# Patient Record
Sex: FEMALE | Race: WHITE | NOT HISPANIC OR LATINO | Employment: UNEMPLOYED | URBAN - NONMETROPOLITAN AREA
[De-identification: names, ages, dates, MRNs, and addresses within clinical notes are randomized per-mention and may not be internally consistent; named-entity substitution may affect disease eponyms.]

---

## 2017-01-01 ENCOUNTER — APPOINTMENT (OUTPATIENT)
Dept: CT IMAGING | Facility: HOSPITAL | Age: 57
End: 2017-01-01

## 2017-01-01 ENCOUNTER — APPOINTMENT (OUTPATIENT)
Dept: GENERAL RADIOLOGY | Facility: HOSPITAL | Age: 57
End: 2017-01-01

## 2017-01-01 ENCOUNTER — OFFICE VISIT (OUTPATIENT)
Dept: FAMILY MEDICINE CLINIC | Facility: CLINIC | Age: 57
End: 2017-01-01

## 2017-01-01 ENCOUNTER — HOSPITAL ENCOUNTER (OUTPATIENT)
Facility: HOSPITAL | Age: 57
Discharge: HOME | End: 2017-05-05
Attending: INTERNAL MEDICINE | Admitting: INTERNAL MEDICINE

## 2017-01-01 ENCOUNTER — APPOINTMENT (OUTPATIENT)
Dept: INTERVENTIONAL RADIOLOGY/VASCULAR | Facility: HOSPITAL | Age: 57
End: 2017-01-01

## 2017-01-01 ENCOUNTER — APPOINTMENT (OUTPATIENT)
Dept: LAB | Facility: HOSPITAL | Age: 57
End: 2017-01-01

## 2017-01-01 ENCOUNTER — APPOINTMENT (OUTPATIENT)
Dept: ULTRASOUND IMAGING | Facility: HOSPITAL | Age: 57
End: 2017-01-01

## 2017-01-01 ENCOUNTER — HOSPITAL ENCOUNTER (OUTPATIENT)
Facility: HOSPITAL | Age: 57
Discharge: HOME | End: 2017-03-13
Attending: FAMILY MEDICINE | Admitting: FAMILY MEDICINE

## 2017-01-01 ENCOUNTER — APPOINTMENT (OUTPATIENT)
Dept: INTERVENTIONAL RADIOLOGY/VASCULAR | Facility: HOSPITAL | Age: 57
End: 2017-01-01
Attending: FAMILY MEDICINE

## 2017-01-01 ENCOUNTER — APPOINTMENT (OUTPATIENT)
Dept: CARDIOLOGY | Facility: HOSPITAL | Age: 57
End: 2017-01-01
Attending: FAMILY MEDICINE

## 2017-01-01 ENCOUNTER — ANESTHESIA (OUTPATIENT)
Dept: EMERGENCY DEPT | Facility: HOSPITAL | Age: 57
End: 2017-01-01

## 2017-01-01 ENCOUNTER — HOSPITAL ENCOUNTER (INPATIENT)
Facility: HOSPITAL | Age: 57
LOS: 11 days | Discharge: HOME HEALTH | End: 2017-02-17
Attending: EMERGENCY MEDICINE | Admitting: INTERNAL MEDICINE

## 2017-01-01 ENCOUNTER — OFFICE VISIT (OUTPATIENT)
Dept: SURGERY | Facility: CLINIC | Age: 57
End: 2017-01-01

## 2017-01-01 ENCOUNTER — ANESTHESIA EVENT (OUTPATIENT)
Dept: EMERGENCY DEPT | Facility: HOSPITAL | Age: 57
End: 2017-01-01

## 2017-01-01 ENCOUNTER — ANESTHESIA (OUTPATIENT)
Dept: PERIOP | Facility: HOSPITAL | Age: 57
End: 2017-01-01

## 2017-01-01 ENCOUNTER — ANESTHESIA (OUTPATIENT)
Dept: GASTROENTEROLOGY | Facility: HOSPITAL | Age: 57
End: 2017-01-01

## 2017-01-01 ENCOUNTER — HOSPITAL ENCOUNTER (INPATIENT)
Facility: HOSPITAL | Age: 57
LOS: 5 days | Discharge: HOME | End: 2017-03-09
Attending: EMERGENCY MEDICINE | Admitting: FAMILY MEDICINE

## 2017-01-01 ENCOUNTER — ANESTHESIA EVENT (OUTPATIENT)
Dept: GASTROENTEROLOGY | Facility: HOSPITAL | Age: 57
End: 2017-01-01

## 2017-01-01 ENCOUNTER — HOSPITAL ENCOUNTER (EMERGENCY)
Facility: HOSPITAL | Age: 57
Discharge: HOME | End: 2017-02-19
Attending: EMERGENCY MEDICINE | Admitting: EMERGENCY MEDICINE

## 2017-01-01 ENCOUNTER — PREP FOR SURGERY (OUTPATIENT)
Dept: OTHER | Facility: HOSPITAL | Age: 57
End: 2017-01-01

## 2017-01-01 ENCOUNTER — HOSPITAL ENCOUNTER (INPATIENT)
Facility: HOSPITAL | Age: 57
LOS: 9 days | End: 2017-03-23
Attending: EMERGENCY MEDICINE | Admitting: FAMILY MEDICINE

## 2017-01-01 ENCOUNTER — ANESTHESIA (OUTPATIENT)
Dept: ICU | Facility: HOSPITAL | Age: 57
End: 2017-01-01

## 2017-01-01 ENCOUNTER — ANESTHESIA EVENT (OUTPATIENT)
Dept: PERIOP | Facility: HOSPITAL | Age: 57
End: 2017-01-01

## 2017-01-01 ENCOUNTER — ANESTHESIA EVENT (OUTPATIENT)
Dept: ICU | Facility: HOSPITAL | Age: 57
End: 2017-01-01

## 2017-01-01 ENCOUNTER — HOSPITAL ENCOUNTER (INPATIENT)
Facility: HOSPITAL | Age: 57
LOS: 28 days | End: 2017-06-03
Attending: FAMILY MEDICINE | Admitting: FAMILY MEDICINE

## 2017-01-01 ENCOUNTER — APPOINTMENT (OUTPATIENT)
Dept: NUCLEAR MEDICINE | Facility: HOSPITAL | Age: 57
End: 2017-01-01

## 2017-01-01 VITALS
HEIGHT: 65 IN | HEART RATE: 96 BPM | SYSTOLIC BLOOD PRESSURE: 160 MMHG | BODY MASS INDEX: 39.14 KG/M2 | OXYGEN SATURATION: 95 % | DIASTOLIC BLOOD PRESSURE: 90 MMHG | WEIGHT: 234.9 LBS

## 2017-01-01 VITALS
HEART RATE: 100 BPM | OXYGEN SATURATION: 94 % | SYSTOLIC BLOOD PRESSURE: 160 MMHG | DIASTOLIC BLOOD PRESSURE: 90 MMHG | BODY MASS INDEX: 39.49 KG/M2 | HEIGHT: 65 IN | WEIGHT: 237 LBS

## 2017-01-01 VITALS
OXYGEN SATURATION: 97 % | HEIGHT: 65 IN | SYSTOLIC BLOOD PRESSURE: 111 MMHG | TEMPERATURE: 98.5 F | HEART RATE: 84 BPM | DIASTOLIC BLOOD PRESSURE: 62 MMHG | RESPIRATION RATE: 16 BRPM | WEIGHT: 248.2 LBS | BODY MASS INDEX: 41.35 KG/M2

## 2017-01-01 VITALS
BODY MASS INDEX: 39.25 KG/M2 | OXYGEN SATURATION: 97 % | HEIGHT: 65 IN | DIASTOLIC BLOOD PRESSURE: 110 MMHG | TEMPERATURE: 99.4 F | HEART RATE: 101 BPM | SYSTOLIC BLOOD PRESSURE: 160 MMHG | WEIGHT: 235.6 LBS

## 2017-01-01 VITALS
RESPIRATION RATE: 17 BRPM | TEMPERATURE: 98.8 F | HEART RATE: 70 BPM | OXYGEN SATURATION: 97 % | BODY MASS INDEX: 37.06 KG/M2 | SYSTOLIC BLOOD PRESSURE: 162 MMHG | DIASTOLIC BLOOD PRESSURE: 90 MMHG | HEIGHT: 65 IN | WEIGHT: 222.44 LBS

## 2017-01-01 VITALS
OXYGEN SATURATION: 95 % | RESPIRATION RATE: 19 BRPM | HEART RATE: 67 BPM | WEIGHT: 220 LBS | DIASTOLIC BLOOD PRESSURE: 78 MMHG | TEMPERATURE: 98.9 F | BODY MASS INDEX: 36.65 KG/M2 | HEIGHT: 65 IN | SYSTOLIC BLOOD PRESSURE: 188 MMHG

## 2017-01-01 VITALS
TEMPERATURE: 97.7 F | HEIGHT: 65 IN | HEART RATE: 75 BPM | OXYGEN SATURATION: 95 % | BODY MASS INDEX: 41.59 KG/M2 | DIASTOLIC BLOOD PRESSURE: 84 MMHG | RESPIRATION RATE: 18 BRPM | SYSTOLIC BLOOD PRESSURE: 136 MMHG | WEIGHT: 249.6 LBS

## 2017-01-01 VITALS
HEIGHT: 65 IN | OXYGEN SATURATION: 98 % | WEIGHT: 244.31 LBS | SYSTOLIC BLOOD PRESSURE: 111 MMHG | RESPIRATION RATE: 22 BRPM | TEMPERATURE: 98.5 F | BODY MASS INDEX: 40.71 KG/M2 | HEART RATE: 80 BPM | DIASTOLIC BLOOD PRESSURE: 55 MMHG

## 2017-01-01 VITALS
BODY MASS INDEX: 39.53 KG/M2 | RESPIRATION RATE: 22 BRPM | HEIGHT: 65 IN | OXYGEN SATURATION: 98 % | TEMPERATURE: 98.6 F | DIASTOLIC BLOOD PRESSURE: 85 MMHG | HEART RATE: 104 BPM | WEIGHT: 237.25 LBS | SYSTOLIC BLOOD PRESSURE: 138 MMHG

## 2017-01-01 VITALS — HEART RATE: 76 BPM

## 2017-01-01 VITALS
BODY MASS INDEX: 39.15 KG/M2 | SYSTOLIC BLOOD PRESSURE: 144 MMHG | WEIGHT: 235 LBS | HEIGHT: 65 IN | DIASTOLIC BLOOD PRESSURE: 98 MMHG

## 2017-01-01 DIAGNOSIS — K92.2 GASTROINTESTINAL HEMORRHAGE, UNSPECIFIED GASTROINTESTINAL HEMORRHAGE TYPE: ICD-10-CM

## 2017-01-01 DIAGNOSIS — Z78.9 IMPAIRED MOBILITY AND ADLS: ICD-10-CM

## 2017-01-01 DIAGNOSIS — R26.89 IMPAIRED GAIT AND MOBILITY: ICD-10-CM

## 2017-01-01 DIAGNOSIS — L03.116 CELLULITIS OF BOTH LOWER EXTREMITIES: Primary | ICD-10-CM

## 2017-01-01 DIAGNOSIS — I10 ESSENTIAL HYPERTENSION: ICD-10-CM

## 2017-01-01 DIAGNOSIS — Z74.09 IMPAIRED FUNCTIONAL MOBILITY, BALANCE, GAIT, AND ENDURANCE: ICD-10-CM

## 2017-01-01 DIAGNOSIS — I50.21 ACUTE SYSTOLIC CONGESTIVE HEART FAILURE (HCC): ICD-10-CM

## 2017-01-01 DIAGNOSIS — Z74.09 IMPAIRED MOBILITY AND ADLS: ICD-10-CM

## 2017-01-01 DIAGNOSIS — K92.2 ACUTE GI HEMORRHAGE: ICD-10-CM

## 2017-01-01 DIAGNOSIS — K92.2 ACUTE GI HEMORRHAGE: Primary | ICD-10-CM

## 2017-01-01 DIAGNOSIS — M48.56XA COMPRESSION FRACTURE OF LUMBAR SPINE, NON-TRAUMATIC, INITIAL ENCOUNTER (HCC): ICD-10-CM

## 2017-01-01 DIAGNOSIS — R06.03 ACUTE RESPIRATORY DISTRESS: Primary | ICD-10-CM

## 2017-01-01 DIAGNOSIS — I16.1 HYPERTENSIVE EMERGENCY: ICD-10-CM

## 2017-01-01 DIAGNOSIS — Z93.3 COLOSTOMY PRESENT (HCC): ICD-10-CM

## 2017-01-01 DIAGNOSIS — R07.9 CHEST PAIN, UNSPECIFIED TYPE: Primary | ICD-10-CM

## 2017-01-01 DIAGNOSIS — D64.9 ANEMIA, UNSPECIFIED TYPE: ICD-10-CM

## 2017-01-01 DIAGNOSIS — I16.0 HYPERTENSIVE URGENCY: ICD-10-CM

## 2017-01-01 DIAGNOSIS — J81.0 ACUTE PULMONARY EDEMA (HCC): ICD-10-CM

## 2017-01-01 DIAGNOSIS — J40 BRONCHITIS: ICD-10-CM

## 2017-01-01 DIAGNOSIS — E86.0 DEHYDRATION: ICD-10-CM

## 2017-01-01 DIAGNOSIS — L24.89 IRRITANT CONTACT DERMATITIS DUE TO OTHER AGENTS: ICD-10-CM

## 2017-01-01 DIAGNOSIS — L03.115 CELLULITIS OF BOTH LOWER EXTREMITIES: Primary | ICD-10-CM

## 2017-01-01 DIAGNOSIS — Z97.8 ENDOTRACHEALLY INTUBATED: ICD-10-CM

## 2017-01-01 DIAGNOSIS — L03.119 CELLULITIS OF LOWER EXTREMITY, UNSPECIFIED LATERALITY: ICD-10-CM

## 2017-01-01 DIAGNOSIS — R10.84 GENERALIZED ABDOMINAL PAIN: Primary | ICD-10-CM

## 2017-01-01 DIAGNOSIS — Z93.3 COLOSTOMY PRESENT (HCC): Primary | ICD-10-CM

## 2017-01-01 DIAGNOSIS — L03.311 ABDOMINAL WALL CELLULITIS: ICD-10-CM

## 2017-01-01 DIAGNOSIS — L03.119 CELLULITIS OF LOWER EXTREMITY, UNSPECIFIED LATERALITY: Primary | ICD-10-CM

## 2017-01-01 DIAGNOSIS — Z74.09 IMPAIRED PHYSICAL MOBILITY: ICD-10-CM

## 2017-01-01 DIAGNOSIS — Z45.2 PICC (PERIPHERALLY INSERTED CENTRAL CATHETER) IN PLACE: ICD-10-CM

## 2017-01-01 DIAGNOSIS — R26.9 ABNORMALITY OF GAIT AND MOBILITY: ICD-10-CM

## 2017-01-01 DIAGNOSIS — Z51.81 ENCOUNTER FOR MEDICATION MONITORING: ICD-10-CM

## 2017-01-01 DIAGNOSIS — L73.9 FOLLICULITIS: Primary | ICD-10-CM

## 2017-01-01 DIAGNOSIS — D50.0 IRON DEFICIENCY ANEMIA DUE TO CHRONIC BLOOD LOSS: ICD-10-CM

## 2017-01-01 DIAGNOSIS — K92.2 GASTROINTESTINAL HEMORRHAGE, UNSPECIFIED GASTROINTESTINAL HEMORRHAGE TYPE: Primary | ICD-10-CM

## 2017-01-01 DIAGNOSIS — R09.02 HYPOXIA: ICD-10-CM

## 2017-01-01 LAB
A-A DO2: 115.4 MMHG
A-A DO2: 27.4 MMHG
ABO + RH BLD: NORMAL
ABO GROUP BLD: NORMAL
ALBUMIN SERPL-MCNC: 2.1 G/DL (ref 3.4–4.8)
ALBUMIN SERPL-MCNC: 2.1 G/DL (ref 3.4–4.8)
ALBUMIN SERPL-MCNC: 2.2 G/DL (ref 3.4–4.8)
ALBUMIN SERPL-MCNC: 2.3 G/DL (ref 3.4–4.8)
ALBUMIN SERPL-MCNC: 2.4 G/DL (ref 3.4–4.8)
ALBUMIN SERPL-MCNC: 2.5 G/DL (ref 3.4–4.8)
ALBUMIN SERPL-MCNC: 2.6 G/DL (ref 3.4–4.8)
ALBUMIN SERPL-MCNC: 2.7 G/DL (ref 3.4–4.8)
ALBUMIN SERPL-MCNC: 2.8 G/DL (ref 3.4–4.8)
ALBUMIN SERPL-MCNC: 3 G/DL (ref 3.4–4.8)
ALBUMIN SERPL-MCNC: 3.1 G/DL (ref 3.4–4.8)
ALBUMIN SERPL-MCNC: 3.1 G/DL (ref 3.4–4.8)
ALBUMIN SERPL-MCNC: 3.2 G/DL (ref 3.4–4.8)
ALBUMIN SERPL-MCNC: 3.3 G/DL (ref 3.4–4.8)
ALBUMIN SERPL-MCNC: 3.5 G/DL (ref 3.4–4.8)
ALBUMIN SERPL-MCNC: 3.6 G/DL (ref 3.4–4.8)
ALBUMIN SERPL-MCNC: 3.6 G/DL (ref 3.4–4.8)
ALBUMIN SERPL-MCNC: 3.7 G/DL (ref 3.4–4.8)
ALBUMIN SERPL-MCNC: 3.8 G/DL (ref 3.4–4.8)
ALBUMIN SERPL-MCNC: 3.8 G/DL (ref 3.4–4.8)
ALBUMIN SERPL-MCNC: 3.9 G/DL (ref 3.4–4.8)
ALBUMIN SERPL-MCNC: 4 G/DL (ref 3.4–4.8)
ALBUMIN SERPL-MCNC: 4 G/DL (ref 3.4–4.8)
ALBUMIN SERPL-MCNC: 4.1 G/DL (ref 3.4–4.8)
ALBUMIN SERPL-MCNC: 4.1 G/DL (ref 3.4–4.8)
ALBUMIN SERPL-MCNC: 4.2 G/DL (ref 3.4–4.8)
ALBUMIN SERPL-MCNC: 4.5 G/DL (ref 3.4–4.8)
ALBUMIN/GLOB SERPL: 0.8 G/DL (ref 1.1–1.8)
ALBUMIN/GLOB SERPL: 0.9 G/DL (ref 1.1–1.8)
ALBUMIN/GLOB SERPL: 1 G/DL (ref 1.1–1.8)
ALBUMIN/GLOB SERPL: 1.1 G/DL (ref 1.1–1.8)
ALBUMIN/GLOB SERPL: 1.2 G/DL (ref 1.1–1.8)
ALBUMIN/GLOB SERPL: 1.3 G/DL (ref 1.1–1.8)
ALBUMIN/GLOB SERPL: 1.4 G/DL (ref 1.1–1.8)
ALBUMIN/GLOB SERPL: 1.5 G/DL (ref 1.1–1.8)
ALBUMIN/GLOB SERPL: 1.6 G/DL (ref 1.1–1.8)
ALBUMIN/GLOB SERPL: 1.7 G/DL (ref 1.1–1.8)
ALBUMIN/GLOB SERPL: 1.7 G/DL (ref 1.1–1.8)
ALP SERPL-CCNC: 100 U/L (ref 38–126)
ALP SERPL-CCNC: 103 U/L (ref 38–126)
ALP SERPL-CCNC: 103 U/L (ref 38–126)
ALP SERPL-CCNC: 104 U/L (ref 38–126)
ALP SERPL-CCNC: 105 U/L (ref 38–126)
ALP SERPL-CCNC: 106 U/L (ref 38–126)
ALP SERPL-CCNC: 109 U/L (ref 38–126)
ALP SERPL-CCNC: 112 U/L (ref 38–126)
ALP SERPL-CCNC: 113 U/L (ref 38–126)
ALP SERPL-CCNC: 116 U/L (ref 38–126)
ALP SERPL-CCNC: 116 U/L (ref 38–126)
ALP SERPL-CCNC: 117 U/L (ref 38–126)
ALP SERPL-CCNC: 117 U/L (ref 38–126)
ALP SERPL-CCNC: 118 U/L (ref 38–126)
ALP SERPL-CCNC: 123 U/L (ref 38–126)
ALP SERPL-CCNC: 123 U/L (ref 38–126)
ALP SERPL-CCNC: 124 U/L (ref 38–126)
ALP SERPL-CCNC: 132 U/L (ref 38–126)
ALP SERPL-CCNC: 136 U/L (ref 38–126)
ALP SERPL-CCNC: 140 U/L (ref 38–126)
ALP SERPL-CCNC: 47 U/L (ref 38–126)
ALP SERPL-CCNC: 48 U/L (ref 38–126)
ALP SERPL-CCNC: 50 U/L (ref 38–126)
ALP SERPL-CCNC: 50 U/L (ref 38–126)
ALP SERPL-CCNC: 52 U/L (ref 38–126)
ALP SERPL-CCNC: 52 U/L (ref 38–126)
ALP SERPL-CCNC: 58 U/L (ref 38–126)
ALP SERPL-CCNC: 59 U/L (ref 38–126)
ALP SERPL-CCNC: 59 U/L (ref 38–126)
ALP SERPL-CCNC: 61 U/L (ref 38–126)
ALP SERPL-CCNC: 62 U/L (ref 38–126)
ALP SERPL-CCNC: 63 U/L (ref 38–126)
ALP SERPL-CCNC: 65 U/L (ref 38–126)
ALP SERPL-CCNC: 66 U/L (ref 38–126)
ALP SERPL-CCNC: 67 U/L (ref 38–126)
ALP SERPL-CCNC: 68 U/L (ref 38–126)
ALP SERPL-CCNC: 68 U/L (ref 38–126)
ALP SERPL-CCNC: 69 U/L (ref 38–126)
ALP SERPL-CCNC: 70 U/L (ref 38–126)
ALP SERPL-CCNC: 71 U/L (ref 38–126)
ALP SERPL-CCNC: 72 U/L (ref 38–126)
ALP SERPL-CCNC: 73 U/L (ref 38–126)
ALP SERPL-CCNC: 74 U/L (ref 38–126)
ALP SERPL-CCNC: 75 U/L (ref 38–126)
ALP SERPL-CCNC: 76 U/L (ref 38–126)
ALP SERPL-CCNC: 77 U/L (ref 38–126)
ALP SERPL-CCNC: 77 U/L (ref 38–126)
ALP SERPL-CCNC: 78 U/L (ref 38–126)
ALP SERPL-CCNC: 80 U/L (ref 38–126)
ALP SERPL-CCNC: 81 U/L (ref 38–126)
ALP SERPL-CCNC: 82 U/L (ref 38–126)
ALP SERPL-CCNC: 83 U/L (ref 38–126)
ALP SERPL-CCNC: 84 U/L (ref 38–126)
ALP SERPL-CCNC: 85 U/L (ref 38–126)
ALP SERPL-CCNC: 86 U/L (ref 38–126)
ALP SERPL-CCNC: 87 U/L (ref 38–126)
ALP SERPL-CCNC: 87 U/L (ref 38–126)
ALP SERPL-CCNC: 88 U/L (ref 38–126)
ALP SERPL-CCNC: 89 U/L (ref 38–126)
ALP SERPL-CCNC: 90 U/L (ref 38–126)
ALP SERPL-CCNC: 90 U/L (ref 38–126)
ALP SERPL-CCNC: 91 U/L (ref 38–126)
ALP SERPL-CCNC: 91 U/L (ref 38–126)
ALP SERPL-CCNC: 92 U/L (ref 38–126)
ALP SERPL-CCNC: 92 U/L (ref 38–126)
ALP SERPL-CCNC: 93 U/L (ref 38–126)
ALP SERPL-CCNC: 94 U/L (ref 38–126)
ALP SERPL-CCNC: 96 U/L (ref 38–126)
ALP SERPL-CCNC: 97 U/L (ref 38–126)
ALP SERPL-CCNC: 97 U/L (ref 38–126)
ALP SERPL-CCNC: 98 U/L (ref 38–126)
ALP SERPL-CCNC: 98 U/L (ref 38–126)
ALT SERPL W P-5'-P-CCNC: 102 U/L (ref 9–52)
ALT SERPL W P-5'-P-CCNC: 106 U/L (ref 9–52)
ALT SERPL W P-5'-P-CCNC: 107 U/L (ref 9–52)
ALT SERPL W P-5'-P-CCNC: 108 U/L (ref 9–52)
ALT SERPL W P-5'-P-CCNC: 111 U/L (ref 9–52)
ALT SERPL W P-5'-P-CCNC: 113 U/L (ref 9–52)
ALT SERPL W P-5'-P-CCNC: 120 U/L (ref 9–52)
ALT SERPL W P-5'-P-CCNC: 124 U/L (ref 9–52)
ALT SERPL W P-5'-P-CCNC: 132 U/L (ref 9–52)
ALT SERPL W P-5'-P-CCNC: 137 U/L (ref 9–52)
ALT SERPL W P-5'-P-CCNC: 139 U/L (ref 9–52)
ALT SERPL W P-5'-P-CCNC: 141 U/L (ref 9–52)
ALT SERPL W P-5'-P-CCNC: 165 U/L (ref 9–52)
ALT SERPL W P-5'-P-CCNC: 166 U/L (ref 9–52)
ALT SERPL W P-5'-P-CCNC: 172 U/L (ref 9–52)
ALT SERPL W P-5'-P-CCNC: 174 U/L (ref 9–52)
ALT SERPL W P-5'-P-CCNC: 176 U/L (ref 9–52)
ALT SERPL W P-5'-P-CCNC: 176 U/L (ref 9–52)
ALT SERPL W P-5'-P-CCNC: 188 U/L (ref 9–52)
ALT SERPL W P-5'-P-CCNC: 23 U/L (ref 9–52)
ALT SERPL W P-5'-P-CCNC: 24 U/L (ref 9–52)
ALT SERPL W P-5'-P-CCNC: 25 U/L (ref 9–52)
ALT SERPL W P-5'-P-CCNC: 26 U/L (ref 9–52)
ALT SERPL W P-5'-P-CCNC: 31 U/L (ref 9–52)
ALT SERPL W P-5'-P-CCNC: 32 U/L (ref 9–52)
ALT SERPL W P-5'-P-CCNC: 35 U/L (ref 9–52)
ALT SERPL W P-5'-P-CCNC: 38 U/L (ref 9–52)
ALT SERPL W P-5'-P-CCNC: 40 U/L (ref 9–52)
ALT SERPL W P-5'-P-CCNC: 42 U/L (ref 9–52)
ALT SERPL W P-5'-P-CCNC: 42 U/L (ref 9–52)
ALT SERPL W P-5'-P-CCNC: 43 U/L (ref 9–52)
ALT SERPL W P-5'-P-CCNC: 43 U/L (ref 9–52)
ALT SERPL W P-5'-P-CCNC: 44 U/L (ref 9–52)
ALT SERPL W P-5'-P-CCNC: 44 U/L (ref 9–52)
ALT SERPL W P-5'-P-CCNC: 45 U/L (ref 9–52)
ALT SERPL W P-5'-P-CCNC: 46 U/L (ref 9–52)
ALT SERPL W P-5'-P-CCNC: 46 U/L (ref 9–52)
ALT SERPL W P-5'-P-CCNC: 47 U/L (ref 9–52)
ALT SERPL W P-5'-P-CCNC: 48 U/L (ref 9–52)
ALT SERPL W P-5'-P-CCNC: 48 U/L (ref 9–52)
ALT SERPL W P-5'-P-CCNC: 49 U/L (ref 9–52)
ALT SERPL W P-5'-P-CCNC: 50 U/L (ref 9–52)
ALT SERPL W P-5'-P-CCNC: 51 U/L (ref 9–52)
ALT SERPL W P-5'-P-CCNC: 52 U/L (ref 9–52)
ALT SERPL W P-5'-P-CCNC: 53 U/L (ref 9–52)
ALT SERPL W P-5'-P-CCNC: 54 U/L (ref 9–52)
ALT SERPL W P-5'-P-CCNC: 56 U/L (ref 9–52)
ALT SERPL W P-5'-P-CCNC: 56 U/L (ref 9–52)
ALT SERPL W P-5'-P-CCNC: 57 U/L (ref 9–52)
ALT SERPL W P-5'-P-CCNC: 57 U/L (ref 9–52)
ALT SERPL W P-5'-P-CCNC: 58 U/L (ref 9–52)
ALT SERPL W P-5'-P-CCNC: 59 U/L (ref 9–52)
ALT SERPL W P-5'-P-CCNC: 62 U/L (ref 9–52)
ALT SERPL W P-5'-P-CCNC: 63 U/L (ref 9–52)
ALT SERPL W P-5'-P-CCNC: 64 U/L (ref 9–52)
ALT SERPL W P-5'-P-CCNC: 66 U/L (ref 9–52)
ALT SERPL W P-5'-P-CCNC: 67 U/L (ref 9–52)
ALT SERPL W P-5'-P-CCNC: 69 U/L (ref 9–52)
ALT SERPL W P-5'-P-CCNC: 70 U/L (ref 9–52)
ALT SERPL W P-5'-P-CCNC: 70 U/L (ref 9–52)
ALT SERPL W P-5'-P-CCNC: 71 U/L (ref 9–52)
ALT SERPL W P-5'-P-CCNC: 71 U/L (ref 9–52)
ALT SERPL W P-5'-P-CCNC: 74 U/L (ref 9–52)
ALT SERPL W P-5'-P-CCNC: 75 U/L (ref 9–52)
ALT SERPL W P-5'-P-CCNC: 76 U/L (ref 9–52)
ALT SERPL W P-5'-P-CCNC: 76 U/L (ref 9–52)
ALT SERPL W P-5'-P-CCNC: 77 U/L (ref 9–52)
ALT SERPL W P-5'-P-CCNC: 83 U/L (ref 9–52)
AMPHET+METHAMPHET UR QL: NEGATIVE
AMPHET+METHAMPHET UR QL: NEGATIVE
AMYLASE SERPL-CCNC: 106 U/L (ref 50–130)
ANION GAP SERPL CALCULATED.3IONS-SCNC: 0 MMOL/L (ref 5–15)
ANION GAP SERPL CALCULATED.3IONS-SCNC: 10 MMOL/L (ref 5–15)
ANION GAP SERPL CALCULATED.3IONS-SCNC: 11 MMOL/L (ref 5–15)
ANION GAP SERPL CALCULATED.3IONS-SCNC: 12 MMOL/L (ref 5–15)
ANION GAP SERPL CALCULATED.3IONS-SCNC: 13 MMOL/L (ref 5–15)
ANION GAP SERPL CALCULATED.3IONS-SCNC: 13 MMOL/L (ref 5–15)
ANION GAP SERPL CALCULATED.3IONS-SCNC: 14 MMOL/L (ref 5–15)
ANION GAP SERPL CALCULATED.3IONS-SCNC: 15 MMOL/L (ref 5–15)
ANION GAP SERPL CALCULATED.3IONS-SCNC: 3 MMOL/L (ref 5–15)
ANION GAP SERPL CALCULATED.3IONS-SCNC: 4 MMOL/L (ref 5–15)
ANION GAP SERPL CALCULATED.3IONS-SCNC: 5 MMOL/L (ref 5–15)
ANION GAP SERPL CALCULATED.3IONS-SCNC: 6 MMOL/L (ref 5–15)
ANION GAP SERPL CALCULATED.3IONS-SCNC: 7 MMOL/L (ref 5–15)
ANION GAP SERPL CALCULATED.3IONS-SCNC: 8 MMOL/L (ref 5–15)
ANION GAP SERPL CALCULATED.3IONS-SCNC: 9 MMOL/L (ref 5–15)
ANISOCYTOSIS BLD QL: ABNORMAL
ANISOCYTOSIS BLD QL: NORMAL
APTT PPP: 20.5 SECONDS (ref 20–40.3)
APTT PPP: 20.6 SECONDS (ref 20–40.3)
APTT PPP: 21.2 SECONDS (ref 20–40.3)
APTT PPP: 23 SECONDS (ref 20–40.3)
APTT PPP: 23.1 SECONDS (ref 20–40.3)
APTT PPP: 24.4 SECONDS (ref 20–40.3)
APTT PPP: 24.9 SECONDS (ref 20–40.3)
APTT PPP: 25.6 SECONDS (ref 20–40.3)
APTT PPP: 25.6 SECONDS (ref 20–40.3)
APTT PPP: 25.9 SECONDS (ref 20–40.3)
APTT PPP: 26.2 SECONDS (ref 20–40.3)
APTT PPP: 26.4 SECONDS (ref 20–40.3)
APTT PPP: 26.9 SECONDS (ref 20–40.3)
APTT PPP: 27.1 SECONDS (ref 20–40.3)
APTT PPP: 27.5 SECONDS (ref 20–40.3)
APTT PPP: 28 SECONDS (ref 20–40.3)
APTT PPP: 28.1 SECONDS (ref 20–40.3)
APTT PPP: 28.1 SECONDS (ref 20–40.3)
APTT PPP: 28.8 SECONDS (ref 20–40.3)
APTT PPP: 29.7 SECONDS (ref 20–40.3)
APTT PPP: 30.4 SECONDS (ref 20–40.3)
APTT PPP: 30.5 SECONDS (ref 20–40.3)
APTT PPP: 36.4 SECONDS (ref 20–40.3)
ARTERIAL PATENCY WRIST A: ABNORMAL
ARTICHOKE IGE QN: 50 MG/DL (ref 1–129)
AST SERPL-CCNC: 106 U/L (ref 14–36)
AST SERPL-CCNC: 112 U/L (ref 14–36)
AST SERPL-CCNC: 113 U/L (ref 14–36)
AST SERPL-CCNC: 114 U/L (ref 14–36)
AST SERPL-CCNC: 119 U/L (ref 14–36)
AST SERPL-CCNC: 123 U/L (ref 14–36)
AST SERPL-CCNC: 126 U/L (ref 14–36)
AST SERPL-CCNC: 132 U/L (ref 14–36)
AST SERPL-CCNC: 14 U/L (ref 14–36)
AST SERPL-CCNC: 142 U/L (ref 14–36)
AST SERPL-CCNC: 146 U/L (ref 14–36)
AST SERPL-CCNC: 148 U/L (ref 14–36)
AST SERPL-CCNC: 151 U/L (ref 14–36)
AST SERPL-CCNC: 16 U/L (ref 14–36)
AST SERPL-CCNC: 160 U/L (ref 14–36)
AST SERPL-CCNC: 165 U/L (ref 14–36)
AST SERPL-CCNC: 170 U/L (ref 14–36)
AST SERPL-CCNC: 171 U/L (ref 14–36)
AST SERPL-CCNC: 172 U/L (ref 14–36)
AST SERPL-CCNC: 21 U/L (ref 14–36)
AST SERPL-CCNC: 26 U/L (ref 14–36)
AST SERPL-CCNC: 27 U/L (ref 14–36)
AST SERPL-CCNC: 30 U/L (ref 14–36)
AST SERPL-CCNC: 31 U/L (ref 14–36)
AST SERPL-CCNC: 33 U/L (ref 14–36)
AST SERPL-CCNC: 35 U/L (ref 14–36)
AST SERPL-CCNC: 35 U/L (ref 14–36)
AST SERPL-CCNC: 38 U/L (ref 14–36)
AST SERPL-CCNC: 38 U/L (ref 14–36)
AST SERPL-CCNC: 39 U/L (ref 14–36)
AST SERPL-CCNC: 40 U/L (ref 14–36)
AST SERPL-CCNC: 41 U/L (ref 14–36)
AST SERPL-CCNC: 44 U/L (ref 14–36)
AST SERPL-CCNC: 44 U/L (ref 14–36)
AST SERPL-CCNC: 45 U/L (ref 14–36)
AST SERPL-CCNC: 47 U/L (ref 14–36)
AST SERPL-CCNC: 47 U/L (ref 14–36)
AST SERPL-CCNC: 48 U/L (ref 14–36)
AST SERPL-CCNC: 48 U/L (ref 14–36)
AST SERPL-CCNC: 49 U/L (ref 14–36)
AST SERPL-CCNC: 51 U/L (ref 14–36)
AST SERPL-CCNC: 52 U/L (ref 14–36)
AST SERPL-CCNC: 53 U/L (ref 14–36)
AST SERPL-CCNC: 54 U/L (ref 14–36)
AST SERPL-CCNC: 55 U/L (ref 14–36)
AST SERPL-CCNC: 56 U/L (ref 14–36)
AST SERPL-CCNC: 57 U/L (ref 14–36)
AST SERPL-CCNC: 58 U/L (ref 14–36)
AST SERPL-CCNC: 58 U/L (ref 14–36)
AST SERPL-CCNC: 59 U/L (ref 14–36)
AST SERPL-CCNC: 60 U/L (ref 14–36)
AST SERPL-CCNC: 61 U/L (ref 14–36)
AST SERPL-CCNC: 61 U/L (ref 14–36)
AST SERPL-CCNC: 62 U/L (ref 14–36)
AST SERPL-CCNC: 62 U/L (ref 14–36)
AST SERPL-CCNC: 63 U/L (ref 14–36)
AST SERPL-CCNC: 65 U/L (ref 14–36)
AST SERPL-CCNC: 66 U/L (ref 14–36)
AST SERPL-CCNC: 66 U/L (ref 14–36)
AST SERPL-CCNC: 67 U/L (ref 14–36)
AST SERPL-CCNC: 73 U/L (ref 14–36)
AST SERPL-CCNC: 73 U/L (ref 14–36)
AST SERPL-CCNC: 74 U/L (ref 14–36)
AST SERPL-CCNC: 75 U/L (ref 14–36)
AST SERPL-CCNC: 75 U/L (ref 14–36)
AST SERPL-CCNC: 76 U/L (ref 14–36)
AST SERPL-CCNC: 76 U/L (ref 14–36)
AST SERPL-CCNC: 77 U/L (ref 14–36)
AST SERPL-CCNC: 78 U/L (ref 14–36)
AST SERPL-CCNC: 80 U/L (ref 14–36)
AST SERPL-CCNC: 81 U/L (ref 14–36)
AST SERPL-CCNC: 84 U/L (ref 14–36)
AST SERPL-CCNC: 86 U/L (ref 14–36)
AST SERPL-CCNC: 86 U/L (ref 14–36)
AST SERPL-CCNC: 87 U/L (ref 14–36)
AST SERPL-CCNC: 88 U/L (ref 14–36)
AST SERPL-CCNC: 91 U/L (ref 14–36)
AST SERPL-CCNC: 92 U/L (ref 14–36)
AST SERPL-CCNC: 93 U/L (ref 14–36)
AST SERPL-CCNC: 97 U/L (ref 14–36)
AST SERPL-CCNC: 99 U/L (ref 14–36)
AT III AG PPP IA-ACNC: 79 % (ref 72–124)
AT III PPP CHRO-ACNC: 98 % (ref 75–135)
ATMOSPHERIC PRESS: ABNORMAL MMHG
BACTERIA SPEC AEROBE CULT: ABNORMAL
BACTERIA SPEC AEROBE CULT: NORMAL
BACTERIA SPEC RESP CULT: ABNORMAL
BACTERIA UR QL AUTO: ABNORMAL /HPF
BARBITURATES UR QL SCN: NEGATIVE
BARBITURATES UR QL SCN: NEGATIVE
BASE EXCESS BLDA CALC-SCNC: -0.2 MMOL/L (ref -2.4–2.4)
BASE EXCESS BLDA CALC-SCNC: -1.5 MMOL/L (ref -2.4–2.4)
BASE EXCESS BLDA CALC-SCNC: -1.7 MMOL/L (ref -2.4–2.4)
BASE EXCESS BLDA CALC-SCNC: -10.1 MMOL/L (ref -2.4–2.4)
BASE EXCESS BLDA CALC-SCNC: -11 MMOL/L (ref -2.4–2.4)
BASE EXCESS BLDA CALC-SCNC: -11 MMOL/L (ref -2.4–2.4)
BASE EXCESS BLDA CALC-SCNC: -11.8 MMOL/L (ref -2.4–2.4)
BASE EXCESS BLDA CALC-SCNC: -2.5 MMOL/L (ref -2.4–2.4)
BASE EXCESS BLDA CALC-SCNC: -3.1 MMOL/L (ref -2.4–2.4)
BASE EXCESS BLDA CALC-SCNC: -3.7 MMOL/L (ref -2.4–2.4)
BASE EXCESS BLDA CALC-SCNC: -4.4 MMOL/L (ref -2.4–2.4)
BASE EXCESS BLDA CALC-SCNC: -4.5 MMOL/L (ref -2.4–2.4)
BASE EXCESS BLDA CALC-SCNC: -5.2 MMOL/L (ref -2.4–2.4)
BASE EXCESS BLDA CALC-SCNC: -5.3 MMOL/L (ref -2.4–2.4)
BASE EXCESS BLDA CALC-SCNC: -5.7 MMOL/L (ref -2.4–2.4)
BASE EXCESS BLDA CALC-SCNC: -5.7 MMOL/L (ref -2.4–2.4)
BASE EXCESS BLDA CALC-SCNC: -5.8 MMOL/L (ref -2.4–2.4)
BASE EXCESS BLDA CALC-SCNC: -6.3 MMOL/L (ref -2.4–2.4)
BASE EXCESS BLDA CALC-SCNC: -7.9 MMOL/L (ref -2.4–2.4)
BASE EXCESS BLDA CALC-SCNC: -8.2 MMOL/L (ref -2.4–2.4)
BASE EXCESS BLDA CALC-SCNC: -9 MMOL/L (ref -2.4–2.4)
BASE EXCESS BLDA CALC-SCNC: -9.7 MMOL/L (ref -2.4–2.4)
BASE EXCESS BLDA CALC-SCNC: -9.8 MMOL/L (ref -2.4–2.4)
BASE EXCESS BLDA CALC-SCNC: -9.9 MMOL/L (ref -2.4–2.4)
BASE EXCESS BLDA CALC-SCNC: 1.2 MMOL/L (ref -2.4–2.4)
BASE EXCESS BLDA CALC-SCNC: 1.5 MMOL/L (ref -2.4–2.4)
BASOPHILS # BLD AUTO: 0 10*3/MM3 (ref 0–0.2)
BASOPHILS # BLD AUTO: 0.01 10*3/MM3 (ref 0–0.2)
BASOPHILS # BLD AUTO: 0.02 10*3/MM3 (ref 0–0.2)
BASOPHILS # BLD MANUAL: 0.07 10*3/MM3 (ref 0–0.2)
BASOPHILS NFR BLD AUTO: 0 % (ref 0–2)
BASOPHILS NFR BLD AUTO: 0.1 % (ref 0–2)
BASOPHILS NFR BLD AUTO: 0.2 % (ref 0–2)
BASOPHILS NFR BLD AUTO: 0.3 % (ref 0–2)
BASOPHILS NFR BLD AUTO: 0.3 % (ref 0–2)
BASOPHILS NFR BLD AUTO: 1 % (ref 0–2)
BDY SITE: ABNORMAL
BENZODIAZ UR QL SCN: NEGATIVE
BENZODIAZ UR QL SCN: POSITIVE
BETA LACTAMASE: ABNORMAL
BETA LACTAMASE: ABNORMAL
BH BB BLOOD EXPIRATION DATE: NORMAL
BH BB BLOOD TYPE BARCODE: 5100
BH BB BLOOD TYPE BARCODE: 600
BH BB BLOOD TYPE BARCODE: 6200
BH BB BLOOD TYPE BARCODE: 8400
BH BB BLOOD TYPE BARCODE: 8400
BH BB BLOOD TYPE BARCODE: 9500
BH BB BLOOD TYPE BARCODE: NORMAL
BH BB DISPENSE STATUS: NORMAL
BH BB PRODUCT CODE: NORMAL
BH BB UNIT NUMBER: NORMAL
BH CV ECHO MEAS - ACS: 1.9 CM
BH CV ECHO MEAS - AO ISTHMUS: 2 CM
BH CV ECHO MEAS - AO MAX PG (FULL): 17.5 MMHG
BH CV ECHO MEAS - AO MAX PG: 23.6 MMHG
BH CV ECHO MEAS - AO MEAN PG (FULL): 6.4 MMHG
BH CV ECHO MEAS - AO MEAN PG: 9.6 MMHG
BH CV ECHO MEAS - AO ROOT AREA (BSA CORRECTED): 1.4
BH CV ECHO MEAS - AO ROOT AREA: 7 CM^2
BH CV ECHO MEAS - AO ROOT DIAM: 3 CM
BH CV ECHO MEAS - AO V2 MAX: 242.8 CM/SEC
BH CV ECHO MEAS - AO V2 MEAN: 144.5 CM/SEC
BH CV ECHO MEAS - AO V2 VTI: 43.5 CM
BH CV ECHO MEAS - ASC AORTA: 2.8 CM
BH CV ECHO MEAS - AVA(I,A): 1.9 CM^2
BH CV ECHO MEAS - AVA(I,D): 1.9 CM^2
BH CV ECHO MEAS - AVA(V,A): 1.6 CM^2
BH CV ECHO MEAS - AVA(V,D): 1.6 CM^2
BH CV ECHO MEAS - BSA(HAYCOCK): 2.2 M^2
BH CV ECHO MEAS - BSA: 2.1 M^2
BH CV ECHO MEAS - BZI_BMI: 36.6 KILOGRAMS/M^2
BH CV ECHO MEAS - BZI_METRIC_HEIGHT: 165.1 CM
BH CV ECHO MEAS - BZI_METRIC_WEIGHT: 99.8 KG
BH CV ECHO MEAS - EDV(CUBED): 139.5 ML
BH CV ECHO MEAS - EDV(TEICH): 128.7 ML
BH CV ECHO MEAS - EF(CUBED): 79.5 %
BH CV ECHO MEAS - EF(TEICH): 71.6 %
BH CV ECHO MEAS - ESV(CUBED): 28.5 ML
BH CV ECHO MEAS - ESV(TEICH): 36.6 ML
BH CV ECHO MEAS - FS: 41.1 %
BH CV ECHO MEAS - IVS/LVPW: 1.1
BH CV ECHO MEAS - IVSD: 1 CM
BH CV ECHO MEAS - LA DIMENSION: 3.7 CM
BH CV ECHO MEAS - LA/AO: 1.2
BH CV ECHO MEAS - LV MASS(C)D: 188.9 GRAMS
BH CV ECHO MEAS - LV MASS(C)DI: 91.7 GRAMS/M^2
BH CV ECHO MEAS - LV MAX PG: 6.1 MMHG
BH CV ECHO MEAS - LV MEAN PG: 3.2 MMHG
BH CV ECHO MEAS - LV V1 MAX: 123.6 CM/SEC
BH CV ECHO MEAS - LV V1 MEAN: 85 CM/SEC
BH CV ECHO MEAS - LV V1 VTI: 27.2 CM
BH CV ECHO MEAS - LVIDD: 5.2 CM
BH CV ECHO MEAS - LVIDS: 3.1 CM
BH CV ECHO MEAS - LVOT AREA (M): 3.1 CM^2
BH CV ECHO MEAS - LVOT AREA: 3.1 CM^2
BH CV ECHO MEAS - LVOT DIAM: 2 CM
BH CV ECHO MEAS - LVPWD: 0.93 CM
BH CV ECHO MEAS - MR MAX PG: 85 MMHG
BH CV ECHO MEAS - MR MAX VEL: 461 CM/SEC
BH CV ECHO MEAS - MV A MAX VEL: 89.2 CM/SEC
BH CV ECHO MEAS - MV DEC SLOPE: 729.8 CM/SEC^2
BH CV ECHO MEAS - MV E MAX VEL: 113.8 CM/SEC
BH CV ECHO MEAS - MV E/A: 1.3
BH CV ECHO MEAS - MV MAX PG: 9.6 MMHG
BH CV ECHO MEAS - MV MEAN PG: 3.9 MMHG
BH CV ECHO MEAS - MV P1/2T MAX VEL: 156.1 CM/SEC
BH CV ECHO MEAS - MV P1/2T: 62.7 MSEC
BH CV ECHO MEAS - MV V2 MAX: 155.1 CM/SEC
BH CV ECHO MEAS - MV V2 MEAN: 90.7 CM/SEC
BH CV ECHO MEAS - MV V2 VTI: 46.2 CM
BH CV ECHO MEAS - MVA P1/2T LCG: 1.4 CM^2
BH CV ECHO MEAS - MVA(P1/2T): 3.5 CM^2
BH CV ECHO MEAS - MVA(VTI): 1.8 CM^2
BH CV ECHO MEAS - PA MAX PG: 4.6 MMHG
BH CV ECHO MEAS - PA V2 MAX: 107.7 CM/SEC
BH CV ECHO MEAS - RAP SYSTOLE: 10 MMHG
BH CV ECHO MEAS - RAP SYSTOLE: 10 MMHG
BH CV ECHO MEAS - RAP SYSTOLE: 5 MMHG
BH CV ECHO MEAS - RVDD: 3.3 CM
BH CV ECHO MEAS - RVSP: 32.1 MMHG
BH CV ECHO MEAS - RVSP: 49.4 MMHG
BH CV ECHO MEAS - RVSP: 52.2 MMHG
BH CV ECHO MEAS - SI(AO): 147.6 ML/M^2
BH CV ECHO MEAS - SI(CUBED): 53.9 ML/M^2
BH CV ECHO MEAS - SI(LVOT): 40.6 ML/M^2
BH CV ECHO MEAS - SI(TEICH): 44.7 ML/M^2
BH CV ECHO MEAS - SV(AO): 304 ML
BH CV ECHO MEAS - SV(CUBED): 111 ML
BH CV ECHO MEAS - SV(LVOT): 83.7 ML
BH CV ECHO MEAS - SV(TEICH): 92.1 ML
BH CV ECHO MEAS - TR MAX VEL: 260.1 CM/SEC
BH CV ECHO MEAS - TR MAX VEL: 314 CM/SEC
BH CV ECHO MEAS - TR MAX VEL: 324.6 CM/SEC
BILIRUB SERPL-MCNC: 0.2 MG/DL (ref 0.2–1.3)
BILIRUB SERPL-MCNC: 0.3 MG/DL (ref 0.2–1.3)
BILIRUB SERPL-MCNC: 0.4 MG/DL (ref 0.2–1.3)
BILIRUB SERPL-MCNC: 0.5 MG/DL (ref 0.2–1.3)
BILIRUB SERPL-MCNC: 0.6 MG/DL (ref 0.2–1.3)
BILIRUB SERPL-MCNC: 0.7 MG/DL (ref 0.2–1.3)
BILIRUB SERPL-MCNC: 0.8 MG/DL (ref 0.2–1.3)
BILIRUB SERPL-MCNC: 1 MG/DL (ref 0.2–1.3)
BILIRUB SERPL-MCNC: 1.1 MG/DL (ref 0.2–1.3)
BILIRUB SERPL-MCNC: 1.1 MG/DL (ref 0.2–1.3)
BILIRUB SERPL-MCNC: 1.3 MG/DL (ref 0.2–1.3)
BILIRUB UR QL STRIP: ABNORMAL
BILIRUB UR QL STRIP: NEGATIVE
BLD GP AB SCN SERPL QL: NEGATIVE
BODY TEMPERATURE: 98.6 C
BODY TEMPERATURE: 98.6 C
BUN BLD-MCNC: 10 MG/DL (ref 7–21)
BUN BLD-MCNC: 102 MG/DL (ref 7–21)
BUN BLD-MCNC: 11 MG/DL (ref 7–21)
BUN BLD-MCNC: 111 MG/DL (ref 7–21)
BUN BLD-MCNC: 119 MG/DL (ref 7–21)
BUN BLD-MCNC: 120 MG/DL (ref 7–21)
BUN BLD-MCNC: 13 MG/DL (ref 7–21)
BUN BLD-MCNC: 17 MG/DL (ref 7–21)
BUN BLD-MCNC: 19 MG/DL (ref 7–21)
BUN BLD-MCNC: 19 MG/DL (ref 7–21)
BUN BLD-MCNC: 20 MG/DL (ref 7–21)
BUN BLD-MCNC: 20 MG/DL (ref 7–21)
BUN BLD-MCNC: 21 MG/DL (ref 7–21)
BUN BLD-MCNC: 23 MG/DL (ref 7–21)
BUN BLD-MCNC: 24 MG/DL (ref 7–21)
BUN BLD-MCNC: 25 MG/DL (ref 7–21)
BUN BLD-MCNC: 26 MG/DL (ref 7–21)
BUN BLD-MCNC: 27 MG/DL (ref 7–21)
BUN BLD-MCNC: 28 MG/DL (ref 7–21)
BUN BLD-MCNC: 29 MG/DL (ref 7–21)
BUN BLD-MCNC: 29 MG/DL (ref 7–21)
BUN BLD-MCNC: 30 MG/DL (ref 7–21)
BUN BLD-MCNC: 30 MG/DL (ref 7–21)
BUN BLD-MCNC: 36 MG/DL (ref 7–21)
BUN BLD-MCNC: 36 MG/DL (ref 7–21)
BUN BLD-MCNC: 37 MG/DL (ref 7–21)
BUN BLD-MCNC: 37 MG/DL (ref 7–21)
BUN BLD-MCNC: 38 MG/DL (ref 7–21)
BUN BLD-MCNC: 39 MG/DL (ref 7–21)
BUN BLD-MCNC: 40 MG/DL (ref 7–21)
BUN BLD-MCNC: 42 MG/DL (ref 7–21)
BUN BLD-MCNC: 43 MG/DL (ref 7–21)
BUN BLD-MCNC: 43 MG/DL (ref 7–21)
BUN BLD-MCNC: 45 MG/DL (ref 7–21)
BUN BLD-MCNC: 46 MG/DL (ref 7–21)
BUN BLD-MCNC: 46 MG/DL (ref 7–21)
BUN BLD-MCNC: 47 MG/DL (ref 7–21)
BUN BLD-MCNC: 48 MG/DL (ref 7–21)
BUN BLD-MCNC: 49 MG/DL (ref 7–21)
BUN BLD-MCNC: 50 MG/DL (ref 7–21)
BUN BLD-MCNC: 50 MG/DL (ref 7–21)
BUN BLD-MCNC: 52 MG/DL (ref 7–21)
BUN BLD-MCNC: 52 MG/DL (ref 7–21)
BUN BLD-MCNC: 53 MG/DL (ref 7–21)
BUN BLD-MCNC: 55 MG/DL (ref 7–21)
BUN BLD-MCNC: 56 MG/DL (ref 7–21)
BUN BLD-MCNC: 57 MG/DL (ref 7–21)
BUN BLD-MCNC: 58 MG/DL (ref 7–21)
BUN BLD-MCNC: 59 MG/DL (ref 7–21)
BUN BLD-MCNC: 59 MG/DL (ref 7–21)
BUN BLD-MCNC: 60 MG/DL (ref 7–21)
BUN BLD-MCNC: 60 MG/DL (ref 7–21)
BUN BLD-MCNC: 64 MG/DL (ref 7–21)
BUN BLD-MCNC: 65 MG/DL (ref 7–21)
BUN BLD-MCNC: 70 MG/DL (ref 7–21)
BUN BLD-MCNC: 71 MG/DL (ref 7–21)
BUN BLD-MCNC: 71 MG/DL (ref 7–21)
BUN BLD-MCNC: 72 MG/DL (ref 7–21)
BUN BLD-MCNC: 72 MG/DL (ref 7–21)
BUN BLD-MCNC: 73 MG/DL (ref 7–21)
BUN BLD-MCNC: 73 MG/DL (ref 7–21)
BUN BLD-MCNC: 74 MG/DL (ref 7–21)
BUN BLD-MCNC: 76 MG/DL (ref 7–21)
BUN BLD-MCNC: 76 MG/DL (ref 7–21)
BUN BLD-MCNC: 77 MG/DL (ref 7–21)
BUN BLD-MCNC: 77 MG/DL (ref 7–21)
BUN BLD-MCNC: 78 MG/DL (ref 7–21)
BUN BLD-MCNC: 78 MG/DL (ref 7–21)
BUN BLD-MCNC: 79 MG/DL (ref 7–21)
BUN BLD-MCNC: 80 MG/DL (ref 7–21)
BUN BLD-MCNC: 81 MG/DL (ref 7–21)
BUN BLD-MCNC: 81 MG/DL (ref 7–21)
BUN BLD-MCNC: 83 MG/DL (ref 7–21)
BUN BLD-MCNC: 84 MG/DL (ref 7–21)
BUN BLD-MCNC: 85 MG/DL (ref 7–21)
BUN BLD-MCNC: 85 MG/DL (ref 7–21)
BUN BLD-MCNC: 87 MG/DL (ref 7–21)
BUN BLD-MCNC: 88 MG/DL (ref 7–21)
BUN BLD-MCNC: 9 MG/DL (ref 7–21)
BUN BLD-MCNC: 90 MG/DL (ref 7–21)
BUN BLD-MCNC: 90 MG/DL (ref 7–21)
BUN BLD-MCNC: 94 MG/DL (ref 7–21)
BUN/CREAT SERPL: 100 (ref 7–25)
BUN/CREAT SERPL: 104.7 (ref 7–25)
BUN/CREAT SERPL: 11.1 (ref 7–25)
BUN/CREAT SERPL: 11.7 (ref 7–25)
BUN/CREAT SERPL: 12 (ref 7–25)
BUN/CREAT SERPL: 12.9 (ref 7–25)
BUN/CREAT SERPL: 13.3 (ref 7–25)
BUN/CREAT SERPL: 13.5 (ref 7–25)
BUN/CREAT SERPL: 13.5 (ref 7–25)
BUN/CREAT SERPL: 13.6 (ref 7–25)
BUN/CREAT SERPL: 13.9 (ref 7–25)
BUN/CREAT SERPL: 14.7 (ref 7–25)
BUN/CREAT SERPL: 14.9 (ref 7–25)
BUN/CREAT SERPL: 15.2 (ref 7–25)
BUN/CREAT SERPL: 17 (ref 7–25)
BUN/CREAT SERPL: 18.1 (ref 7–25)
BUN/CREAT SERPL: 18.2 (ref 7–25)
BUN/CREAT SERPL: 18.3 (ref 7–25)
BUN/CREAT SERPL: 19.1 (ref 7–25)
BUN/CREAT SERPL: 20.2 (ref 7–25)
BUN/CREAT SERPL: 22.6 (ref 7–25)
BUN/CREAT SERPL: 22.6 (ref 7–25)
BUN/CREAT SERPL: 22.8 (ref 7–25)
BUN/CREAT SERPL: 22.9 (ref 7–25)
BUN/CREAT SERPL: 22.9 (ref 7–25)
BUN/CREAT SERPL: 23.8 (ref 7–25)
BUN/CREAT SERPL: 24.1 (ref 7–25)
BUN/CREAT SERPL: 24.4 (ref 7–25)
BUN/CREAT SERPL: 25.4 (ref 7–25)
BUN/CREAT SERPL: 26.1 (ref 7–25)
BUN/CREAT SERPL: 26.4 (ref 7–25)
BUN/CREAT SERPL: 26.8 (ref 7–25)
BUN/CREAT SERPL: 26.9 (ref 7–25)
BUN/CREAT SERPL: 27.4 (ref 7–25)
BUN/CREAT SERPL: 27.5 (ref 7–25)
BUN/CREAT SERPL: 27.5 (ref 7–25)
BUN/CREAT SERPL: 27.9 (ref 7–25)
BUN/CREAT SERPL: 28.1 (ref 7–25)
BUN/CREAT SERPL: 28.1 (ref 7–25)
BUN/CREAT SERPL: 29.3 (ref 7–25)
BUN/CREAT SERPL: 29.7 (ref 7–25)
BUN/CREAT SERPL: 30.6 (ref 7–25)
BUN/CREAT SERPL: 30.9 (ref 7–25)
BUN/CREAT SERPL: 31 (ref 7–25)
BUN/CREAT SERPL: 32.2 (ref 7–25)
BUN/CREAT SERPL: 32.4 (ref 7–25)
BUN/CREAT SERPL: 32.9 (ref 7–25)
BUN/CREAT SERPL: 33 (ref 7–25)
BUN/CREAT SERPL: 33.1 (ref 7–25)
BUN/CREAT SERPL: 33.1 (ref 7–25)
BUN/CREAT SERPL: 34.8 (ref 7–25)
BUN/CREAT SERPL: 36.4 (ref 7–25)
BUN/CREAT SERPL: 37.3 (ref 7–25)
BUN/CREAT SERPL: 37.4 (ref 7–25)
BUN/CREAT SERPL: 37.6 (ref 7–25)
BUN/CREAT SERPL: 37.7 (ref 7–25)
BUN/CREAT SERPL: 38.8 (ref 7–25)
BUN/CREAT SERPL: 39.2 (ref 7–25)
BUN/CREAT SERPL: 39.2 (ref 7–25)
BUN/CREAT SERPL: 39.6 (ref 7–25)
BUN/CREAT SERPL: 39.8 (ref 7–25)
BUN/CREAT SERPL: 41 (ref 7–25)
BUN/CREAT SERPL: 41.3 (ref 7–25)
BUN/CREAT SERPL: 41.6 (ref 7–25)
BUN/CREAT SERPL: 41.7 (ref 7–25)
BUN/CREAT SERPL: 42.3 (ref 7–25)
BUN/CREAT SERPL: 42.8 (ref 7–25)
BUN/CREAT SERPL: 43.5 (ref 7–25)
BUN/CREAT SERPL: 43.8 (ref 7–25)
BUN/CREAT SERPL: 43.9 (ref 7–25)
BUN/CREAT SERPL: 45 (ref 7–25)
BUN/CREAT SERPL: 45 (ref 7–25)
BUN/CREAT SERPL: 46 (ref 7–25)
BUN/CREAT SERPL: 47.2 (ref 7–25)
BUN/CREAT SERPL: 48.4 (ref 7–25)
BUN/CREAT SERPL: 48.7 (ref 7–25)
BUN/CREAT SERPL: 48.8 (ref 7–25)
BUN/CREAT SERPL: 49.6 (ref 7–25)
BUN/CREAT SERPL: 52.7 (ref 7–25)
BUN/CREAT SERPL: 52.9 (ref 7–25)
BUN/CREAT SERPL: 53.4 (ref 7–25)
BUN/CREAT SERPL: 55.5 (ref 7–25)
BUN/CREAT SERPL: 55.5 (ref 7–25)
BUN/CREAT SERPL: 60.5 (ref 7–25)
BUN/CREAT SERPL: 62.4 (ref 7–25)
BUN/CREAT SERPL: 63.9 (ref 7–25)
BUN/CREAT SERPL: 66.1 (ref 7–25)
BUN/CREAT SERPL: 67.5 (ref 7–25)
BUN/CREAT SERPL: 71.5 (ref 7–25)
BUN/CREAT SERPL: 72.2 (ref 7–25)
BUN/CREAT SERPL: 73.7 (ref 7–25)
BUN/CREAT SERPL: 77 (ref 7–25)
BUN/CREAT SERPL: 77.6 (ref 7–25)
BUN/CREAT SERPL: 79 (ref 7–25)
BUN/CREAT SERPL: 80 (ref 7–25)
BUN/CREAT SERPL: 80 (ref 7–25)
BUN/CREAT SERPL: 80.2 (ref 7–25)
BUN/CREAT SERPL: 87.2 (ref 7–25)
BUN/CREAT SERPL: 88.8 (ref 7–25)
BUN/CREAT SERPL: 94.9 (ref 7–25)
CA TITR SERPL AGGL: NORMAL {TITER}
CA-I BLD-MCNC: 4.2 MG/DL (ref 4.5–4.9)
CA-I BLD-MCNC: 4.4 MG/DL (ref 4.5–4.9)
CA-I BLD-MCNC: 4.5 MG/DL (ref 4.5–4.9)
CA-I BLD-MCNC: 4.5 MG/DL (ref 4.5–4.9)
CA-I BLD-MCNC: 4.6 MG/DL (ref 4.5–4.9)
CA-I BLD-MCNC: 4.7 MG/DL (ref 4.5–4.9)
CA-I BLD-MCNC: 4.8 MG/DL (ref 4.5–4.9)
CA-I BLD-MCNC: 4.9 MG/DL (ref 4.5–4.9)
CA-I BLD-MCNC: 4.9 MG/DL (ref 4.5–4.9)
CA-I BLD-MCNC: 5 MG/DL (ref 4.5–4.9)
CA-I BLD-MCNC: 5 MG/DL (ref 4.5–4.9)
CALCIUM SPEC-SCNC: 7.1 MG/DL (ref 8.4–10.2)
CALCIUM SPEC-SCNC: 7.3 MG/DL (ref 8.4–10.2)
CALCIUM SPEC-SCNC: 7.4 MG/DL (ref 8.4–10.2)
CALCIUM SPEC-SCNC: 7.5 MG/DL (ref 8.4–10.2)
CALCIUM SPEC-SCNC: 7.6 MG/DL (ref 8.4–10.2)
CALCIUM SPEC-SCNC: 7.6 MG/DL (ref 8.4–10.2)
CALCIUM SPEC-SCNC: 7.7 MG/DL (ref 8.4–10.2)
CALCIUM SPEC-SCNC: 7.8 MG/DL (ref 8.4–10.2)
CALCIUM SPEC-SCNC: 7.9 MG/DL (ref 8.4–10.2)
CALCIUM SPEC-SCNC: 8 MG/DL (ref 8.4–10.2)
CALCIUM SPEC-SCNC: 8.1 MG/DL (ref 8.4–10.2)
CALCIUM SPEC-SCNC: 8.2 MG/DL (ref 8.4–10.2)
CALCIUM SPEC-SCNC: 8.3 MG/DL (ref 8.4–10.2)
CALCIUM SPEC-SCNC: 8.4 MG/DL (ref 8.4–10.2)
CALCIUM SPEC-SCNC: 8.4 MG/DL (ref 8.4–10.2)
CALCIUM SPEC-SCNC: 8.6 MG/DL (ref 8.4–10.2)
CALCIUM SPEC-SCNC: 8.7 MG/DL (ref 8.4–10.2)
CALCIUM SPEC-SCNC: 8.8 MG/DL (ref 8.4–10.2)
CALCIUM SPEC-SCNC: 8.8 MG/DL (ref 8.4–10.2)
CALCIUM SPEC-SCNC: 8.9 MG/DL (ref 8.4–10.2)
CALCIUM SPEC-SCNC: 9 MG/DL (ref 8.4–10.2)
CALCIUM SPEC-SCNC: 9.1 MG/DL (ref 8.4–10.2)
CALCIUM SPEC-SCNC: 9.2 MG/DL (ref 8.4–10.2)
CALCIUM SPEC-SCNC: 9.4 MG/DL (ref 8.4–10.2)
CALCIUM SPEC-SCNC: 9.4 MG/DL (ref 8.4–10.2)
CALCIUM SPEC-SCNC: 9.5 MG/DL (ref 8.4–10.2)
CALCIUM SPEC-SCNC: 9.5 MG/DL (ref 8.4–10.2)
CANNABINOIDS SERPL QL: NEGATIVE
CANNABINOIDS SERPL QL: NEGATIVE
CHLORIDE SERPL-SCNC: 100 MMOL/L (ref 95–110)
CHLORIDE SERPL-SCNC: 102 MMOL/L (ref 95–110)
CHLORIDE SERPL-SCNC: 103 MMOL/L (ref 95–110)
CHLORIDE SERPL-SCNC: 104 MMOL/L (ref 95–110)
CHLORIDE SERPL-SCNC: 105 MMOL/L (ref 95–110)
CHLORIDE SERPL-SCNC: 106 MMOL/L (ref 95–110)
CHLORIDE SERPL-SCNC: 107 MMOL/L (ref 95–110)
CHLORIDE SERPL-SCNC: 108 MMOL/L (ref 95–110)
CHLORIDE SERPL-SCNC: 109 MMOL/L (ref 95–110)
CHLORIDE SERPL-SCNC: 110 MMOL/L (ref 95–110)
CHLORIDE SERPL-SCNC: 111 MMOL/L (ref 95–110)
CHLORIDE SERPL-SCNC: 112 MMOL/L (ref 95–110)
CHLORIDE SERPL-SCNC: 113 MMOL/L (ref 95–110)
CHLORIDE SERPL-SCNC: 114 MMOL/L (ref 95–110)
CHLORIDE SERPL-SCNC: 115 MMOL/L (ref 95–110)
CHLORIDE SERPL-SCNC: 116 MMOL/L (ref 95–110)
CHLORIDE SERPL-SCNC: 94 MMOL/L (ref 95–110)
CHLORIDE SERPL-SCNC: 95 MMOL/L (ref 95–110)
CHLORIDE SERPL-SCNC: 96 MMOL/L (ref 95–110)
CHLORIDE SERPL-SCNC: 97 MMOL/L (ref 95–110)
CHLORIDE SERPL-SCNC: 97 MMOL/L (ref 95–110)
CHLORIDE SERPL-SCNC: 98 MMOL/L (ref 95–110)
CHOLEST SERPL-MCNC: 92 MG/DL (ref 0–199)
CK MB SERPL-CCNC: 0.49 NG/ML (ref 0–5)
CK MB SERPL-CCNC: 0.53 NG/ML (ref 0–5)
CK MB SERPL-CCNC: 0.76 NG/ML (ref 0–5)
CK MB SERPL-CCNC: 0.79 NG/ML (ref 0–5)
CK MB SERPL-CCNC: 0.99 NG/ML (ref 0–5)
CK MB SERPL-CCNC: 1.22 NG/ML (ref 0–5)
CK MB SERPL-CCNC: 2.81 NG/ML (ref 0–5)
CK SERPL-CCNC: 28 U/L (ref 30–135)
CK SERPL-CCNC: 30 U/L (ref 30–135)
CK SERPL-CCNC: 35 U/L (ref 30–135)
CK SERPL-CCNC: 36 U/L (ref 30–135)
CK SERPL-CCNC: 52 U/L (ref 30–135)
CK SERPL-CCNC: 52 U/L (ref 30–135)
CK SERPL-CCNC: 62 U/L (ref 30–135)
CK SERPL-CCNC: 97 U/L (ref 30–135)
CLARITY UR: ABNORMAL
CLARITY UR: CLEAR
CO2 BLDA-SCNC: 14.9 MMOL/L (ref 23–27)
CO2 BLDA-SCNC: 15.9 MMOL/L (ref 23–27)
CO2 BLDA-SCNC: 16.3 MMOL/L (ref 23–27)
CO2 BLDA-SCNC: 16.4 MMOL/L (ref 23–27)
CO2 BLDA-SCNC: 16.4 MMOL/L (ref 23–27)
CO2 BLDA-SCNC: 17 MMOL/L (ref 23–27)
CO2 BLDA-SCNC: 17.3 MMOL/L (ref 23–27)
CO2 BLDA-SCNC: 17.6 MMOL/L (ref 23–27)
CO2 BLDA-SCNC: 18.1 MMOL/L (ref 23–27)
CO2 BLDA-SCNC: 18.4 MMOL/L (ref 23–27)
CO2 BLDA-SCNC: 18.4 MMOL/L (ref 23–27)
CO2 BLDA-SCNC: 18.5 MMOL/L (ref 23–27)
CO2 BLDA-SCNC: 18.5 MMOL/L (ref 23–27)
CO2 BLDA-SCNC: 19.1 MMOL/L (ref 23–27)
CO2 BLDA-SCNC: 19.1 MMOL/L (ref 23–27)
CO2 BLDA-SCNC: 19.4 MMOL/L (ref 23–27)
CO2 BLDA-SCNC: 20.1 MMOL/L (ref 23–27)
CO2 BLDA-SCNC: 20.3 MMOL/L (ref 23–27)
CO2 BLDA-SCNC: 22.2 MMOL/L (ref 23–27)
CO2 BLDA-SCNC: 22.2 MMOL/L (ref 23–27)
CO2 BLDA-SCNC: 22.5 MMOL/L (ref 23–27)
CO2 BLDA-SCNC: 23.7 MMOL/L (ref 23–27)
CO2 BLDA-SCNC: 24.3 MMOL/L (ref 23–27)
CO2 BLDA-SCNC: 25.2 MMOL/L (ref 23–27)
CO2 BLDA-SCNC: 25.9 MMOL/L (ref 23–27)
CO2 BLDA-SCNC: 25.9 MMOL/L (ref 23–27)
CO2 BLDA-SCNC: 26.8 MMOL/L (ref 23–27)
CO2 BLDA-SCNC: 27.7 MMOL/L (ref 23–27)
CO2 SERPL-SCNC: 14 MMOL/L (ref 22–31)
CO2 SERPL-SCNC: 15 MMOL/L (ref 22–31)
CO2 SERPL-SCNC: 15 MMOL/L (ref 22–31)
CO2 SERPL-SCNC: 16 MMOL/L (ref 22–31)
CO2 SERPL-SCNC: 16 MMOL/L (ref 22–31)
CO2 SERPL-SCNC: 17 MMOL/L (ref 22–31)
CO2 SERPL-SCNC: 18 MMOL/L (ref 22–31)
CO2 SERPL-SCNC: 19 MMOL/L (ref 22–31)
CO2 SERPL-SCNC: 20 MMOL/L (ref 22–31)
CO2 SERPL-SCNC: 21 MMOL/L (ref 22–31)
CO2 SERPL-SCNC: 22 MMOL/L (ref 22–31)
CO2 SERPL-SCNC: 23 MMOL/L (ref 22–31)
CO2 SERPL-SCNC: 24 MMOL/L (ref 22–31)
CO2 SERPL-SCNC: 25 MMOL/L (ref 22–31)
CO2 SERPL-SCNC: 26 MMOL/L (ref 22–31)
CO2 SERPL-SCNC: 27 MMOL/L (ref 22–31)
CO2 SERPL-SCNC: 29 MMOL/L (ref 22–31)
CO2 SERPL-SCNC: 30 MMOL/L (ref 22–31)
CO2 SERPL-SCNC: 30 MMOL/L (ref 22–31)
CO2 SERPL-SCNC: 31 MMOL/L (ref 22–31)
CO2 SERPL-SCNC: 32 MMOL/L (ref 22–31)
CO2 SERPL-SCNC: 32 MMOL/L (ref 22–31)
CO2 SERPL-SCNC: 33 MMOL/L (ref 22–31)
CO2 SERPL-SCNC: 36 MMOL/L (ref 22–31)
COCAINE UR QL: NEGATIVE
COCAINE UR QL: NEGATIVE
COLOR UR: ABNORMAL
COLOR UR: YELLOW
COLOR UR: YELLOW
CRE SCREEN PCR: NOT DETECTED
CRE SCREEN PCR: NOT DETECTED
CREAT BLD-MCNC: 0.71 MG/DL (ref 0.5–1)
CREAT BLD-MCNC: 0.74 MG/DL (ref 0.5–1)
CREAT BLD-MCNC: 0.75 MG/DL (ref 0.5–1)
CREAT BLD-MCNC: 0.77 MG/DL (ref 0.5–1)
CREAT BLD-MCNC: 0.81 MG/DL (ref 0.5–1)
CREAT BLD-MCNC: 0.84 MG/DL (ref 0.5–1)
CREAT BLD-MCNC: 0.84 MG/DL (ref 0.5–1)
CREAT BLD-MCNC: 0.85 MG/DL (ref 0.5–1)
CREAT BLD-MCNC: 0.86 MG/DL (ref 0.5–1)
CREAT BLD-MCNC: 0.9 MG/DL (ref 0.5–1)
CREAT BLD-MCNC: 0.91 MG/DL (ref 0.5–1)
CREAT BLD-MCNC: 0.92 MG/DL (ref 0.5–1)
CREAT BLD-MCNC: 0.96 MG/DL (ref 0.5–1)
CREAT BLD-MCNC: 0.97 MG/DL (ref 0.5–1)
CREAT BLD-MCNC: 1 MG/DL (ref 0.5–1)
CREAT BLD-MCNC: 1 MG/DL (ref 0.5–1)
CREAT BLD-MCNC: 1.01 MG/DL (ref 0.5–1)
CREAT BLD-MCNC: 1.05 MG/DL (ref 0.5–1)
CREAT BLD-MCNC: 1.05 MG/DL (ref 0.5–1)
CREAT BLD-MCNC: 1.06 MG/DL (ref 0.5–1)
CREAT BLD-MCNC: 1.06 MG/DL (ref 0.5–1)
CREAT BLD-MCNC: 1.08 MG/DL (ref 0.5–1)
CREAT BLD-MCNC: 1.09 MG/DL (ref 0.5–1)
CREAT BLD-MCNC: 1.1 MG/DL (ref 0.5–1)
CREAT BLD-MCNC: 1.12 MG/DL (ref 0.5–1)
CREAT BLD-MCNC: 1.13 MG/DL (ref 0.5–1)
CREAT BLD-MCNC: 1.14 MG/DL (ref 0.5–1)
CREAT BLD-MCNC: 1.15 MG/DL (ref 0.5–1)
CREAT BLD-MCNC: 1.15 MG/DL (ref 0.5–1)
CREAT BLD-MCNC: 1.16 MG/DL (ref 0.5–1)
CREAT BLD-MCNC: 1.16 MG/DL (ref 0.5–1)
CREAT BLD-MCNC: 1.17 MG/DL (ref 0.5–1)
CREAT BLD-MCNC: 1.18 MG/DL (ref 0.5–1)
CREAT BLD-MCNC: 1.19 MG/DL (ref 0.5–1)
CREAT BLD-MCNC: 1.19 MG/DL (ref 0.5–1)
CREAT BLD-MCNC: 1.2 MG/DL (ref 0.5–1)
CREAT BLD-MCNC: 1.2 MG/DL (ref 0.5–1)
CREAT BLD-MCNC: 1.21 MG/DL (ref 0.5–1)
CREAT BLD-MCNC: 1.22 MG/DL (ref 0.5–1)
CREAT BLD-MCNC: 1.23 MG/DL (ref 0.5–1)
CREAT BLD-MCNC: 1.24 MG/DL (ref 0.5–1)
CREAT BLD-MCNC: 1.26 MG/DL (ref 0.5–1)
CREAT BLD-MCNC: 1.27 MG/DL (ref 0.5–1)
CREAT BLD-MCNC: 1.29 MG/DL (ref 0.5–1)
CREAT BLD-MCNC: 1.3 MG/DL (ref 0.5–1)
CREAT BLD-MCNC: 1.3 MG/DL (ref 0.5–1)
CREAT BLD-MCNC: 1.31 MG/DL (ref 0.5–1)
CREAT BLD-MCNC: 1.33 MG/DL (ref 0.5–1)
CREAT BLD-MCNC: 1.34 MG/DL (ref 0.5–1)
CREAT BLD-MCNC: 1.35 MG/DL (ref 0.5–1)
CREAT BLD-MCNC: 1.35 MG/DL (ref 0.5–1)
CREAT BLD-MCNC: 1.36 MG/DL (ref 0.5–1)
CREAT BLD-MCNC: 1.36 MG/DL (ref 0.5–1)
CREAT BLD-MCNC: 1.37 MG/DL (ref 0.5–1)
CREAT BLD-MCNC: 1.38 MG/DL (ref 0.5–1)
CREAT BLD-MCNC: 1.39 MG/DL (ref 0.5–1)
CREAT BLD-MCNC: 1.39 MG/DL (ref 0.5–1)
CREAT BLD-MCNC: 1.43 MG/DL (ref 0.5–1)
CREAT BLD-MCNC: 1.44 MG/DL (ref 0.5–1)
CREAT BLD-MCNC: 1.46 MG/DL (ref 0.5–1)
CREAT BLD-MCNC: 1.46 MG/DL (ref 0.5–1)
CREAT BLD-MCNC: 1.47 MG/DL (ref 0.5–1)
CREAT BLD-MCNC: 1.48 MG/DL (ref 0.5–1)
CREAT BLD-MCNC: 1.5 MG/DL (ref 0.5–1)
CREAT BLD-MCNC: 1.51 MG/DL (ref 0.5–1)
CREAT BLD-MCNC: 1.52 MG/DL (ref 0.5–1)
CREAT BLD-MCNC: 1.52 MG/DL (ref 0.5–1)
CREAT BLD-MCNC: 1.53 MG/DL (ref 0.5–1)
CREAT BLD-MCNC: 1.57 MG/DL (ref 0.5–1)
CREAT BLD-MCNC: 1.59 MG/DL (ref 0.5–1)
CREAT BLD-MCNC: 1.6 MG/DL (ref 0.5–1)
CREAT BLD-MCNC: 1.66 MG/DL (ref 0.5–1)
CREAT BLD-MCNC: 1.71 MG/DL (ref 0.5–1)
CREAT BLD-MCNC: 1.82 MG/DL (ref 0.5–1)
CREAT BLD-MCNC: 1.91 MG/DL (ref 0.5–1)
CREAT BLD-MCNC: 2.01 MG/DL (ref 0.5–1)
CREAT BLD-MCNC: 2.03 MG/DL (ref 0.5–1)
CREAT BLD-MCNC: 2.04 MG/DL (ref 0.5–1)
CREAT BLD-MCNC: 2.15 MG/DL (ref 0.5–1)
CREAT BLD-MCNC: 2.24 MG/DL (ref 0.5–1)
CREAT BLD-MCNC: 2.28 MG/DL (ref 0.5–1)
CREAT BLD-MCNC: 2.31 MG/DL (ref 0.5–1)
CREAT BLD-MCNC: 2.34 MG/DL (ref 0.5–1)
CREAT BLD-MCNC: 2.5 MG/DL (ref 0.5–1)
CREAT BLD-MCNC: 2.85 MG/DL (ref 0.5–1)
CREAT BLD-MCNC: 2.9 MG/DL (ref 0.5–1)
CREAT BLD-MCNC: 2.97 MG/DL (ref 0.5–1)
CREAT BLD-MCNC: 3.06 MG/DL (ref 0.5–1)
CREAT UR-MCNC: 170.5 MG/DL
CRP SERPL-MCNC: 0.5 MG/DL (ref 0–1)
CRP SERPL-MCNC: 0.7 MG/DL (ref 0–1)
CRP SERPL-MCNC: 0.8 MG/DL (ref 0–1)
CRP SERPL-MCNC: 0.9 MG/DL (ref 0–1)
CRP SERPL-MCNC: 0.9 MG/DL (ref 0–1)
CRP SERPL-MCNC: 1.1 MG/DL (ref 0–1)
CRP SERPL-MCNC: 2.1 MG/DL (ref 0–1)
CRP SERPL-MCNC: 3.2 MG/DL (ref 0–1)
CRP SERPL-MCNC: 3.7 MG/DL (ref 0–1)
CRP SERPL-MCNC: 4.3 MG/DL (ref 0–1)
CRP SERPL-MCNC: 4.3 MG/DL (ref 0–1)
CRP SERPL-MCNC: 4.7 MG/DL (ref 0–1)
CRP SERPL-MCNC: 5.5 MG/DL (ref 0–1)
CRP SERPL-MCNC: 7.6 MG/DL (ref 0–1)
CRP SERPL-MCNC: 7.8 MG/DL (ref 0–1)
CRP SERPL-MCNC: <0.5 MG/DL (ref 0–1)
CRP SERPL-MCNC: <0.5 MG/DL (ref 0–1)
CRYOCRIT SER SPUN WESTERGREN: 6 %
CRYOGLOB SER QL 1D COLD INC: ABNORMAL
CRYOGLOB TYP SER IFE: ABNORMAL
D-DIMER, QUANTITATIVE (MAD,POW, STR): 3896 NG/ML (FEU) (ref 0–470)
D-LACTATE SERPL-SCNC: 0.5 MMOL/L (ref 0.5–2)
D-LACTATE SERPL-SCNC: 0.6 MMOL/L (ref 0.5–2)
D-LACTATE SERPL-SCNC: 0.6 MMOL/L (ref 0.5–2)
D-LACTATE SERPL-SCNC: 0.7 MMOL/L (ref 0.5–2)
D-LACTATE SERPL-SCNC: 0.9 MMOL/L (ref 0.5–2)
D-LACTATE SERPL-SCNC: 0.9 MMOL/L (ref 0.5–2)
D-LACTATE SERPL-SCNC: <0.5 MMOL/L (ref 0.5–2)
DEPRECATED RDW RBC AUTO: 40.1 FL (ref 36.4–46.3)
DEPRECATED RDW RBC AUTO: 41 FL (ref 36.4–46.3)
DEPRECATED RDW RBC AUTO: 41.5 FL (ref 36.4–46.3)
DEPRECATED RDW RBC AUTO: 41.7 FL (ref 36.4–46.3)
DEPRECATED RDW RBC AUTO: 42.1 FL (ref 36.4–46.3)
DEPRECATED RDW RBC AUTO: 42.3 FL (ref 36.4–46.3)
DEPRECATED RDW RBC AUTO: 43 FL (ref 36.4–46.3)
DEPRECATED RDW RBC AUTO: 43.6 FL (ref 36.4–46.3)
DEPRECATED RDW RBC AUTO: 43.8 FL (ref 36.4–46.3)
DEPRECATED RDW RBC AUTO: 44.3 FL (ref 36.4–46.3)
DEPRECATED RDW RBC AUTO: 44.4 FL (ref 36.4–46.3)
DEPRECATED RDW RBC AUTO: 44.5 FL (ref 36.4–46.3)
DEPRECATED RDW RBC AUTO: 44.6 FL (ref 36.4–46.3)
DEPRECATED RDW RBC AUTO: 44.7 FL (ref 36.4–46.3)
DEPRECATED RDW RBC AUTO: 44.9 FL (ref 36.4–46.3)
DEPRECATED RDW RBC AUTO: 45.4 FL (ref 36.4–46.3)
DEPRECATED RDW RBC AUTO: 45.5 FL (ref 36.4–46.3)
DEPRECATED RDW RBC AUTO: 45.7 FL (ref 36.4–46.3)
DEPRECATED RDW RBC AUTO: 45.8 FL (ref 36.4–46.3)
DEPRECATED RDW RBC AUTO: 46.2 FL (ref 36.4–46.3)
DEPRECATED RDW RBC AUTO: 46.2 FL (ref 36.4–46.3)
DEPRECATED RDW RBC AUTO: 46.4 FL (ref 36.4–46.3)
DEPRECATED RDW RBC AUTO: 46.4 FL (ref 36.4–46.3)
DEPRECATED RDW RBC AUTO: 46.6 FL (ref 36.4–46.3)
DEPRECATED RDW RBC AUTO: 46.6 FL (ref 36.4–46.3)
DEPRECATED RDW RBC AUTO: 46.7 FL (ref 36.4–46.3)
DEPRECATED RDW RBC AUTO: 46.9 FL (ref 36.4–46.3)
DEPRECATED RDW RBC AUTO: 47 FL (ref 36.4–46.3)
DEPRECATED RDW RBC AUTO: 47.1 FL (ref 36.4–46.3)
DEPRECATED RDW RBC AUTO: 47.1 FL (ref 36.4–46.3)
DEPRECATED RDW RBC AUTO: 47.6 FL (ref 36.4–46.3)
DEPRECATED RDW RBC AUTO: 47.7 FL (ref 36.4–46.3)
DEPRECATED RDW RBC AUTO: 48 FL (ref 36.4–46.3)
DEPRECATED RDW RBC AUTO: 48.1 FL (ref 36.4–46.3)
DEPRECATED RDW RBC AUTO: 48.2 FL (ref 36.4–46.3)
DEPRECATED RDW RBC AUTO: 48.3 FL (ref 36.4–46.3)
DEPRECATED RDW RBC AUTO: 48.4 FL (ref 36.4–46.3)
DEPRECATED RDW RBC AUTO: 48.4 FL (ref 36.4–46.3)
DEPRECATED RDW RBC AUTO: 48.5 FL (ref 36.4–46.3)
DEPRECATED RDW RBC AUTO: 48.5 FL (ref 36.4–46.3)
DEPRECATED RDW RBC AUTO: 48.7 FL (ref 36.4–46.3)
DEPRECATED RDW RBC AUTO: 48.8 FL (ref 36.4–46.3)
DEPRECATED RDW RBC AUTO: 48.8 FL (ref 36.4–46.3)
DEPRECATED RDW RBC AUTO: 48.9 FL (ref 36.4–46.3)
DEPRECATED RDW RBC AUTO: 48.9 FL (ref 36.4–46.3)
DEPRECATED RDW RBC AUTO: 49 FL (ref 36.4–46.3)
DEPRECATED RDW RBC AUTO: 49.1 FL (ref 36.4–46.3)
DEPRECATED RDW RBC AUTO: 49.2 FL (ref 36.4–46.3)
DEPRECATED RDW RBC AUTO: 49.3 FL (ref 36.4–46.3)
DEPRECATED RDW RBC AUTO: 49.4 FL (ref 36.4–46.3)
DEPRECATED RDW RBC AUTO: 49.5 FL (ref 36.4–46.3)
DEPRECATED RDW RBC AUTO: 49.6 FL (ref 36.4–46.3)
DEPRECATED RDW RBC AUTO: 49.7 FL (ref 36.4–46.3)
DEPRECATED RDW RBC AUTO: 49.8 FL (ref 36.4–46.3)
DEPRECATED RDW RBC AUTO: 49.9 FL (ref 36.4–46.3)
DEPRECATED RDW RBC AUTO: 50 FL (ref 36.4–46.3)
DEPRECATED RDW RBC AUTO: 50.2 FL (ref 36.4–46.3)
DEPRECATED RDW RBC AUTO: 50.2 FL (ref 36.4–46.3)
DEPRECATED RDW RBC AUTO: 50.3 FL (ref 36.4–46.3)
DEPRECATED RDW RBC AUTO: 50.4 FL (ref 36.4–46.3)
DEPRECATED RDW RBC AUTO: 50.5 FL (ref 36.4–46.3)
DEPRECATED RDW RBC AUTO: 50.7 FL (ref 36.4–46.3)
DEPRECATED RDW RBC AUTO: 51 FL (ref 36.4–46.3)
DEPRECATED RDW RBC AUTO: 51 FL (ref 36.4–46.3)
DEPRECATED RDW RBC AUTO: 51.3 FL (ref 36.4–46.3)
DEPRECATED RDW RBC AUTO: 51.5 FL (ref 36.4–46.3)
DEPRECATED RDW RBC AUTO: 51.5 FL (ref 36.4–46.3)
DEPRECATED RDW RBC AUTO: 51.7 FL (ref 36.4–46.3)
DEPRECATED RDW RBC AUTO: 51.8 FL (ref 36.4–46.3)
DEPRECATED RDW RBC AUTO: 51.9 FL (ref 36.4–46.3)
DEPRECATED RDW RBC AUTO: 51.9 FL (ref 36.4–46.3)
DEPRECATED RDW RBC AUTO: 52 FL (ref 36.4–46.3)
DEPRECATED RDW RBC AUTO: 52 FL (ref 36.4–46.3)
DEPRECATED RDW RBC AUTO: 52.1 FL (ref 36.4–46.3)
DEPRECATED RDW RBC AUTO: 52.1 FL (ref 36.4–46.3)
DEPRECATED RDW RBC AUTO: 52.2 FL (ref 36.4–46.3)
DEPRECATED RDW RBC AUTO: 52.3 FL (ref 36.4–46.3)
DEPRECATED RDW RBC AUTO: 52.3 FL (ref 36.4–46.3)
DEPRECATED RDW RBC AUTO: 52.4 FL (ref 36.4–46.3)
DEPRECATED RDW RBC AUTO: 52.4 FL (ref 36.4–46.3)
DEPRECATED RDW RBC AUTO: 52.6 FL (ref 36.4–46.3)
DEPRECATED RDW RBC AUTO: 52.9 FL (ref 36.4–46.3)
DEPRECATED RDW RBC AUTO: 53.1 FL (ref 36.4–46.3)
DEPRECATED RDW RBC AUTO: 53.2 FL (ref 36.4–46.3)
DEPRECATED RDW RBC AUTO: 53.4 FL (ref 36.4–46.3)
DEPRECATED RDW RBC AUTO: 53.5 FL (ref 36.4–46.3)
DEPRECATED RDW RBC AUTO: 53.6 FL (ref 36.4–46.3)
DEPRECATED RDW RBC AUTO: 53.6 FL (ref 36.4–46.3)
DEPRECATED RDW RBC AUTO: 53.8 FL (ref 36.4–46.3)
DEPRECATED RDW RBC AUTO: 54.2 FL (ref 36.4–46.3)
DEPRECATED RDW RBC AUTO: 54.3 FL (ref 36.4–46.3)
E CHAFFEENSIS DNA BLD QL NAA+PROBE: NEGATIVE
EOSINOPHIL # BLD AUTO: 0 10*3/MM3 (ref 0–0.7)
EOSINOPHIL # BLD AUTO: 0.01 10*3/MM3 (ref 0–0.7)
EOSINOPHIL # BLD AUTO: 0.02 10*3/MM3 (ref 0–0.7)
EOSINOPHIL # BLD AUTO: 0.03 10*3/MM3 (ref 0–0.7)
EOSINOPHIL # BLD AUTO: 0.04 10*3/MM3 (ref 0–0.7)
EOSINOPHIL # BLD AUTO: 0.04 10*3/MM3 (ref 0–0.7)
EOSINOPHIL # BLD AUTO: 0.05 10*3/MM3 (ref 0–0.7)
EOSINOPHIL # BLD AUTO: 0.05 10*3/MM3 (ref 0–0.7)
EOSINOPHIL # BLD AUTO: 0.07 10*3/MM3 (ref 0–0.7)
EOSINOPHIL # BLD AUTO: 0.09 10*3/MM3 (ref 0–0.7)
EOSINOPHIL # BLD AUTO: 0.11 10*3/MM3 (ref 0–0.7)
EOSINOPHIL # BLD AUTO: 0.12 10*3/MM3 (ref 0–0.7)
EOSINOPHIL # BLD AUTO: 0.13 10*3/MM3 (ref 0–0.7)
EOSINOPHIL # BLD AUTO: 0.14 10*3/MM3 (ref 0–0.7)
EOSINOPHIL # BLD AUTO: 0.14 10*3/MM3 (ref 0–0.7)
EOSINOPHIL # BLD AUTO: 0.15 10*3/MM3 (ref 0–0.7)
EOSINOPHIL # BLD AUTO: 0.18 10*3/MM3 (ref 0–0.7)
EOSINOPHIL # BLD AUTO: 0.21 10*3/MM3 (ref 0–0.7)
EOSINOPHIL # BLD AUTO: 0.22 10*3/MM3 (ref 0–0.7)
EOSINOPHIL # BLD MANUAL: 0.07 10*3/MM3 (ref 0–0.7)
EOSINOPHIL # BLD MANUAL: 0.15 10*3/MM3 (ref 0–0.7)
EOSINOPHIL NFR BLD AUTO: 0 % (ref 0–7)
EOSINOPHIL NFR BLD AUTO: 0.1 % (ref 0–7)
EOSINOPHIL NFR BLD AUTO: 0.2 % (ref 0–7)
EOSINOPHIL NFR BLD AUTO: 0.3 % (ref 0–7)
EOSINOPHIL NFR BLD AUTO: 0.3 % (ref 0–7)
EOSINOPHIL NFR BLD AUTO: 0.4 % (ref 0–7)
EOSINOPHIL NFR BLD AUTO: 0.6 % (ref 0–7)
EOSINOPHIL NFR BLD AUTO: 0.6 % (ref 0–7)
EOSINOPHIL NFR BLD AUTO: 0.7 % (ref 0–7)
EOSINOPHIL NFR BLD AUTO: 0.9 % (ref 0–7)
EOSINOPHIL NFR BLD AUTO: 1.1 % (ref 0–7)
EOSINOPHIL NFR BLD AUTO: 1.4 % (ref 0–7)
EOSINOPHIL NFR BLD AUTO: 1.5 % (ref 0–7)
EOSINOPHIL NFR BLD AUTO: 1.5 % (ref 0–7)
EOSINOPHIL NFR BLD AUTO: 1.6 % (ref 0–7)
EOSINOPHIL NFR BLD AUTO: 1.8 % (ref 0–7)
EOSINOPHIL NFR BLD AUTO: 1.8 % (ref 0–7)
EOSINOPHIL NFR BLD AUTO: 2.3 % (ref 0–7)
EOSINOPHIL NFR BLD AUTO: 2.4 % (ref 0–7)
EOSINOPHIL NFR BLD AUTO: 2.4 % (ref 0–7)
EOSINOPHIL NFR BLD AUTO: 3.2 % (ref 0–7)
EOSINOPHIL NFR BLD AUTO: 3.3 % (ref 0–7)
EOSINOPHIL NFR BLD AUTO: 5 % (ref 0–7)
EOSINOPHIL NFR BLD MANUAL: 1 % (ref 0–7)
EOSINOPHIL NFR BLD MANUAL: 4 % (ref 0–7)
ERYTHROCYTE [DISTWIDTH] IN BLOOD BY AUTOMATED COUNT: 12.3 % (ref 11.5–14.5)
ERYTHROCYTE [DISTWIDTH] IN BLOOD BY AUTOMATED COUNT: 12.6 % (ref 11.5–14.5)
ERYTHROCYTE [DISTWIDTH] IN BLOOD BY AUTOMATED COUNT: 12.6 % (ref 11.5–14.5)
ERYTHROCYTE [DISTWIDTH] IN BLOOD BY AUTOMATED COUNT: 12.8 % (ref 11.5–14.5)
ERYTHROCYTE [DISTWIDTH] IN BLOOD BY AUTOMATED COUNT: 12.9 % (ref 11.5–14.5)
ERYTHROCYTE [DISTWIDTH] IN BLOOD BY AUTOMATED COUNT: 13.2 % (ref 11.5–14.5)
ERYTHROCYTE [DISTWIDTH] IN BLOOD BY AUTOMATED COUNT: 13.3 % (ref 11.5–14.5)
ERYTHROCYTE [DISTWIDTH] IN BLOOD BY AUTOMATED COUNT: 13.4 % (ref 11.5–14.5)
ERYTHROCYTE [DISTWIDTH] IN BLOOD BY AUTOMATED COUNT: 13.4 % (ref 11.5–14.5)
ERYTHROCYTE [DISTWIDTH] IN BLOOD BY AUTOMATED COUNT: 13.5 % (ref 11.5–14.5)
ERYTHROCYTE [DISTWIDTH] IN BLOOD BY AUTOMATED COUNT: 13.5 % (ref 11.5–14.5)
ERYTHROCYTE [DISTWIDTH] IN BLOOD BY AUTOMATED COUNT: 13.7 % (ref 11.5–14.5)
ERYTHROCYTE [DISTWIDTH] IN BLOOD BY AUTOMATED COUNT: 13.8 % (ref 11.5–14.5)
ERYTHROCYTE [DISTWIDTH] IN BLOOD BY AUTOMATED COUNT: 13.9 % (ref 11.5–14.5)
ERYTHROCYTE [DISTWIDTH] IN BLOOD BY AUTOMATED COUNT: 14.2 % (ref 11.5–14.5)
ERYTHROCYTE [DISTWIDTH] IN BLOOD BY AUTOMATED COUNT: 14.3 % (ref 11.5–14.5)
ERYTHROCYTE [DISTWIDTH] IN BLOOD BY AUTOMATED COUNT: 14.4 % (ref 11.5–14.5)
ERYTHROCYTE [DISTWIDTH] IN BLOOD BY AUTOMATED COUNT: 14.5 % (ref 11.5–14.5)
ERYTHROCYTE [DISTWIDTH] IN BLOOD BY AUTOMATED COUNT: 14.6 % (ref 11.5–14.5)
ERYTHROCYTE [DISTWIDTH] IN BLOOD BY AUTOMATED COUNT: 14.6 % (ref 11.5–14.5)
ERYTHROCYTE [DISTWIDTH] IN BLOOD BY AUTOMATED COUNT: 14.7 % (ref 11.5–14.5)
ERYTHROCYTE [DISTWIDTH] IN BLOOD BY AUTOMATED COUNT: 14.8 % (ref 11.5–14.5)
ERYTHROCYTE [DISTWIDTH] IN BLOOD BY AUTOMATED COUNT: 14.9 % (ref 11.5–14.5)
ERYTHROCYTE [DISTWIDTH] IN BLOOD BY AUTOMATED COUNT: 15 % (ref 11.5–14.5)
ERYTHROCYTE [DISTWIDTH] IN BLOOD BY AUTOMATED COUNT: 15.1 % (ref 11.5–14.5)
ERYTHROCYTE [DISTWIDTH] IN BLOOD BY AUTOMATED COUNT: 15.2 % (ref 11.5–14.5)
ERYTHROCYTE [DISTWIDTH] IN BLOOD BY AUTOMATED COUNT: 15.3 % (ref 11.5–14.5)
ERYTHROCYTE [DISTWIDTH] IN BLOOD BY AUTOMATED COUNT: 15.4 % (ref 11.5–14.5)
ERYTHROCYTE [DISTWIDTH] IN BLOOD BY AUTOMATED COUNT: 15.5 % (ref 11.5–14.5)
ERYTHROCYTE [DISTWIDTH] IN BLOOD BY AUTOMATED COUNT: 15.6 % (ref 11.5–14.5)
ERYTHROCYTE [DISTWIDTH] IN BLOOD BY AUTOMATED COUNT: 15.6 % (ref 11.5–14.5)
ERYTHROCYTE [DISTWIDTH] IN BLOOD BY AUTOMATED COUNT: 15.7 % (ref 11.5–14.5)
ERYTHROCYTE [DISTWIDTH] IN BLOOD BY AUTOMATED COUNT: 15.9 % (ref 11.5–14.5)
ERYTHROCYTE [DISTWIDTH] IN BLOOD BY AUTOMATED COUNT: 15.9 % (ref 11.5–14.5)
ERYTHROCYTE [DISTWIDTH] IN BLOOD BY AUTOMATED COUNT: 16 % (ref 11.5–14.5)
ERYTHROCYTE [DISTWIDTH] IN BLOOD BY AUTOMATED COUNT: 16.1 % (ref 11.5–14.5)
ERYTHROCYTE [DISTWIDTH] IN BLOOD BY AUTOMATED COUNT: 16.3 % (ref 11.5–14.5)
ERYTHROCYTE [DISTWIDTH] IN BLOOD BY AUTOMATED COUNT: 16.4 % (ref 11.5–14.5)
ERYTHROCYTE [DISTWIDTH] IN BLOOD BY AUTOMATED COUNT: 16.4 % (ref 11.5–14.5)
ERYTHROCYTE [DISTWIDTH] IN BLOOD BY AUTOMATED COUNT: 16.5 % (ref 11.5–14.5)
ERYTHROCYTE [DISTWIDTH] IN BLOOD BY AUTOMATED COUNT: 16.6 % (ref 11.5–14.5)
ERYTHROCYTE [DISTWIDTH] IN BLOOD BY AUTOMATED COUNT: 16.7 % (ref 11.5–14.5)
ERYTHROCYTE [DISTWIDTH] IN BLOOD BY AUTOMATED COUNT: 16.8 % (ref 11.5–14.5)
ERYTHROCYTE [DISTWIDTH] IN BLOOD BY AUTOMATED COUNT: 17 % (ref 11.5–14.5)
ERYTHROCYTE [DISTWIDTH] IN BLOOD BY AUTOMATED COUNT: 17 % (ref 11.5–14.5)
ERYTHROCYTE [DISTWIDTH] IN BLOOD BY AUTOMATED COUNT: 17.1 % (ref 11.5–14.5)
ERYTHROCYTE [DISTWIDTH] IN BLOOD BY AUTOMATED COUNT: 17.2 % (ref 11.5–14.5)
ERYTHROCYTE [DISTWIDTH] IN BLOOD BY AUTOMATED COUNT: 17.3 % (ref 11.5–14.5)
ERYTHROCYTE [SEDIMENTATION RATE] IN BLOOD: 80 MM/HR (ref 0–20)
FACT V ACT/NOR PPP: 56 % (ref 70–150)
FERRITIN SERPL-MCNC: 234 NG/ML (ref 11.1–264)
FERRITIN SERPL-MCNC: 365 NG/ML (ref 11.1–264)
FIBRINOGEN PPP-MCNC: 132 MG/DL (ref 228–514)
FIBRINOGEN PPP-MCNC: 178 MG/DL (ref 228–514)
FIBRINOGEN PPP-MCNC: 188 MG/DL (ref 228–514)
FIBRINOGEN PPP-MCNC: 242 MG/DL (ref 228–514)
FIBRINOGEN PPP-MCNC: 248 MG/DL (ref 228–514)
FIBRINOGEN PPP-MCNC: 251 MG/DL (ref 228–514)
FIBRINOGEN PPP-MCNC: 263 MG/DL (ref 228–514)
FIBRINOGEN PPP-MCNC: 300 MG/DL (ref 228–514)
FIBRINOGEN PPP-MCNC: 321 MG/DL (ref 228–514)
FIBRINOGEN PPP-MCNC: 321 MG/DL (ref 228–514)
FIBRINOGEN PPP-MCNC: 327 MG/DL (ref 228–514)
FIBRINOGEN PPP-MCNC: 333 MG/DL (ref 228–514)
FOLATE SERPL-MCNC: 8.36 NG/ML (ref 2.76–21)
GASTROCULT GAST QL: POSITIVE
GFR SERPL CREATININE-BSD FRML MDRD: 16 ML/MIN/1.73 (ref 51–120)
GFR SERPL CREATININE-BSD FRML MDRD: 16 ML/MIN/1.73 (ref 60–120)
GFR SERPL CREATININE-BSD FRML MDRD: 17 ML/MIN/1.73 (ref 51–120)
GFR SERPL CREATININE-BSD FRML MDRD: 17 ML/MIN/1.73 (ref 60–120)
GFR SERPL CREATININE-BSD FRML MDRD: 20 ML/MIN/1.73 (ref 51–120)
GFR SERPL CREATININE-BSD FRML MDRD: 21 ML/MIN/1.73 (ref 51–120)
GFR SERPL CREATININE-BSD FRML MDRD: 22 ML/MIN/1.73 (ref 60–120)
GFR SERPL CREATININE-BSD FRML MDRD: 24 ML/MIN/1.73 (ref 60–120)
GFR SERPL CREATININE-BSD FRML MDRD: 25 ML/MIN/1.73 (ref 51–120)
GFR SERPL CREATININE-BSD FRML MDRD: 25 ML/MIN/1.73 (ref 51–120)
GFR SERPL CREATININE-BSD FRML MDRD: 25 ML/MIN/1.73 (ref 60–120)
GFR SERPL CREATININE-BSD FRML MDRD: 27 ML/MIN/1.73 (ref 51–120)
GFR SERPL CREATININE-BSD FRML MDRD: 29 ML/MIN/1.73 (ref 51–120)
GFR SERPL CREATININE-BSD FRML MDRD: 31 ML/MIN/1.73 (ref 51–120)
GFR SERPL CREATININE-BSD FRML MDRD: 32 ML/MIN/1.73 (ref 51–120)
GFR SERPL CREATININE-BSD FRML MDRD: 33 ML/MIN/1.73 (ref 51–120)
GFR SERPL CREATININE-BSD FRML MDRD: 33 ML/MIN/1.73 (ref 60–120)
GFR SERPL CREATININE-BSD FRML MDRD: 34 ML/MIN/1.73 (ref 51–120)
GFR SERPL CREATININE-BSD FRML MDRD: 35 ML/MIN/1.73 (ref 51–120)
GFR SERPL CREATININE-BSD FRML MDRD: 35 ML/MIN/1.73 (ref 60–120)
GFR SERPL CREATININE-BSD FRML MDRD: 36 ML/MIN/1.73 (ref 51–120)
GFR SERPL CREATININE-BSD FRML MDRD: 36 ML/MIN/1.73 (ref 51–120)
GFR SERPL CREATININE-BSD FRML MDRD: 36 ML/MIN/1.73 (ref 60–120)
GFR SERPL CREATININE-BSD FRML MDRD: 36 ML/MIN/1.73 (ref 60–120)
GFR SERPL CREATININE-BSD FRML MDRD: 37 ML/MIN/1.73 (ref 51–120)
GFR SERPL CREATININE-BSD FRML MDRD: 37 ML/MIN/1.73 (ref 60–120)
GFR SERPL CREATININE-BSD FRML MDRD: 38 ML/MIN/1.73 (ref 60–120)
GFR SERPL CREATININE-BSD FRML MDRD: 39 ML/MIN/1.73 (ref 51–120)
GFR SERPL CREATININE-BSD FRML MDRD: 39 ML/MIN/1.73 (ref 60–120)
GFR SERPL CREATININE-BSD FRML MDRD: 39 ML/MIN/1.73 (ref 60–120)
GFR SERPL CREATININE-BSD FRML MDRD: 40 ML/MIN/1.73 (ref 51–120)
GFR SERPL CREATININE-BSD FRML MDRD: 40 ML/MIN/1.73 (ref 51–120)
GFR SERPL CREATININE-BSD FRML MDRD: 40 ML/MIN/1.73 (ref 60–120)
GFR SERPL CREATININE-BSD FRML MDRD: 41 ML/MIN/1.73 (ref 51–120)
GFR SERPL CREATININE-BSD FRML MDRD: 41 ML/MIN/1.73 (ref 60–120)
GFR SERPL CREATININE-BSD FRML MDRD: 42 ML/MIN/1.73 (ref 51–120)
GFR SERPL CREATININE-BSD FRML MDRD: 42 ML/MIN/1.73 (ref 60–120)
GFR SERPL CREATININE-BSD FRML MDRD: 43 ML/MIN/1.73 (ref 60–120)
GFR SERPL CREATININE-BSD FRML MDRD: 44 ML/MIN/1.73 (ref 60–120)
GFR SERPL CREATININE-BSD FRML MDRD: 44 ML/MIN/1.73 (ref 60–120)
GFR SERPL CREATININE-BSD FRML MDRD: 45 ML/MIN/1.73 (ref 51–120)
GFR SERPL CREATININE-BSD FRML MDRD: 45 ML/MIN/1.73 (ref 51–120)
GFR SERPL CREATININE-BSD FRML MDRD: 45 ML/MIN/1.73 (ref 60–120)
GFR SERPL CREATININE-BSD FRML MDRD: 45 ML/MIN/1.73 (ref 60–120)
GFR SERPL CREATININE-BSD FRML MDRD: 46 ML/MIN/1.73 (ref 51–120)
GFR SERPL CREATININE-BSD FRML MDRD: 46 ML/MIN/1.73 (ref 60–120)
GFR SERPL CREATININE-BSD FRML MDRD: 47 ML/MIN/1.73 (ref 51–120)
GFR SERPL CREATININE-BSD FRML MDRD: 47 ML/MIN/1.73 (ref 51–120)
GFR SERPL CREATININE-BSD FRML MDRD: 47 ML/MIN/1.73 (ref 60–120)
GFR SERPL CREATININE-BSD FRML MDRD: 48 ML/MIN/1.73 (ref 51–120)
GFR SERPL CREATININE-BSD FRML MDRD: 48 ML/MIN/1.73 (ref 60–120)
GFR SERPL CREATININE-BSD FRML MDRD: 49 ML/MIN/1.73 (ref 60–120)
GFR SERPL CREATININE-BSD FRML MDRD: 49 ML/MIN/1.73 (ref 60–120)
GFR SERPL CREATININE-BSD FRML MDRD: 50 ML/MIN/1.73 (ref 60–120)
GFR SERPL CREATININE-BSD FRML MDRD: 51 ML/MIN/1.73 (ref 60–120)
GFR SERPL CREATININE-BSD FRML MDRD: 52 ML/MIN/1.73 (ref 51–120)
GFR SERPL CREATININE-BSD FRML MDRD: 52 ML/MIN/1.73 (ref 60–120)
GFR SERPL CREATININE-BSD FRML MDRD: 53 ML/MIN/1.73 (ref 60–120)
GFR SERPL CREATININE-BSD FRML MDRD: 53 ML/MIN/1.73 (ref 60–120)
GFR SERPL CREATININE-BSD FRML MDRD: 54 ML/MIN/1.73 (ref 51–120)
GFR SERPL CREATININE-BSD FRML MDRD: 54 ML/MIN/1.73 (ref 60–120)
GFR SERPL CREATININE-BSD FRML MDRD: 56 ML/MIN/1.73 (ref 51–120)
GFR SERPL CREATININE-BSD FRML MDRD: 57 ML/MIN/1.73 (ref 51–120)
GFR SERPL CREATININE-BSD FRML MDRD: 57 ML/MIN/1.73 (ref 51–120)
GFR SERPL CREATININE-BSD FRML MDRD: 59 ML/MIN/1.73 (ref 60–120)
GFR SERPL CREATININE-BSD FRML MDRD: 60 ML/MIN/1.73 (ref 60–120)
GFR SERPL CREATININE-BSD FRML MDRD: 63 ML/MIN/1.73 (ref 60–120)
GFR SERPL CREATININE-BSD FRML MDRD: 63 ML/MIN/1.73 (ref 60–120)
GFR SERPL CREATININE-BSD FRML MDRD: 64 ML/MIN/1.73 (ref 51–120)
GFR SERPL CREATININE-BSD FRML MDRD: 68 ML/MIN/1.73 (ref 60–120)
GFR SERPL CREATININE-BSD FRML MDRD: 69 ML/MIN/1.73 (ref 51–120)
GFR SERPL CREATININE-BSD FRML MDRD: 70 ML/MIN/1.73 (ref 51–120)
GFR SERPL CREATININE-BSD FRML MDRD: 70 ML/MIN/1.73 (ref 60–120)
GFR SERPL CREATININE-BSD FRML MDRD: 73 ML/MIN/1.73 (ref 51–120)
GFR SERPL CREATININE-BSD FRML MDRD: 77 ML/MIN/1.73 (ref 60–120)
GFR SERPL CREATININE-BSD FRML MDRD: 79 ML/MIN/1.73 (ref 51–120)
GFR SERPL CREATININE-BSD FRML MDRD: 81 ML/MIN/1.73 (ref 60–120)
GFR SERPL CREATININE-BSD FRML MDRD: 85 ML/MIN/1.73 (ref 60–120)
GLOBULIN UR ELPH-MCNC: 2.1 GM/DL (ref 2.3–3.5)
GLOBULIN UR ELPH-MCNC: 2.1 GM/DL (ref 2.3–3.5)
GLOBULIN UR ELPH-MCNC: 2.2 GM/DL (ref 2.3–3.5)
GLOBULIN UR ELPH-MCNC: 2.2 GM/DL (ref 2.3–3.5)
GLOBULIN UR ELPH-MCNC: 2.3 GM/DL (ref 2.3–3.5)
GLOBULIN UR ELPH-MCNC: 2.4 GM/DL (ref 2.3–3.5)
GLOBULIN UR ELPH-MCNC: 2.5 GM/DL (ref 2.3–3.5)
GLOBULIN UR ELPH-MCNC: 2.6 GM/DL (ref 2.3–3.5)
GLOBULIN UR ELPH-MCNC: 2.7 GM/DL (ref 2.3–3.5)
GLOBULIN UR ELPH-MCNC: 2.8 GM/DL (ref 2.3–3.5)
GLOBULIN UR ELPH-MCNC: 2.9 GM/DL (ref 2.3–3.5)
GLOBULIN UR ELPH-MCNC: 3 GM/DL (ref 2.3–3.5)
GLOBULIN UR ELPH-MCNC: 3.1 GM/DL (ref 2.3–3.5)
GLOBULIN UR ELPH-MCNC: 3.3 GM/DL (ref 2.3–3.5)
GLUCOSE BLD-MCNC: 100 MG/DL (ref 60–100)
GLUCOSE BLD-MCNC: 101 MG/DL (ref 60–100)
GLUCOSE BLD-MCNC: 102 MG/DL (ref 60–100)
GLUCOSE BLD-MCNC: 103 MG/DL (ref 60–100)
GLUCOSE BLD-MCNC: 104 MG/DL (ref 60–100)
GLUCOSE BLD-MCNC: 105 MG/DL (ref 60–100)
GLUCOSE BLD-MCNC: 106 MG/DL (ref 60–100)
GLUCOSE BLD-MCNC: 106 MG/DL (ref 60–100)
GLUCOSE BLD-MCNC: 107 MG/DL (ref 60–100)
GLUCOSE BLD-MCNC: 107 MG/DL (ref 60–100)
GLUCOSE BLD-MCNC: 109 MG/DL (ref 60–100)
GLUCOSE BLD-MCNC: 109 MG/DL (ref 60–100)
GLUCOSE BLD-MCNC: 117 MG/DL (ref 60–100)
GLUCOSE BLD-MCNC: 117 MG/DL (ref 60–100)
GLUCOSE BLD-MCNC: 118 MG/DL (ref 60–100)
GLUCOSE BLD-MCNC: 118 MG/DL (ref 60–100)
GLUCOSE BLD-MCNC: 121 MG/DL (ref 60–100)
GLUCOSE BLD-MCNC: 124 MG/DL (ref 60–100)
GLUCOSE BLD-MCNC: 127 MG/DL (ref 60–100)
GLUCOSE BLD-MCNC: 132 MG/DL (ref 60–100)
GLUCOSE BLD-MCNC: 136 MG/DL (ref 60–100)
GLUCOSE BLD-MCNC: 139 MG/DL (ref 60–100)
GLUCOSE BLD-MCNC: 140 MG/DL (ref 60–100)
GLUCOSE BLD-MCNC: 141 MG/DL (ref 60–100)
GLUCOSE BLD-MCNC: 150 MG/DL (ref 60–100)
GLUCOSE BLD-MCNC: 152 MG/DL (ref 60–100)
GLUCOSE BLD-MCNC: 152 MG/DL (ref 60–100)
GLUCOSE BLD-MCNC: 158 MG/DL (ref 60–100)
GLUCOSE BLD-MCNC: 160 MG/DL (ref 60–100)
GLUCOSE BLD-MCNC: 171 MG/DL (ref 60–100)
GLUCOSE BLD-MCNC: 185 MG/DL (ref 60–100)
GLUCOSE BLD-MCNC: 196 MG/DL (ref 60–100)
GLUCOSE BLD-MCNC: 206 MG/DL (ref 60–100)
GLUCOSE BLD-MCNC: 64 MG/DL (ref 60–100)
GLUCOSE BLD-MCNC: 67 MG/DL (ref 60–100)
GLUCOSE BLD-MCNC: 69 MG/DL (ref 60–100)
GLUCOSE BLD-MCNC: 73 MG/DL (ref 60–100)
GLUCOSE BLD-MCNC: 73 MG/DL (ref 60–100)
GLUCOSE BLD-MCNC: 75 MG/DL (ref 60–100)
GLUCOSE BLD-MCNC: 77 MG/DL (ref 60–100)
GLUCOSE BLD-MCNC: 77 MG/DL (ref 60–100)
GLUCOSE BLD-MCNC: 79 MG/DL (ref 60–100)
GLUCOSE BLD-MCNC: 79 MG/DL (ref 60–100)
GLUCOSE BLD-MCNC: 80 MG/DL (ref 60–100)
GLUCOSE BLD-MCNC: 81 MG/DL (ref 60–100)
GLUCOSE BLD-MCNC: 82 MG/DL (ref 60–100)
GLUCOSE BLD-MCNC: 82 MG/DL (ref 60–100)
GLUCOSE BLD-MCNC: 83 MG/DL (ref 60–100)
GLUCOSE BLD-MCNC: 84 MG/DL (ref 60–100)
GLUCOSE BLD-MCNC: 85 MG/DL (ref 60–100)
GLUCOSE BLD-MCNC: 86 MG/DL (ref 60–100)
GLUCOSE BLD-MCNC: 86 MG/DL (ref 60–100)
GLUCOSE BLD-MCNC: 87 MG/DL (ref 60–100)
GLUCOSE BLD-MCNC: 87 MG/DL (ref 60–100)
GLUCOSE BLD-MCNC: 88 MG/DL (ref 60–100)
GLUCOSE BLD-MCNC: 89 MG/DL (ref 60–100)
GLUCOSE BLD-MCNC: 89 MG/DL (ref 60–100)
GLUCOSE BLD-MCNC: 90 MG/DL (ref 60–100)
GLUCOSE BLD-MCNC: 91 MG/DL (ref 60–100)
GLUCOSE BLD-MCNC: 91 MG/DL (ref 60–100)
GLUCOSE BLD-MCNC: 92 MG/DL (ref 60–100)
GLUCOSE BLD-MCNC: 93 MG/DL (ref 60–100)
GLUCOSE BLD-MCNC: 94 MG/DL (ref 60–100)
GLUCOSE BLD-MCNC: 95 MG/DL (ref 60–100)
GLUCOSE BLD-MCNC: 96 MG/DL (ref 60–100)
GLUCOSE BLD-MCNC: 97 MG/DL (ref 60–100)
GLUCOSE BLD-MCNC: 98 MG/DL (ref 60–100)
GLUCOSE BLD-MCNC: 98 MG/DL (ref 60–100)
GLUCOSE BLD-MCNC: 99 MG/DL (ref 60–100)
GLUCOSE BLDA-MCNC: 103 MMOL/L
GLUCOSE BLDA-MCNC: 109 MMOL/L
GLUCOSE BLDA-MCNC: 112 MMOL/L
GLUCOSE BLDA-MCNC: 115 MMOL/L
GLUCOSE BLDA-MCNC: 116 MMOL/L
GLUCOSE BLDA-MCNC: 117 MMOL/L
GLUCOSE BLDA-MCNC: 121 MMOL/L
GLUCOSE BLDA-MCNC: 122 MMOL/L
GLUCOSE BLDA-MCNC: 122 MMOL/L
GLUCOSE BLDA-MCNC: 135 MMOL/L
GLUCOSE BLDA-MCNC: 135 MMOL/L
GLUCOSE BLDA-MCNC: 157 MMOL/L
GLUCOSE BLDA-MCNC: 161 MMOL/L
GLUCOSE BLDA-MCNC: 161 MMOL/L
GLUCOSE BLDA-MCNC: 163 MMOL/L
GLUCOSE BLDA-MCNC: 170 MMOL/L
GLUCOSE BLDA-MCNC: 72 MMOL/L
GLUCOSE BLDA-MCNC: 72 MMOL/L
GLUCOSE BLDA-MCNC: 73 MMOL/L
GLUCOSE BLDA-MCNC: 81 MMOL/L
GLUCOSE BLDA-MCNC: 84 MMOL/L
GLUCOSE BLDA-MCNC: 84 MMOL/L
GLUCOSE BLDA-MCNC: 87 MMOL/L
GLUCOSE BLDA-MCNC: 88 MMOL/L
GLUCOSE BLDA-MCNC: 91 MMOL/L
GLUCOSE BLDA-MCNC: 92 MMOL/L
GLUCOSE BLDA-MCNC: 92 MMOL/L
GLUCOSE BLDA-MCNC: 99 MMOL/L
GLUCOSE BLDC GLUCOMTR-MCNC: 105 MG/DL (ref 70–130)
GLUCOSE BLDC GLUCOMTR-MCNC: 107 MG/DL (ref 70–130)
GLUCOSE BLDC GLUCOMTR-MCNC: 133 MG/DL (ref 70–130)
GLUCOSE BLDC GLUCOMTR-MCNC: 134 MG/DL (ref 70–130)
GLUCOSE BLDC GLUCOMTR-MCNC: 60 MG/DL (ref 70–130)
GLUCOSE BLDC GLUCOMTR-MCNC: 67 MG/DL (ref 70–130)
GLUCOSE BLDC GLUCOMTR-MCNC: 74 MG/DL (ref 70–130)
GLUCOSE BLDC GLUCOMTR-MCNC: 77 MG/DL (ref 70–130)
GLUCOSE BLDC GLUCOMTR-MCNC: 82 MG/DL (ref 70–130)
GLUCOSE BLDC GLUCOMTR-MCNC: 85 MG/DL (ref 70–130)
GLUCOSE BLDC GLUCOMTR-MCNC: 89 MG/DL (ref 70–130)
GLUCOSE BLDC GLUCOMTR-MCNC: 90 MG/DL (ref 70–130)
GLUCOSE BLDC GLUCOMTR-MCNC: 95 MG/DL (ref 70–130)
GLUCOSE UR STRIP-MCNC: NEGATIVE MG/DL
GRAM STN SPEC: ABNORMAL
HANSEL STAIN: NEGATIVE
HBA1C MFR BLD: 4.72 % (ref 4–5.6)
HCO3 BLDA-SCNC: 14 MMOL/L (ref 22–26)
HCO3 BLDA-SCNC: 14.9 MMOL/L (ref 22–26)
HCO3 BLDA-SCNC: 15.3 MMOL/L (ref 22–26)
HCO3 BLDA-SCNC: 15.4 MMOL/L (ref 22–26)
HCO3 BLDA-SCNC: 15.4 MMOL/L (ref 22–26)
HCO3 BLDA-SCNC: 15.9 MMOL/L (ref 22–26)
HCO3 BLDA-SCNC: 16.1 MMOL/L (ref 22–26)
HCO3 BLDA-SCNC: 16.8 MMOL/L (ref 22–26)
HCO3 BLDA-SCNC: 16.9 MMOL/L (ref 22–26)
HCO3 BLDA-SCNC: 17.4 MMOL/L (ref 22–26)
HCO3 BLDA-SCNC: 17.6 MMOL/L (ref 22–26)
HCO3 BLDA-SCNC: 17.9 MMOL/L (ref 22–26)
HCO3 BLDA-SCNC: 18.2 MMOL/L (ref 22–26)
HCO3 BLDA-SCNC: 18.4 MMOL/L (ref 22–26)
HCO3 BLDA-SCNC: 19.3 MMOL/L (ref 22–26)
HCO3 BLDA-SCNC: 19.4 MMOL/L (ref 22–26)
HCO3 BLDA-SCNC: 21 MMOL/L (ref 22–26)
HCO3 BLDA-SCNC: 21 MMOL/L (ref 22–26)
HCO3 BLDA-SCNC: 21.2 MMOL/L (ref 22–26)
HCO3 BLDA-SCNC: 22.6 MMOL/L (ref 22–26)
HCO3 BLDA-SCNC: 22.6 MMOL/L (ref 22–26)
HCO3 BLDA-SCNC: 23.9 MMOL/L (ref 22–26)
HCO3 BLDA-SCNC: 24.3 MMOL/L (ref 22–26)
HCO3 BLDA-SCNC: 24.8 MMOL/L (ref 22–26)
HCO3 BLDA-SCNC: 25.6 MMOL/L (ref 22–26)
HCO3 BLDA-SCNC: 26.2 MMOL/L (ref 22–26)
HCT VFR BLD AUTO: 15.2 % (ref 35–45)
HCT VFR BLD AUTO: 16.7 % (ref 35–45)
HCT VFR BLD AUTO: 18.4 % (ref 35–45)
HCT VFR BLD AUTO: 19.3 % (ref 35–45)
HCT VFR BLD AUTO: 21.2 % (ref 35–45)
HCT VFR BLD AUTO: 21.8 % (ref 35–45)
HCT VFR BLD AUTO: 21.9 % (ref 35–45)
HCT VFR BLD AUTO: 22.2 % (ref 35–45)
HCT VFR BLD AUTO: 22.4 % (ref 35–45)
HCT VFR BLD AUTO: 22.5 % (ref 35–45)
HCT VFR BLD AUTO: 22.6 % (ref 35–45)
HCT VFR BLD AUTO: 22.6 % (ref 35–45)
HCT VFR BLD AUTO: 22.7 % (ref 35–45)
HCT VFR BLD AUTO: 22.8 % (ref 35–45)
HCT VFR BLD AUTO: 22.9 % (ref 35–45)
HCT VFR BLD AUTO: 22.9 % (ref 35–45)
HCT VFR BLD AUTO: 23 % (ref 35–45)
HCT VFR BLD AUTO: 23.1 % (ref 35–45)
HCT VFR BLD AUTO: 23.2 % (ref 35–45)
HCT VFR BLD AUTO: 23.3 % (ref 35–45)
HCT VFR BLD AUTO: 23.4 % (ref 35–45)
HCT VFR BLD AUTO: 23.6 % (ref 35–45)
HCT VFR BLD AUTO: 23.7 % (ref 35–45)
HCT VFR BLD AUTO: 23.9 % (ref 35–45)
HCT VFR BLD AUTO: 23.9 % (ref 35–45)
HCT VFR BLD AUTO: 24.1 % (ref 35–45)
HCT VFR BLD AUTO: 24.2 % (ref 35–45)
HCT VFR BLD AUTO: 24.2 % (ref 35–45)
HCT VFR BLD AUTO: 24.3 % (ref 35–45)
HCT VFR BLD AUTO: 24.4 % (ref 35–45)
HCT VFR BLD AUTO: 24.6 % (ref 35–45)
HCT VFR BLD AUTO: 24.7 % (ref 35–45)
HCT VFR BLD AUTO: 24.7 % (ref 35–45)
HCT VFR BLD AUTO: 24.8 % (ref 35–45)
HCT VFR BLD AUTO: 24.9 % (ref 35–45)
HCT VFR BLD AUTO: 24.9 % (ref 35–45)
HCT VFR BLD AUTO: 25 % (ref 35–45)
HCT VFR BLD AUTO: 25.1 % (ref 35–45)
HCT VFR BLD AUTO: 25.1 % (ref 35–45)
HCT VFR BLD AUTO: 25.2 % (ref 35–45)
HCT VFR BLD AUTO: 25.4 % (ref 35–45)
HCT VFR BLD AUTO: 25.6 % (ref 35–45)
HCT VFR BLD AUTO: 25.6 % (ref 35–45)
HCT VFR BLD AUTO: 25.8 % (ref 35–45)
HCT VFR BLD AUTO: 25.9 % (ref 35–45)
HCT VFR BLD AUTO: 26 % (ref 35–45)
HCT VFR BLD AUTO: 26.1 % (ref 35–45)
HCT VFR BLD AUTO: 26.2 % (ref 35–45)
HCT VFR BLD AUTO: 26.4 % (ref 35–45)
HCT VFR BLD AUTO: 26.5 % (ref 35–45)
HCT VFR BLD AUTO: 26.6 % (ref 35–45)
HCT VFR BLD AUTO: 26.7 % (ref 35–45)
HCT VFR BLD AUTO: 26.7 % (ref 35–45)
HCT VFR BLD AUTO: 26.8 % (ref 35–45)
HCT VFR BLD AUTO: 26.9 % (ref 35–45)
HCT VFR BLD AUTO: 27 % (ref 35–45)
HCT VFR BLD AUTO: 27 % (ref 35–45)
HCT VFR BLD AUTO: 27.1 % (ref 35–45)
HCT VFR BLD AUTO: 27.2 % (ref 35–45)
HCT VFR BLD AUTO: 27.2 % (ref 35–45)
HCT VFR BLD AUTO: 27.3 % (ref 35–45)
HCT VFR BLD AUTO: 27.3 % (ref 35–45)
HCT VFR BLD AUTO: 27.4 % (ref 35–45)
HCT VFR BLD AUTO: 27.4 % (ref 35–45)
HCT VFR BLD AUTO: 27.5 % (ref 35–45)
HCT VFR BLD AUTO: 27.7 % (ref 35–45)
HCT VFR BLD AUTO: 28 % (ref 35–45)
HCT VFR BLD AUTO: 28 % (ref 35–45)
HCT VFR BLD AUTO: 28.2 % (ref 35–45)
HCT VFR BLD AUTO: 28.3 % (ref 35–45)
HCT VFR BLD AUTO: 28.4 % (ref 35–45)
HCT VFR BLD AUTO: 28.5 % (ref 35–45)
HCT VFR BLD AUTO: 28.7 % (ref 35–45)
HCT VFR BLD AUTO: 29 % (ref 35–45)
HCT VFR BLD AUTO: 29.1 % (ref 35–45)
HCT VFR BLD AUTO: 29.2 % (ref 35–45)
HCT VFR BLD AUTO: 29.5 % (ref 35–45)
HCT VFR BLD AUTO: 29.5 % (ref 35–45)
HCT VFR BLD AUTO: 29.7 % (ref 35–45)
HCT VFR BLD AUTO: 29.9 % (ref 35–45)
HCT VFR BLD AUTO: 29.9 % (ref 35–45)
HCT VFR BLD AUTO: 30.2 % (ref 35–45)
HCT VFR BLD AUTO: 30.3 % (ref 35–45)
HCT VFR BLD AUTO: 30.3 % (ref 35–45)
HCT VFR BLD AUTO: 30.6 % (ref 35–45)
HCT VFR BLD AUTO: 31 % (ref 35–45)
HCT VFR BLD AUTO: 31.2 % (ref 35–45)
HCT VFR BLD AUTO: 31.9 % (ref 35–45)
HCT VFR BLD AUTO: 32 % (ref 35–45)
HCT VFR BLD AUTO: 32 % (ref 35–45)
HCT VFR BLD AUTO: 32.1 % (ref 35–45)
HCT VFR BLD AUTO: 32.5 % (ref 35–45)
HCT VFR BLD AUTO: 33 % (ref 35–45)
HCT VFR BLD AUTO: 34.6 % (ref 35–45)
HCT VFR BLD AUTO: 35.2 % (ref 35–45)
HCT VFR BLD CALC: 21 % (ref 38–47)
HCT VFR BLD CALC: 23 % (ref 38–47)
HCT VFR BLD CALC: 23 % (ref 38–47)
HCT VFR BLD CALC: 24 % (ref 38–47)
HCT VFR BLD CALC: 25 % (ref 38–47)
HCT VFR BLD CALC: 26 %
HCT VFR BLD CALC: 26 % (ref 38–47)
HCT VFR BLD CALC: 27 % (ref 38–47)
HCT VFR BLD CALC: 28 % (ref 38–47)
HCT VFR BLD CALC: 29 % (ref 38–47)
HCT VFR BLD CALC: 29 % (ref 38–47)
HCT VFR BLD CALC: 30 % (ref 38–47)
HCT VFR BLD CALC: 31 % (ref 38–47)
HCT VFR BLD CALC: 33 % (ref 38–47)
HCT VFR BLD CALC: 33 % (ref 38–47)
HDLC SERPL-MCNC: 22 MG/DL (ref 60–200)
HEMOCCULT STL QL: POSITIVE
HEMOCCULT STL QL: POSITIVE
HGB BLD-MCNC: 10 G/DL (ref 12–15.5)
HGB BLD-MCNC: 10.1 G/DL (ref 12–15.5)
HGB BLD-MCNC: 10.5 G/DL (ref 12–15.5)
HGB BLD-MCNC: 10.5 G/DL (ref 12–15.5)
HGB BLD-MCNC: 10.6 G/DL (ref 12–15.5)
HGB BLD-MCNC: 10.8 G/DL (ref 12–15.5)
HGB BLD-MCNC: 10.9 G/DL (ref 12–15.5)
HGB BLD-MCNC: 11 G/DL (ref 12–15.5)
HGB BLD-MCNC: 11.1 G/DL (ref 12–15.5)
HGB BLD-MCNC: 11.2 G/DL (ref 12–15.5)
HGB BLD-MCNC: 11.4 G/DL (ref 12–15.5)
HGB BLD-MCNC: 11.9 G/DL (ref 12–15.5)
HGB BLD-MCNC: 11.9 G/DL (ref 12–15.5)
HGB BLD-MCNC: 5.2 G/DL (ref 12–15.5)
HGB BLD-MCNC: 5.7 G/DL (ref 12–15.5)
HGB BLD-MCNC: 6 G/DL (ref 12–15.5)
HGB BLD-MCNC: 6.6 G/DL (ref 12–15.5)
HGB BLD-MCNC: 6.9 G/DL (ref 12–15.5)
HGB BLD-MCNC: 7.3 G/DL (ref 12–15.5)
HGB BLD-MCNC: 7.4 G/DL (ref 12–15.5)
HGB BLD-MCNC: 7.4 G/DL (ref 12–15.5)
HGB BLD-MCNC: 7.5 G/DL (ref 12–15.5)
HGB BLD-MCNC: 7.6 G/DL (ref 12–15.5)
HGB BLD-MCNC: 7.7 G/DL (ref 12–15.5)
HGB BLD-MCNC: 7.8 G/DL (ref 12–15.5)
HGB BLD-MCNC: 7.9 G/DL (ref 12–15.5)
HGB BLD-MCNC: 8 G/DL (ref 12–15.5)
HGB BLD-MCNC: 8.1 G/DL (ref 12–15.5)
HGB BLD-MCNC: 8.2 G/DL (ref 12–15.5)
HGB BLD-MCNC: 8.3 G/DL (ref 12–15.5)
HGB BLD-MCNC: 8.4 G/DL (ref 12–15.5)
HGB BLD-MCNC: 8.5 G/DL (ref 12–15.5)
HGB BLD-MCNC: 8.6 G/DL (ref 12–15.5)
HGB BLD-MCNC: 8.7 G/DL (ref 12–15.5)
HGB BLD-MCNC: 8.8 G/DL (ref 12–15.5)
HGB BLD-MCNC: 8.9 G/DL (ref 12–15.5)
HGB BLD-MCNC: 9 G/DL (ref 12–15.5)
HGB BLD-MCNC: 9.1 G/DL (ref 12–15.5)
HGB BLD-MCNC: 9.2 G/DL (ref 12–15.5)
HGB BLD-MCNC: 9.3 G/DL (ref 12–15.5)
HGB BLD-MCNC: 9.4 G/DL (ref 12–15.5)
HGB BLD-MCNC: 9.5 G/DL (ref 12–15.5)
HGB BLD-MCNC: 9.6 G/DL (ref 12–15.5)
HGB BLD-MCNC: 9.7 G/DL (ref 12–15.5)
HGB BLD-MCNC: 9.7 G/DL (ref 12–15.5)
HGB BLD-MCNC: 9.8 G/DL (ref 12–15.5)
HGB BLD-MCNC: 9.9 G/DL (ref 12–15.5)
HGB BLDA-MCNC: 10.1 G/DL (ref 12–16)
HGB BLDA-MCNC: 10.6 G/DL (ref 12–16)
HGB BLDA-MCNC: 11.1 G/DL (ref 12–16)
HGB BLDA-MCNC: 11.3 G/DL (ref 12–16)
HGB BLDA-MCNC: 7.1 G/DL (ref 12–16)
HGB BLDA-MCNC: 7.7 G/DL (ref 12–16)
HGB BLDA-MCNC: 7.7 G/DL (ref 12–16)
HGB BLDA-MCNC: 8 G/DL (ref 12–16)
HGB BLDA-MCNC: 8.1 G/DL (ref 12–16)
HGB BLDA-MCNC: 8.3 G/DL (ref 12–16)
HGB BLDA-MCNC: 8.4 G/DL (ref 12–16)
HGB BLDA-MCNC: 8.5 G/DL (ref 12–16)
HGB BLDA-MCNC: 8.6 G/DL (ref 12–16)
HGB BLDA-MCNC: 8.7 G/DL (ref 12–16)
HGB BLDA-MCNC: 8.8 G/DL (ref 12–16)
HGB BLDA-MCNC: 8.9 G/DL (ref 12–16)
HGB BLDA-MCNC: 9 G/DL (ref 12–16)
HGB BLDA-MCNC: 9.3 G/DL (ref 12–16)
HGB BLDA-MCNC: 9.5 G/DL (ref 12–16)
HGB BLDA-MCNC: 9.6 G/DL (ref 12–16)
HGB BLDA-MCNC: 9.7 G/DL (ref 12–16)
HGB BLDA-MCNC: 9.8 G/DL (ref 12–16)
HGB UR QL STRIP.AUTO: ABNORMAL
HOLD SPECIMEN: NORMAL
HOROWITZ INDEX BLD+IHG-RTO: 30 %
HYALINE CASTS UR QL AUTO: ABNORMAL /LPF
HYPOCHROMIA BLD QL: ABNORMAL
HYPOCHROMIA BLD QL: ABNORMAL
HYPOCHROMIA BLD QL: NORMAL
IMM GRANULOCYTES # BLD: 0 10*3/MM3 (ref 0–0.02)
IMM GRANULOCYTES # BLD: 0 10*3/MM3 (ref 0–0.02)
IMM GRANULOCYTES # BLD: 0.01 10*3/MM3 (ref 0–0.02)
IMM GRANULOCYTES # BLD: 0.02 10*3/MM3 (ref 0–0.02)
IMM GRANULOCYTES # BLD: 0.03 10*3/MM3 (ref 0–0.02)
IMM GRANULOCYTES # BLD: 0.04 10*3/MM3 (ref 0–0.02)
IMM GRANULOCYTES # BLD: 0.05 10*3/MM3 (ref 0–0.02)
IMM GRANULOCYTES # BLD: 0.06 10*3/MM3 (ref 0–0.02)
IMM GRANULOCYTES # BLD: 0.07 10*3/MM3 (ref 0–0.02)
IMM GRANULOCYTES # BLD: 0.08 10*3/MM3 (ref 0–0.02)
IMM GRANULOCYTES # BLD: 0.09 10*3/MM3 (ref 0–0.02)
IMM GRANULOCYTES NFR BLD: 0 % (ref 0–0.5)
IMM GRANULOCYTES NFR BLD: 0 % (ref 0–0.5)
IMM GRANULOCYTES NFR BLD: 0.1 % (ref 0–0.5)
IMM GRANULOCYTES NFR BLD: 0.1 % (ref 0–0.5)
IMM GRANULOCYTES NFR BLD: 0.2 % (ref 0–0.5)
IMM GRANULOCYTES NFR BLD: 0.3 % (ref 0–0.5)
IMM GRANULOCYTES NFR BLD: 0.4 % (ref 0–0.5)
IMM GRANULOCYTES NFR BLD: 0.5 % (ref 0–0.5)
IMM GRANULOCYTES NFR BLD: 0.6 % (ref 0–0.5)
IMM GRANULOCYTES NFR BLD: 0.7 % (ref 0–0.5)
IMM GRANULOCYTES NFR BLD: 0.8 % (ref 0–0.5)
IMM GRANULOCYTES NFR BLD: 0.9 % (ref 0–0.5)
IMM GRANULOCYTES NFR BLD: 0.9 % (ref 0–0.5)
IMM GRANULOCYTES NFR BLD: 1 % (ref 0–0.5)
IMM GRANULOCYTES NFR BLD: 1 % (ref 0–0.5)
IMM GRANULOCYTES NFR BLD: 1.1 % (ref 0–0.5)
IMP STRAIN: NORMAL
IMP STRAIN: NORMAL
INR PPP: 1.05 (ref 0.8–1.2)
INR PPP: 1.06 (ref 0.8–1.2)
INR PPP: 1.07 (ref 0.8–1.2)
INR PPP: 1.08 (ref 0.8–1.2)
INR PPP: 1.12 (ref 0.8–1.2)
INR PPP: 1.14 (ref 0.8–1.2)
INR PPP: 1.15 (ref 0.8–1.2)
INR PPP: 1.15 (ref 0.8–1.2)
INR PPP: 1.18 (ref 0.8–1.2)
INR PPP: 1.2 (ref 0.8–1.2)
INR PPP: 1.2 (ref 0.8–1.2)
INR PPP: 1.22 (ref 0.8–1.2)
INR PPP: 1.23 (ref 0.8–1.2)
INR PPP: 1.23 (ref 0.8–1.2)
INR PPP: 1.25 (ref 0.8–1.2)
INR PPP: 1.26 (ref 0.8–1.2)
INR PPP: 1.27 (ref 0.8–1.2)
INR PPP: 1.29 (ref 0.8–1.2)
INR PPP: 1.29 (ref 0.8–1.2)
IRON 24H UR-MRATE: 119 MCG/DL (ref 37–170)
IRON SATN MFR SERPL: 45 % (ref 15–50)
KETONES UR QL STRIP: ABNORMAL
KETONES UR QL STRIP: NEGATIVE
KETONES UR QL STRIP: NEGATIVE
KPC STRAIN: NORMAL
KPC STRAIN: NORMAL
L PNEUMO1 AG UR QL IA: NEGATIVE
LAB AP CASE REPORT: NORMAL
LAB AP CASE REPORT: NORMAL
LAB AP DIAGNOSIS COMMENT: NORMAL
LDH SERPL-CCNC: 451 U/L (ref 313–618)
LDLC/HDLC SERPL: 2.6 {RATIO} (ref 0–3.22)
LEUKOCYTE ESTERASE UR QL STRIP.AUTO: ABNORMAL
LEUKOCYTE ESTERASE UR QL STRIP.AUTO: NEGATIVE
LIPASE SERPL-CCNC: 189 U/L (ref 23–300)
LIPASE SERPL-CCNC: 192 U/L (ref 23–300)
LIPASE SERPL-CCNC: 416 U/L (ref 23–300)
LIPASE SERPL-CCNC: 73 U/L (ref 23–300)
LIPASE SERPL-CCNC: 85 U/L (ref 23–300)
LYMPHOCYTES # BLD AUTO: 0.37 10*3/MM3 (ref 0.6–4.2)
LYMPHOCYTES # BLD AUTO: 0.5 10*3/MM3 (ref 0.6–4.2)
LYMPHOCYTES # BLD AUTO: 0.56 10*3/MM3 (ref 0.6–4.2)
LYMPHOCYTES # BLD AUTO: 0.61 10*3/MM3 (ref 0.6–4.2)
LYMPHOCYTES # BLD AUTO: 0.63 10*3/MM3 (ref 0.6–4.2)
LYMPHOCYTES # BLD AUTO: 0.63 10*3/MM3 (ref 0.6–4.2)
LYMPHOCYTES # BLD AUTO: 0.78 10*3/MM3 (ref 0.6–4.2)
LYMPHOCYTES # BLD AUTO: 0.79 10*3/MM3 (ref 0.6–4.2)
LYMPHOCYTES # BLD AUTO: 0.81 10*3/MM3 (ref 0.6–4.2)
LYMPHOCYTES # BLD AUTO: 0.83 10*3/MM3 (ref 0.6–4.2)
LYMPHOCYTES # BLD AUTO: 0.85 10*3/MM3 (ref 0.6–4.2)
LYMPHOCYTES # BLD AUTO: 0.88 10*3/MM3 (ref 0.6–4.2)
LYMPHOCYTES # BLD AUTO: 0.99 10*3/MM3 (ref 0.6–4.2)
LYMPHOCYTES # BLD AUTO: 1.01 10*3/MM3 (ref 0.6–4.2)
LYMPHOCYTES # BLD AUTO: 1.05 10*3/MM3 (ref 0.6–4.2)
LYMPHOCYTES # BLD AUTO: 1.1 10*3/MM3 (ref 0.6–4.2)
LYMPHOCYTES # BLD AUTO: 1.17 10*3/MM3 (ref 0.6–4.2)
LYMPHOCYTES # BLD AUTO: 1.2 10*3/MM3 (ref 0.6–4.2)
LYMPHOCYTES # BLD AUTO: 1.25 10*3/MM3 (ref 0.6–4.2)
LYMPHOCYTES # BLD AUTO: 1.26 10*3/MM3 (ref 0.6–4.2)
LYMPHOCYTES # BLD AUTO: 1.27 10*3/MM3 (ref 0.6–4.2)
LYMPHOCYTES # BLD AUTO: 1.31 10*3/MM3 (ref 0.6–4.2)
LYMPHOCYTES # BLD AUTO: 1.34 10*3/MM3 (ref 0.6–4.2)
LYMPHOCYTES # BLD AUTO: 1.38 10*3/MM3 (ref 0.6–4.2)
LYMPHOCYTES # BLD AUTO: 1.38 10*3/MM3 (ref 0.6–4.2)
LYMPHOCYTES # BLD AUTO: 1.43 10*3/MM3 (ref 0.6–4.2)
LYMPHOCYTES # BLD AUTO: 1.49 10*3/MM3 (ref 0.6–4.2)
LYMPHOCYTES # BLD AUTO: 1.57 10*3/MM3 (ref 0.6–4.2)
LYMPHOCYTES # BLD AUTO: 1.6 10*3/MM3 (ref 0.6–4.2)
LYMPHOCYTES # BLD AUTO: 1.61 10*3/MM3 (ref 0.6–4.2)
LYMPHOCYTES # BLD AUTO: 1.67 10*3/MM3 (ref 0.6–4.2)
LYMPHOCYTES # BLD AUTO: 1.75 10*3/MM3 (ref 0.6–4.2)
LYMPHOCYTES # BLD AUTO: 1.83 10*3/MM3 (ref 0.6–4.2)
LYMPHOCYTES # BLD AUTO: 1.83 10*3/MM3 (ref 0.6–4.2)
LYMPHOCYTES # BLD MANUAL: 0.19 10*3/MM3 (ref 0.6–4.2)
LYMPHOCYTES # BLD MANUAL: 0.32 10*3/MM3 (ref 0.6–4.2)
LYMPHOCYTES # BLD MANUAL: 0.61 10*3/MM3 (ref 0.6–4.2)
LYMPHOCYTES # BLD MANUAL: 0.71 10*3/MM3 (ref 0.6–4.2)
LYMPHOCYTES # BLD MANUAL: 0.79 10*3/MM3 (ref 0.6–4.2)
LYMPHOCYTES NFR BLD AUTO: 10.1 % (ref 10–50)
LYMPHOCYTES NFR BLD AUTO: 10.1 % (ref 10–50)
LYMPHOCYTES NFR BLD AUTO: 10.7 % (ref 10–50)
LYMPHOCYTES NFR BLD AUTO: 11.1 % (ref 10–50)
LYMPHOCYTES NFR BLD AUTO: 12 % (ref 10–50)
LYMPHOCYTES NFR BLD AUTO: 13 % (ref 10–50)
LYMPHOCYTES NFR BLD AUTO: 13.4 % (ref 10–50)
LYMPHOCYTES NFR BLD AUTO: 13.9 % (ref 10–50)
LYMPHOCYTES NFR BLD AUTO: 14.7 % (ref 10–50)
LYMPHOCYTES NFR BLD AUTO: 15.6 % (ref 10–50)
LYMPHOCYTES NFR BLD AUTO: 16.2 % (ref 10–50)
LYMPHOCYTES NFR BLD AUTO: 16.2 % (ref 10–50)
LYMPHOCYTES NFR BLD AUTO: 17.7 % (ref 10–50)
LYMPHOCYTES NFR BLD AUTO: 18.1 % (ref 10–50)
LYMPHOCYTES NFR BLD AUTO: 18.4 % (ref 10–50)
LYMPHOCYTES NFR BLD AUTO: 18.6 % (ref 10–50)
LYMPHOCYTES NFR BLD AUTO: 20.5 % (ref 10–50)
LYMPHOCYTES NFR BLD AUTO: 21.1 % (ref 10–50)
LYMPHOCYTES NFR BLD AUTO: 23.1 % (ref 10–50)
LYMPHOCYTES NFR BLD AUTO: 23.5 % (ref 10–50)
LYMPHOCYTES NFR BLD AUTO: 24.4 % (ref 10–50)
LYMPHOCYTES NFR BLD AUTO: 25.5 % (ref 10–50)
LYMPHOCYTES NFR BLD AUTO: 27.5 % (ref 10–50)
LYMPHOCYTES NFR BLD AUTO: 28.7 % (ref 10–50)
LYMPHOCYTES NFR BLD AUTO: 30.2 % (ref 10–50)
LYMPHOCYTES NFR BLD AUTO: 32.3 % (ref 10–50)
LYMPHOCYTES NFR BLD AUTO: 33.7 % (ref 10–50)
LYMPHOCYTES NFR BLD AUTO: 41.5 % (ref 10–50)
LYMPHOCYTES NFR BLD AUTO: 7.3 % (ref 10–50)
LYMPHOCYTES NFR BLD AUTO: 7.4 % (ref 10–50)
LYMPHOCYTES NFR BLD AUTO: 8.5 % (ref 10–50)
LYMPHOCYTES NFR BLD AUTO: 8.9 % (ref 10–50)
LYMPHOCYTES NFR BLD AUTO: 9.8 % (ref 10–50)
LYMPHOCYTES NFR BLD AUTO: 9.8 % (ref 10–50)
LYMPHOCYTES NFR BLD MANUAL: 10 % (ref 10–50)
LYMPHOCYTES NFR BLD MANUAL: 11 % (ref 0–12)
LYMPHOCYTES NFR BLD MANUAL: 14 % (ref 0–12)
LYMPHOCYTES NFR BLD MANUAL: 21 % (ref 10–50)
LYMPHOCYTES NFR BLD MANUAL: 3 % (ref 0–12)
LYMPHOCYTES NFR BLD MANUAL: 4 % (ref 10–50)
LYMPHOCYTES NFR BLD MANUAL: 5 % (ref 0–12)
LYMPHOCYTES NFR BLD MANUAL: 5 % (ref 10–50)
LYMPHOCYTES NFR BLD MANUAL: 7 % (ref 0–12)
LYMPHOCYTES NFR BLD MANUAL: 8 % (ref 10–50)
Lab: NORMAL
MAGNESIUM SERPL-MCNC: 1.6 MG/DL (ref 1.6–2.3)
MAGNESIUM SERPL-MCNC: 1.7 MG/DL (ref 1.6–2.3)
MAGNESIUM SERPL-MCNC: 1.7 MG/DL (ref 1.6–2.3)
MAGNESIUM SERPL-MCNC: 1.8 MG/DL (ref 1.6–2.3)
MAGNESIUM SERPL-MCNC: 1.9 MG/DL (ref 1.6–2.3)
MAGNESIUM SERPL-MCNC: 1.9 MG/DL (ref 1.6–2.3)
MAGNESIUM SERPL-MCNC: 2.1 MG/DL (ref 1.6–2.3)
MAGNESIUM SERPL-MCNC: 2.2 MG/DL (ref 1.6–2.3)
MAGNESIUM SERPL-MCNC: 2.2 MG/DL (ref 1.6–2.3)
MAGNESIUM SERPL-MCNC: 2.3 MG/DL (ref 1.6–2.3)
MAGNESIUM SERPL-MCNC: 2.4 MG/DL (ref 1.6–2.3)
MAGNESIUM SERPL-MCNC: 2.4 MG/DL (ref 1.6–2.3)
MAGNESIUM SERPL-MCNC: 2.5 MG/DL (ref 1.6–2.3)
MAGNESIUM SERPL-MCNC: 3.2 MG/DL (ref 1.6–2.3)
MAGNESIUM SERPL-MCNC: 3.2 MG/DL (ref 1.6–2.3)
MAGNESIUM SERPL-MCNC: 3.6 MG/DL (ref 1.6–2.3)
MAGNESIUM SERPL-MCNC: 3.7 MG/DL (ref 1.6–2.3)
MCH RBC QN AUTO: 28.3 PG (ref 26.5–34)
MCH RBC QN AUTO: 28.5 PG (ref 26.5–34)
MCH RBC QN AUTO: 28.6 PG (ref 26.5–34)
MCH RBC QN AUTO: 28.6 PG (ref 26.5–34)
MCH RBC QN AUTO: 28.7 PG (ref 26.5–34)
MCH RBC QN AUTO: 28.7 PG (ref 26.5–34)
MCH RBC QN AUTO: 28.8 PG (ref 26.5–34)
MCH RBC QN AUTO: 28.9 PG (ref 26.5–34)
MCH RBC QN AUTO: 29 PG (ref 26.5–34)
MCH RBC QN AUTO: 29.1 PG (ref 26.5–34)
MCH RBC QN AUTO: 29.1 PG (ref 26.5–34)
MCH RBC QN AUTO: 29.2 PG (ref 26.5–34)
MCH RBC QN AUTO: 29.4 PG (ref 26.5–34)
MCH RBC QN AUTO: 29.4 PG (ref 26.5–34)
MCH RBC QN AUTO: 29.5 PG (ref 26.5–34)
MCH RBC QN AUTO: 29.6 PG (ref 26.5–34)
MCH RBC QN AUTO: 29.7 PG (ref 26.5–34)
MCH RBC QN AUTO: 29.8 PG (ref 26.5–34)
MCH RBC QN AUTO: 29.9 PG (ref 26.5–34)
MCH RBC QN AUTO: 30 PG (ref 26.5–34)
MCH RBC QN AUTO: 30.1 PG (ref 26.5–34)
MCH RBC QN AUTO: 30.2 PG (ref 26.5–34)
MCH RBC QN AUTO: 30.3 PG (ref 26.5–34)
MCH RBC QN AUTO: 30.4 PG (ref 26.5–34)
MCH RBC QN AUTO: 30.5 PG (ref 26.5–34)
MCH RBC QN AUTO: 30.6 PG (ref 26.5–34)
MCH RBC QN AUTO: 30.8 PG (ref 26.5–34)
MCH RBC QN AUTO: 31 PG (ref 26.5–34)
MCH RBC QN AUTO: 31.1 PG (ref 26.5–34)
MCH RBC QN AUTO: 31.2 PG (ref 26.5–34)
MCH RBC QN AUTO: 31.2 PG (ref 26.5–34)
MCH RBC QN AUTO: 31.3 PG (ref 26.5–34)
MCH RBC QN AUTO: 31.4 PG (ref 26.5–34)
MCH RBC QN AUTO: 31.7 PG (ref 26.5–34)
MCH RBC QN AUTO: 31.9 PG (ref 26.5–34)
MCHC RBC AUTO-ENTMCNC: 31.3 G/DL (ref 31.4–36)
MCHC RBC AUTO-ENTMCNC: 31.7 G/DL (ref 31.4–36)
MCHC RBC AUTO-ENTMCNC: 31.7 G/DL (ref 31.4–36)
MCHC RBC AUTO-ENTMCNC: 31.9 G/DL (ref 31.4–36)
MCHC RBC AUTO-ENTMCNC: 32.1 G/DL (ref 31.4–36)
MCHC RBC AUTO-ENTMCNC: 32.2 G/DL (ref 31.4–36)
MCHC RBC AUTO-ENTMCNC: 32.3 G/DL (ref 31.4–36)
MCHC RBC AUTO-ENTMCNC: 32.3 G/DL (ref 31.4–36)
MCHC RBC AUTO-ENTMCNC: 32.5 G/DL (ref 31.4–36)
MCHC RBC AUTO-ENTMCNC: 32.5 G/DL (ref 31.4–36)
MCHC RBC AUTO-ENTMCNC: 32.6 G/DL (ref 31.4–36)
MCHC RBC AUTO-ENTMCNC: 32.7 G/DL (ref 31.4–36)
MCHC RBC AUTO-ENTMCNC: 32.8 G/DL (ref 31.4–36)
MCHC RBC AUTO-ENTMCNC: 32.9 G/DL (ref 31.4–36)
MCHC RBC AUTO-ENTMCNC: 33 G/DL (ref 31.4–36)
MCHC RBC AUTO-ENTMCNC: 33 G/DL (ref 31.4–36)
MCHC RBC AUTO-ENTMCNC: 33.1 G/DL (ref 31.4–36)
MCHC RBC AUTO-ENTMCNC: 33.2 G/DL (ref 31.4–36)
MCHC RBC AUTO-ENTMCNC: 33.3 G/DL (ref 31.4–36)
MCHC RBC AUTO-ENTMCNC: 33.4 G/DL (ref 31.4–36)
MCHC RBC AUTO-ENTMCNC: 33.5 G/DL (ref 31.4–36)
MCHC RBC AUTO-ENTMCNC: 33.6 G/DL (ref 31.4–36)
MCHC RBC AUTO-ENTMCNC: 33.7 G/DL (ref 31.4–36)
MCHC RBC AUTO-ENTMCNC: 33.8 G/DL (ref 31.4–36)
MCHC RBC AUTO-ENTMCNC: 33.9 G/DL (ref 31.4–36)
MCHC RBC AUTO-ENTMCNC: 34 G/DL (ref 31.4–36)
MCHC RBC AUTO-ENTMCNC: 34.1 G/DL (ref 31.4–36)
MCHC RBC AUTO-ENTMCNC: 34.2 G/DL (ref 31.4–36)
MCHC RBC AUTO-ENTMCNC: 34.3 G/DL (ref 31.4–36)
MCHC RBC AUTO-ENTMCNC: 34.3 G/DL (ref 31.4–36)
MCHC RBC AUTO-ENTMCNC: 34.4 G/DL (ref 31.4–36)
MCHC RBC AUTO-ENTMCNC: 34.5 G/DL (ref 31.4–36)
MCHC RBC AUTO-ENTMCNC: 34.6 G/DL (ref 31.4–36)
MCHC RBC AUTO-ENTMCNC: 34.7 G/DL (ref 31.4–36)
MCHC RBC AUTO-ENTMCNC: 34.7 G/DL (ref 31.4–36)
MCHC RBC AUTO-ENTMCNC: 34.8 G/DL (ref 31.4–36)
MCHC RBC AUTO-ENTMCNC: 34.8 G/DL (ref 31.4–36)
MCHC RBC AUTO-ENTMCNC: 34.9 G/DL (ref 31.4–36)
MCHC RBC AUTO-ENTMCNC: 34.9 G/DL (ref 31.4–36)
MCHC RBC AUTO-ENTMCNC: 35 G/DL (ref 31.4–36)
MCHC RBC AUTO-ENTMCNC: 35.1 G/DL (ref 31.4–36)
MCHC RBC AUTO-ENTMCNC: 35.2 G/DL (ref 31.4–36)
MCHC RBC AUTO-ENTMCNC: 35.3 G/DL (ref 31.4–36)
MCHC RBC AUTO-ENTMCNC: 35.6 G/DL (ref 31.4–36)
MCV RBC AUTO: 83.7 FL (ref 80–98)
MCV RBC AUTO: 84.1 FL (ref 80–98)
MCV RBC AUTO: 84.1 FL (ref 80–98)
MCV RBC AUTO: 84.6 FL (ref 80–98)
MCV RBC AUTO: 85.3 FL (ref 80–98)
MCV RBC AUTO: 85.3 FL (ref 80–98)
MCV RBC AUTO: 85.4 FL (ref 80–98)
MCV RBC AUTO: 85.5 FL (ref 80–98)
MCV RBC AUTO: 85.9 FL (ref 80–98)
MCV RBC AUTO: 86.1 FL (ref 80–98)
MCV RBC AUTO: 86.2 FL (ref 80–98)
MCV RBC AUTO: 86.3 FL (ref 80–98)
MCV RBC AUTO: 86.3 FL (ref 80–98)
MCV RBC AUTO: 86.6 FL (ref 80–98)
MCV RBC AUTO: 86.7 FL (ref 80–98)
MCV RBC AUTO: 86.9 FL (ref 80–98)
MCV RBC AUTO: 86.9 FL (ref 80–98)
MCV RBC AUTO: 87 FL (ref 80–98)
MCV RBC AUTO: 87.1 FL (ref 80–98)
MCV RBC AUTO: 87.2 FL (ref 80–98)
MCV RBC AUTO: 87.3 FL (ref 80–98)
MCV RBC AUTO: 87.3 FL (ref 80–98)
MCV RBC AUTO: 87.4 FL (ref 80–98)
MCV RBC AUTO: 87.4 FL (ref 80–98)
MCV RBC AUTO: 87.5 FL (ref 80–98)
MCV RBC AUTO: 87.6 FL (ref 80–98)
MCV RBC AUTO: 87.8 FL (ref 80–98)
MCV RBC AUTO: 87.9 FL (ref 80–98)
MCV RBC AUTO: 88 FL (ref 80–98)
MCV RBC AUTO: 88 FL (ref 80–98)
MCV RBC AUTO: 88.1 FL (ref 80–98)
MCV RBC AUTO: 88.2 FL (ref 80–98)
MCV RBC AUTO: 88.2 FL (ref 80–98)
MCV RBC AUTO: 88.3 FL (ref 80–98)
MCV RBC AUTO: 88.4 FL (ref 80–98)
MCV RBC AUTO: 88.5 FL (ref 80–98)
MCV RBC AUTO: 88.6 FL (ref 80–98)
MCV RBC AUTO: 88.7 FL (ref 80–98)
MCV RBC AUTO: 88.8 FL (ref 80–98)
MCV RBC AUTO: 88.8 FL (ref 80–98)
MCV RBC AUTO: 88.9 FL (ref 80–98)
MCV RBC AUTO: 89 FL (ref 80–98)
MCV RBC AUTO: 89.1 FL (ref 80–98)
MCV RBC AUTO: 89.2 FL (ref 80–98)
MCV RBC AUTO: 89.2 FL (ref 80–98)
MCV RBC AUTO: 89.3 FL (ref 80–98)
MCV RBC AUTO: 89.4 FL (ref 80–98)
MCV RBC AUTO: 89.5 FL (ref 80–98)
MCV RBC AUTO: 89.6 FL (ref 80–98)
MCV RBC AUTO: 89.7 FL (ref 80–98)
MCV RBC AUTO: 89.8 FL (ref 80–98)
MCV RBC AUTO: 89.9 FL (ref 80–98)
MCV RBC AUTO: 89.9 FL (ref 80–98)
MCV RBC AUTO: 90 FL (ref 80–98)
MCV RBC AUTO: 90.1 FL (ref 80–98)
MCV RBC AUTO: 90.1 FL (ref 80–98)
MCV RBC AUTO: 90.3 FL (ref 80–98)
MCV RBC AUTO: 90.4 FL (ref 80–98)
MCV RBC AUTO: 90.5 FL (ref 80–98)
MCV RBC AUTO: 90.7 FL (ref 80–98)
MCV RBC AUTO: 90.8 FL (ref 80–98)
MCV RBC AUTO: 90.9 FL (ref 80–98)
MCV RBC AUTO: 90.9 FL (ref 80–98)
MCV RBC AUTO: 91 FL (ref 80–98)
MCV RBC AUTO: 91 FL (ref 80–98)
MCV RBC AUTO: 91.2 FL (ref 80–98)
MCV RBC AUTO: 91.3 FL (ref 80–98)
MCV RBC AUTO: 91.3 FL (ref 80–98)
MCV RBC AUTO: 91.5 FL (ref 80–98)
MCV RBC AUTO: 91.6 FL (ref 80–98)
MCV RBC AUTO: 91.6 FL (ref 80–98)
MCV RBC AUTO: 91.7 FL (ref 80–98)
MCV RBC AUTO: 91.7 FL (ref 80–98)
MCV RBC AUTO: 91.8 FL (ref 80–98)
MCV RBC AUTO: 91.8 FL (ref 80–98)
MCV RBC AUTO: 91.9 FL (ref 80–98)
MCV RBC AUTO: 92 FL (ref 80–98)
MCV RBC AUTO: 92.9 FL (ref 80–98)
MCV RBC AUTO: 93.2 FL (ref 80–98)
MCV RBC AUTO: 93.2 FL (ref 80–98)
MCV RBC AUTO: 93.7 FL (ref 80–98)
MCV RBC AUTO: 93.9 FL (ref 80–98)
MCV RBC AUTO: 94 FL (ref 80–98)
MCV RBC AUTO: 94.7 FL (ref 80–98)
MCV RBC AUTO: 94.8 FL (ref 80–98)
MCV RBC AUTO: 95.1 FL (ref 80–98)
METHADONE UR QL SCN: NEGATIVE
METHADONE UR QL SCN: NEGATIVE
MODALITY: ABNORMAL
MONOCYTES # BLD AUTO: 0.06 10*3/MM3 (ref 0–0.9)
MONOCYTES # BLD AUTO: 0.19 10*3/MM3 (ref 0–0.9)
MONOCYTES # BLD AUTO: 0.23 10*3/MM3 (ref 0–0.9)
MONOCYTES # BLD AUTO: 0.24 10*3/MM3 (ref 0–0.9)
MONOCYTES # BLD AUTO: 0.29 10*3/MM3 (ref 0–0.9)
MONOCYTES # BLD AUTO: 0.33 10*3/MM3 (ref 0–0.9)
MONOCYTES # BLD AUTO: 0.34 10*3/MM3 (ref 0–0.9)
MONOCYTES # BLD AUTO: 0.35 10*3/MM3 (ref 0–0.9)
MONOCYTES # BLD AUTO: 0.37 10*3/MM3 (ref 0–0.9)
MONOCYTES # BLD AUTO: 0.38 10*3/MM3 (ref 0–0.9)
MONOCYTES # BLD AUTO: 0.41 10*3/MM3 (ref 0–0.9)
MONOCYTES # BLD AUTO: 0.42 10*3/MM3 (ref 0–0.9)
MONOCYTES # BLD AUTO: 0.45 10*3/MM3 (ref 0–0.9)
MONOCYTES # BLD AUTO: 0.46 10*3/MM3 (ref 0–0.9)
MONOCYTES # BLD AUTO: 0.49 10*3/MM3 (ref 0–0.9)
MONOCYTES # BLD AUTO: 0.51 10*3/MM3 (ref 0–0.9)
MONOCYTES # BLD AUTO: 0.54 10*3/MM3 (ref 0–0.9)
MONOCYTES # BLD AUTO: 0.54 10*3/MM3 (ref 0–0.9)
MONOCYTES # BLD AUTO: 0.55 10*3/MM3 (ref 0–0.9)
MONOCYTES # BLD AUTO: 0.55 10*3/MM3 (ref 0–0.9)
MONOCYTES # BLD AUTO: 0.56 10*3/MM3 (ref 0–0.9)
MONOCYTES # BLD AUTO: 0.58 10*3/MM3 (ref 0–0.9)
MONOCYTES # BLD AUTO: 0.58 10*3/MM3 (ref 0–0.9)
MONOCYTES # BLD AUTO: 0.6 10*3/MM3 (ref 0–0.9)
MONOCYTES # BLD AUTO: 0.61 10*3/MM3 (ref 0–0.9)
MONOCYTES # BLD AUTO: 0.63 10*3/MM3 (ref 0–0.9)
MONOCYTES # BLD AUTO: 0.69 10*3/MM3 (ref 0–0.9)
MONOCYTES # BLD AUTO: 0.69 10*3/MM3 (ref 0–0.9)
MONOCYTES # BLD AUTO: 0.71 10*3/MM3 (ref 0–0.9)
MONOCYTES # BLD AUTO: 0.71 10*3/MM3 (ref 0–0.9)
MONOCYTES # BLD AUTO: 0.74 10*3/MM3 (ref 0–0.9)
MONOCYTES # BLD AUTO: 0.8 10*3/MM3 (ref 0–0.9)
MONOCYTES # BLD AUTO: 0.81 10*3/MM3 (ref 0–0.9)
MONOCYTES # BLD AUTO: 0.84 10*3/MM3 (ref 0–0.9)
MONOCYTES # BLD AUTO: 0.84 10*3/MM3 (ref 0–0.9)
MONOCYTES # BLD AUTO: 0.85 10*3/MM3 (ref 0–0.9)
MONOCYTES # BLD AUTO: 0.86 10*3/MM3 (ref 0–0.9)
MONOCYTES # BLD AUTO: 0.93 10*3/MM3 (ref 0–0.9)
MONOCYTES # BLD AUTO: 0.95 10*3/MM3 (ref 0–0.9)
MONOCYTES # BLD AUTO: 0.95 10*3/MM3 (ref 0–0.9)
MONOCYTES # BLD AUTO: 1.06 10*3/MM3 (ref 0–0.9)
MONOCYTES NFR BLD AUTO: 10 % (ref 0–12)
MONOCYTES NFR BLD AUTO: 10 % (ref 0–12)
MONOCYTES NFR BLD AUTO: 10.2 % (ref 0–12)
MONOCYTES NFR BLD AUTO: 10.6 % (ref 0–12)
MONOCYTES NFR BLD AUTO: 10.7 % (ref 0–12)
MONOCYTES NFR BLD AUTO: 10.9 % (ref 0–12)
MONOCYTES NFR BLD AUTO: 11.6 % (ref 0–12)
MONOCYTES NFR BLD AUTO: 11.9 % (ref 0–12)
MONOCYTES NFR BLD AUTO: 11.9 % (ref 0–12)
MONOCYTES NFR BLD AUTO: 12 % (ref 0–12)
MONOCYTES NFR BLD AUTO: 12.3 % (ref 0–12)
MONOCYTES NFR BLD AUTO: 12.3 % (ref 0–12)
MONOCYTES NFR BLD AUTO: 12.5 % (ref 0–12)
MONOCYTES NFR BLD AUTO: 12.6 % (ref 0–12)
MONOCYTES NFR BLD AUTO: 13.2 % (ref 0–12)
MONOCYTES NFR BLD AUTO: 16.5 % (ref 0–12)
MONOCYTES NFR BLD AUTO: 2.5 % (ref 0–12)
MONOCYTES NFR BLD AUTO: 4.6 % (ref 0–12)
MONOCYTES NFR BLD AUTO: 5.1 % (ref 0–12)
MONOCYTES NFR BLD AUTO: 5.1 % (ref 0–12)
MONOCYTES NFR BLD AUTO: 5.3 % (ref 0–12)
MONOCYTES NFR BLD AUTO: 5.4 % (ref 0–12)
MONOCYTES NFR BLD AUTO: 5.8 % (ref 0–12)
MONOCYTES NFR BLD AUTO: 5.8 % (ref 0–12)
MONOCYTES NFR BLD AUTO: 5.9 % (ref 0–12)
MONOCYTES NFR BLD AUTO: 6.6 % (ref 0–12)
MONOCYTES NFR BLD AUTO: 6.9 % (ref 0–12)
MONOCYTES NFR BLD AUTO: 7.2 % (ref 0–12)
MONOCYTES NFR BLD AUTO: 7.3 % (ref 0–12)
MONOCYTES NFR BLD AUTO: 7.3 % (ref 0–12)
MONOCYTES NFR BLD AUTO: 7.6 % (ref 0–12)
MONOCYTES NFR BLD AUTO: 8 % (ref 0–12)
MONOCYTES NFR BLD AUTO: 8.4 % (ref 0–12)
MONOCYTES NFR BLD AUTO: 8.4 % (ref 0–12)
MONOCYTES NFR BLD AUTO: 8.6 % (ref 0–12)
MONOCYTES NFR BLD AUTO: 8.9 % (ref 0–12)
MONOCYTES NFR BLD AUTO: 9.3 % (ref 0–12)
MONOCYTES NFR BLD AUTO: 9.7 % (ref 0–12)
MRSA DNA SPEC QL NAA+PROBE: POSITIVE
MYCOPLASMAE PNEUMONIAE BY PCR: NEGATIVE
NDM STRAIN: NORMAL
NDM STRAIN: NORMAL
NEUTROPHILS # BLD AUTO: 1.22 10*3/MM3 (ref 2–8.6)
NEUTROPHILS # BLD AUTO: 1.71 10*3/MM3 (ref 2–8.6)
NEUTROPHILS # BLD AUTO: 2.4 10*3/MM3 (ref 2–8.6)
NEUTROPHILS # BLD AUTO: 2.69 10*3/MM3 (ref 2–8.6)
NEUTROPHILS # BLD AUTO: 2.94 10*3/MM3 (ref 2–8.6)
NEUTROPHILS # BLD AUTO: 3.06 10*3/MM3 (ref 2–8.6)
NEUTROPHILS # BLD AUTO: 3.09 10*3/MM3 (ref 2–8.6)
NEUTROPHILS # BLD AUTO: 3.27 10*3/MM3 (ref 2–8.6)
NEUTROPHILS # BLD AUTO: 3.45 10*3/MM3 (ref 2–8.6)
NEUTROPHILS # BLD AUTO: 3.63 10*3/MM3 (ref 2–8.6)
NEUTROPHILS # BLD AUTO: 4.09 10*3/MM3 (ref 2–8.6)
NEUTROPHILS # BLD AUTO: 4.26 10*3/MM3 (ref 2–8.6)
NEUTROPHILS # BLD AUTO: 4.28 10*3/MM3 (ref 2–8.6)
NEUTROPHILS # BLD AUTO: 4.33 10*3/MM3 (ref 2–8.6)
NEUTROPHILS # BLD AUTO: 4.38 10*3/MM3 (ref 2–8.6)
NEUTROPHILS # BLD AUTO: 4.39 10*3/MM3 (ref 2–8.6)
NEUTROPHILS # BLD AUTO: 4.57 10*3/MM3 (ref 2–8.6)
NEUTROPHILS # BLD AUTO: 4.66 10*3/MM3 (ref 2–8.6)
NEUTROPHILS # BLD AUTO: 4.73 10*3/MM3 (ref 2–8.6)
NEUTROPHILS # BLD AUTO: 4.74 10*3/MM3 (ref 2–8.6)
NEUTROPHILS # BLD AUTO: 4.77 10*3/MM3 (ref 2–8.6)
NEUTROPHILS # BLD AUTO: 4.82 10*3/MM3 (ref 2–8.6)
NEUTROPHILS # BLD AUTO: 5.02 10*3/MM3 (ref 2–8.6)
NEUTROPHILS # BLD AUTO: 5.17 10*3/MM3 (ref 2–8.6)
NEUTROPHILS # BLD AUTO: 5.36 10*3/MM3 (ref 2–8.6)
NEUTROPHILS # BLD AUTO: 5.38 10*3/MM3 (ref 2–8.6)
NEUTROPHILS # BLD AUTO: 5.41 10*3/MM3 (ref 2–8.6)
NEUTROPHILS # BLD AUTO: 5.42 10*3/MM3 (ref 2–8.6)
NEUTROPHILS # BLD AUTO: 5.54 10*3/MM3 (ref 2–8.6)
NEUTROPHILS # BLD AUTO: 5.68 10*3/MM3 (ref 2–8.6)
NEUTROPHILS # BLD AUTO: 5.71 10*3/MM3 (ref 2–8.6)
NEUTROPHILS # BLD AUTO: 5.81 10*3/MM3 (ref 2–8.6)
NEUTROPHILS # BLD AUTO: 5.9 10*3/MM3 (ref 2–8.6)
NEUTROPHILS # BLD AUTO: 5.94 10*3/MM3 (ref 2–8.6)
NEUTROPHILS # BLD AUTO: 6.04 10*3/MM3 (ref 2–8.6)
NEUTROPHILS # BLD AUTO: 6.51 10*3/MM3 (ref 2–8.6)
NEUTROPHILS # BLD AUTO: 6.53 10*3/MM3 (ref 2–8.6)
NEUTROPHILS # BLD AUTO: 6.54 10*3/MM3 (ref 2–8.6)
NEUTROPHILS # BLD AUTO: 6.67 10*3/MM3 (ref 2–8.6)
NEUTROPHILS # BLD AUTO: 6.85 10*3/MM3 (ref 2–8.6)
NEUTROPHILS # BLD AUTO: 7.31 10*3/MM3 (ref 2–8.6)
NEUTROPHILS # BLD AUTO: 7.52 10*3/MM3 (ref 2–8.6)
NEUTROPHILS # BLD AUTO: 8.4 10*3/MM3 (ref 2–8.6)
NEUTROPHILS NFR BLD AUTO: 40.6 % (ref 37–80)
NEUTROPHILS NFR BLD AUTO: 54.1 % (ref 37–80)
NEUTROPHILS NFR BLD AUTO: 54.1 % (ref 37–80)
NEUTROPHILS NFR BLD AUTO: 56.6 % (ref 37–80)
NEUTROPHILS NFR BLD AUTO: 58.8 % (ref 37–80)
NEUTROPHILS NFR BLD AUTO: 59.2 % (ref 37–80)
NEUTROPHILS NFR BLD AUTO: 59.3 % (ref 37–80)
NEUTROPHILS NFR BLD AUTO: 65 % (ref 37–80)
NEUTROPHILS NFR BLD AUTO: 65.7 % (ref 37–80)
NEUTROPHILS NFR BLD AUTO: 66.2 % (ref 37–80)
NEUTROPHILS NFR BLD AUTO: 67 % (ref 37–80)
NEUTROPHILS NFR BLD AUTO: 69.2 % (ref 37–80)
NEUTROPHILS NFR BLD AUTO: 70 % (ref 37–80)
NEUTROPHILS NFR BLD AUTO: 70.2 % (ref 37–80)
NEUTROPHILS NFR BLD AUTO: 70.7 % (ref 37–80)
NEUTROPHILS NFR BLD AUTO: 70.8 % (ref 37–80)
NEUTROPHILS NFR BLD AUTO: 71.2 % (ref 37–80)
NEUTROPHILS NFR BLD AUTO: 71.6 % (ref 37–80)
NEUTROPHILS NFR BLD AUTO: 71.7 % (ref 37–80)
NEUTROPHILS NFR BLD AUTO: 72.4 % (ref 37–80)
NEUTROPHILS NFR BLD AUTO: 73.4 % (ref 37–80)
NEUTROPHILS NFR BLD AUTO: 73.7 % (ref 37–80)
NEUTROPHILS NFR BLD AUTO: 74.5 % (ref 37–80)
NEUTROPHILS NFR BLD AUTO: 74.9 % (ref 37–80)
NEUTROPHILS NFR BLD AUTO: 75.7 % (ref 37–80)
NEUTROPHILS NFR BLD AUTO: 76.9 % (ref 37–80)
NEUTROPHILS NFR BLD AUTO: 78.9 % (ref 37–80)
NEUTROPHILS NFR BLD AUTO: 79.3 % (ref 37–80)
NEUTROPHILS NFR BLD AUTO: 80 % (ref 37–80)
NEUTROPHILS NFR BLD AUTO: 81.3 % (ref 37–80)
NEUTROPHILS NFR BLD AUTO: 81.6 % (ref 37–80)
NEUTROPHILS NFR BLD AUTO: 83.2 % (ref 37–80)
NEUTROPHILS NFR BLD AUTO: 83.5 % (ref 37–80)
NEUTROPHILS NFR BLD AUTO: 83.6 % (ref 37–80)
NEUTROPHILS NFR BLD AUTO: 84.2 % (ref 37–80)
NEUTROPHILS NFR BLD AUTO: 84.9 % (ref 37–80)
NEUTROPHILS NFR BLD AUTO: 86.4 % (ref 37–80)
NEUTROPHILS NFR BLD AUTO: 87.6 % (ref 37–80)
NEUTROPHILS NFR BLD MANUAL: 64 % (ref 37–80)
NEUTROPHILS NFR BLD MANUAL: 78 % (ref 37–80)
NEUTROPHILS NFR BLD MANUAL: 81 % (ref 37–80)
NEUTROPHILS NFR BLD MANUAL: 89 % (ref 37–80)
NEUTROPHILS NFR BLD MANUAL: 91 % (ref 37–80)
NEUTS BAND NFR BLD MANUAL: 1 % (ref 0–5)
NEUTS BAND NFR BLD MANUAL: 2 % (ref 0–5)
NITRITE UR QL STRIP: NEGATIVE
NITRITE UR QL STRIP: POSITIVE
NITRITE UR QL STRIP: POSITIVE
NRBC BLD MANUAL-RTO: 0 /100 WBC (ref 0–0)
NT-PROBNP SERPL-MCNC: 4300 PG/ML (ref 0–900)
NT-PROBNP SERPL-MCNC: 8080 PG/ML (ref 0–900)
NT-PROBNP SERPL-MCNC: 8320 PG/ML (ref 0–900)
NT-PROBNP SERPL-MCNC: ABNORMAL PG/ML (ref 0–900)
OPIATES UR QL: NEGATIVE
OPIATES UR QL: POSITIVE
OSMOLALITY UR: 309 MOSM/KG
OXA 48 STRAIN: NORMAL
OXA 48 STRAIN: NORMAL
OXYCODONE UR QL SCN: NEGATIVE
OXYCODONE UR QL SCN: POSITIVE
PATH REPORT.FINAL DX SPEC: NORMAL
PATH REPORT.FINAL DX SPEC: NORMAL
PATH REPORT.GROSS SPEC: NORMAL
PATH REPORT.GROSS SPEC: NORMAL
PCO2 BLDA: 23.7 MM HG (ref 35–45)
PCO2 BLDA: 27.1 MM HG (ref 35–45)
PCO2 BLDA: 27.2 MM HG (ref 35–45)
PCO2 BLDA: 27.5 MM HG (ref 35–45)
PCO2 BLDA: 28.3 MM HG (ref 35–45)
PCO2 BLDA: 28.9 MM HG (ref 35–45)
PCO2 BLDA: 29 MM HG (ref 35–45)
PCO2 BLDA: 30.8 MM HG (ref 35–45)
PCO2 BLDA: 30.9 MM HG (ref 35–45)
PCO2 BLDA: 31.4 MM HG (ref 35–45)
PCO2 BLDA: 31.8 MM HG (ref 35–45)
PCO2 BLDA: 33.2 MM HG (ref 35–45)
PCO2 BLDA: 34.8 MM HG (ref 35–45)
PCO2 BLDA: 35.7 MM HG (ref 35–45)
PCO2 BLDA: 36 MM HG (ref 35–45)
PCO2 BLDA: 36.2 MM HG (ref 35–45)
PCO2 BLDA: 38.1 MM HG (ref 35–45)
PCO2 BLDA: 38.1 MM HG (ref 35–45)
PCO2 BLDA: 40.2 MM HG (ref 35–45)
PCO2 BLDA: 41.2 MM HG (ref 35–45)
PCO2 BLDA: 42.8 MM HG (ref 35–45)
PCO2 BLDA: 43.8 MM HG (ref 35–45)
PCO2 BLDA: 47.4 MM HG (ref 35–45)
PCO2 BLDA: 50.3 MM HG (ref 35–45)
PCO2 BLDA: 52.2 MM HG (ref 35–45)
PCO2 BLDA: 56.7 MM HG (ref 35–45)
PH BLDA: 7.18 PH UNITS (ref 7.35–7.45)
PH BLDA: 7.22 PH UNITS (ref 7.35–7.45)
PH BLDA: 7.23 PH UNITS (ref 7.35–7.45)
PH BLDA: 7.26 PH UNITS (ref 7.35–7.45)
PH BLDA: 7.27 PH UNITS (ref 7.35–7.45)
PH BLDA: 7.29 PH UNITS (ref 7.35–7.45)
PH BLDA: 7.29 PH UNITS (ref 7.35–7.45)
PH BLDA: 7.3 PH UNITS (ref 7.35–7.45)
PH BLDA: 7.3 PH UNITS (ref 7.35–7.45)
PH BLDA: 7.31 PH UNITS (ref 7.35–7.45)
PH BLDA: 7.32 PH UNITS (ref 7.35–7.45)
PH BLDA: 7.32 PH UNITS (ref 7.35–7.45)
PH BLDA: 7.33 PH UNITS (ref 7.35–7.45)
PH BLDA: 7.33 PH UNITS (ref 7.35–7.45)
PH BLDA: 7.34 PH UNITS (ref 7.35–7.45)
PH BLDA: 7.36 PH UNITS (ref 7.35–7.45)
PH BLDA: 7.39 PH UNITS (ref 7.35–7.45)
PH BLDA: 7.4 PH UNITS (ref 7.35–7.45)
PH BLDA: 7.4 PH UNITS (ref 7.35–7.45)
PH BLDA: 7.41 PH UNITS (ref 7.35–7.45)
PH BLDA: 7.42 PH UNITS (ref 7.35–7.45)
PH BLDA: 7.43 PH UNITS (ref 7.35–7.45)
PH BLDA: 7.45 PH UNITS (ref 7.35–7.45)
PH BLDA: 7.46 PH UNITS (ref 7.35–7.45)
PH BLDA: 7.47 PH UNITS (ref 7.35–7.45)
PH BLDA: 7.47 PH UNITS (ref 7.35–7.45)
PH UR STRIP.AUTO: 5.5 [PH] (ref 5–9)
PH UR STRIP.AUTO: 5.5 [PH] (ref 5–9)
PH UR STRIP.AUTO: 6 [PH] (ref 5–9)
PH UR STRIP.AUTO: 6 [PH] (ref 5–9)
PH UR STRIP.AUTO: 6.5 [PH] (ref 5–9)
PH UR STRIP.AUTO: <=5 [PH] (ref 5–9)
PLATELET # BLD AUTO: 101 10*3/MM3 (ref 150–450)
PLATELET # BLD AUTO: 101 10*3/MM3 (ref 150–450)
PLATELET # BLD AUTO: 104 10*3/MM3 (ref 150–450)
PLATELET # BLD AUTO: 105 10*3/MM3 (ref 150–450)
PLATELET # BLD AUTO: 105 10*3/MM3 (ref 150–450)
PLATELET # BLD AUTO: 108 10*3/MM3 (ref 150–450)
PLATELET # BLD AUTO: 111 10*3/MM3 (ref 150–450)
PLATELET # BLD AUTO: 111 10*3/MM3 (ref 150–450)
PLATELET # BLD AUTO: 112 10*3/MM3 (ref 150–450)
PLATELET # BLD AUTO: 113 10*3/MM3 (ref 150–450)
PLATELET # BLD AUTO: 113 10*3/MM3 (ref 150–450)
PLATELET # BLD AUTO: 114 10*3/MM3 (ref 150–450)
PLATELET # BLD AUTO: 117 10*3/MM3 (ref 150–450)
PLATELET # BLD AUTO: 117 10*3/MM3 (ref 150–450)
PLATELET # BLD AUTO: 119 10*3/MM3 (ref 150–450)
PLATELET # BLD AUTO: 120 10*3/MM3 (ref 150–450)
PLATELET # BLD AUTO: 120 10*3/MM3 (ref 150–450)
PLATELET # BLD AUTO: 121 10*3/MM3 (ref 150–450)
PLATELET # BLD AUTO: 123 10*3/MM3 (ref 150–450)
PLATELET # BLD AUTO: 127 10*3/MM3 (ref 150–450)
PLATELET # BLD AUTO: 128 10*3/MM3 (ref 150–450)
PLATELET # BLD AUTO: 130 10*3/MM3 (ref 150–450)
PLATELET # BLD AUTO: 131 10*3/MM3 (ref 150–450)
PLATELET # BLD AUTO: 134 10*3/MM3 (ref 150–450)
PLATELET # BLD AUTO: 136 10*3/MM3 (ref 150–450)
PLATELET # BLD AUTO: 137 10*3/MM3 (ref 150–450)
PLATELET # BLD AUTO: 138 10*3/MM3 (ref 150–450)
PLATELET # BLD AUTO: 138 10*3/MM3 (ref 150–450)
PLATELET # BLD AUTO: 139 10*3/MM3 (ref 150–450)
PLATELET # BLD AUTO: 139 10*3/MM3 (ref 150–450)
PLATELET # BLD AUTO: 140 10*3/MM3 (ref 150–450)
PLATELET # BLD AUTO: 142 10*3/MM3 (ref 150–450)
PLATELET # BLD AUTO: 144 10*3/MM3 (ref 150–450)
PLATELET # BLD AUTO: 145 10*3/MM3 (ref 150–450)
PLATELET # BLD AUTO: 145 10*3/MM3 (ref 150–450)
PLATELET # BLD AUTO: 146 10*3/MM3 (ref 150–450)
PLATELET # BLD AUTO: 147 10*3/MM3 (ref 150–450)
PLATELET # BLD AUTO: 147 10*3/MM3 (ref 150–450)
PLATELET # BLD AUTO: 148 10*3/MM3 (ref 150–450)
PLATELET # BLD AUTO: 148 10*3/MM3 (ref 150–450)
PLATELET # BLD AUTO: 149 10*3/MM3 (ref 150–450)
PLATELET # BLD AUTO: 149 10*3/MM3 (ref 150–450)
PLATELET # BLD AUTO: 152 10*3/MM3 (ref 150–450)
PLATELET # BLD AUTO: 153 10*3/MM3 (ref 150–450)
PLATELET # BLD AUTO: 155 10*3/MM3 (ref 150–450)
PLATELET # BLD AUTO: 156 10*3/MM3 (ref 150–450)
PLATELET # BLD AUTO: 158 10*3/MM3 (ref 150–450)
PLATELET # BLD AUTO: 160 10*3/MM3 (ref 150–450)
PLATELET # BLD AUTO: 161 10*3/MM3 (ref 150–450)
PLATELET # BLD AUTO: 162 10*3/MM3 (ref 150–450)
PLATELET # BLD AUTO: 163 10*3/MM3 (ref 150–450)
PLATELET # BLD AUTO: 164 10*3/MM3 (ref 150–450)
PLATELET # BLD AUTO: 164 10*3/MM3 (ref 150–450)
PLATELET # BLD AUTO: 170 10*3/MM3 (ref 150–450)
PLATELET # BLD AUTO: 171 10*3/MM3 (ref 150–450)
PLATELET # BLD AUTO: 178 10*3/MM3 (ref 150–450)
PLATELET # BLD AUTO: 181 10*3/MM3 (ref 150–450)
PLATELET # BLD AUTO: 206 10*3/MM3 (ref 150–450)
PLATELET # BLD AUTO: 220 10*3/MM3 (ref 150–450)
PLATELET # BLD AUTO: 235 10*3/MM3 (ref 150–450)
PLATELET # BLD AUTO: 244 10*3/MM3 (ref 150–450)
PLATELET # BLD AUTO: 45 10*3/MM3 (ref 150–450)
PLATELET # BLD AUTO: 46 10*3/MM3 (ref 150–450)
PLATELET # BLD AUTO: 46 10*3/MM3 (ref 150–450)
PLATELET # BLD AUTO: 49 10*3/MM3 (ref 150–450)
PLATELET # BLD AUTO: 50 10*3/MM3 (ref 150–450)
PLATELET # BLD AUTO: 51 10*3/MM3 (ref 150–450)
PLATELET # BLD AUTO: 52 10*3/MM3 (ref 150–450)
PLATELET # BLD AUTO: 54 10*3/MM3 (ref 150–450)
PLATELET # BLD AUTO: 55 10*3/MM3 (ref 150–450)
PLATELET # BLD AUTO: 55 10*3/MM3 (ref 150–450)
PLATELET # BLD AUTO: 56 10*3/MM3 (ref 150–450)
PLATELET # BLD AUTO: 59 10*3/MM3 (ref 150–450)
PLATELET # BLD AUTO: 59 10*3/MM3 (ref 150–450)
PLATELET # BLD AUTO: 60 10*3/MM3 (ref 150–450)
PLATELET # BLD AUTO: 60 10*3/MM3 (ref 150–450)
PLATELET # BLD AUTO: 63 10*3/MM3 (ref 150–450)
PLATELET # BLD AUTO: 63 10*3/MM3 (ref 150–450)
PLATELET # BLD AUTO: 64 10*3/MM3 (ref 150–450)
PLATELET # BLD AUTO: 66 10*3/MM3 (ref 150–450)
PLATELET # BLD AUTO: 68 10*3/MM3 (ref 150–450)
PLATELET # BLD AUTO: 69 10*3/MM3 (ref 150–450)
PLATELET # BLD AUTO: 70 10*3/MM3 (ref 150–450)
PLATELET # BLD AUTO: 73 10*3/MM3 (ref 150–450)
PLATELET # BLD AUTO: 74 10*3/MM3 (ref 150–450)
PLATELET # BLD AUTO: 76 10*3/MM3 (ref 150–450)
PLATELET # BLD AUTO: 76 10*3/MM3 (ref 150–450)
PLATELET # BLD AUTO: 82 10*3/MM3 (ref 150–450)
PLATELET # BLD AUTO: 84 10*3/MM3 (ref 150–450)
PLATELET # BLD AUTO: 85 10*3/MM3 (ref 150–450)
PLATELET # BLD AUTO: 85 10*3/MM3 (ref 150–450)
PLATELET # BLD AUTO: 90 10*3/MM3 (ref 150–450)
PLATELET # BLD AUTO: 91 10*3/MM3 (ref 150–450)
PLATELET # BLD AUTO: 92 10*3/MM3 (ref 150–450)
PLATELET # BLD AUTO: 92 10*3/MM3 (ref 150–450)
PLATELET # BLD AUTO: 93 10*3/MM3 (ref 150–450)
PLATELET # BLD AUTO: 94 10*3/MM3 (ref 150–450)
PLATELET # BLD AUTO: 95 10*3/MM3 (ref 150–450)
PLATELET # BLD AUTO: 96 10*3/MM3 (ref 150–450)
PLATELET # BLD AUTO: 97 10*3/MM3 (ref 150–450)
PMV BLD AUTO: 10 FL (ref 8–12)
PMV BLD AUTO: 10.1 FL (ref 8–12)
PMV BLD AUTO: 10.2 FL (ref 8–12)
PMV BLD AUTO: 10.3 FL (ref 8–12)
PMV BLD AUTO: 10.4 FL (ref 8–12)
PMV BLD AUTO: 10.5 FL (ref 8–12)
PMV BLD AUTO: 10.6 FL (ref 8–12)
PMV BLD AUTO: 10.7 FL (ref 8–12)
PMV BLD AUTO: 10.8 FL (ref 8–12)
PMV BLD AUTO: 10.9 FL (ref 8–12)
PMV BLD AUTO: 11 FL (ref 8–12)
PMV BLD AUTO: 11.1 FL (ref 8–12)
PMV BLD AUTO: 11.2 FL (ref 8–12)
PMV BLD AUTO: 11.2 FL (ref 8–12)
PMV BLD AUTO: 11.3 FL (ref 8–12)
PMV BLD AUTO: 11.3 FL (ref 8–12)
PMV BLD AUTO: 11.4 FL (ref 8–12)
PMV BLD AUTO: 11.6 FL (ref 8–12)
PMV BLD AUTO: 11.7 FL (ref 8–12)
PMV BLD AUTO: 11.8 FL (ref 8–12)
PMV BLD AUTO: 11.9 FL (ref 8–12)
PMV BLD AUTO: 12 FL (ref 8–12)
PMV BLD AUTO: 12.1 FL (ref 8–12)
PMV BLD AUTO: 8.8 FL (ref 8–12)
PMV BLD AUTO: 8.9 FL (ref 8–12)
PMV BLD AUTO: 9 FL (ref 8–12)
PMV BLD AUTO: 9 FL (ref 8–12)
PMV BLD AUTO: 9.1 FL (ref 8–12)
PMV BLD AUTO: 9.2 FL (ref 8–12)
PMV BLD AUTO: 9.3 FL (ref 8–12)
PMV BLD AUTO: 9.4 FL (ref 8–12)
PMV BLD AUTO: 9.4 FL (ref 8–12)
PMV BLD AUTO: 9.5 FL (ref 8–12)
PMV BLD AUTO: 9.6 FL (ref 8–12)
PMV BLD AUTO: 9.7 FL (ref 8–12)
PMV BLD AUTO: 9.8 FL (ref 8–12)
PMV BLD AUTO: 9.9 FL (ref 8–12)
PMV BLD AUTO: ABNORMAL FL (ref 8–12)
PO2 BLDA: 102.2 MM HG (ref 80–105)
PO2 BLDA: 102.5 MM HG (ref 80–105)
PO2 BLDA: 105.9 MM HG (ref 80–105)
PO2 BLDA: 106.8 MM HG (ref 80–105)
PO2 BLDA: 107.2 MM HG (ref 80–105)
PO2 BLDA: 109 MM HG (ref 80–105)
PO2 BLDA: 112.6 MM HG (ref 80–105)
PO2 BLDA: 131.9 MM HG (ref 80–105)
PO2 BLDA: 137.4 MM HG (ref 80–105)
PO2 BLDA: 146.6 MM HG (ref 80–105)
PO2 BLDA: 150.7 MM HG (ref 80–105)
PO2 BLDA: 162.3 MM HG (ref 80–105)
PO2 BLDA: 193.1 MM HG (ref 80–105)
PO2 BLDA: 212.1 MM HG (ref 80–105)
PO2 BLDA: 39.1 MM HG (ref 80–105)
PO2 BLDA: 473.5 MM HG (ref 80–105)
PO2 BLDA: 69.8 MM HG (ref 80–105)
PO2 BLDA: 73.9 MM HG (ref 80–105)
PO2 BLDA: 75.2 MM HG (ref 80–105)
PO2 BLDA: 75.6 MM HG (ref 80–105)
PO2 BLDA: 76.7 MM HG (ref 80–105)
PO2 BLDA: 79.8 MM HG (ref 80–105)
PO2 BLDA: 82.1 MM HG (ref 80–105)
PO2 BLDA: 87.6 MM HG (ref 80–105)
PO2 BLDA: 90.1 MM HG (ref 80–105)
PO2 BLDA: 90.5 MM HG (ref 80–105)
PO2 BLDA: 90.6 MM HG (ref 80–105)
PO2 BLDA: 91.8 MM HG (ref 80–105)
POLYCHROMASIA BLD QL SMEAR: ABNORMAL
POTASSIUM BLD-SCNC: 3.1 MMOL/L (ref 3.5–5.1)
POTASSIUM BLD-SCNC: 3.2 MMOL/L (ref 3.5–5.1)
POTASSIUM BLD-SCNC: 3.3 MMOL/L (ref 3.5–5.1)
POTASSIUM BLD-SCNC: 3.3 MMOL/L (ref 3.5–5.1)
POTASSIUM BLD-SCNC: 3.5 MMOL/L (ref 3.5–5.1)
POTASSIUM BLD-SCNC: 3.6 MMOL/L (ref 3.5–5.1)
POTASSIUM BLD-SCNC: 3.7 MMOL/L (ref 3.5–5.1)
POTASSIUM BLD-SCNC: 3.8 MMOL/L (ref 3.5–5.1)
POTASSIUM BLD-SCNC: 3.9 MMOL/L (ref 3.5–5.1)
POTASSIUM BLD-SCNC: 4 MMOL/L (ref 3.5–5.1)
POTASSIUM BLD-SCNC: 4.1 MMOL/L (ref 3.5–5.1)
POTASSIUM BLD-SCNC: 4.2 MMOL/L (ref 3.5–5.1)
POTASSIUM BLD-SCNC: 4.3 MMOL/L (ref 3.5–5.1)
POTASSIUM BLD-SCNC: 4.4 MMOL/L (ref 3.5–5.1)
POTASSIUM BLD-SCNC: 4.5 MMOL/L (ref 3.5–5.1)
POTASSIUM BLD-SCNC: 4.5 MMOL/L (ref 3.5–5.1)
POTASSIUM BLD-SCNC: 4.6 MMOL/L (ref 3.5–5.1)
POTASSIUM BLD-SCNC: 4.6 MMOL/L (ref 3.5–5.1)
POTASSIUM BLD-SCNC: 4.7 MMOL/L (ref 3.5–5.1)
POTASSIUM BLD-SCNC: 4.8 MMOL/L (ref 3.5–5.1)
POTASSIUM BLD-SCNC: 4.8 MMOL/L (ref 3.5–5.1)
POTASSIUM BLD-SCNC: 4.9 MMOL/L (ref 3.5–5.1)
POTASSIUM BLD-SCNC: 5 MMOL/L (ref 3.5–5.1)
POTASSIUM BLD-SCNC: 5.1 MMOL/L (ref 3.5–5.1)
POTASSIUM BLD-SCNC: 5.1 MMOL/L (ref 3.5–5.1)
POTASSIUM BLD-SCNC: 5.2 MMOL/L (ref 3.5–5.1)
POTASSIUM BLD-SCNC: 5.3 MMOL/L (ref 3.5–5.1)
POTASSIUM BLD-SCNC: 5.3 MMOL/L (ref 3.5–5.1)
POTASSIUM BLD-SCNC: 5.4 MMOL/L (ref 3.5–5.1)
POTASSIUM BLD-SCNC: 5.4 MMOL/L (ref 3.5–5.1)
POTASSIUM BLD-SCNC: 5.6 MMOL/L (ref 3.5–5.1)
POTASSIUM BLDA-SCNC: 3.33 MMOL/L (ref 3.6–4.9)
POTASSIUM BLDA-SCNC: 3.39 MMOL/L (ref 3.6–4.9)
POTASSIUM BLDA-SCNC: 3.4 MMOL/L (ref 3.6–4.9)
POTASSIUM BLDA-SCNC: 3.47 MMOL/L (ref 3.6–4.9)
POTASSIUM BLDA-SCNC: 3.74 MMOL/L (ref 3.6–4.9)
POTASSIUM BLDA-SCNC: 3.75 MMOL/L (ref 3.6–4.9)
POTASSIUM BLDA-SCNC: 3.8 MMOL/L (ref 3.6–4.9)
POTASSIUM BLDA-SCNC: 3.9 MMOL/L (ref 3.6–4.9)
POTASSIUM BLDA-SCNC: 3.91 MMOL/L (ref 3.6–4.9)
POTASSIUM BLDA-SCNC: 3.94 MMOL/L (ref 3.6–4.9)
POTASSIUM BLDA-SCNC: 3.99 MMOL/L (ref 3.6–4.9)
POTASSIUM BLDA-SCNC: 4.04 MMOL/L (ref 3.6–4.9)
POTASSIUM BLDA-SCNC: 4.15 MMOL/L (ref 3.6–4.9)
POTASSIUM BLDA-SCNC: 4.17 MMOL/L (ref 3.6–4.9)
POTASSIUM BLDA-SCNC: 4.24 MMOL/L (ref 3.6–4.9)
POTASSIUM BLDA-SCNC: 4.43 MMOL/L (ref 3.6–4.9)
POTASSIUM BLDA-SCNC: 4.48 MMOL/L (ref 3.6–4.9)
POTASSIUM BLDA-SCNC: 4.6 MMOL/L (ref 3.6–4.9)
POTASSIUM BLDA-SCNC: 4.99 MMOL/L (ref 3.6–4.9)
POTASSIUM BLDA-SCNC: 5.06 MMOL/L (ref 3.6–4.9)
POTASSIUM BLDA-SCNC: 5.16 MMOL/L (ref 3.6–4.9)
POTASSIUM BLDA-SCNC: 5.2 MMOL/L (ref 3.6–4.9)
POTASSIUM BLDA-SCNC: 5.23 MMOL/L (ref 3.6–4.9)
POTASSIUM BLDA-SCNC: 5.34 MMOL/L (ref 3.6–4.9)
POTASSIUM BLDA-SCNC: 5.36 MMOL/L (ref 3.6–4.9)
POTASSIUM BLDA-SCNC: 5.43 MMOL/L (ref 3.6–4.9)
POTASSIUM BLDA-SCNC: 5.49 MMOL/L (ref 3.6–4.9)
POTASSIUM BLDA-SCNC: 5.57 MMOL/L (ref 3.6–4.9)
PROT SERPL-MCNC: 4.3 G/DL (ref 6.3–8.6)
PROT SERPL-MCNC: 4.3 G/DL (ref 6.3–8.6)
PROT SERPL-MCNC: 4.5 G/DL (ref 6.3–8.6)
PROT SERPL-MCNC: 4.5 G/DL (ref 6.3–8.6)
PROT SERPL-MCNC: 4.6 G/DL (ref 6.3–8.6)
PROT SERPL-MCNC: 4.7 G/DL (ref 6.3–8.6)
PROT SERPL-MCNC: 4.8 G/DL (ref 6.3–8.6)
PROT SERPL-MCNC: 4.9 G/DL (ref 6.3–8.6)
PROT SERPL-MCNC: 5 G/DL (ref 6.3–8.6)
PROT SERPL-MCNC: 5.1 G/DL (ref 6.3–8.6)
PROT SERPL-MCNC: 5.2 G/DL (ref 6.3–8.6)
PROT SERPL-MCNC: 5.3 G/DL (ref 6.3–8.6)
PROT SERPL-MCNC: 5.4 G/DL (ref 6.3–8.6)
PROT SERPL-MCNC: 5.5 G/DL (ref 6.3–8.6)
PROT SERPL-MCNC: 5.6 G/DL (ref 6.3–8.6)
PROT SERPL-MCNC: 5.7 G/DL (ref 6.3–8.6)
PROT SERPL-MCNC: 5.8 G/DL (ref 6.3–8.6)
PROT SERPL-MCNC: 5.9 G/DL (ref 6.3–8.6)
PROT SERPL-MCNC: 6 G/DL (ref 6.3–8.6)
PROT SERPL-MCNC: 6 G/DL (ref 6.3–8.6)
PROT SERPL-MCNC: 6.1 G/DL (ref 6.3–8.6)
PROT SERPL-MCNC: 6.1 G/DL (ref 6.3–8.6)
PROT SERPL-MCNC: 6.2 G/DL (ref 6.3–8.6)
PROT SERPL-MCNC: 6.3 G/DL (ref 6.3–8.6)
PROT SERPL-MCNC: 6.4 G/DL (ref 6.3–8.6)
PROT SERPL-MCNC: 6.5 G/DL (ref 6.3–8.6)
PROT SERPL-MCNC: 6.5 G/DL (ref 6.3–8.6)
PROT SERPL-MCNC: 6.6 G/DL (ref 6.3–8.6)
PROT SERPL-MCNC: 6.8 G/DL (ref 6.3–8.6)
PROT SERPL-MCNC: 6.8 G/DL (ref 6.3–8.6)
PROT SERPL-MCNC: 6.9 G/DL (ref 6.3–8.6)
PROT SERPL-MCNC: 7.6 G/DL (ref 6.3–8.6)
PROT UR QL STRIP: ABNORMAL
PROTHROMBIN TIME: 13.7 SECONDS (ref 11.1–15.3)
PROTHROMBIN TIME: 13.8 SECONDS (ref 11.1–15.3)
PROTHROMBIN TIME: 13.8 SECONDS (ref 11.1–15.3)
PROTHROMBIN TIME: 13.9 SECONDS (ref 11.1–15.3)
PROTHROMBIN TIME: 14.3 SECONDS (ref 11.1–15.3)
PROTHROMBIN TIME: 14.5 SECONDS (ref 11.1–15.3)
PROTHROMBIN TIME: 14.6 SECONDS (ref 11.1–15.3)
PROTHROMBIN TIME: 14.6 SECONDS (ref 11.1–15.3)
PROTHROMBIN TIME: 15 SECONDS (ref 11.1–15.3)
PROTHROMBIN TIME: 15.2 SECONDS (ref 11.1–15.3)
PROTHROMBIN TIME: 15.2 SECONDS (ref 11.1–15.3)
PROTHROMBIN TIME: 15.3 SECONDS (ref 11.1–15.3)
PROTHROMBIN TIME: 15.4 SECONDS (ref 11.1–15.3)
PROTHROMBIN TIME: 15.5 SECONDS (ref 11.1–15.3)
PROTHROMBIN TIME: 15.7 SECONDS (ref 11.1–15.3)
PROTHROMBIN TIME: 15.8 SECONDS (ref 11.1–15.3)
PROTHROMBIN TIME: 15.8 SECONDS (ref 11.1–15.3)
PROTHROMBIN TIME: 16.1 SECONDS (ref 11.1–15.3)
PROTHROMBIN TIME: 16.1 SECONDS (ref 11.1–15.3)
RBC # BLD AUTO: 1.88 10*6/MM3 (ref 3.77–5.16)
RBC # BLD AUTO: 2.07 10*6/MM3 (ref 3.77–5.16)
RBC # BLD AUTO: 2.19 10*6/MM3 (ref 3.77–5.16)
RBC # BLD AUTO: 2.3 10*6/MM3 (ref 3.77–5.16)
RBC # BLD AUTO: 2.41 10*6/MM3 (ref 3.77–5.16)
RBC # BLD AUTO: 2.44 10*6/MM3 (ref 3.77–5.16)
RBC # BLD AUTO: 2.45 10*6/MM3 (ref 3.77–5.16)
RBC # BLD AUTO: 2.46 10*6/MM3 (ref 3.77–5.16)
RBC # BLD AUTO: 2.5 10*6/MM3 (ref 3.77–5.16)
RBC # BLD AUTO: 2.52 10*6/MM3 (ref 3.77–5.16)
RBC # BLD AUTO: 2.53 10*6/MM3 (ref 3.77–5.16)
RBC # BLD AUTO: 2.53 10*6/MM3 (ref 3.77–5.16)
RBC # BLD AUTO: 2.54 10*6/MM3 (ref 3.77–5.16)
RBC # BLD AUTO: 2.55 10*6/MM3 (ref 3.77–5.16)
RBC # BLD AUTO: 2.55 10*6/MM3 (ref 3.77–5.16)
RBC # BLD AUTO: 2.56 10*6/MM3 (ref 3.77–5.16)
RBC # BLD AUTO: 2.57 10*6/MM3 (ref 3.77–5.16)
RBC # BLD AUTO: 2.59 10*6/MM3 (ref 3.77–5.16)
RBC # BLD AUTO: 2.61 10*6/MM3 (ref 3.77–5.16)
RBC # BLD AUTO: 2.64 10*6/MM3 (ref 3.77–5.16)
RBC # BLD AUTO: 2.64 10*6/MM3 (ref 3.77–5.16)
RBC # BLD AUTO: 2.65 10*6/MM3 (ref 3.77–5.16)
RBC # BLD AUTO: 2.65 10*6/MM3 (ref 3.77–5.16)
RBC # BLD AUTO: 2.66 10*6/MM3 (ref 3.77–5.16)
RBC # BLD AUTO: 2.67 10*6/MM3 (ref 3.77–5.16)
RBC # BLD AUTO: 2.69 10*6/MM3 (ref 3.77–5.16)
RBC # BLD AUTO: 2.7 10*6/MM3 (ref 3.77–5.16)
RBC # BLD AUTO: 2.7 10*6/MM3 (ref 3.77–5.16)
RBC # BLD AUTO: 2.71 10*6/MM3 (ref 3.77–5.16)
RBC # BLD AUTO: 2.72 10*6/MM3 (ref 3.77–5.16)
RBC # BLD AUTO: 2.74 10*6/MM3 (ref 3.77–5.16)
RBC # BLD AUTO: 2.75 10*6/MM3 (ref 3.77–5.16)
RBC # BLD AUTO: 2.77 10*6/MM3 (ref 3.77–5.16)
RBC # BLD AUTO: 2.8 10*6/MM3 (ref 3.77–5.16)
RBC # BLD AUTO: 2.8 10*6/MM3 (ref 3.77–5.16)
RBC # BLD AUTO: 2.81 10*6/MM3 (ref 3.77–5.16)
RBC # BLD AUTO: 2.82 10*6/MM3 (ref 3.77–5.16)
RBC # BLD AUTO: 2.83 10*6/MM3 (ref 3.77–5.16)
RBC # BLD AUTO: 2.84 10*6/MM3 (ref 3.77–5.16)
RBC # BLD AUTO: 2.86 10*6/MM3 (ref 3.77–5.16)
RBC # BLD AUTO: 2.87 10*6/MM3 (ref 3.77–5.16)
RBC # BLD AUTO: 2.87 10*6/MM3 (ref 3.77–5.16)
RBC # BLD AUTO: 2.88 10*6/MM3 (ref 3.77–5.16)
RBC # BLD AUTO: 2.89 10*6/MM3 (ref 3.77–5.16)
RBC # BLD AUTO: 2.89 10*6/MM3 (ref 3.77–5.16)
RBC # BLD AUTO: 2.9 10*6/MM3 (ref 3.77–5.16)
RBC # BLD AUTO: 2.91 10*6/MM3 (ref 3.77–5.16)
RBC # BLD AUTO: 2.92 10*6/MM3 (ref 3.77–5.16)
RBC # BLD AUTO: 2.93 10*6/MM3 (ref 3.77–5.16)
RBC # BLD AUTO: 2.94 10*6/MM3 (ref 3.77–5.16)
RBC # BLD AUTO: 2.95 10*6/MM3 (ref 3.77–5.16)
RBC # BLD AUTO: 2.96 10*6/MM3 (ref 3.77–5.16)
RBC # BLD AUTO: 2.97 10*6/MM3 (ref 3.77–5.16)
RBC # BLD AUTO: 2.98 10*6/MM3 (ref 3.77–5.16)
RBC # BLD AUTO: 2.99 10*6/MM3 (ref 3.77–5.16)
RBC # BLD AUTO: 2.99 10*6/MM3 (ref 3.77–5.16)
RBC # BLD AUTO: 3 10*6/MM3 (ref 3.77–5.16)
RBC # BLD AUTO: 3 10*6/MM3 (ref 3.77–5.16)
RBC # BLD AUTO: 3.01 10*6/MM3 (ref 3.77–5.16)
RBC # BLD AUTO: 3.02 10*6/MM3 (ref 3.77–5.16)
RBC # BLD AUTO: 3.04 10*6/MM3 (ref 3.77–5.16)
RBC # BLD AUTO: 3.04 10*6/MM3 (ref 3.77–5.16)
RBC # BLD AUTO: 3.05 10*6/MM3 (ref 3.77–5.16)
RBC # BLD AUTO: 3.05 10*6/MM3 (ref 3.77–5.16)
RBC # BLD AUTO: 3.07 10*6/MM3 (ref 3.77–5.16)
RBC # BLD AUTO: 3.09 10*6/MM3 (ref 3.77–5.16)
RBC # BLD AUTO: 3.09 10*6/MM3 (ref 3.77–5.16)
RBC # BLD AUTO: 3.1 10*6/MM3 (ref 3.77–5.16)
RBC # BLD AUTO: 3.11 10*6/MM3 (ref 3.77–5.16)
RBC # BLD AUTO: 3.12 10*6/MM3 (ref 3.77–5.16)
RBC # BLD AUTO: 3.12 10*6/MM3 (ref 3.77–5.16)
RBC # BLD AUTO: 3.15 10*6/MM3 (ref 3.77–5.16)
RBC # BLD AUTO: 3.17 10*6/MM3 (ref 3.77–5.16)
RBC # BLD AUTO: 3.17 10*6/MM3 (ref 3.77–5.16)
RBC # BLD AUTO: 3.2 10*6/MM3 (ref 3.77–5.16)
RBC # BLD AUTO: 3.21 10*6/MM3 (ref 3.77–5.16)
RBC # BLD AUTO: 3.22 10*6/MM3 (ref 3.77–5.16)
RBC # BLD AUTO: 3.22 10*6/MM3 (ref 3.77–5.16)
RBC # BLD AUTO: 3.23 10*6/MM3 (ref 3.77–5.16)
RBC # BLD AUTO: 3.23 10*6/MM3 (ref 3.77–5.16)
RBC # BLD AUTO: 3.24 10*6/MM3 (ref 3.77–5.16)
RBC # BLD AUTO: 3.24 10*6/MM3 (ref 3.77–5.16)
RBC # BLD AUTO: 3.25 10*6/MM3 (ref 3.77–5.16)
RBC # BLD AUTO: 3.25 10*6/MM3 (ref 3.77–5.16)
RBC # BLD AUTO: 3.26 10*6/MM3 (ref 3.77–5.16)
RBC # BLD AUTO: 3.28 10*6/MM3 (ref 3.77–5.16)
RBC # BLD AUTO: 3.29 10*6/MM3 (ref 3.77–5.16)
RBC # BLD AUTO: 3.3 10*6/MM3 (ref 3.77–5.16)
RBC # BLD AUTO: 3.34 10*6/MM3 (ref 3.77–5.16)
RBC # BLD AUTO: 3.37 10*6/MM3 (ref 3.77–5.16)
RBC # BLD AUTO: 3.38 10*6/MM3 (ref 3.77–5.16)
RBC # BLD AUTO: 3.38 10*6/MM3 (ref 3.77–5.16)
RBC # BLD AUTO: 3.41 10*6/MM3 (ref 3.77–5.16)
RBC # BLD AUTO: 3.5 10*6/MM3 (ref 3.77–5.16)
RBC # BLD AUTO: 3.51 10*6/MM3 (ref 3.77–5.16)
RBC # BLD AUTO: 3.55 10*6/MM3 (ref 3.77–5.16)
RBC # BLD AUTO: 3.57 10*6/MM3 (ref 3.77–5.16)
RBC # BLD AUTO: 3.59 10*6/MM3 (ref 3.77–5.16)
RBC # BLD AUTO: 3.63 10*6/MM3 (ref 3.77–5.16)
RBC # BLD AUTO: 3.86 10*6/MM3 (ref 3.77–5.16)
RBC # UR: ABNORMAL /HPF
RBC MORPH BLD: NORMAL
RBC MORPH BLD: NORMAL
REF LAB TEST METHOD: ABNORMAL
RH BLD: POSITIVE
RICK SF IGG TITR SER IF: NEGATIVE {TITER}
S PNEUM AG SPEC QL LA: NEGATIVE
SAO2 % BLDCOA: 72 % (ref 94–100)
SAO2 % BLDCOA: 93.3 %
SAO2 % BLDCOA: 94.5 %
SAO2 % BLDCOA: 94.6 %
SAO2 % BLDCOA: 95.1 % (ref 94–100)
SAO2 % BLDCOA: 95.2 % (ref 94–100)
SAO2 % BLDCOA: 95.3 % (ref 94–100)
SAO2 % BLDCOA: 95.7 %
SAO2 % BLDCOA: 95.7 % (ref 94–100)
SAO2 % BLDCOA: 96.5 %
SAO2 % BLDCOA: 96.5 %
SAO2 % BLDCOA: 96.5 % (ref 94–100)
SAO2 % BLDCOA: 96.9 %
SAO2 % BLDCOA: 97.3 % (ref 94–100)
SAO2 % BLDCOA: 97.4 % (ref 94–100)
SAO2 % BLDCOA: 97.5 % (ref 94–100)
SAO2 % BLDCOA: 97.6 %
SAO2 % BLDCOA: 97.8 %
SAO2 % BLDCOA: 98 %
SAO2 % BLDCOA: 98.1 %
SAO2 % BLDCOA: 98.3 %
SAO2 % BLDCOA: 98.3 % (ref 94–100)
SAO2 % BLDCOA: 98.6 %
SAO2 % BLDCOA: 98.7 %
SAO2 % BLDCOA: 98.8 %
SAO2 % BLDCOA: 98.9 %
SAO2 % BLDCOA: 99 %
SAO2 % BLDCOA: 99.4 %
SMALL PLATELETS BLD QL SMEAR: ABNORMAL
SMALL PLATELETS BLD QL SMEAR: ADEQUATE
SMALL PLATELETS BLD QL SMEAR: ADEQUATE
SMALL PLATELETS BLD QL SMEAR: NORMAL
SODIUM BLD-SCNC: 133 MMOL/L (ref 137–145)
SODIUM BLD-SCNC: 133 MMOL/L (ref 137–145)
SODIUM BLD-SCNC: 134 MMOL/L (ref 137–145)
SODIUM BLD-SCNC: 135 MMOL/L (ref 137–145)
SODIUM BLD-SCNC: 136 MMOL/L (ref 137–145)
SODIUM BLD-SCNC: 137 MMOL/L (ref 137–145)
SODIUM BLD-SCNC: 138 MMOL/L (ref 137–145)
SODIUM BLD-SCNC: 139 MMOL/L (ref 137–145)
SODIUM BLD-SCNC: 140 MMOL/L (ref 137–145)
SODIUM BLD-SCNC: 141 MMOL/L (ref 137–145)
SODIUM BLD-SCNC: 142 MMOL/L (ref 137–145)
SODIUM BLD-SCNC: 143 MMOL/L (ref 137–145)
SODIUM BLD-SCNC: 144 MMOL/L (ref 137–145)
SODIUM BLD-SCNC: 145 MMOL/L (ref 137–145)
SODIUM BLD-SCNC: 146 MMOL/L (ref 137–145)
SODIUM BLD-SCNC: 147 MMOL/L (ref 137–145)
SODIUM BLD-SCNC: 147 MMOL/L (ref 137–145)
SODIUM BLDA-SCNC: 127 MMOL/L (ref 138–146)
SODIUM BLDA-SCNC: 131.2 MMOL/L (ref 138–146)
SODIUM BLDA-SCNC: 133.6 MMOL/L (ref 138–146)
SODIUM BLDA-SCNC: 134 MMOL/L (ref 138–146)
SODIUM BLDA-SCNC: 134.3 MMOL/L (ref 138–146)
SODIUM BLDA-SCNC: 134.5 MMOL/L (ref 138–146)
SODIUM BLDA-SCNC: 135.3 MMOL/L (ref 138–146)
SODIUM BLDA-SCNC: 135.4 MMOL/L (ref 138–146)
SODIUM BLDA-SCNC: 135.6 MMOL/L (ref 138–146)
SODIUM BLDA-SCNC: 135.7 MMOL/L (ref 138–146)
SODIUM BLDA-SCNC: 135.7 MMOL/L (ref 138–146)
SODIUM BLDA-SCNC: 135.9 MMOL/L (ref 138–146)
SODIUM BLDA-SCNC: 136.2 MMOL/L (ref 138–146)
SODIUM BLDA-SCNC: 136.7 MMOL/L (ref 138–146)
SODIUM BLDA-SCNC: 136.9 MMOL/L (ref 138–146)
SODIUM BLDA-SCNC: 137.7 MMOL/L (ref 138–146)
SODIUM BLDA-SCNC: 138.1 MMOL/L (ref 138–146)
SODIUM BLDA-SCNC: 139 MMOL/L (ref 138–146)
SODIUM BLDA-SCNC: 139.2 MMOL/L (ref 138–146)
SODIUM BLDA-SCNC: 139.3 MMOL/L (ref 138–146)
SODIUM BLDA-SCNC: 139.5 MMOL/L (ref 138–146)
SODIUM BLDA-SCNC: 139.6 MMOL/L (ref 138–146)
SODIUM BLDA-SCNC: 139.6 MMOL/L (ref 138–146)
SODIUM BLDA-SCNC: 140.2 MMOL/L (ref 138–146)
SODIUM BLDA-SCNC: 140.5 MMOL/L (ref 138–146)
SODIUM BLDA-SCNC: 142.1 MMOL/L (ref 138–146)
SODIUM BLDA-SCNC: 142.4 MMOL/L (ref 138–146)
SODIUM BLDA-SCNC: 143.8 MMOL/L (ref 138–146)
SODIUM UR-SCNC: 12 MMOL/L (ref 30–90)
SODIUM UR-SCNC: 66 MMOL/L (ref 30–90)
SP GR UR STRIP: 1.01 (ref 1–1.03)
SP GR UR STRIP: 1.02 (ref 1–1.03)
SQUAMOUS #/AREA URNS HPF: ABNORMAL /HPF
T4 FREE SERPL-MCNC: 1.05 NG/DL (ref 0.78–2.19)
TIBC SERPL-MCNC: 267 MCG/DL (ref 265–497)
TRIGL SERPL-MCNC: 64 MG/DL (ref 20–199)
TROPONIN I SERPL-MCNC: 0.01 NG/ML
TROPONIN I SERPL-MCNC: 0.02 NG/ML
TROPONIN I SERPL-MCNC: 0.02 NG/ML
TROPONIN I SERPL-MCNC: 0.04 NG/ML
TROPONIN I SERPL-MCNC: 0.05 NG/ML
TROPONIN I SERPL-MCNC: 0.05 NG/ML
TROPONIN I SERPL-MCNC: 0.06 NG/ML
TROPONIN I SERPL-MCNC: 0.07 NG/ML
TROPONIN I SERPL-MCNC: 0.11 NG/ML
TROPONIN I SERPL-MCNC: 0.18 NG/ML
TROPONIN I SERPL-MCNC: 0.23 NG/ML
TROPONIN I SERPL-MCNC: 0.31 NG/ML
TROPONIN I SERPL-MCNC: 0.33 NG/ML
TROPONIN I SERPL-MCNC: <0.012 NG/ML
TSH SERPL DL<=0.05 MIU/L-ACNC: 1.72 MIU/ML (ref 0.46–4.68)
TSH SERPL DL<=0.05 MIU/L-ACNC: 3.67 MIU/ML (ref 0.46–4.68)
UNIT  ABO: NORMAL
UNIT  RH: NORMAL
UROBILINOGEN UR QL STRIP: ABNORMAL
VANCOMYCIN SERPL-MCNC: 27.68 MCG/ML
VANCOMYCIN SERPL-MCNC: 36.96 MCG/ML
VANCOMYCIN TROUGH SERPL-MCNC: 14.05 MCG/ML (ref 10–15)
VANCOMYCIN TROUGH SERPL-MCNC: 24.99 MCG/ML (ref 10–15)
VANCOMYCIN TROUGH SERPL-MCNC: 27.92 MCG/ML (ref 10–15)
VANCOMYCIN TROUGH SERPL-MCNC: 37.31 MCG/ML (ref 10–15)
VIM STRAIN: NORMAL
VIM STRAIN: NORMAL
VIT B12 BLD-MCNC: 762 PG/ML (ref 239–931)
WBC MORPH BLD: NORMAL
WBC NRBC COR # BLD: 10.04 10*3/MM3 (ref 3.2–9.8)
WBC NRBC COR # BLD: 10.5 10*3/MM3 (ref 3.2–9.8)
WBC NRBC COR # BLD: 2.39 10*3/MM3 (ref 3.2–9.8)
WBC NRBC COR # BLD: 3.01 10*3/MM3 (ref 3.2–9.8)
WBC NRBC COR # BLD: 3.18 10*3/MM3 (ref 3.2–9.8)
WBC NRBC COR # BLD: 3.6 10*3/MM3 (ref 3.2–9.8)
WBC NRBC COR # BLD: 3.63 10*3/MM3 (ref 3.2–9.8)
WBC NRBC COR # BLD: 3.7 10*3/MM3 (ref 3.2–9.8)
WBC NRBC COR # BLD: 3.7 10*3/MM3 (ref 3.2–9.8)
WBC NRBC COR # BLD: 3.75 10*3/MM3 (ref 3.2–9.8)
WBC NRBC COR # BLD: 3.85 10*3/MM3 (ref 3.2–9.8)
WBC NRBC COR # BLD: 3.93 10*3/MM3 (ref 3.2–9.8)
WBC NRBC COR # BLD: 3.94 10*3/MM3 (ref 3.2–9.8)
WBC NRBC COR # BLD: 4.09 10*3/MM3 (ref 3.2–9.8)
WBC NRBC COR # BLD: 4.12 10*3/MM3 (ref 3.2–9.8)
WBC NRBC COR # BLD: 4.13 10*3/MM3 (ref 3.2–9.8)
WBC NRBC COR # BLD: 4.17 10*3/MM3 (ref 3.2–9.8)
WBC NRBC COR # BLD: 4.23 10*3/MM3 (ref 3.2–9.8)
WBC NRBC COR # BLD: 4.25 10*3/MM3 (ref 3.2–9.8)
WBC NRBC COR # BLD: 4.36 10*3/MM3 (ref 3.2–9.8)
WBC NRBC COR # BLD: 4.41 10*3/MM3 (ref 3.2–9.8)
WBC NRBC COR # BLD: 4.52 10*3/MM3 (ref 3.2–9.8)
WBC NRBC COR # BLD: 4.56 10*3/MM3 (ref 3.2–9.8)
WBC NRBC COR # BLD: 4.58 10*3/MM3 (ref 3.2–9.8)
WBC NRBC COR # BLD: 4.69 10*3/MM3 (ref 3.2–9.8)
WBC NRBC COR # BLD: 4.72 10*3/MM3 (ref 3.2–9.8)
WBC NRBC COR # BLD: 4.74 10*3/MM3 (ref 3.2–9.8)
WBC NRBC COR # BLD: 4.76 10*3/MM3 (ref 3.2–9.8)
WBC NRBC COR # BLD: 4.81 10*3/MM3 (ref 3.2–9.8)
WBC NRBC COR # BLD: 4.82 10*3/MM3 (ref 3.2–9.8)
WBC NRBC COR # BLD: 4.9 10*3/MM3 (ref 3.2–9.8)
WBC NRBC COR # BLD: 4.95 10*3/MM3 (ref 3.2–9.8)
WBC NRBC COR # BLD: 4.95 10*3/MM3 (ref 3.2–9.8)
WBC NRBC COR # BLD: 4.98 10*3/MM3 (ref 3.2–9.8)
WBC NRBC COR # BLD: 4.98 10*3/MM3 (ref 3.2–9.8)
WBC NRBC COR # BLD: 5.01 10*3/MM3 (ref 3.2–9.8)
WBC NRBC COR # BLD: 5.08 10*3/MM3 (ref 3.2–9.8)
WBC NRBC COR # BLD: 5.09 10*3/MM3 (ref 3.2–9.8)
WBC NRBC COR # BLD: 5.13 10*3/MM3 (ref 3.2–9.8)
WBC NRBC COR # BLD: 5.19 10*3/MM3 (ref 3.2–9.8)
WBC NRBC COR # BLD: 5.21 10*3/MM3 (ref 3.2–9.8)
WBC NRBC COR # BLD: 5.28 10*3/MM3 (ref 3.2–9.8)
WBC NRBC COR # BLD: 5.29 10*3/MM3 (ref 3.2–9.8)
WBC NRBC COR # BLD: 5.32 10*3/MM3 (ref 3.2–9.8)
WBC NRBC COR # BLD: 5.33 10*3/MM3 (ref 3.2–9.8)
WBC NRBC COR # BLD: 5.35 10*3/MM3 (ref 3.2–9.8)
WBC NRBC COR # BLD: 5.39 10*3/MM3 (ref 3.2–9.8)
WBC NRBC COR # BLD: 5.43 10*3/MM3 (ref 3.2–9.8)
WBC NRBC COR # BLD: 5.49 10*3/MM3 (ref 3.2–9.8)
WBC NRBC COR # BLD: 5.53 10*3/MM3 (ref 3.2–9.8)
WBC NRBC COR # BLD: 5.54 10*3/MM3 (ref 3.2–9.8)
WBC NRBC COR # BLD: 5.57 10*3/MM3 (ref 3.2–9.8)
WBC NRBC COR # BLD: 5.58 10*3/MM3 (ref 3.2–9.8)
WBC NRBC COR # BLD: 5.59 10*3/MM3 (ref 3.2–9.8)
WBC NRBC COR # BLD: 5.61 10*3/MM3 (ref 3.2–9.8)
WBC NRBC COR # BLD: 5.66 10*3/MM3 (ref 3.2–9.8)
WBC NRBC COR # BLD: 5.73 10*3/MM3 (ref 3.2–9.8)
WBC NRBC COR # BLD: 5.75 10*3/MM3 (ref 3.2–9.8)
WBC NRBC COR # BLD: 5.78 10*3/MM3 (ref 3.2–9.8)
WBC NRBC COR # BLD: 5.79 10*3/MM3 (ref 3.2–9.8)
WBC NRBC COR # BLD: 5.79 10*3/MM3 (ref 3.2–9.8)
WBC NRBC COR # BLD: 5.81 10*3/MM3 (ref 3.2–9.8)
WBC NRBC COR # BLD: 5.82 10*3/MM3 (ref 3.2–9.8)
WBC NRBC COR # BLD: 5.85 10*3/MM3 (ref 3.2–9.8)
WBC NRBC COR # BLD: 5.9 10*3/MM3 (ref 3.2–9.8)
WBC NRBC COR # BLD: 5.91 10*3/MM3 (ref 3.2–9.8)
WBC NRBC COR # BLD: 5.93 10*3/MM3 (ref 3.2–9.8)
WBC NRBC COR # BLD: 5.94 10*3/MM3 (ref 3.2–9.8)
WBC NRBC COR # BLD: 5.96 10*3/MM3 (ref 3.2–9.8)
WBC NRBC COR # BLD: 5.97 10*3/MM3 (ref 3.2–9.8)
WBC NRBC COR # BLD: 5.98 10*3/MM3 (ref 3.2–9.8)
WBC NRBC COR # BLD: 6.06 10*3/MM3 (ref 3.2–9.8)
WBC NRBC COR # BLD: 6.18 10*3/MM3 (ref 3.2–9.8)
WBC NRBC COR # BLD: 6.2 10*3/MM3 (ref 3.2–9.8)
WBC NRBC COR # BLD: 6.22 10*3/MM3 (ref 3.2–9.8)
WBC NRBC COR # BLD: 6.24 10*3/MM3 (ref 3.2–9.8)
WBC NRBC COR # BLD: 6.26 10*3/MM3 (ref 3.2–9.8)
WBC NRBC COR # BLD: 6.26 10*3/MM3 (ref 3.2–9.8)
WBC NRBC COR # BLD: 6.31 10*3/MM3 (ref 3.2–9.8)
WBC NRBC COR # BLD: 6.34 10*3/MM3 (ref 3.2–9.8)
WBC NRBC COR # BLD: 6.36 10*3/MM3 (ref 3.2–9.8)
WBC NRBC COR # BLD: 6.36 10*3/MM3 (ref 3.2–9.8)
WBC NRBC COR # BLD: 6.41 10*3/MM3 (ref 3.2–9.8)
WBC NRBC COR # BLD: 6.44 10*3/MM3 (ref 3.2–9.8)
WBC NRBC COR # BLD: 6.46 10*3/MM3 (ref 3.2–9.8)
WBC NRBC COR # BLD: 6.53 10*3/MM3 (ref 3.2–9.8)
WBC NRBC COR # BLD: 6.54 10*3/MM3 (ref 3.2–9.8)
WBC NRBC COR # BLD: 6.6 10*3/MM3 (ref 3.2–9.8)
WBC NRBC COR # BLD: 6.63 10*3/MM3 (ref 3.2–9.8)
WBC NRBC COR # BLD: 6.66 10*3/MM3 (ref 3.2–9.8)
WBC NRBC COR # BLD: 6.7 10*3/MM3 (ref 3.2–9.8)
WBC NRBC COR # BLD: 6.76 10*3/MM3 (ref 3.2–9.8)
WBC NRBC COR # BLD: 6.77 10*3/MM3 (ref 3.2–9.8)
WBC NRBC COR # BLD: 6.78 10*3/MM3 (ref 3.2–9.8)
WBC NRBC COR # BLD: 6.97 10*3/MM3 (ref 3.2–9.8)
WBC NRBC COR # BLD: 6.98 10*3/MM3 (ref 3.2–9.8)
WBC NRBC COR # BLD: 7.02 10*3/MM3 (ref 3.2–9.8)
WBC NRBC COR # BLD: 7.05 10*3/MM3 (ref 3.2–9.8)
WBC NRBC COR # BLD: 7.08 10*3/MM3 (ref 3.2–9.8)
WBC NRBC COR # BLD: 7.21 10*3/MM3 (ref 3.2–9.8)
WBC NRBC COR # BLD: 7.32 10*3/MM3 (ref 3.2–9.8)
WBC NRBC COR # BLD: 7.48 10*3/MM3 (ref 3.2–9.8)
WBC NRBC COR # BLD: 7.53 10*3/MM3 (ref 3.2–9.8)
WBC NRBC COR # BLD: 7.57 10*3/MM3 (ref 3.2–9.8)
WBC NRBC COR # BLD: 7.72 10*3/MM3 (ref 3.2–9.8)
WBC NRBC COR # BLD: 7.79 10*3/MM3 (ref 3.2–9.8)
WBC NRBC COR # BLD: 7.82 10*3/MM3 (ref 3.2–9.8)
WBC NRBC COR # BLD: 7.92 10*3/MM3 (ref 3.2–9.8)
WBC NRBC COR # BLD: 8 10*3/MM3 (ref 3.2–9.8)
WBC NRBC COR # BLD: 8.01 10*3/MM3 (ref 3.2–9.8)
WBC NRBC COR # BLD: 8.1 10*3/MM3 (ref 3.2–9.8)
WBC NRBC COR # BLD: 8.23 10*3/MM3 (ref 3.2–9.8)
WBC NRBC COR # BLD: 8.25 10*3/MM3 (ref 3.2–9.8)
WBC NRBC COR # BLD: 8.28 10*3/MM3 (ref 3.2–9.8)
WBC NRBC COR # BLD: 8.39 10*3/MM3 (ref 3.2–9.8)
WBC NRBC COR # BLD: 8.54 10*3/MM3 (ref 3.2–9.8)
WBC NRBC COR # BLD: 8.64 10*3/MM3 (ref 3.2–9.8)
WBC NRBC COR # BLD: 8.75 10*3/MM3 (ref 3.2–9.8)
WBC NRBC COR # BLD: 9.61 10*3/MM3 (ref 3.2–9.8)
WBC UR QL AUTO: ABNORMAL /HPF
WHOLE BLOOD HOLD SPECIMEN: NORMAL

## 2017-01-01 PROCEDURE — 94760 N-INVAS EAR/PLS OXIMETRY 1: CPT

## 2017-01-01 PROCEDURE — 97535 SELF CARE MNGMENT TRAINING: CPT

## 2017-01-01 PROCEDURE — 25010000002 FUROSEMIDE PER 20 MG: Performed by: INTERNAL MEDICINE

## 2017-01-01 PROCEDURE — 85027 COMPLETE CBC AUTOMATED: CPT | Performed by: INTERNAL MEDICINE

## 2017-01-01 PROCEDURE — 85025 COMPLETE CBC W/AUTO DIFF WBC: CPT | Performed by: NURSE PRACTITIONER

## 2017-01-01 PROCEDURE — 93010 ELECTROCARDIOGRAM REPORT: CPT | Performed by: INTERNAL MEDICINE

## 2017-01-01 PROCEDURE — 25010000002 HYDRALAZINE PER 20 MG: Performed by: FAMILY MEDICINE

## 2017-01-01 PROCEDURE — 94799 UNLISTED PULMONARY SVC/PX: CPT

## 2017-01-01 PROCEDURE — 25010000002 METHYLPREDNISOLONE PER 125 MG: Performed by: FAMILY MEDICINE

## 2017-01-01 PROCEDURE — 94003 VENT MGMT INPAT SUBQ DAY: CPT

## 2017-01-01 PROCEDURE — 83605 ASSAY OF LACTIC ACID: CPT | Performed by: EMERGENCY MEDICINE

## 2017-01-01 PROCEDURE — 97116 GAIT TRAINING THERAPY: CPT

## 2017-01-01 PROCEDURE — 71020 HC CHEST PA AND LATERAL: CPT

## 2017-01-01 PROCEDURE — 86900 BLOOD TYPING SEROLOGIC ABO: CPT

## 2017-01-01 PROCEDURE — 25010000002 CEFTRIAXONE: Performed by: FAMILY MEDICINE

## 2017-01-01 PROCEDURE — 97110 THERAPEUTIC EXERCISES: CPT

## 2017-01-01 PROCEDURE — 25010000002 METHYLPREDNISOLONE PER 40 MG: Performed by: NURSE PRACTITIONER

## 2017-01-01 PROCEDURE — 25010000002 CEFTAZIDIME PER 500 MG: Performed by: FAMILY MEDICINE

## 2017-01-01 PROCEDURE — 85384 FIBRINOGEN ACTIVITY: CPT | Performed by: FAMILY MEDICINE

## 2017-01-01 PROCEDURE — 86140 C-REACTIVE PROTEIN: CPT | Performed by: FAMILY MEDICINE

## 2017-01-01 PROCEDURE — 80053 COMPREHEN METABOLIC PANEL: CPT | Performed by: INTERNAL MEDICINE

## 2017-01-01 PROCEDURE — 97530 THERAPEUTIC ACTIVITIES: CPT

## 2017-01-01 PROCEDURE — 86850 RBC ANTIBODY SCREEN: CPT | Performed by: INTERNAL MEDICINE

## 2017-01-01 PROCEDURE — 83735 ASSAY OF MAGNESIUM: CPT | Performed by: INTERNAL MEDICINE

## 2017-01-01 PROCEDURE — 99219 PR INITIAL OBSERVATION CARE/DAY 50 MINUTES: CPT | Performed by: FAMILY MEDICINE

## 2017-01-01 PROCEDURE — 25010000002 MORPHINE SULFATE (PF) 2 MG/ML SOLUTION: Performed by: FAMILY MEDICINE

## 2017-01-01 PROCEDURE — 82553 CREATINE MB FRACTION: CPT | Performed by: EMERGENCY MEDICINE

## 2017-01-01 PROCEDURE — 86900 BLOOD TYPING SEROLOGIC ABO: CPT | Performed by: FAMILY MEDICINE

## 2017-01-01 PROCEDURE — 80053 COMPREHEN METABOLIC PANEL: CPT | Performed by: EMERGENCY MEDICINE

## 2017-01-01 PROCEDURE — 80053 COMPREHEN METABOLIC PANEL: CPT | Performed by: FAMILY MEDICINE

## 2017-01-01 PROCEDURE — 74176 CT ABD & PELVIS W/O CONTRAST: CPT

## 2017-01-01 PROCEDURE — 99232 SBSQ HOSP IP/OBS MODERATE 35: CPT | Performed by: FAMILY MEDICINE

## 2017-01-01 PROCEDURE — 85014 HEMATOCRIT: CPT | Performed by: FAMILY MEDICINE

## 2017-01-01 PROCEDURE — 25010000002 CEFTRIAXONE: Performed by: NURSE PRACTITIONER

## 2017-01-01 PROCEDURE — 85730 THROMBOPLASTIN TIME PARTIAL: CPT | Performed by: FAMILY MEDICINE

## 2017-01-01 PROCEDURE — 99231 SBSQ HOSP IP/OBS SF/LOW 25: CPT | Performed by: SURGERY

## 2017-01-01 PROCEDURE — 85007 BL SMEAR W/DIFF WBC COUNT: CPT | Performed by: FAMILY MEDICINE

## 2017-01-01 PROCEDURE — 83550 IRON BINDING TEST: CPT | Performed by: INTERNAL MEDICINE

## 2017-01-01 PROCEDURE — 63710000001 PREDNISONE PER 1 MG: Performed by: FAMILY MEDICINE

## 2017-01-01 PROCEDURE — 97166 OT EVAL MOD COMPLEX 45 MIN: CPT

## 2017-01-01 PROCEDURE — 85025 COMPLETE CBC W/AUTO DIFF WBC: CPT | Performed by: FAMILY MEDICINE

## 2017-01-01 PROCEDURE — 25010000002 VANCOMYCIN PER 500 MG: Performed by: FAMILY MEDICINE

## 2017-01-01 PROCEDURE — 83615 LACTATE (LD) (LDH) ENZYME: CPT | Performed by: FAMILY MEDICINE

## 2017-01-01 PROCEDURE — 25010000002 FUROSEMIDE PER 20 MG: Performed by: EMERGENCY MEDICINE

## 2017-01-01 PROCEDURE — 25010000002 HYDRALAZINE PER 20 MG: Performed by: INTERNAL MEDICINE

## 2017-01-01 PROCEDURE — 93005 ELECTROCARDIOGRAM TRACING: CPT | Performed by: EMERGENCY MEDICINE

## 2017-01-01 PROCEDURE — 25010000002 ALBUMIN HUMAN 25% PER 50 ML: Performed by: INTERNAL MEDICINE

## 2017-01-01 PROCEDURE — 93308 TTE F-UP OR LMTD: CPT

## 2017-01-01 PROCEDURE — 5A1955Z RESPIRATORY VENTILATION, GREATER THAN 96 CONSECUTIVE HOURS: ICD-10-PCS | Performed by: EMERGENCY MEDICINE

## 2017-01-01 PROCEDURE — 25010000002 HYDRALAZINE PER 20 MG: Performed by: NURSE PRACTITIONER

## 2017-01-01 PROCEDURE — P9046 ALBUMIN (HUMAN), 25%, 20 ML: HCPCS | Performed by: INTERNAL MEDICINE

## 2017-01-01 PROCEDURE — 25010000002 MIDAZOLAM 50 MG/10ML SOLUTION 10 ML VIAL: Performed by: FAMILY MEDICINE

## 2017-01-01 PROCEDURE — 25010000002 FUROSEMIDE PER 20 MG: Performed by: FAMILY MEDICINE

## 2017-01-01 PROCEDURE — 82803 BLOOD GASES ANY COMBINATION: CPT | Performed by: INTERNAL MEDICINE

## 2017-01-01 PROCEDURE — A9567 TECHNETIUM TC-99M AEROSOL: HCPCS | Performed by: FAMILY MEDICINE

## 2017-01-01 PROCEDURE — C1751 CATH, INF, PER/CENT/MIDLINE: HCPCS

## 2017-01-01 PROCEDURE — 85027 COMPLETE CBC AUTOMATED: CPT | Performed by: FAMILY MEDICINE

## 2017-01-01 PROCEDURE — 88305 TISSUE EXAM BY PATHOLOGIST: CPT | Performed by: INTERNAL MEDICINE

## 2017-01-01 PROCEDURE — 85025 COMPLETE CBC W/AUTO DIFF WBC: CPT | Performed by: INTERNAL MEDICINE

## 2017-01-01 PROCEDURE — 94002 VENT MGMT INPAT INIT DAY: CPT

## 2017-01-01 PROCEDURE — G8988 SELF CARE GOAL STATUS: HCPCS

## 2017-01-01 PROCEDURE — 99233 SBSQ HOSP IP/OBS HIGH 50: CPT | Performed by: FAMILY MEDICINE

## 2017-01-01 PROCEDURE — 87040 BLOOD CULTURE FOR BACTERIA: CPT | Performed by: INTERNAL MEDICINE

## 2017-01-01 PROCEDURE — 36430 TRANSFUSION BLD/BLD COMPNT: CPT

## 2017-01-01 PROCEDURE — 99232 SBSQ HOSP IP/OBS MODERATE 35: CPT | Performed by: INTERNAL MEDICINE

## 2017-01-01 PROCEDURE — 25010000002 METHYLPREDNISOLONE PER 40 MG: Performed by: FAMILY MEDICINE

## 2017-01-01 PROCEDURE — 86850 RBC ANTIBODY SCREEN: CPT

## 2017-01-01 PROCEDURE — 25010000002 FENTANYL CITRATE (PF) 100 MCG/2ML SOLUTION: Performed by: INTERNAL MEDICINE

## 2017-01-01 PROCEDURE — 87040 BLOOD CULTURE FOR BACTERIA: CPT | Performed by: FAMILY MEDICINE

## 2017-01-01 PROCEDURE — 99238 HOSP IP/OBS DSCHRG MGMT 30/<: CPT | Performed by: FAMILY MEDICINE

## 2017-01-01 PROCEDURE — 25010000002 PROPOFOL 1000 MG/ML EMULSION: Performed by: EMERGENCY MEDICINE

## 2017-01-01 PROCEDURE — 82803 BLOOD GASES ANY COMBINATION: CPT | Performed by: FAMILY MEDICINE

## 2017-01-01 PROCEDURE — 25010000002 PIPERACILLIN SOD-TAZOBACTAM PER 1 G: Performed by: FAMILY MEDICINE

## 2017-01-01 PROCEDURE — 71010 HC CHEST PA OR AP: CPT

## 2017-01-01 PROCEDURE — 25010000002 FUROSEMIDE PER 20 MG

## 2017-01-01 PROCEDURE — 25010000002 MIDAZOLAM PER 1 MG: Performed by: NURSE ANESTHETIST, CERTIFIED REGISTERED

## 2017-01-01 PROCEDURE — 85018 HEMOGLOBIN: CPT | Performed by: INTERNAL MEDICINE

## 2017-01-01 PROCEDURE — 76705 ECHO EXAM OF ABDOMEN: CPT

## 2017-01-01 PROCEDURE — 80061 LIPID PANEL: CPT | Performed by: FAMILY MEDICINE

## 2017-01-01 PROCEDURE — 86850 RBC ANTIBODY SCREEN: CPT | Performed by: NURSE PRACTITIONER

## 2017-01-01 PROCEDURE — 99217 PR OBSERVATION CARE DISCHARGE MANAGEMENT: CPT | Performed by: FAMILY MEDICINE

## 2017-01-01 PROCEDURE — 25010000002 MORPHINE PER 10 MG: Performed by: INTERNAL MEDICINE

## 2017-01-01 PROCEDURE — 87086 URINE CULTURE/COLONY COUNT: CPT | Performed by: FAMILY MEDICINE

## 2017-01-01 PROCEDURE — 80307 DRUG TEST PRSMV CHEM ANLYZR: CPT | Performed by: FAMILY MEDICINE

## 2017-01-01 PROCEDURE — 25010000002 PROPOFOL 1000 MG/ML EMULSION: Performed by: SURGERY

## 2017-01-01 PROCEDURE — G8979 MOBILITY GOAL STATUS: HCPCS

## 2017-01-01 PROCEDURE — 25010000002 ONDANSETRON PER 1 MG: Performed by: EMERGENCY MEDICINE

## 2017-01-01 PROCEDURE — 96374 THER/PROPH/DIAG INJ IV PUSH: CPT

## 2017-01-01 PROCEDURE — 86923 COMPATIBILITY TEST ELECTRIC: CPT

## 2017-01-01 PROCEDURE — 80202 ASSAY OF VANCOMYCIN: CPT | Performed by: FAMILY MEDICINE

## 2017-01-01 PROCEDURE — 87040 BLOOD CULTURE FOR BACTERIA: CPT | Performed by: EMERGENCY MEDICINE

## 2017-01-01 PROCEDURE — 87205 SMEAR GRAM STAIN: CPT | Performed by: INTERNAL MEDICINE

## 2017-01-01 PROCEDURE — 94003 VENT MGMT INPAT SUBQ DAY: CPT | Performed by: INTERNAL MEDICINE

## 2017-01-01 PROCEDURE — 99225 PR SBSQ OBSERVATION CARE/DAY 25 MINUTES: CPT | Performed by: FAMILY MEDICINE

## 2017-01-01 PROCEDURE — P9021 RED BLOOD CELLS UNIT: HCPCS

## 2017-01-01 PROCEDURE — G8978 MOBILITY CURRENT STATUS: HCPCS

## 2017-01-01 PROCEDURE — 87077 CULTURE AEROBIC IDENTIFY: CPT | Performed by: FAMILY MEDICINE

## 2017-01-01 PROCEDURE — 94640 AIRWAY INHALATION TREATMENT: CPT

## 2017-01-01 PROCEDURE — 84484 ASSAY OF TROPONIN QUANT: CPT | Performed by: FAMILY MEDICINE

## 2017-01-01 PROCEDURE — 82728 ASSAY OF FERRITIN: CPT | Performed by: INTERNAL MEDICINE

## 2017-01-01 PROCEDURE — 82803 BLOOD GASES ANY COMBINATION: CPT | Performed by: SURGERY

## 2017-01-01 PROCEDURE — 85610 PROTHROMBIN TIME: CPT | Performed by: FAMILY MEDICINE

## 2017-01-01 PROCEDURE — 99225 PR SBSQ OBSERVATION CARE/DAY 25 MINUTES: CPT | Performed by: NURSE PRACTITIONER

## 2017-01-01 PROCEDURE — 25010000002 FUROSEMIDE PER 20 MG: Performed by: SURGERY

## 2017-01-01 PROCEDURE — 82962 GLUCOSE BLOOD TEST: CPT

## 2017-01-01 PROCEDURE — 83690 ASSAY OF LIPASE: CPT | Performed by: EMERGENCY MEDICINE

## 2017-01-01 PROCEDURE — 25010000002 MORPHINE PER 10 MG: Performed by: EMERGENCY MEDICINE

## 2017-01-01 PROCEDURE — P9035 PLATELET PHERES LEUKOREDUCED: HCPCS

## 2017-01-01 PROCEDURE — 25010000002 PIPERACILLIN-TAZOBACTAM: Performed by: INTERNAL MEDICINE

## 2017-01-01 PROCEDURE — 71010 HC CHEST AP: CPT

## 2017-01-01 PROCEDURE — 87086 URINE CULTURE/COLONY COUNT: CPT | Performed by: INTERNAL MEDICINE

## 2017-01-01 PROCEDURE — 99213 OFFICE O/P EST LOW 20 MIN: CPT | Performed by: FAMILY MEDICINE

## 2017-01-01 PROCEDURE — 25010000002 PIPERACILLIN SOD-TAZOBACTAM PER 1 G: Performed by: INTERNAL MEDICINE

## 2017-01-01 PROCEDURE — 25010000002 VANCOMYCIN PER 500 MG: Performed by: INTERNAL MEDICINE

## 2017-01-01 PROCEDURE — 83036 HEMOGLOBIN GLYCOSYLATED A1C: CPT | Performed by: FAMILY MEDICINE

## 2017-01-01 PROCEDURE — 85014 HEMATOCRIT: CPT | Performed by: INTERNAL MEDICINE

## 2017-01-01 PROCEDURE — P9016 RBC LEUKOCYTES REDUCED: HCPCS

## 2017-01-01 PROCEDURE — 74020 HC XR ABDOMEN FLAT & UPRIGHT: CPT

## 2017-01-01 PROCEDURE — 25010000002 MIDAZOLAM PER 1 MG: Performed by: EMERGENCY MEDICINE

## 2017-01-01 PROCEDURE — 87040 BLOOD CULTURE FOR BACTERIA: CPT | Performed by: NURSE PRACTITIONER

## 2017-01-01 PROCEDURE — 85018 HEMOGLOBIN: CPT | Performed by: FAMILY MEDICINE

## 2017-01-01 PROCEDURE — 99291 CRITICAL CARE FIRST HOUR: CPT | Performed by: FAMILY MEDICINE

## 2017-01-01 PROCEDURE — 86927 PLASMA FRESH FROZEN: CPT

## 2017-01-01 PROCEDURE — 25010000002 LEVOFLOXACIN PER 250 MG: Performed by: FAMILY MEDICINE

## 2017-01-01 PROCEDURE — 86900 BLOOD TYPING SEROLOGIC ABO: CPT | Performed by: INTERNAL MEDICINE

## 2017-01-01 PROCEDURE — 25010000002 MORPHINE SULFATE (PF) 2 MG/ML SOLUTION: Performed by: INTERNAL MEDICINE

## 2017-01-01 PROCEDURE — 81001 URINALYSIS AUTO W/SCOPE: CPT | Performed by: FAMILY MEDICINE

## 2017-01-01 PROCEDURE — 25010000002 FUROSEMIDE PER 20 MG: Performed by: NURSE PRACTITIONER

## 2017-01-01 PROCEDURE — 25010000002 AZITHROMYCIN: Performed by: INTERNAL MEDICINE

## 2017-01-01 PROCEDURE — 83605 ASSAY OF LACTIC ACID: CPT | Performed by: FAMILY MEDICINE

## 2017-01-01 PROCEDURE — 25010000002 PROPOFOL 1000 MG/ML EMULSION: Performed by: FAMILY MEDICINE

## 2017-01-01 PROCEDURE — 76937 US GUIDE VASCULAR ACCESS: CPT

## 2017-01-01 PROCEDURE — 85025 COMPLETE CBC W/AUTO DIFF WBC: CPT | Performed by: EMERGENCY MEDICINE

## 2017-01-01 PROCEDURE — 97162 PT EVAL MOD COMPLEX 30 MIN: CPT | Performed by: PHYSICAL THERAPIST

## 2017-01-01 PROCEDURE — 99232 SBSQ HOSP IP/OBS MODERATE 35: CPT | Performed by: SURGERY

## 2017-01-01 PROCEDURE — 87077 CULTURE AEROBIC IDENTIFY: CPT | Performed by: EMERGENCY MEDICINE

## 2017-01-01 PROCEDURE — 25010000002 MIDAZOLAM PER 1 MG: Performed by: INTERNAL MEDICINE

## 2017-01-01 PROCEDURE — 25010000002 MAGNESIUM SULFATE IN D5W 1G/100ML (PREMIX) 10-5 MG/ML-% SOLUTION: Performed by: INTERNAL MEDICINE

## 2017-01-01 PROCEDURE — 36600 WITHDRAWAL OF ARTERIAL BLOOD: CPT

## 2017-01-01 PROCEDURE — 85379 FIBRIN DEGRADATION QUANT: CPT | Performed by: EMERGENCY MEDICINE

## 2017-01-01 PROCEDURE — 84132 ASSAY OF SERUM POTASSIUM: CPT | Performed by: FAMILY MEDICINE

## 2017-01-01 PROCEDURE — A9540 TC99M MAA: HCPCS | Performed by: FAMILY MEDICINE

## 2017-01-01 PROCEDURE — 25010000002 AZITHROMYCIN: Performed by: NURSE PRACTITIONER

## 2017-01-01 PROCEDURE — 85610 PROTHROMBIN TIME: CPT | Performed by: INTERNAL MEDICINE

## 2017-01-01 PROCEDURE — 84484 ASSAY OF TROPONIN QUANT: CPT | Performed by: EMERGENCY MEDICINE

## 2017-01-01 PROCEDURE — 96375 TX/PRO/DX INJ NEW DRUG ADDON: CPT

## 2017-01-01 PROCEDURE — 82746 ASSAY OF FOLIC ACID SERUM: CPT | Performed by: INTERNAL MEDICINE

## 2017-01-01 PROCEDURE — 99233 SBSQ HOSP IP/OBS HIGH 50: CPT | Performed by: INTERNAL MEDICINE

## 2017-01-01 PROCEDURE — 25010000002 PROPOFOL 10 MG/ML EMULSION: Performed by: NURSE ANESTHETIST, CERTIFIED REGISTERED

## 2017-01-01 PROCEDURE — 84132 ASSAY OF SERUM POTASSIUM: CPT | Performed by: INTERNAL MEDICINE

## 2017-01-01 PROCEDURE — 99285 EMERGENCY DEPT VISIT HI MDM: CPT

## 2017-01-01 PROCEDURE — 87070 CULTURE OTHR SPECIMN AEROBIC: CPT | Performed by: FAMILY MEDICINE

## 2017-01-01 PROCEDURE — 25010000002 LEVOFLOXACIN PER 250 MG: Performed by: NURSE PRACTITIONER

## 2017-01-01 PROCEDURE — 70450 CT HEAD/BRAIN W/O DYE: CPT

## 2017-01-01 PROCEDURE — 85384 FIBRINOGEN ACTIVITY: CPT | Performed by: INTERNAL MEDICINE

## 2017-01-01 PROCEDURE — 77001 FLUOROGUIDE FOR VEIN DEVICE: CPT

## 2017-01-01 PROCEDURE — 94770: CPT

## 2017-01-01 PROCEDURE — 25010000002 FENTANYL CITRATE (PF) 100 MCG/2ML SOLUTION: Performed by: NURSE ANESTHETIST, CERTIFIED REGISTERED

## 2017-01-01 PROCEDURE — 97162 PT EVAL MOD COMPLEX 30 MIN: CPT

## 2017-01-01 PROCEDURE — 81001 URINALYSIS AUTO W/SCOPE: CPT | Performed by: INTERNAL MEDICINE

## 2017-01-01 PROCEDURE — 93005 ELECTROCARDIOGRAM TRACING: CPT | Performed by: INTERNAL MEDICINE

## 2017-01-01 PROCEDURE — 25010000002 VITAMIN K1 PER 1 MG: Performed by: FAMILY MEDICINE

## 2017-01-01 PROCEDURE — 25010000002 LORAZEPAM PER 2 MG: Performed by: FAMILY MEDICINE

## 2017-01-01 PROCEDURE — 86900 BLOOD TYPING SEROLOGIC ABO: CPT | Performed by: NURSE PRACTITIONER

## 2017-01-01 PROCEDURE — 97542 WHEELCHAIR MNGMENT TRAINING: CPT

## 2017-01-01 PROCEDURE — 0DBN8ZX EXCISION OF SIGMOID COLON, VIA NATURAL OR ARTIFICIAL OPENING ENDOSCOPIC, DIAGNOSTIC: ICD-10-PCS | Performed by: INTERNAL MEDICINE

## 2017-01-01 PROCEDURE — 25010000002 PIPERACILLIN SOD-TAZOBACTAM PER 1 G: Performed by: NURSE PRACTITIONER

## 2017-01-01 PROCEDURE — 25010000002 HEPARIN (PORCINE) PER 1000 UNITS: Performed by: FAMILY MEDICINE

## 2017-01-01 PROCEDURE — B548ZZA ULTRASONOGRAPHY OF SUPERIOR VENA CAVA, GUIDANCE: ICD-10-PCS | Performed by: INTERNAL MEDICINE

## 2017-01-01 PROCEDURE — 99232 SBSQ HOSP IP/OBS MODERATE 35: CPT | Performed by: NURSE PRACTITIONER

## 2017-01-01 PROCEDURE — 85007 BL SMEAR W/DIFF WBC COUNT: CPT | Performed by: NURSE PRACTITIONER

## 2017-01-01 PROCEDURE — 25010000002 ONDANSETRON PER 1 MG: Performed by: NURSE ANESTHETIST, CERTIFIED REGISTERED

## 2017-01-01 PROCEDURE — 99231 SBSQ HOSP IP/OBS SF/LOW 25: CPT | Performed by: INTERNAL MEDICINE

## 2017-01-01 PROCEDURE — 93321 DOPPLER ECHO F-UP/LMTD STD: CPT

## 2017-01-01 PROCEDURE — 99291 CRITICAL CARE FIRST HOUR: CPT | Performed by: INTERNAL MEDICINE

## 2017-01-01 PROCEDURE — 99284 EMERGENCY DEPT VISIT MOD MDM: CPT

## 2017-01-01 PROCEDURE — 80202 ASSAY OF VANCOMYCIN: CPT | Performed by: INTERNAL MEDICINE

## 2017-01-01 PROCEDURE — 86901 BLOOD TYPING SEROLOGIC RH(D): CPT | Performed by: INTERNAL MEDICINE

## 2017-01-01 PROCEDURE — 82550 ASSAY OF CK (CPK): CPT | Performed by: EMERGENCY MEDICINE

## 2017-01-01 PROCEDURE — 80053 COMPREHEN METABOLIC PANEL: CPT | Performed by: NURSE PRACTITIONER

## 2017-01-01 PROCEDURE — 82270 OCCULT BLOOD FECES: CPT | Performed by: INTERNAL MEDICINE

## 2017-01-01 PROCEDURE — B548ZZA ULTRASONOGRAPHY OF SUPERIOR VENA CAVA, GUIDANCE: ICD-10-PCS | Performed by: FAMILY MEDICINE

## 2017-01-01 PROCEDURE — 83540 ASSAY OF IRON: CPT | Performed by: INTERNAL MEDICINE

## 2017-01-01 PROCEDURE — 85014 HEMATOCRIT: CPT | Performed by: NURSE PRACTITIONER

## 2017-01-01 PROCEDURE — 82607 VITAMIN B-12: CPT | Performed by: INTERNAL MEDICINE

## 2017-01-01 PROCEDURE — 74270 X-RAY XM COLON 1CNTRST STD: CPT

## 2017-01-01 PROCEDURE — 82595 ASSAY OF CRYOGLOBULIN: CPT | Performed by: FAMILY MEDICINE

## 2017-01-01 PROCEDURE — 81001 URINALYSIS AUTO W/SCOPE: CPT | Performed by: NURSE PRACTITIONER

## 2017-01-01 PROCEDURE — 86901 BLOOD TYPING SEROLOGIC RH(D): CPT | Performed by: NURSE PRACTITIONER

## 2017-01-01 PROCEDURE — 85730 THROMBOPLASTIN TIME PARTIAL: CPT | Performed by: EMERGENCY MEDICINE

## 2017-01-01 PROCEDURE — 93325 DOPPLER ECHO COLOR FLOW MAPG: CPT

## 2017-01-01 PROCEDURE — 87147 CULTURE TYPE IMMUNOLOGIC: CPT | Performed by: FAMILY MEDICINE

## 2017-01-01 PROCEDURE — 93005 ELECTROCARDIOGRAM TRACING: CPT | Performed by: FAMILY MEDICINE

## 2017-01-01 PROCEDURE — 99221 1ST HOSP IP/OBS SF/LOW 40: CPT | Performed by: FAMILY MEDICINE

## 2017-01-01 PROCEDURE — 02HV33Z INSERTION OF INFUSION DEVICE INTO SUPERIOR VENA CAVA, PERCUTANEOUS APPROACH: ICD-10-PCS | Performed by: RADIOLOGY

## 2017-01-01 PROCEDURE — 83880 ASSAY OF NATRIURETIC PEPTIDE: CPT | Performed by: EMERGENCY MEDICINE

## 2017-01-01 PROCEDURE — 97165 OT EVAL LOW COMPLEX 30 MIN: CPT

## 2017-01-01 PROCEDURE — G8987 SELF CARE CURRENT STATUS: HCPCS

## 2017-01-01 PROCEDURE — P9017 PLASMA 1 DONOR FRZ W/IN 8 HR: HCPCS

## 2017-01-01 PROCEDURE — 87899 AGENT NOS ASSAY W/OPTIC: CPT | Performed by: FAMILY MEDICINE

## 2017-01-01 PROCEDURE — 25010000002 ALBUMIN HUMAN 5% PER 50 ML: Performed by: INTERNAL MEDICINE

## 2017-01-01 PROCEDURE — 25010000002 MIDAZOLAM 50 MG/10ML SOLUTION 10 ML VIAL: Performed by: EMERGENCY MEDICINE

## 2017-01-01 PROCEDURE — 85651 RBC SED RATE NONAUTOMATED: CPT | Performed by: FAMILY MEDICINE

## 2017-01-01 PROCEDURE — 25010000002 VITAMIN K1 PER 1 MG: Performed by: INTERNAL MEDICINE

## 2017-01-01 PROCEDURE — B548ZZA ULTRASONOGRAPHY OF SUPERIOR VENA CAVA, GUIDANCE: ICD-10-PCS | Performed by: RADIOLOGY

## 2017-01-01 PROCEDURE — 80048 BASIC METABOLIC PNL TOTAL CA: CPT | Performed by: INTERNAL MEDICINE

## 2017-01-01 PROCEDURE — 25010000002 HYDRALAZINE PER 20 MG: Performed by: HOSPITALIST

## 2017-01-01 PROCEDURE — 84300 ASSAY OF URINE SODIUM: CPT | Performed by: INTERNAL MEDICINE

## 2017-01-01 PROCEDURE — 80053 COMPREHEN METABOLIC PANEL: CPT | Performed by: SURGERY

## 2017-01-01 PROCEDURE — 93005 ELECTROCARDIOGRAM TRACING: CPT

## 2017-01-01 PROCEDURE — 31500 INSERT EMERGENCY AIRWAY: CPT

## 2017-01-01 PROCEDURE — 87186 SC STD MICRODIL/AGAR DIL: CPT | Performed by: FAMILY MEDICINE

## 2017-01-01 PROCEDURE — 99224 PR SBSQ OBSERVATION CARE/DAY 15 MINUTES: CPT | Performed by: SURGERY

## 2017-01-01 PROCEDURE — P9012 CRYOPRECIPITATE EACH UNIT: HCPCS

## 2017-01-01 PROCEDURE — 82570 ASSAY OF URINE CREATININE: CPT | Performed by: FAMILY MEDICINE

## 2017-01-01 PROCEDURE — 86901 BLOOD TYPING SEROLOGIC RH(D): CPT | Performed by: FAMILY MEDICINE

## 2017-01-01 PROCEDURE — 99222 1ST HOSP IP/OBS MODERATE 55: CPT | Performed by: FAMILY MEDICINE

## 2017-01-01 PROCEDURE — 83690 ASSAY OF LIPASE: CPT | Performed by: NURSE PRACTITIONER

## 2017-01-01 PROCEDURE — 97150 GROUP THERAPEUTIC PROCEDURES: CPT

## 2017-01-01 PROCEDURE — 99222 1ST HOSP IP/OBS MODERATE 55: CPT | Performed by: NURSE PRACTITIONER

## 2017-01-01 PROCEDURE — G8978 MOBILITY CURRENT STATUS: HCPCS | Performed by: PHYSICAL THERAPIST

## 2017-01-01 PROCEDURE — 0 TECHNETIUM ALBUMIN AGGREGATED: Performed by: FAMILY MEDICINE

## 2017-01-01 PROCEDURE — 82150 ASSAY OF AMYLASE: CPT | Performed by: EMERGENCY MEDICINE

## 2017-01-01 PROCEDURE — 84439 ASSAY OF FREE THYROXINE: CPT | Performed by: FAMILY MEDICINE

## 2017-01-01 PROCEDURE — 85018 HEMOGLOBIN: CPT | Performed by: NURSE PRACTITIONER

## 2017-01-01 PROCEDURE — 5A1955Z RESPIRATORY VENTILATION, GREATER THAN 96 CONSECUTIVE HOURS: ICD-10-PCS | Performed by: ANESTHESIOLOGY

## 2017-01-01 PROCEDURE — 88305 TISSUE EXAM BY PATHOLOGIST: CPT | Performed by: PATHOLOGY

## 2017-01-01 PROCEDURE — 93306 TTE W/DOPPLER COMPLETE: CPT

## 2017-01-01 PROCEDURE — 82803 BLOOD GASES ANY COMBINATION: CPT | Performed by: EMERGENCY MEDICINE

## 2017-01-01 PROCEDURE — 85730 THROMBOPLASTIN TIME PARTIAL: CPT | Performed by: INTERNAL MEDICINE

## 2017-01-01 PROCEDURE — 86850 RBC ANTIBODY SCREEN: CPT | Performed by: FAMILY MEDICINE

## 2017-01-01 PROCEDURE — 85027 COMPLETE CBC AUTOMATED: CPT | Performed by: NURSE PRACTITIONER

## 2017-01-01 PROCEDURE — 83735 ASSAY OF MAGNESIUM: CPT | Performed by: FAMILY MEDICINE

## 2017-01-01 PROCEDURE — P9041 ALBUMIN (HUMAN),5%, 50ML: HCPCS | Performed by: NURSE ANESTHETIST, CERTIFIED REGISTERED

## 2017-01-01 PROCEDURE — 63710000001 PREDNISONE PER 5 MG: Performed by: NURSE PRACTITIONER

## 2017-01-01 PROCEDURE — 74000 HC ABDOMEN KUB: CPT

## 2017-01-01 PROCEDURE — 02HV33Z INSERTION OF INFUSION DEVICE INTO SUPERIOR VENA CAVA, PERCUTANEOUS APPROACH: ICD-10-PCS | Performed by: FAMILY MEDICINE

## 2017-01-01 PROCEDURE — 85610 PROTHROMBIN TIME: CPT | Performed by: EMERGENCY MEDICINE

## 2017-01-01 PROCEDURE — 97110 THERAPEUTIC EXERCISES: CPT | Performed by: PHYSICAL THERAPIST

## 2017-01-01 PROCEDURE — 82553 CREATINE MB FRACTION: CPT | Performed by: FAMILY MEDICINE

## 2017-01-01 PROCEDURE — 0BH17EZ INSERTION OF ENDOTRACHEAL AIRWAY INTO TRACHEA, VIA NATURAL OR ARTIFICIAL OPENING: ICD-10-PCS | Performed by: ANESTHESIOLOGY

## 2017-01-01 PROCEDURE — 25010000002 PROMETHAZINE PER 50 MG: Performed by: NURSE PRACTITIONER

## 2017-01-01 PROCEDURE — 97163 PT EVAL HIGH COMPLEX 45 MIN: CPT

## 2017-01-01 PROCEDURE — 87205 SMEAR GRAM STAIN: CPT | Performed by: FAMILY MEDICINE

## 2017-01-01 PROCEDURE — 85220 BLOOC CLOT FACTOR V TEST: CPT | Performed by: FAMILY MEDICINE

## 2017-01-01 PROCEDURE — 31500 INSERT EMERGENCY AIRWAY: CPT | Performed by: ANESTHESIOLOGY

## 2017-01-01 PROCEDURE — 99225 PR SBSQ OBSERVATION CARE/DAY 25 MINUTES: CPT | Performed by: SURGERY

## 2017-01-01 PROCEDURE — 25010000002 PHENYLEPHRINE PER 1 ML: Performed by: NURSE ANESTHETIST, CERTIFIED REGISTERED

## 2017-01-01 PROCEDURE — 82270 OCCULT BLOOD FECES: CPT | Performed by: FAMILY MEDICINE

## 2017-01-01 PROCEDURE — 78582 LUNG VENTILAT&PERFUS IMAGING: CPT

## 2017-01-01 PROCEDURE — 99024 POSTOP FOLLOW-UP VISIT: CPT | Performed by: SURGERY

## 2017-01-01 PROCEDURE — 83735 ASSAY OF MAGNESIUM: CPT | Performed by: EMERGENCY MEDICINE

## 2017-01-01 PROCEDURE — 86157 COLD AGGLUTININ TITER: CPT | Performed by: FAMILY MEDICINE

## 2017-01-01 PROCEDURE — 88304 TISSUE EXAM BY PATHOLOGIST: CPT | Performed by: SURGERY

## 2017-01-01 PROCEDURE — 81001 URINALYSIS AUTO W/SCOPE: CPT | Performed by: EMERGENCY MEDICINE

## 2017-01-01 PROCEDURE — 86334 IMMUNOFIX E-PHORESIS SERUM: CPT | Performed by: FAMILY MEDICINE

## 2017-01-01 PROCEDURE — 85300 ANTITHROMBIN III ACTIVITY: CPT | Performed by: FAMILY MEDICINE

## 2017-01-01 PROCEDURE — 83935 ASSAY OF URINE OSMOLALITY: CPT | Performed by: FAMILY MEDICINE

## 2017-01-01 PROCEDURE — 83880 ASSAY OF NATRIURETIC PEPTIDE: CPT | Performed by: FAMILY MEDICINE

## 2017-01-01 PROCEDURE — 99291 CRITICAL CARE FIRST HOUR: CPT

## 2017-01-01 PROCEDURE — 87205 SMEAR GRAM STAIN: CPT | Performed by: EMERGENCY MEDICINE

## 2017-01-01 PROCEDURE — 84443 ASSAY THYROID STIM HORMONE: CPT | Performed by: FAMILY MEDICINE

## 2017-01-01 PROCEDURE — 87081 CULTURE SCREEN ONLY: CPT | Performed by: FAMILY MEDICINE

## 2017-01-01 PROCEDURE — G8979 MOBILITY GOAL STATUS: HCPCS | Performed by: PHYSICAL THERAPIST

## 2017-01-01 PROCEDURE — 85301 ANTITHROMBIN III ANTIGEN: CPT | Performed by: FAMILY MEDICINE

## 2017-01-01 PROCEDURE — 0 IOPAMIDOL 61 % SOLUTION: Performed by: INTERNAL MEDICINE

## 2017-01-01 PROCEDURE — 99213 OFFICE O/P EST LOW 20 MIN: CPT | Performed by: SURGERY

## 2017-01-01 PROCEDURE — 02HV33Z INSERTION OF INFUSION DEVICE INTO SUPERIOR VENA CAVA, PERCUTANEOUS APPROACH: ICD-10-PCS | Performed by: INTERNAL MEDICINE

## 2017-01-01 PROCEDURE — 84443 ASSAY THYROID STIM HORMONE: CPT | Performed by: EMERGENCY MEDICINE

## 2017-01-01 PROCEDURE — 25010000002 ALBUMIN HUMAN 5% PER 50 ML: Performed by: NURSE ANESTHETIST, CERTIFIED REGISTERED

## 2017-01-01 PROCEDURE — P9041 ALBUMIN (HUMAN),5%, 50ML: HCPCS | Performed by: INTERNAL MEDICINE

## 2017-01-01 PROCEDURE — 25010000002 HYDRALAZINE PER 20 MG: Performed by: EMERGENCY MEDICINE

## 2017-01-01 PROCEDURE — 0D1M0Z4 BYPASS DESCENDING COLON TO CUTANEOUS, OPEN APPROACH: ICD-10-PCS | Performed by: SURGERY

## 2017-01-01 PROCEDURE — P9019 PLATELETS, EACH UNIT: HCPCS

## 2017-01-01 PROCEDURE — 25010000002 SUCCINYLCHOLINE PER 20 MG: Performed by: NURSE ANESTHETIST, CERTIFIED REGISTERED

## 2017-01-01 PROCEDURE — 87581 M.PNEUMON DNA AMP PROBE: CPT | Performed by: FAMILY MEDICINE

## 2017-01-01 PROCEDURE — 44345 REVISION OF COLOSTOMY: CPT | Performed by: SURGERY

## 2017-01-01 PROCEDURE — 80048 BASIC METABOLIC PNL TOTAL CA: CPT | Performed by: NURSE PRACTITIONER

## 2017-01-01 PROCEDURE — 25010000002 MORPHINE SULFATE (PF) 2 MG/ML SOLUTION: Performed by: NURSE PRACTITIONER

## 2017-01-01 PROCEDURE — 87798 DETECT AGENT NOS DNA AMP: CPT | Performed by: FAMILY MEDICINE

## 2017-01-01 PROCEDURE — 93970 EXTREMITY STUDY: CPT

## 2017-01-01 PROCEDURE — 25010000002 SUCCINYLCHOLINE PER 20 MG: Performed by: EMERGENCY MEDICINE

## 2017-01-01 PROCEDURE — 86901 BLOOD TYPING SEROLOGIC RH(D): CPT

## 2017-01-01 PROCEDURE — 93308 TTE F-UP OR LMTD: CPT | Performed by: INTERNAL MEDICINE

## 2017-01-01 PROCEDURE — 85007 BL SMEAR W/DIFF WBC COUNT: CPT | Performed by: EMERGENCY MEDICINE

## 2017-01-01 PROCEDURE — 99223 1ST HOSP IP/OBS HIGH 75: CPT | Performed by: INTERNAL MEDICINE

## 2017-01-01 PROCEDURE — 0 TECHNETIUM TC 99M PENTETATE KIT: Performed by: FAMILY MEDICINE

## 2017-01-01 PROCEDURE — 99221 1ST HOSP IP/OBS SF/LOW 40: CPT | Performed by: SURGERY

## 2017-01-01 PROCEDURE — 87070 CULTURE OTHR SPECIMN AEROBIC: CPT | Performed by: EMERGENCY MEDICINE

## 2017-01-01 PROCEDURE — 36415 COLL VENOUS BLD VENIPUNCTURE: CPT | Performed by: FAMILY MEDICINE

## 2017-01-01 PROCEDURE — 74177 CT ABD & PELVIS W/CONTRAST: CPT

## 2017-01-01 PROCEDURE — 99223 1ST HOSP IP/OBS HIGH 75: CPT | Performed by: FAMILY MEDICINE

## 2017-01-01 PROCEDURE — 31500 INSERT EMERGENCY AIRWAY: CPT | Performed by: EMERGENCY MEDICINE

## 2017-01-01 PROCEDURE — 87186 SC STD MICRODIL/AGAR DIL: CPT | Performed by: EMERGENCY MEDICINE

## 2017-01-01 PROCEDURE — 0BH17EZ INSERTION OF ENDOTRACHEAL AIRWAY INTO TRACHEA, VIA NATURAL OR ARTIFICIAL OPENING: ICD-10-PCS | Performed by: EMERGENCY MEDICINE

## 2017-01-01 PROCEDURE — C1751 CATH, INF, PER/CENT/MIDLINE: HCPCS | Performed by: ANESTHESIOLOGY

## 2017-01-01 PROCEDURE — 87449 NOS EACH ORGANISM AG IA: CPT | Performed by: FAMILY MEDICINE

## 2017-01-01 PROCEDURE — 82550 ASSAY OF CK (CPK): CPT | Performed by: FAMILY MEDICINE

## 2017-01-01 PROCEDURE — 97164 PT RE-EVAL EST PLAN CARE: CPT

## 2017-01-01 PROCEDURE — 88304 TISSUE EXAM BY PATHOLOGIST: CPT | Performed by: PATHOLOGY

## 2017-01-01 PROCEDURE — 82803 BLOOD GASES ANY COMBINATION: CPT | Performed by: NURSE PRACTITIONER

## 2017-01-01 RX ORDER — MORPHINE SULFATE 4 MG/ML
4 INJECTION, SOLUTION INTRAMUSCULAR; INTRAVENOUS ONCE
Status: COMPLETED | OUTPATIENT
Start: 2017-01-01 | End: 2017-01-01

## 2017-01-01 RX ORDER — METHOCARBAMOL 500 MG/1
500 TABLET, FILM COATED ORAL EVERY 12 HOURS PRN
Status: DISCONTINUED | OUTPATIENT
Start: 2017-01-01 | End: 2017-01-01 | Stop reason: HOSPADM

## 2017-01-01 RX ORDER — MEGESTROL ACETATE 40 MG/ML
400 SUSPENSION ORAL EVERY 12 HOURS SCHEDULED
Status: DISCONTINUED | OUTPATIENT
Start: 2017-01-01 | End: 2017-01-01 | Stop reason: HOSPADM

## 2017-01-01 RX ORDER — SODIUM CHLORIDE 9 MG/ML
INJECTION, SOLUTION INTRAVENOUS
Status: DISPENSED
Start: 2017-01-01 | End: 2017-01-01

## 2017-01-01 RX ORDER — DOCUSATE SODIUM 100 MG/1
100 CAPSULE, LIQUID FILLED ORAL 2 TIMES DAILY
Status: DISCONTINUED | OUTPATIENT
Start: 2017-01-01 | End: 2017-01-01

## 2017-01-01 RX ORDER — DOCUSATE SODIUM 50 MG/5ML
100 LIQUID ORAL 2 TIMES DAILY
Status: DISCONTINUED | OUTPATIENT
Start: 2017-01-01 | End: 2017-01-01 | Stop reason: HOSPADM

## 2017-01-01 RX ORDER — CLONIDINE HYDROCHLORIDE 0.1 MG/1
0.1 TABLET ORAL EVERY 12 HOURS SCHEDULED
Status: DISCONTINUED | OUTPATIENT
Start: 2017-01-01 | End: 2017-01-01

## 2017-01-01 RX ORDER — SODIUM CHLORIDE 0.9 % (FLUSH) 0.9 %
10 SYRINGE (ML) INJECTION AS NEEDED
Status: DISCONTINUED | OUTPATIENT
Start: 2017-01-01 | End: 2017-01-01 | Stop reason: SDUPTHER

## 2017-01-01 RX ORDER — HYDRALAZINE HYDROCHLORIDE 20 MG/ML
10 INJECTION INTRAMUSCULAR; INTRAVENOUS EVERY 8 HOURS PRN
Status: DISCONTINUED | OUTPATIENT
Start: 2017-01-01 | End: 2017-01-01

## 2017-01-01 RX ORDER — ONDANSETRON 2 MG/ML
INJECTION INTRAMUSCULAR; INTRAVENOUS AS NEEDED
Status: DISCONTINUED | OUTPATIENT
Start: 2017-01-01 | End: 2017-01-01 | Stop reason: SURG

## 2017-01-01 RX ORDER — LISINOPRIL 20 MG/1
20 TABLET ORAL DAILY
Qty: 30 TABLET | Refills: 3 | Status: SHIPPED | OUTPATIENT
Start: 2017-01-01

## 2017-01-01 RX ORDER — MORPHINE SULFATE 2 MG/ML
2 INJECTION, SOLUTION INTRAMUSCULAR; INTRAVENOUS EVERY 4 HOURS PRN
Status: DISCONTINUED | OUTPATIENT
Start: 2017-01-01 | End: 2017-01-01

## 2017-01-01 RX ORDER — LISINOPRIL 40 MG/1
40 TABLET ORAL DAILY
Status: DISCONTINUED | OUTPATIENT
Start: 2017-01-01 | End: 2017-01-01

## 2017-01-01 RX ORDER — SODIUM CHLORIDE 0.9 % (FLUSH) 0.9 %
1-10 SYRINGE (ML) INJECTION AS NEEDED
Status: DISCONTINUED | OUTPATIENT
Start: 2017-01-01 | End: 2017-01-01 | Stop reason: SDUPTHER

## 2017-01-01 RX ORDER — ATROPINE SULFATE 10 MG/ML
1 SOLUTION/ DROPS OPHTHALMIC 3 TIMES DAILY
Status: DISCONTINUED | OUTPATIENT
Start: 2017-01-01 | End: 2017-01-01 | Stop reason: CLARIF

## 2017-01-01 RX ORDER — METOCLOPRAMIDE 5 MG/1
5 TABLET ORAL
Status: DISCONTINUED | OUTPATIENT
Start: 2017-01-01 | End: 2017-01-01 | Stop reason: HOSPADM

## 2017-01-01 RX ORDER — DOCUSATE SODIUM 50 MG/5ML
100 LIQUID ORAL 2 TIMES DAILY
Status: DISCONTINUED | OUTPATIENT
Start: 2017-01-01 | End: 2017-01-01 | Stop reason: SDUPTHER

## 2017-01-01 RX ORDER — DEXTROSE, SODIUM CHLORIDE, AND POTASSIUM CHLORIDE 5; .45; .15 G/100ML; G/100ML; G/100ML
125 INJECTION INTRAVENOUS CONTINUOUS
Status: DISCONTINUED | OUTPATIENT
Start: 2017-01-01 | End: 2017-01-01

## 2017-01-01 RX ORDER — POTASSIUM CHLORIDE 20 MEQ/1
20 TABLET, EXTENDED RELEASE ORAL ONCE
Status: DISCONTINUED | OUTPATIENT
Start: 2017-01-01 | End: 2017-01-01 | Stop reason: SDUPTHER

## 2017-01-01 RX ORDER — ATORVASTATIN CALCIUM 40 MG/1
80 TABLET, FILM COATED ORAL NIGHTLY
Status: DISCONTINUED | OUTPATIENT
Start: 2017-01-01 | End: 2017-01-01 | Stop reason: HOSPADM

## 2017-01-01 RX ORDER — HYDRALAZINE HYDROCHLORIDE 20 MG/ML
20 INJECTION INTRAMUSCULAR; INTRAVENOUS EVERY 8 HOURS
Status: DISCONTINUED | OUTPATIENT
Start: 2017-01-01 | End: 2017-01-01

## 2017-01-01 RX ORDER — SULFAMETHOXAZOLE AND TRIMETHOPRIM 800; 160 MG/1; MG/1
1 TABLET ORAL EVERY 12 HOURS SCHEDULED
Status: DISCONTINUED | OUTPATIENT
Start: 2017-01-01 | End: 2017-01-01

## 2017-01-01 RX ORDER — LABETALOL 200 MG/1
200 TABLET, FILM COATED ORAL 2 TIMES DAILY
Status: DISCONTINUED | OUTPATIENT
Start: 2017-01-01 | End: 2017-01-01 | Stop reason: HOSPADM

## 2017-01-01 RX ORDER — ATROPINE SULFATE 10 MG/ML
2 SOLUTION/ DROPS OPHTHALMIC 3 TIMES DAILY
Status: DISCONTINUED | OUTPATIENT
Start: 2017-01-01 | End: 2017-01-01 | Stop reason: SDUPTHER

## 2017-01-01 RX ORDER — PREDNISONE 20 MG/1
20 TABLET ORAL 2 TIMES DAILY WITH MEALS
Status: DISCONTINUED | OUTPATIENT
Start: 2017-01-01 | End: 2017-01-01 | Stop reason: HOSPADM

## 2017-01-01 RX ORDER — ACETAMINOPHEN 325 MG/1
650 TABLET ORAL ONCE AS NEEDED
Status: DISCONTINUED | OUTPATIENT
Start: 2017-01-01 | End: 2017-01-01 | Stop reason: HOSPADM

## 2017-01-01 RX ORDER — ENALAPRILAT 2.5 MG/2ML
0.62 INJECTION INTRAVENOUS ONCE
Status: DISCONTINUED | OUTPATIENT
Start: 2017-01-01 | End: 2017-01-01

## 2017-01-01 RX ORDER — HYDRALAZINE HYDROCHLORIDE 50 MG/1
50 TABLET, FILM COATED ORAL EVERY 8 HOURS SCHEDULED
Status: DISCONTINUED | OUTPATIENT
Start: 2017-01-01 | End: 2017-01-01 | Stop reason: ALTCHOICE

## 2017-01-01 RX ORDER — LISINOPRIL 20 MG/1
20 TABLET ORAL EVERY 12 HOURS SCHEDULED
Status: DISCONTINUED | OUTPATIENT
Start: 2017-01-01 | End: 2017-01-01 | Stop reason: HOSPADM

## 2017-01-01 RX ORDER — FUROSEMIDE 10 MG/ML
40 INJECTION INTRAMUSCULAR; INTRAVENOUS ONCE
Status: COMPLETED | OUTPATIENT
Start: 2017-01-01 | End: 2017-01-01

## 2017-01-01 RX ORDER — CLONIDINE HYDROCHLORIDE 0.2 MG/1
0.2 TABLET ORAL EVERY 8 HOURS SCHEDULED
Status: DISCONTINUED | OUTPATIENT
Start: 2017-01-01 | End: 2017-01-01

## 2017-01-01 RX ORDER — PANTOPRAZOLE SODIUM 40 MG/10ML
40 INJECTION, POWDER, LYOPHILIZED, FOR SOLUTION INTRAVENOUS
Status: DISCONTINUED | OUTPATIENT
Start: 2017-01-01 | End: 2017-01-01 | Stop reason: HOSPADM

## 2017-01-01 RX ORDER — HYDRALAZINE HYDROCHLORIDE 20 MG/ML
20 INJECTION INTRAMUSCULAR; INTRAVENOUS EVERY 6 HOURS PRN
Status: DISCONTINUED | OUTPATIENT
Start: 2017-01-01 | End: 2017-01-01 | Stop reason: HOSPADM

## 2017-01-01 RX ORDER — LINEZOLID 2 MG/ML
600 INJECTION, SOLUTION INTRAVENOUS EVERY 12 HOURS
Status: DISCONTINUED | OUTPATIENT
Start: 2017-01-01 | End: 2017-01-01

## 2017-01-01 RX ORDER — HYDRALAZINE HYDROCHLORIDE 25 MG/1
25 TABLET, FILM COATED ORAL EVERY 8 HOURS SCHEDULED
Status: DISCONTINUED | OUTPATIENT
Start: 2017-01-01 | End: 2017-01-01 | Stop reason: ALTCHOICE

## 2017-01-01 RX ORDER — LISINOPRIL 40 MG/1
40 TABLET ORAL DAILY
Qty: 30 TABLET | Refills: 3 | Status: ON HOLD | OUTPATIENT
Start: 2017-01-01 | End: 2017-01-01

## 2017-01-01 RX ORDER — ALBUTEROL SULFATE 2.5 MG/3ML
2.5 SOLUTION RESPIRATORY (INHALATION)
Status: DISCONTINUED | OUTPATIENT
Start: 2017-01-01 | End: 2017-01-01

## 2017-01-01 RX ORDER — HYDRALAZINE HYDROCHLORIDE 20 MG/ML
10 INJECTION INTRAMUSCULAR; INTRAVENOUS ONCE
Status: COMPLETED | OUTPATIENT
Start: 2017-01-01 | End: 2017-01-01

## 2017-01-01 RX ORDER — SPIRONOLACTONE 25 MG/1
25 TABLET ORAL DAILY
Status: DISCONTINUED | OUTPATIENT
Start: 2017-01-01 | End: 2017-01-01

## 2017-01-01 RX ORDER — HYDRALAZINE HYDROCHLORIDE 50 MG/1
50 TABLET, FILM COATED ORAL EVERY 12 HOURS SCHEDULED
Status: DISCONTINUED | OUTPATIENT
Start: 2017-01-01 | End: 2017-01-01 | Stop reason: HOSPADM

## 2017-01-01 RX ORDER — BISACODYL 5 MG/1
5 TABLET, DELAYED RELEASE ORAL DAILY PRN
Status: DISCONTINUED | OUTPATIENT
Start: 2017-01-01 | End: 2017-01-01 | Stop reason: HOSPADM

## 2017-01-01 RX ORDER — TRAZODONE HYDROCHLORIDE 100 MG/1
100 TABLET ORAL NIGHTLY PRN
Status: DISCONTINUED | OUTPATIENT
Start: 2017-01-01 | End: 2017-01-01 | Stop reason: SDUPTHER

## 2017-01-01 RX ORDER — CLONIDINE HYDROCHLORIDE 0.2 MG/1
0.2 TABLET ORAL 3 TIMES DAILY
COMMUNITY

## 2017-01-01 RX ORDER — OXYCODONE AND ACETAMINOPHEN 10; 325 MG/1; MG/1
1 TABLET ORAL EVERY 4 HOURS PRN
Status: DISCONTINUED | OUTPATIENT
Start: 2017-01-01 | End: 2017-01-01 | Stop reason: HOSPADM

## 2017-01-01 RX ORDER — METHOCARBAMOL 500 MG/1
500 TABLET, FILM COATED ORAL EVERY 12 HOURS PRN
Qty: 60 TABLET | Refills: 1 | Status: SHIPPED | OUTPATIENT
Start: 2017-01-01 | End: 2017-01-01

## 2017-01-01 RX ORDER — SODIUM CHLORIDE 9 MG/ML
30 INJECTION, SOLUTION INTRAVENOUS ONCE
Status: COMPLETED | OUTPATIENT
Start: 2017-01-01 | End: 2017-01-01

## 2017-01-01 RX ORDER — HYDROCHLOROTHIAZIDE 25 MG/1
25 TABLET ORAL DAILY
COMMUNITY
End: 2017-01-01 | Stop reason: HOSPADM

## 2017-01-01 RX ORDER — SODIUM CHLORIDE 9 MG/ML
INJECTION, SOLUTION INTRAVENOUS
Status: COMPLETED
Start: 2017-01-01 | End: 2017-01-01

## 2017-01-01 RX ORDER — ALBUTEROL SULFATE 2.5 MG/3ML
2.5 SOLUTION RESPIRATORY (INHALATION) EVERY 6 HOURS PRN
Status: DISCONTINUED | OUTPATIENT
Start: 2017-01-01 | End: 2017-01-01

## 2017-01-01 RX ORDER — DOCUSATE SODIUM 100 MG/1
100 CAPSULE, LIQUID FILLED ORAL 2 TIMES DAILY
Status: DISCONTINUED | OUTPATIENT
Start: 2017-01-01 | End: 2017-01-01 | Stop reason: HOSPADM

## 2017-01-01 RX ORDER — ACETYLCYSTEINE 100 MG/ML
600 SOLUTION ORAL; RESPIRATORY (INHALATION) EVERY 12 HOURS SCHEDULED
Status: DISCONTINUED | OUTPATIENT
Start: 2017-01-01 | End: 2017-01-01

## 2017-01-01 RX ORDER — POTASSIUM CHLORIDE 1.5 G/1.77G
20 POWDER, FOR SOLUTION ORAL 2 TIMES DAILY
Qty: 10 PACKET | Refills: 0 | Status: SHIPPED | OUTPATIENT
Start: 2017-01-01 | End: 2017-01-01

## 2017-01-01 RX ORDER — METHYLPREDNISOLONE SODIUM SUCCINATE 125 MG/2ML
80 INJECTION, POWDER, LYOPHILIZED, FOR SOLUTION INTRAMUSCULAR; INTRAVENOUS EVERY 12 HOURS
Status: DISCONTINUED | OUTPATIENT
Start: 2017-01-01 | End: 2017-01-01

## 2017-01-01 RX ORDER — METHYLPREDNISOLONE SODIUM SUCCINATE 40 MG/ML
20 INJECTION, POWDER, LYOPHILIZED, FOR SOLUTION INTRAMUSCULAR; INTRAVENOUS EVERY 8 HOURS
Status: DISCONTINUED | OUTPATIENT
Start: 2017-01-01 | End: 2017-01-01

## 2017-01-01 RX ORDER — SODIUM CHLORIDE 0.9 % (FLUSH) 0.9 %
1-10 SYRINGE (ML) INJECTION AS NEEDED
Status: DISCONTINUED | OUTPATIENT
Start: 2017-01-01 | End: 2017-01-01 | Stop reason: HOSPADM

## 2017-01-01 RX ORDER — HEPARIN SODIUM 5000 [USP'U]/ML
5000 INJECTION, SOLUTION INTRAVENOUS; SUBCUTANEOUS EVERY 8 HOURS SCHEDULED
Status: DISCONTINUED | OUTPATIENT
Start: 2017-01-01 | End: 2017-01-01

## 2017-01-01 RX ORDER — PREDNISONE 20 MG/1
40 TABLET ORAL
Qty: 30 TABLET | Refills: 2 | Status: CANCELLED | OUTPATIENT
Start: 2017-01-01 | End: 2017-06-20

## 2017-01-01 RX ORDER — LABETALOL 200 MG/1
200 TABLET, FILM COATED ORAL 2 TIMES DAILY
Status: DISCONTINUED | OUTPATIENT
Start: 2017-01-01 | End: 2017-01-01

## 2017-01-01 RX ORDER — HYDRALAZINE HYDROCHLORIDE 50 MG/1
100 TABLET, FILM COATED ORAL EVERY 8 HOURS SCHEDULED
Status: DISCONTINUED | OUTPATIENT
Start: 2017-01-01 | End: 2017-01-01

## 2017-01-01 RX ORDER — LISINOPRIL 20 MG/1
20 TABLET ORAL DAILY
Status: DISCONTINUED | OUTPATIENT
Start: 2017-01-01 | End: 2017-01-01

## 2017-01-01 RX ORDER — SODIUM CHLORIDE 9 MG/ML
75 INJECTION, SOLUTION INTRAVENOUS CONTINUOUS
Status: DISPENSED | OUTPATIENT
Start: 2017-01-01 | End: 2017-01-01

## 2017-01-01 RX ORDER — TRAZODONE HYDROCHLORIDE 150 MG/1
150 TABLET ORAL NIGHTLY
Status: DISCONTINUED | OUTPATIENT
Start: 2017-01-01 | End: 2017-01-01 | Stop reason: HOSPADM

## 2017-01-01 RX ORDER — MORPHINE SULFATE 2 MG/ML
0.5 INJECTION, SOLUTION INTRAMUSCULAR; INTRAVENOUS ONCE
Status: COMPLETED | OUTPATIENT
Start: 2017-01-01 | End: 2017-01-01

## 2017-01-01 RX ORDER — CLONIDINE HYDROCHLORIDE 0.2 MG/1
0.2 TABLET ORAL ONCE
Status: DISCONTINUED | OUTPATIENT
Start: 2017-01-01 | End: 2017-01-01

## 2017-01-01 RX ORDER — QUETIAPINE FUMARATE 100 MG/1
200 TABLET, FILM COATED ORAL NIGHTLY
Status: DISCONTINUED | OUTPATIENT
Start: 2017-01-01 | End: 2017-01-01 | Stop reason: HOSPADM

## 2017-01-01 RX ORDER — NYSTATIN 100000 [USP'U]/G
POWDER TOPICAL EVERY 12 HOURS SCHEDULED
Status: DISCONTINUED | OUTPATIENT
Start: 2017-01-01 | End: 2017-01-01 | Stop reason: HOSPADM

## 2017-01-01 RX ORDER — BUSPIRONE HYDROCHLORIDE 15 MG/1
15 TABLET ORAL EVERY 12 HOURS SCHEDULED
Status: DISCONTINUED | OUTPATIENT
Start: 2017-01-01 | End: 2017-01-01 | Stop reason: HOSPADM

## 2017-01-01 RX ORDER — FUROSEMIDE 10 MG/ML
40 INJECTION INTRAMUSCULAR; INTRAVENOUS 2 TIMES DAILY
Status: DISCONTINUED | OUTPATIENT
Start: 2017-01-01 | End: 2017-01-01

## 2017-01-01 RX ORDER — FUROSEMIDE 10 MG/ML
20 INJECTION INTRAMUSCULAR; INTRAVENOUS ONCE
Status: COMPLETED | OUTPATIENT
Start: 2017-01-01 | End: 2017-01-01

## 2017-01-01 RX ORDER — ALBUTEROL SULFATE 90 UG/1
2 AEROSOL, METERED RESPIRATORY (INHALATION) EVERY 4 HOURS PRN
Status: DISCONTINUED | OUTPATIENT
Start: 2017-01-01 | End: 2017-01-01 | Stop reason: HOSPADM

## 2017-01-01 RX ORDER — PANTOPRAZOLE SODIUM 40 MG/1
40 TABLET, DELAYED RELEASE ORAL DAILY
Qty: 30 TABLET | Refills: 3 | Status: SHIPPED | OUTPATIENT
Start: 2017-01-01 | End: 2017-01-01

## 2017-01-01 RX ORDER — NITROGLYCERIN 20 MG/100ML
5-200 INJECTION INTRAVENOUS
Status: DISCONTINUED | OUTPATIENT
Start: 2017-01-01 | End: 2017-01-01 | Stop reason: HOSPADM

## 2017-01-01 RX ORDER — POTASSIUM CHLORIDE 20MEQ/15ML
20 LIQUID (ML) ORAL 2 TIMES DAILY
Status: DISCONTINUED | OUTPATIENT
Start: 2017-01-01 | End: 2017-01-01 | Stop reason: HOSPADM

## 2017-01-01 RX ORDER — LISINOPRIL 10 MG/1
10 TABLET ORAL DAILY
Status: DISCONTINUED | OUTPATIENT
Start: 2017-01-01 | End: 2017-01-01 | Stop reason: DRUGHIGH

## 2017-01-01 RX ORDER — SODIUM CHLORIDE 9 MG/ML
30 INJECTION, SOLUTION INTRAVENOUS CONTINUOUS
Status: DISCONTINUED | OUTPATIENT
Start: 2017-01-01 | End: 2017-01-01

## 2017-01-01 RX ORDER — LABETALOL 200 MG/1
200 TABLET, FILM COATED ORAL EVERY 12 HOURS SCHEDULED
Status: DISCONTINUED | OUTPATIENT
Start: 2017-01-01 | End: 2017-01-01

## 2017-01-01 RX ORDER — CLONIDINE HYDROCHLORIDE 0.2 MG/1
0.2 TABLET ORAL ONCE
Status: COMPLETED | OUTPATIENT
Start: 2017-01-01 | End: 2017-01-01

## 2017-01-01 RX ORDER — NICOTINE 21 MG/24HR
1 PATCH, TRANSDERMAL 24 HOURS TRANSDERMAL EVERY 24 HOURS
Status: DISCONTINUED | OUTPATIENT
Start: 2017-01-01 | End: 2017-01-01 | Stop reason: HOSPADM

## 2017-01-01 RX ORDER — ACETAMINOPHEN AND CODEINE PHOSPHATE 300; 30 MG/1; MG/1
1 TABLET ORAL EVERY 6 HOURS PRN
Status: DISCONTINUED | OUTPATIENT
Start: 2017-01-01 | End: 2017-01-01

## 2017-01-01 RX ORDER — FUROSEMIDE 10 MG/ML
40 INJECTION INTRAMUSCULAR; INTRAVENOUS EVERY 8 HOURS
Status: DISCONTINUED | OUTPATIENT
Start: 2017-01-01 | End: 2017-01-01

## 2017-01-01 RX ORDER — FUROSEMIDE 10 MG/ML
80 INJECTION INTRAMUSCULAR; INTRAVENOUS ONCE
Status: DISCONTINUED | OUTPATIENT
Start: 2017-01-01 | End: 2017-01-01

## 2017-01-01 RX ORDER — CYCLOBENZAPRINE HCL 10 MG
10 TABLET ORAL EVERY 6 HOURS PRN
Status: DISCONTINUED | OUTPATIENT
Start: 2017-01-01 | End: 2017-01-01 | Stop reason: HOSPADM

## 2017-01-01 RX ORDER — OXYCODONE AND ACETAMINOPHEN 10; 325 MG/1; MG/1
1 TABLET ORAL EVERY 6 HOURS PRN
Status: DISCONTINUED | OUTPATIENT
Start: 2017-01-01 | End: 2017-01-01 | Stop reason: HOSPADM

## 2017-01-01 RX ORDER — NYSTATIN 100000 U/G
CREAM TOPICAL EVERY 12 HOURS SCHEDULED
Status: DISCONTINUED | OUTPATIENT
Start: 2017-01-01 | End: 2017-01-01

## 2017-01-01 RX ORDER — PANTOPRAZOLE SODIUM 40 MG/1
40 TABLET, DELAYED RELEASE ORAL
Status: DISCONTINUED | OUTPATIENT
Start: 2017-01-01 | End: 2017-01-01 | Stop reason: HOSPADM

## 2017-01-01 RX ORDER — POTASSIUM CHLORIDE 20 MEQ/1
40 TABLET, EXTENDED RELEASE ORAL
Status: DISPENSED | OUTPATIENT
Start: 2017-01-01 | End: 2017-01-01

## 2017-01-01 RX ORDER — LISINOPRIL 20 MG/1
20 TABLET ORAL EVERY 12 HOURS SCHEDULED
Status: DISCONTINUED | OUTPATIENT
Start: 2017-01-01 | End: 2017-01-01

## 2017-01-01 RX ORDER — FENTANYL CITRATE 50 UG/ML
25 INJECTION, SOLUTION INTRAMUSCULAR; INTRAVENOUS
Status: DISCONTINUED | OUTPATIENT
Start: 2017-01-01 | End: 2017-01-01

## 2017-01-01 RX ORDER — HYDROCODONE BITARTRATE AND ACETAMINOPHEN 5; 325 MG/1; MG/1
1 TABLET ORAL EVERY 6 HOURS PRN
Status: DISPENSED | OUTPATIENT
Start: 2017-01-01 | End: 2017-01-01

## 2017-01-01 RX ORDER — FUROSEMIDE 10 MG/ML
40 INJECTION INTRAMUSCULAR; INTRAVENOUS 2 TIMES DAILY
Status: DISCONTINUED | OUTPATIENT
Start: 2017-01-01 | End: 2017-01-01 | Stop reason: SDUPTHER

## 2017-01-01 RX ORDER — AZITHROMYCIN 500 MG/1
500 TABLET, FILM COATED ORAL DAILY
Qty: 2 TABLET | Refills: 0 | Status: SHIPPED | OUTPATIENT
Start: 2017-01-01 | End: 2017-01-01

## 2017-01-01 RX ORDER — LABETALOL HYDROCHLORIDE 5 MG/ML
20 INJECTION, SOLUTION INTRAVENOUS 2 TIMES DAILY
Status: DISCONTINUED | OUTPATIENT
Start: 2017-01-01 | End: 2017-01-01

## 2017-01-01 RX ORDER — LORAZEPAM 2 MG/ML
1 INJECTION INTRAMUSCULAR
Status: DISCONTINUED | OUTPATIENT
Start: 2017-01-01 | End: 2017-01-01 | Stop reason: HOSPADM

## 2017-01-01 RX ORDER — SODIUM CHLORIDE 9 MG/ML
INJECTION, SOLUTION INTRAVENOUS
Status: DISCONTINUED
Start: 2017-01-01 | End: 2017-01-01 | Stop reason: HOSPADM

## 2017-01-01 RX ORDER — ALBUTEROL SULFATE 2.5 MG/3ML
2.5 SOLUTION RESPIRATORY (INHALATION) EVERY 4 HOURS PRN
COMMUNITY

## 2017-01-01 RX ORDER — CLONIDINE HYDROCHLORIDE 0.2 MG/1
0.2 TABLET ORAL EVERY 8 HOURS SCHEDULED
Qty: 30 TABLET | Refills: 2 | Status: CANCELLED | OUTPATIENT
Start: 2017-01-01 | End: 2017-06-20

## 2017-01-01 RX ORDER — POTASSIUM CHLORIDE 20 MEQ/1
40 TABLET, EXTENDED RELEASE ORAL ONCE
Status: COMPLETED | OUTPATIENT
Start: 2017-01-01 | End: 2017-01-01

## 2017-01-01 RX ORDER — PROPOFOL 10 MG/ML
VIAL (ML) INTRAVENOUS AS NEEDED
Status: DISCONTINUED | OUTPATIENT
Start: 2017-01-01 | End: 2017-01-01 | Stop reason: SURG

## 2017-01-01 RX ORDER — DOXYCYCLINE HYCLATE 100 MG/1
100 TABLET, DELAYED RELEASE ORAL 2 TIMES DAILY
Qty: 42 TABLET | Refills: 0 | Status: SHIPPED | OUTPATIENT
Start: 2017-01-01 | End: 2017-01-01

## 2017-01-01 RX ORDER — ETOMIDATE 2 MG/ML
INJECTION INTRAVENOUS
Status: COMPLETED | OUTPATIENT
Start: 2017-01-01 | End: 2017-01-01

## 2017-01-01 RX ORDER — SULFAMETHOXAZOLE AND TRIMETHOPRIM 800; 160 MG/1; MG/1
1 TABLET ORAL 2 TIMES DAILY
Qty: 28 TABLET | Refills: 0 | Status: SHIPPED | OUTPATIENT
Start: 2017-01-01 | End: 2017-01-01 | Stop reason: HOSPADM

## 2017-01-01 RX ORDER — MORPHINE SULFATE 2 MG/ML
2 INJECTION, SOLUTION INTRAMUSCULAR; INTRAVENOUS ONCE
Status: COMPLETED | OUTPATIENT
Start: 2017-01-01 | End: 2017-01-01

## 2017-01-01 RX ORDER — FUROSEMIDE 10 MG/ML
40 INJECTION INTRAMUSCULAR; INTRAVENOUS DAILY
Status: DISCONTINUED | OUTPATIENT
Start: 2017-01-01 | End: 2017-01-01

## 2017-01-01 RX ORDER — SODIUM CHLORIDE 9 MG/ML
75 INJECTION, SOLUTION INTRAVENOUS CONTINUOUS
Status: DISCONTINUED | OUTPATIENT
Start: 2017-01-01 | End: 2017-01-01

## 2017-01-01 RX ORDER — SODIUM CHLORIDE 9 MG/ML
125 INJECTION, SOLUTION INTRAVENOUS CONTINUOUS
Status: DISCONTINUED | OUTPATIENT
Start: 2017-01-01 | End: 2017-01-01

## 2017-01-01 RX ORDER — ATORVASTATIN CALCIUM 80 MG/1
80 TABLET, FILM COATED ORAL NIGHTLY
Qty: 30 TABLET | Refills: 3 | Status: SHIPPED | OUTPATIENT
Start: 2017-01-01

## 2017-01-01 RX ORDER — ASPIRIN 81 MG/1
324 TABLET, CHEWABLE ORAL ONCE
Status: DISCONTINUED | OUTPATIENT
Start: 2017-01-01 | End: 2017-01-01 | Stop reason: SDUPTHER

## 2017-01-01 RX ORDER — LISINOPRIL 20 MG/1
20 TABLET ORAL DAILY
Qty: 30 TABLET | Refills: 2 | Status: SHIPPED | OUTPATIENT
Start: 2017-01-01 | End: 2017-01-01 | Stop reason: SDUPTHER

## 2017-01-01 RX ORDER — SODIUM CHLORIDE 450 MG/100ML
75 INJECTION, SOLUTION INTRAVENOUS CONTINUOUS
Status: DISCONTINUED | OUTPATIENT
Start: 2017-01-01 | End: 2017-01-01

## 2017-01-01 RX ORDER — DEXTROSE AND SODIUM CHLORIDE 5; .45 G/100ML; G/100ML
125 INJECTION, SOLUTION INTRAVENOUS CONTINUOUS
Status: DISCONTINUED | OUTPATIENT
Start: 2017-01-01 | End: 2017-01-01

## 2017-01-01 RX ORDER — LORAZEPAM 2 MG/ML
1 INJECTION INTRAMUSCULAR
Status: DISCONTINUED | OUTPATIENT
Start: 2017-01-01 | End: 2017-01-01

## 2017-01-01 RX ORDER — HYDRALAZINE HYDROCHLORIDE 20 MG/ML
10 INJECTION INTRAMUSCULAR; INTRAVENOUS ONCE
Status: DISCONTINUED | OUTPATIENT
Start: 2017-01-01 | End: 2017-01-01 | Stop reason: HOSPADM

## 2017-01-01 RX ORDER — MIRTAZAPINE 15 MG/1
15 TABLET, FILM COATED ORAL DAILY
Status: DISCONTINUED | OUTPATIENT
Start: 2017-01-01 | End: 2017-01-01 | Stop reason: HOSPADM

## 2017-01-01 RX ORDER — ONDANSETRON 2 MG/ML
4 INJECTION INTRAMUSCULAR; INTRAVENOUS ONCE AS NEEDED
Status: DISCONTINUED | OUTPATIENT
Start: 2017-01-01 | End: 2017-01-01 | Stop reason: HOSPADM

## 2017-01-01 RX ORDER — ACETAMINOPHEN 325 MG/1
650 TABLET ORAL EVERY 4 HOURS
Status: DISCONTINUED | OUTPATIENT
Start: 2017-01-01 | End: 2017-01-01 | Stop reason: HOSPADM

## 2017-01-01 RX ORDER — ATROPINE SULFATE 10 MG/ML
2 SOLUTION/ DROPS OPHTHALMIC 3 TIMES DAILY
Status: DISCONTINUED | OUTPATIENT
Start: 2017-01-01 | End: 2017-01-01 | Stop reason: HOSPADM

## 2017-01-01 RX ORDER — POLYETHYLENE GLYCOL 3350, SODIUM CHLORIDE, POTASSIUM CHLORIDE, SODIUM BICARBONATE, AND SODIUM SULFATE 240; 5.84; 2.98; 6.72; 22.72 G/4L; G/4L; G/4L; G/4L; G/4L
2000 POWDER, FOR SOLUTION ORAL DAILY
Status: DISCONTINUED | OUTPATIENT
Start: 2017-01-01 | End: 2017-01-01

## 2017-01-01 RX ORDER — ERYTHROMYCIN 5 MG/G
OINTMENT OPHTHALMIC EVERY 12 HOURS
Status: DISCONTINUED | OUTPATIENT
Start: 2017-01-01 | End: 2017-01-01

## 2017-01-01 RX ORDER — FUROSEMIDE 10 MG/ML
20 INJECTION INTRAMUSCULAR; INTRAVENOUS ONCE
Status: DISCONTINUED | OUTPATIENT
Start: 2017-01-01 | End: 2017-01-01

## 2017-01-01 RX ORDER — 0.9 % SODIUM CHLORIDE 0.9 %
10 VIAL (ML) INJECTION EVERY 12 HOURS SCHEDULED
Status: DISCONTINUED | OUTPATIENT
Start: 2017-01-01 | End: 2017-01-01 | Stop reason: HOSPADM

## 2017-01-01 RX ORDER — HYDRALAZINE HYDROCHLORIDE 20 MG/ML
20 INJECTION INTRAMUSCULAR; INTRAVENOUS EVERY 6 HOURS PRN
Status: DISCONTINUED | OUTPATIENT
Start: 2017-01-01 | End: 2017-01-01 | Stop reason: DRUGHIGH

## 2017-01-01 RX ORDER — CYCLOBENZAPRINE HCL 10 MG
10 TABLET ORAL EVERY 6 HOURS PRN
COMMUNITY
End: 2017-01-01 | Stop reason: HOSPADM

## 2017-01-01 RX ORDER — HYDRALAZINE HYDROCHLORIDE 100 MG/1
100 TABLET, FILM COATED ORAL EVERY 8 HOURS SCHEDULED
Qty: 90 TABLET | Refills: 2 | Status: SHIPPED | OUTPATIENT
Start: 2017-01-01

## 2017-01-01 RX ORDER — SPIRONOLACTONE 25 MG/1
25 TABLET ORAL DAILY
Status: DISCONTINUED | OUTPATIENT
Start: 2017-01-01 | End: 2017-01-01 | Stop reason: HOSPADM

## 2017-01-01 RX ORDER — ENALAPRILAT 2.5 MG/2ML
0.62 INJECTION INTRAVENOUS EVERY 6 HOURS PRN
Status: DISCONTINUED | OUTPATIENT
Start: 2017-01-01 | End: 2017-01-01 | Stop reason: HOSPADM

## 2017-01-01 RX ORDER — HYDROCODONE BITARTRATE AND ACETAMINOPHEN 5; 325 MG/1; MG/1
1 TABLET ORAL EVERY 8 HOURS PRN
Qty: 20 TABLET | Refills: 0 | Status: SHIPPED | OUTPATIENT
Start: 2017-01-01 | End: 2017-01-01 | Stop reason: HOSPADM

## 2017-01-01 RX ORDER — MAGNESIUM CARB/ALUMINUM HYDROX 105-160MG
296 TABLET,CHEWABLE ORAL ONCE
Status: COMPLETED | OUTPATIENT
Start: 2017-01-01 | End: 2017-01-01

## 2017-01-01 RX ORDER — SCOLOPAMINE TRANSDERMAL SYSTEM 1 MG/1
1 PATCH, EXTENDED RELEASE TRANSDERMAL
Status: DISCONTINUED | OUTPATIENT
Start: 2017-01-01 | End: 2017-01-01 | Stop reason: HOSPADM

## 2017-01-01 RX ORDER — LISINOPRIL 20 MG/1
20 TABLET ORAL DAILY
Status: DISCONTINUED | OUTPATIENT
Start: 2017-01-01 | End: 2017-01-01 | Stop reason: HOSPADM

## 2017-01-01 RX ORDER — FUROSEMIDE 20 MG/1
20 TABLET ORAL DAILY PRN
Status: DISCONTINUED | OUTPATIENT
Start: 2017-01-01 | End: 2017-01-01 | Stop reason: HOSPADM

## 2017-01-01 RX ORDER — ALBUTEROL SULFATE 2.5 MG/3ML
2.5 SOLUTION RESPIRATORY (INHALATION) EVERY 4 HOURS PRN
Status: DISCONTINUED | OUTPATIENT
Start: 2017-01-01 | End: 2017-01-01

## 2017-01-01 RX ORDER — LEVOFLOXACIN 5 MG/ML
750 INJECTION, SOLUTION INTRAVENOUS
Status: DISCONTINUED | OUTPATIENT
Start: 2017-01-01 | End: 2017-01-01

## 2017-01-01 RX ORDER — HYDRALAZINE HYDROCHLORIDE 50 MG/1
50 TABLET, FILM COATED ORAL EVERY 12 HOURS SCHEDULED
Status: DISCONTINUED | OUTPATIENT
Start: 2017-01-01 | End: 2017-01-01 | Stop reason: DRUGHIGH

## 2017-01-01 RX ORDER — MORPHINE SULFATE 2 MG/ML
1 INJECTION, SOLUTION INTRAMUSCULAR; INTRAVENOUS EVERY 4 HOURS PRN
Status: DISCONTINUED | OUTPATIENT
Start: 2017-01-01 | End: 2017-01-01 | Stop reason: SDUPTHER

## 2017-01-01 RX ORDER — LINEZOLID 100 MG/5ML
600 SUSPENSION ORAL EVERY 12 HOURS SCHEDULED
Status: DISCONTINUED | OUTPATIENT
Start: 2017-01-01 | End: 2017-01-01

## 2017-01-01 RX ORDER — MIDAZOLAM HYDROCHLORIDE 1 MG/ML
2 INJECTION INTRAMUSCULAR; INTRAVENOUS ONCE
Status: COMPLETED | OUTPATIENT
Start: 2017-01-01 | End: 2017-01-01

## 2017-01-01 RX ORDER — SODIUM CHLORIDE 9 MG/ML
100 INJECTION, SOLUTION INTRAVENOUS CONTINUOUS
Status: DISCONTINUED | OUTPATIENT
Start: 2017-01-01 | End: 2017-01-01 | Stop reason: HOSPADM

## 2017-01-01 RX ORDER — HYDROCHLOROTHIAZIDE 25 MG/1
25 TABLET ORAL DAILY
Status: DISCONTINUED | OUTPATIENT
Start: 2017-01-01 | End: 2017-01-01 | Stop reason: HOSPADM

## 2017-01-01 RX ORDER — PREDNISONE 20 MG/1
40 TABLET ORAL
Status: DISCONTINUED | OUTPATIENT
Start: 2017-01-01 | End: 2017-01-01

## 2017-01-01 RX ORDER — ASPIRIN 81 MG/1
81 TABLET ORAL DAILY
Status: DISCONTINUED | OUTPATIENT
Start: 2017-01-01 | End: 2017-01-01 | Stop reason: HOSPADM

## 2017-01-01 RX ORDER — LABETALOL 200 MG/1
200 TABLET, FILM COATED ORAL 2 TIMES DAILY
Status: DISPENSED | OUTPATIENT
Start: 2017-01-01 | End: 2017-01-01

## 2017-01-01 RX ORDER — FUROSEMIDE 10 MG/ML
INJECTION INTRAMUSCULAR; INTRAVENOUS
Status: COMPLETED | OUTPATIENT
Start: 2017-01-01 | End: 2017-01-01

## 2017-01-01 RX ORDER — DEXTROSE AND SODIUM CHLORIDE 5; .45 G/100ML; G/100ML
100 INJECTION, SOLUTION INTRAVENOUS CONTINUOUS
Status: DISCONTINUED | OUTPATIENT
Start: 2017-01-01 | End: 2017-01-01

## 2017-01-01 RX ORDER — FOLIC ACID 1 MG/1
1 TABLET ORAL DAILY
Status: DISCONTINUED | OUTPATIENT
Start: 2017-01-01 | End: 2017-01-01

## 2017-01-01 RX ORDER — PANTOPRAZOLE SODIUM 40 MG/1
40 TABLET, DELAYED RELEASE ORAL DAILY
COMMUNITY

## 2017-01-01 RX ORDER — POTASSIUM CHLORIDE 20MEQ/15ML
20 LIQUID (ML) ORAL ONCE
Status: COMPLETED | OUTPATIENT
Start: 2017-01-01 | End: 2017-01-01

## 2017-01-01 RX ORDER — SODIUM POLYSTYRENE SULFONATE 15 G/60ML
15 SUSPENSION ORAL; RECTAL ONCE
Status: DISCONTINUED | OUTPATIENT
Start: 2017-01-01 | End: 2017-01-01 | Stop reason: HOSPADM

## 2017-01-01 RX ORDER — CEFTRIAXONE 1 G/1
1 INJECTION, POWDER, FOR SOLUTION INTRAMUSCULAR; INTRAVENOUS DAILY
Qty: 7 G | Refills: 0 | Status: SHIPPED | OUTPATIENT
Start: 2017-01-01 | End: 2017-01-01

## 2017-01-01 RX ORDER — SODIUM CHLORIDE 9 MG/ML
50 INJECTION, SOLUTION INTRAVENOUS CONTINUOUS
Status: DISCONTINUED | OUTPATIENT
Start: 2017-01-01 | End: 2017-01-01

## 2017-01-01 RX ORDER — POTASSIUM CHLORIDE 750 MG/1
20 CAPSULE, EXTENDED RELEASE ORAL DAILY
Status: DISCONTINUED | OUTPATIENT
Start: 2017-01-01 | End: 2017-01-01

## 2017-01-01 RX ORDER — HYDROCODONE BITARTRATE AND ACETAMINOPHEN 5; 325 MG/1; MG/1
1 TABLET ORAL EVERY 4 HOURS PRN
Status: DISCONTINUED | OUTPATIENT
Start: 2017-01-01 | End: 2017-01-01

## 2017-01-01 RX ORDER — IPRATROPIUM BROMIDE AND ALBUTEROL SULFATE 2.5; .5 MG/3ML; MG/3ML
3 SOLUTION RESPIRATORY (INHALATION) EVERY 4 HOURS PRN
Status: DISCONTINUED | OUTPATIENT
Start: 2017-01-01 | End: 2017-01-01

## 2017-01-01 RX ORDER — ATROPINE SULFATE 10 MG/ML
1 SOLUTION/ DROPS OPHTHALMIC 3 TIMES DAILY
Status: DISCONTINUED | OUTPATIENT
Start: 2017-01-01 | End: 2017-01-01

## 2017-01-01 RX ORDER — LABETALOL 200 MG/1
200 TABLET, FILM COATED ORAL 2 TIMES DAILY
COMMUNITY

## 2017-01-01 RX ORDER — HYDRALAZINE HYDROCHLORIDE 20 MG/ML
10 INJECTION INTRAMUSCULAR; INTRAVENOUS EVERY 4 HOURS PRN
Status: DISCONTINUED | OUTPATIENT
Start: 2017-01-01 | End: 2017-01-01 | Stop reason: HOSPADM

## 2017-01-01 RX ORDER — LISINOPRIL 20 MG/1
20 TABLET ORAL
Status: DISCONTINUED | OUTPATIENT
Start: 2017-01-01 | End: 2017-01-01 | Stop reason: HOSPADM

## 2017-01-01 RX ORDER — HEPARIN SODIUM 5000 [USP'U]/ML
80 INJECTION, SOLUTION INTRAVENOUS; SUBCUTANEOUS ONCE
Status: COMPLETED | OUTPATIENT
Start: 2017-01-01 | End: 2017-01-01

## 2017-01-01 RX ORDER — FLUMAZENIL 0.1 MG/ML
0.2 INJECTION INTRAVENOUS AS NEEDED
Status: DISCONTINUED | OUTPATIENT
Start: 2017-01-01 | End: 2017-01-01 | Stop reason: HOSPADM

## 2017-01-01 RX ORDER — MORPHINE SULFATE 2 MG/ML
1 INJECTION, SOLUTION INTRAMUSCULAR; INTRAVENOUS EVERY 6 HOURS PRN
Status: DISCONTINUED | OUTPATIENT
Start: 2017-01-01 | End: 2017-01-01

## 2017-01-01 RX ORDER — HYDRALAZINE HYDROCHLORIDE 50 MG/1
100 TABLET, FILM COATED ORAL 3 TIMES DAILY
COMMUNITY
End: 2017-01-01 | Stop reason: HOSPADM

## 2017-01-01 RX ORDER — FUROSEMIDE 10 MG/ML
40 INJECTION INTRAMUSCULAR; INTRAVENOUS EVERY 12 HOURS
Status: DISCONTINUED | OUTPATIENT
Start: 2017-01-01 | End: 2017-01-01

## 2017-01-01 RX ORDER — ATORVASTATIN CALCIUM 40 MG/1
80 TABLET, FILM COATED ORAL NIGHTLY
Status: DISCONTINUED | OUTPATIENT
Start: 2017-01-01 | End: 2017-01-01

## 2017-01-01 RX ORDER — CEFUROXIME AXETIL 500 MG/1
500 TABLET ORAL 2 TIMES DAILY
Qty: 6 TABLET | Refills: 0 | Status: SHIPPED | OUTPATIENT
Start: 2017-01-01 | End: 2017-01-01 | Stop reason: HOSPADM

## 2017-01-01 RX ORDER — POLYETHYLENE GLYCOL 3350, SODIUM CHLORIDE, POTASSIUM CHLORIDE, SODIUM BICARBONATE, AND SODIUM SULFATE 240; 5.84; 2.98; 6.72; 22.72 G/4L; G/4L; G/4L; G/4L; G/4L
2000 POWDER, FOR SOLUTION ORAL ONCE
Status: COMPLETED | OUTPATIENT
Start: 2017-01-01 | End: 2017-01-01

## 2017-01-01 RX ORDER — NITROGLYCERIN 0.4 MG/1
0.4 TABLET SUBLINGUAL
Status: DISCONTINUED | OUTPATIENT
Start: 2017-01-01 | End: 2017-01-01

## 2017-01-01 RX ORDER — CEFTAZIDIME 1 G/1
1 INJECTION, POWDER, FOR SOLUTION INTRAMUSCULAR; INTRAVENOUS EVERY 8 HOURS
Status: DISCONTINUED | OUTPATIENT
Start: 2017-01-01 | End: 2017-01-01

## 2017-01-01 RX ORDER — ALUMINA, MAGNESIA, AND SIMETHICONE 2400; 2400; 240 MG/30ML; MG/30ML; MG/30ML
15 SUSPENSION ORAL EVERY 6 HOURS
Status: DISCONTINUED | OUTPATIENT
Start: 2017-01-01 | End: 2017-01-01

## 2017-01-01 RX ORDER — NICOTINE 21 MG/24HR
1 PATCH, TRANSDERMAL 24 HOURS TRANSDERMAL EVERY 24 HOURS
Qty: 30 PATCH | Refills: 1 | Status: SHIPPED | OUTPATIENT
Start: 2017-01-01

## 2017-01-01 RX ORDER — BUMETANIDE 0.5 MG/1
0.5 TABLET ORAL 2 TIMES DAILY
COMMUNITY
Start: 2017-01-01 | End: 2017-01-01 | Stop reason: HOSPADM

## 2017-01-01 RX ORDER — HEPARIN SODIUM 5000 [USP'U]/ML
80 INJECTION, SOLUTION INTRAVENOUS; SUBCUTANEOUS AS NEEDED
Status: DISCONTINUED | OUTPATIENT
Start: 2017-01-01 | End: 2017-01-01

## 2017-01-01 RX ORDER — POTASSIUM CHLORIDE 20 MEQ/1
20 TABLET, EXTENDED RELEASE ORAL 2 TIMES DAILY WITH MEALS
Status: DISCONTINUED | OUTPATIENT
Start: 2017-01-01 | End: 2017-01-01

## 2017-01-01 RX ORDER — METHYLPREDNISOLONE SODIUM SUCCINATE 125 MG/2ML
80 INJECTION, POWDER, LYOPHILIZED, FOR SOLUTION INTRAMUSCULAR; INTRAVENOUS EVERY 6 HOURS
Status: DISCONTINUED | OUTPATIENT
Start: 2017-01-01 | End: 2017-01-01

## 2017-01-01 RX ORDER — PANTOPRAZOLE SODIUM 40 MG/10ML
40 INJECTION, POWDER, LYOPHILIZED, FOR SOLUTION INTRAVENOUS
Status: DISCONTINUED | OUTPATIENT
Start: 2017-01-01 | End: 2017-01-01

## 2017-01-01 RX ORDER — ASPIRIN 81 MG/1
324 TABLET, CHEWABLE ORAL ONCE
Status: COMPLETED | OUTPATIENT
Start: 2017-01-01 | End: 2017-01-01

## 2017-01-01 RX ORDER — HEPARIN SODIUM 5000 [USP'U]/ML
40 INJECTION, SOLUTION INTRAVENOUS; SUBCUTANEOUS AS NEEDED
Status: DISCONTINUED | OUTPATIENT
Start: 2017-01-01 | End: 2017-01-01

## 2017-01-01 RX ORDER — LABETALOL HYDROCHLORIDE 5 MG/ML
20 INJECTION, SOLUTION INTRAVENOUS ONCE
Status: COMPLETED | OUTPATIENT
Start: 2017-01-01 | End: 2017-01-01

## 2017-01-01 RX ORDER — MAGNESIUM HYDROXIDE/ALUMINUM HYDROXICE/SIMETHICONE 120; 1200; 1200 MG/30ML; MG/30ML; MG/30ML
15 SUSPENSION ORAL EVERY 6 HOURS
Status: DISCONTINUED | OUTPATIENT
Start: 2017-01-01 | End: 2017-01-01 | Stop reason: SDUPTHER

## 2017-01-01 RX ORDER — LISINOPRIL 40 MG/1
40 TABLET ORAL EVERY 12 HOURS SCHEDULED
Status: DISCONTINUED | OUTPATIENT
Start: 2017-01-01 | End: 2017-01-01

## 2017-01-01 RX ORDER — ONDANSETRON 2 MG/ML
4 INJECTION INTRAMUSCULAR; INTRAVENOUS EVERY 6 HOURS PRN
Status: DISCONTINUED | OUTPATIENT
Start: 2017-01-01 | End: 2017-01-01 | Stop reason: HOSPADM

## 2017-01-01 RX ORDER — HYDROCODONE BITARTRATE AND ACETAMINOPHEN 5; 325 MG/1; MG/1
1 TABLET ORAL EVERY 6 HOURS PRN
Status: DISCONTINUED | OUTPATIENT
Start: 2017-01-01 | End: 2017-01-01 | Stop reason: HOSPADM

## 2017-01-01 RX ORDER — IPRATROPIUM BROMIDE AND ALBUTEROL SULFATE 2.5; .5 MG/3ML; MG/3ML
3 SOLUTION RESPIRATORY (INHALATION) ONCE
Status: COMPLETED | OUTPATIENT
Start: 2017-01-01 | End: 2017-01-01

## 2017-01-01 RX ORDER — FLUCONAZOLE 150 MG/1
150 TABLET ORAL DAILY
Status: DISPENSED | OUTPATIENT
Start: 2017-01-01 | End: 2017-01-01

## 2017-01-01 RX ORDER — METHYLPREDNISOLONE SODIUM SUCCINATE 125 MG/2ML
60 INJECTION, POWDER, LYOPHILIZED, FOR SOLUTION INTRAMUSCULAR; INTRAVENOUS DAILY
Status: DISCONTINUED | OUTPATIENT
Start: 2017-01-01 | End: 2017-01-01 | Stop reason: HOSPADM

## 2017-01-01 RX ORDER — ACETAMINOPHEN 325 MG/1
650 TABLET ORAL EVERY 6 HOURS PRN
Status: DISCONTINUED | OUTPATIENT
Start: 2017-01-01 | End: 2017-01-01 | Stop reason: HOSPADM

## 2017-01-01 RX ORDER — HYDRALAZINE HYDROCHLORIDE 20 MG/ML
10 INJECTION INTRAMUSCULAR; INTRAVENOUS EVERY 6 HOURS PRN
Status: DISCONTINUED | OUTPATIENT
Start: 2017-01-01 | End: 2017-01-01 | Stop reason: HOSPADM

## 2017-01-01 RX ORDER — CLONIDINE HYDROCHLORIDE 0.2 MG/1
0.2 TABLET ORAL 3 TIMES DAILY
Status: DISCONTINUED | OUTPATIENT
Start: 2017-01-01 | End: 2017-01-01 | Stop reason: HOSPADM

## 2017-01-01 RX ORDER — PREDNISONE 20 MG/1
20 TABLET ORAL
Status: DISCONTINUED | OUTPATIENT
Start: 2017-01-01 | End: 2017-01-01

## 2017-01-01 RX ORDER — ALBUTEROL SULFATE 2.5 MG/3ML
2.5 SOLUTION RESPIRATORY (INHALATION)
Status: DISCONTINUED | OUTPATIENT
Start: 2017-01-01 | End: 2017-01-01 | Stop reason: HOSPADM

## 2017-01-01 RX ORDER — SODIUM CHLORIDE 9 MG/ML
100 INJECTION, SOLUTION INTRAVENOUS CONTINUOUS
Status: DISCONTINUED | OUTPATIENT
Start: 2017-01-01 | End: 2017-01-01

## 2017-01-01 RX ORDER — CLONIDINE HYDROCHLORIDE 0.1 MG/1
0.2 TABLET ORAL EVERY 12 HOURS SCHEDULED
Status: DISCONTINUED | OUTPATIENT
Start: 2017-01-01 | End: 2017-01-01

## 2017-01-01 RX ORDER — BUDESONIDE AND FORMOTEROL FUMARATE DIHYDRATE 160; 4.5 UG/1; UG/1
2 AEROSOL RESPIRATORY (INHALATION)
Status: DISCONTINUED | OUTPATIENT
Start: 2017-01-01 | End: 2017-01-01 | Stop reason: HOSPADM

## 2017-01-01 RX ORDER — IPRATROPIUM BROMIDE AND ALBUTEROL SULFATE 2.5; .5 MG/3ML; MG/3ML
3 SOLUTION RESPIRATORY (INHALATION)
Status: DISCONTINUED | OUTPATIENT
Start: 2017-01-01 | End: 2017-01-01 | Stop reason: HOSPADM

## 2017-01-01 RX ORDER — SUCCINYLCHOLINE CHLORIDE 20 MG/ML
INJECTION INTRAMUSCULAR; INTRAVENOUS AS NEEDED
Status: DISCONTINUED | OUTPATIENT
Start: 2017-01-01 | End: 2017-01-01 | Stop reason: SURG

## 2017-01-01 RX ORDER — FUROSEMIDE 10 MG/ML
20 INJECTION INTRAMUSCULAR; INTRAVENOUS ONCE
Status: CANCELLED | OUTPATIENT
Start: 2017-01-01 | End: 2017-01-01

## 2017-01-01 RX ORDER — MORPHINE SULFATE 2 MG/ML
1 INJECTION, SOLUTION INTRAMUSCULAR; INTRAVENOUS EVERY 4 HOURS PRN
Status: DISCONTINUED | OUTPATIENT
Start: 2017-01-01 | End: 2017-01-01

## 2017-01-01 RX ORDER — FENTANYL CITRATE 50 UG/ML
INJECTION, SOLUTION INTRAMUSCULAR; INTRAVENOUS AS NEEDED
Status: DISCONTINUED | OUTPATIENT
Start: 2017-01-01 | End: 2017-01-01 | Stop reason: SURG

## 2017-01-01 RX ORDER — DOXYCYCLINE 100 MG/1
CAPSULE ORAL
COMMUNITY
Start: 2017-01-01 | End: 2017-01-01

## 2017-01-01 RX ORDER — OXYCODONE AND ACETAMINOPHEN 10; 325 MG/1; MG/1
1 TABLET ORAL EVERY 6 HOURS PRN
Qty: 90 TABLET | Refills: 0 | Status: SHIPPED | OUTPATIENT
Start: 2017-01-01 | End: 2017-01-01

## 2017-01-01 RX ORDER — LABETALOL HYDROCHLORIDE 5 MG/ML
5 INJECTION, SOLUTION INTRAVENOUS
Status: DISCONTINUED | OUTPATIENT
Start: 2017-01-01 | End: 2017-01-01 | Stop reason: HOSPADM

## 2017-01-01 RX ORDER — NALOXONE HCL 0.4 MG/ML
0.4 VIAL (ML) INJECTION
Status: DISCONTINUED | OUTPATIENT
Start: 2017-01-01 | End: 2017-01-01 | Stop reason: HOSPADM

## 2017-01-01 RX ORDER — MIDAZOLAM HYDROCHLORIDE 1 MG/ML
INJECTION INTRAMUSCULAR; INTRAVENOUS AS NEEDED
Status: DISCONTINUED | OUTPATIENT
Start: 2017-01-01 | End: 2017-01-01 | Stop reason: SURG

## 2017-01-01 RX ORDER — FUROSEMIDE 40 MG/1
40 TABLET ORAL DAILY
Status: DISCONTINUED | OUTPATIENT
Start: 2017-01-01 | End: 2017-01-01

## 2017-01-01 RX ORDER — METOCLOPRAMIDE 10 MG/1
10 TABLET ORAL
Status: DISCONTINUED | OUTPATIENT
Start: 2017-01-01 | End: 2017-01-01

## 2017-01-01 RX ORDER — FUROSEMIDE 20 MG/1
20 TABLET ORAL DAILY
Status: DISCONTINUED | OUTPATIENT
Start: 2017-01-01 | End: 2017-01-01 | Stop reason: HOSPADM

## 2017-01-01 RX ORDER — DOCUSATE SODIUM 50 MG/5ML
100 LIQUID ORAL 2 TIMES DAILY
Status: DISCONTINUED | OUTPATIENT
Start: 2017-01-01 | End: 2017-01-01

## 2017-01-01 RX ORDER — FUROSEMIDE 10 MG/ML
40 INJECTION INTRAMUSCULAR; INTRAVENOUS EVERY 24 HOURS
Status: DISPENSED | OUTPATIENT
Start: 2017-01-01 | End: 2017-01-01

## 2017-01-01 RX ORDER — LEVOFLOXACIN 5 MG/ML
500 INJECTION, SOLUTION INTRAVENOUS ONCE
Status: COMPLETED | OUTPATIENT
Start: 2017-01-01 | End: 2017-01-01

## 2017-01-01 RX ORDER — DOXYCYCLINE HYCLATE 100 MG
100 TABLET ORAL EVERY 12 HOURS SCHEDULED
Status: DISCONTINUED | OUTPATIENT
Start: 2017-01-01 | End: 2017-01-01 | Stop reason: HOSPADM

## 2017-01-01 RX ORDER — TRAZODONE HYDROCHLORIDE 100 MG/1
100 TABLET ORAL NIGHTLY
COMMUNITY
End: 2017-01-01 | Stop reason: HOSPADM

## 2017-01-01 RX ORDER — METHYLPREDNISOLONE 4 MG/1
TABLET ORAL
Qty: 21 TABLET | Refills: 0 | Status: SHIPPED | OUTPATIENT
Start: 2017-01-01 | End: 2017-01-01 | Stop reason: HOSPADM

## 2017-01-01 RX ORDER — ALBUTEROL SULFATE 2.5 MG/3ML
2.5 SOLUTION RESPIRATORY (INHALATION) EVERY 4 HOURS PRN
Status: DISCONTINUED | OUTPATIENT
Start: 2017-01-01 | End: 2017-01-01 | Stop reason: HOSPADM

## 2017-01-01 RX ORDER — FUROSEMIDE 10 MG/ML
INJECTION INTRAMUSCULAR; INTRAVENOUS
Status: COMPLETED
Start: 2017-01-01 | End: 2017-01-01

## 2017-01-01 RX ORDER — POTASSIUM CHLORIDE 20MEQ/15ML
40 LIQUID (ML) ORAL ONCE
Status: COMPLETED | OUTPATIENT
Start: 2017-01-01 | End: 2017-01-01

## 2017-01-01 RX ORDER — BUDESONIDE AND FORMOTEROL FUMARATE DIHYDRATE 160; 4.5 UG/1; UG/1
2 AEROSOL RESPIRATORY (INHALATION)
Qty: 1 INHALER | Refills: 12 | Status: SHIPPED | OUTPATIENT
Start: 2017-01-01 | End: 2017-01-01

## 2017-01-01 RX ORDER — LABETALOL 100 MG/1
100 TABLET, FILM COATED ORAL EVERY 12 HOURS SCHEDULED
Status: DISCONTINUED | OUTPATIENT
Start: 2017-01-01 | End: 2017-01-01

## 2017-01-01 RX ORDER — ALBUTEROL SULFATE 90 UG/1
2 AEROSOL, METERED RESPIRATORY (INHALATION)
Status: DISCONTINUED | OUTPATIENT
Start: 2017-01-01 | End: 2017-01-01 | Stop reason: SDUPTHER

## 2017-01-01 RX ORDER — CYCLOBENZAPRINE HCL 10 MG
10 TABLET ORAL ONCE
Status: DISCONTINUED | OUTPATIENT
Start: 2017-01-01 | End: 2017-01-01

## 2017-01-01 RX ORDER — MIDAZOLAM HYDROCHLORIDE 1 MG/ML
1 INJECTION INTRAMUSCULAR; INTRAVENOUS
Status: DISCONTINUED | OUTPATIENT
Start: 2017-01-01 | End: 2017-01-01

## 2017-01-01 RX ORDER — PROMETHAZINE HYDROCHLORIDE 25 MG/ML
12.5 INJECTION, SOLUTION INTRAMUSCULAR; INTRAVENOUS ONCE
Status: COMPLETED | OUTPATIENT
Start: 2017-01-01 | End: 2017-01-01

## 2017-01-01 RX ORDER — ACETAMINOPHEN 325 MG/1
650 TABLET ORAL EVERY 4 HOURS PRN
Status: DISCONTINUED | OUTPATIENT
Start: 2017-01-01 | End: 2017-01-01

## 2017-01-01 RX ORDER — SODIUM CHLORIDE, SODIUM LACTATE, POTASSIUM CHLORIDE, CALCIUM CHLORIDE 600; 310; 30; 20 MG/100ML; MG/100ML; MG/100ML; MG/100ML
9 INJECTION, SOLUTION INTRAVENOUS CONTINUOUS
Status: DISCONTINUED | OUTPATIENT
Start: 2017-01-01 | End: 2017-01-01

## 2017-01-01 RX ORDER — LABETALOL 200 MG/1
200 TABLET, FILM COATED ORAL 2 TIMES DAILY
Qty: 60 TABLET | Refills: 3 | Status: SHIPPED | OUTPATIENT
Start: 2017-01-01 | End: 2017-01-01

## 2017-01-01 RX ORDER — ONDANSETRON 2 MG/ML
4 INJECTION INTRAMUSCULAR; INTRAVENOUS ONCE
Status: COMPLETED | OUTPATIENT
Start: 2017-01-01 | End: 2017-01-01

## 2017-01-01 RX ORDER — LEVOFLOXACIN 5 MG/ML
500 INJECTION, SOLUTION INTRAVENOUS EVERY 24 HOURS
Status: DISCONTINUED | OUTPATIENT
Start: 2017-01-01 | End: 2017-01-01

## 2017-01-01 RX ORDER — NICOTINE 21 MG/24HR
1 PATCH, TRANSDERMAL 24 HOURS TRANSDERMAL EVERY 24 HOURS
Status: DISCONTINUED | OUTPATIENT
Start: 2017-01-01 | End: 2017-01-01

## 2017-01-01 RX ORDER — FUROSEMIDE 40 MG/1
40 TABLET ORAL 2 TIMES DAILY
Status: DISCONTINUED | OUTPATIENT
Start: 2017-01-01 | End: 2017-01-01

## 2017-01-01 RX ORDER — SODIUM CHLORIDE 0.9 % (FLUSH) 0.9 %
10 SYRINGE (ML) INJECTION AS NEEDED
Status: DISCONTINUED | OUTPATIENT
Start: 2017-01-01 | End: 2017-01-01 | Stop reason: HOSPADM

## 2017-01-01 RX ORDER — BUMETANIDE 0.5 MG/1
0.5 TABLET ORAL 2 TIMES DAILY
Status: DISCONTINUED | OUTPATIENT
Start: 2017-01-01 | End: 2017-01-01 | Stop reason: HOSPADM

## 2017-01-01 RX ORDER — ALBUTEROL SULFATE 2.5 MG/3ML
2.5 SOLUTION RESPIRATORY (INHALATION) EVERY 6 HOURS PRN
Status: DISCONTINUED | OUTPATIENT
Start: 2017-01-01 | End: 2017-01-01 | Stop reason: HOSPADM

## 2017-01-01 RX ORDER — BUMETANIDE 0.25 MG/ML
1 INJECTION INTRAMUSCULAR; INTRAVENOUS 2 TIMES DAILY
Status: DISCONTINUED | OUTPATIENT
Start: 2017-01-01 | End: 2017-01-01

## 2017-01-01 RX ORDER — LINEZOLID 600 MG/1
600 TABLET, FILM COATED ORAL EVERY 12 HOURS SCHEDULED
Status: DISCONTINUED | OUTPATIENT
Start: 2017-01-01 | End: 2017-01-01

## 2017-01-01 RX ORDER — PANTOPRAZOLE SODIUM 40 MG/10ML
40 INJECTION, POWDER, LYOPHILIZED, FOR SOLUTION INTRAVENOUS ONCE
Status: COMPLETED | OUTPATIENT
Start: 2017-01-01 | End: 2017-01-01

## 2017-01-01 RX ORDER — HYDROCODONE BITARTRATE AND ACETAMINOPHEN 5; 325 MG/1; MG/1
1 TABLET ORAL EVERY 8 HOURS PRN
Qty: 20 TABLET | Refills: 0 | Status: SHIPPED | OUTPATIENT
Start: 2017-01-01 | End: 2017-01-01

## 2017-01-01 RX ORDER — HYDRALAZINE HYDROCHLORIDE 20 MG/ML
10 INJECTION INTRAMUSCULAR; INTRAVENOUS EVERY 6 HOURS PRN
Status: DISCONTINUED | OUTPATIENT
Start: 2017-01-01 | End: 2017-01-01

## 2017-01-01 RX ORDER — ACETAMINOPHEN 650 MG/1
650 SUPPOSITORY RECTAL ONCE AS NEEDED
Status: DISCONTINUED | OUTPATIENT
Start: 2017-01-01 | End: 2017-01-01 | Stop reason: HOSPADM

## 2017-01-01 RX ORDER — CHLORHEXIDINE GLUCONATE 0.12 MG/ML
15 RINSE ORAL EVERY 12 HOURS SCHEDULED
Status: DISCONTINUED | OUTPATIENT
Start: 2017-01-01 | End: 2017-01-01

## 2017-01-01 RX ORDER — HYDROCODONE BITARTRATE AND ACETAMINOPHEN 7.5; 325 MG/1; MG/1
1 TABLET ORAL EVERY 6 HOURS PRN
Status: DISPENSED | OUTPATIENT
Start: 2017-01-01 | End: 2017-01-01

## 2017-01-01 RX ORDER — CLONIDINE HYDROCHLORIDE 0.1 MG/1
0.1 TABLET ORAL ONCE AS NEEDED
Status: ACTIVE | OUTPATIENT
Start: 2017-01-01 | End: 2017-01-01

## 2017-01-01 RX ORDER — METHYLPREDNISOLONE SODIUM SUCCINATE 125 MG/2ML
80 INJECTION, POWDER, LYOPHILIZED, FOR SOLUTION INTRAMUSCULAR; INTRAVENOUS EVERY 8 HOURS
Status: DISCONTINUED | OUTPATIENT
Start: 2017-01-01 | End: 2017-01-01

## 2017-01-01 RX ORDER — MORPHINE SULFATE 4 MG/ML
4 INJECTION, SOLUTION INTRAMUSCULAR; INTRAVENOUS
Status: DISCONTINUED | OUTPATIENT
Start: 2017-01-01 | End: 2017-01-01

## 2017-01-01 RX ORDER — HYDROCODONE BITARTRATE AND ACETAMINOPHEN 5; 325 MG/1; MG/1
1 TABLET ORAL EVERY 8 HOURS PRN
Status: DISCONTINUED | OUTPATIENT
Start: 2017-01-01 | End: 2017-01-01 | Stop reason: HOSPADM

## 2017-01-01 RX ORDER — NYSTATIN 100000 U/G
CREAM TOPICAL EVERY 12 HOURS SCHEDULED
Qty: 30 G | Refills: 0 | Status: SHIPPED | OUTPATIENT
Start: 2017-01-01 | End: 2017-06-20

## 2017-01-01 RX ORDER — DIPHENHYDRAMINE HYDROCHLORIDE 50 MG/ML
12.5 INJECTION INTRAMUSCULAR; INTRAVENOUS
Status: DISCONTINUED | OUTPATIENT
Start: 2017-01-01 | End: 2017-01-01 | Stop reason: HOSPADM

## 2017-01-01 RX ORDER — BUDESONIDE AND FORMOTEROL FUMARATE DIHYDRATE 160; 4.5 UG/1; UG/1
2 AEROSOL RESPIRATORY (INHALATION)
Status: DISCONTINUED | OUTPATIENT
Start: 2017-01-01 | End: 2017-01-01

## 2017-01-01 RX ORDER — FUROSEMIDE 10 MG/ML
20 INJECTION INTRAMUSCULAR; INTRAVENOUS AS NEEDED
Status: COMPLETED | OUTPATIENT
Start: 2017-01-01 | End: 2017-01-01

## 2017-01-01 RX ORDER — OXYCODONE AND ACETAMINOPHEN 10; 325 MG/1; MG/1
1 TABLET ORAL EVERY 8 HOURS PRN
Status: DISCONTINUED | OUTPATIENT
Start: 2017-01-01 | End: 2017-01-01

## 2017-01-01 RX ORDER — METHYLPREDNISOLONE SODIUM SUCCINATE 40 MG/ML
20 INJECTION, POWDER, LYOPHILIZED, FOR SOLUTION INTRAMUSCULAR; INTRAVENOUS DAILY
Status: DISCONTINUED | OUTPATIENT
Start: 2017-01-01 | End: 2017-01-01

## 2017-01-01 RX ORDER — LORAZEPAM 2 MG/ML
1 INJECTION INTRAMUSCULAR EVERY 12 HOURS PRN
Status: DISCONTINUED | OUTPATIENT
Start: 2017-01-01 | End: 2017-01-01

## 2017-01-01 RX ORDER — MORPHINE SULFATE 2 MG/ML
2 INJECTION, SOLUTION INTRAMUSCULAR; INTRAVENOUS
Status: DISCONTINUED | OUTPATIENT
Start: 2017-01-01 | End: 2017-01-01 | Stop reason: HOSPADM

## 2017-01-01 RX ORDER — HYDRALAZINE HYDROCHLORIDE 50 MG/1
100 TABLET, FILM COATED ORAL 3 TIMES DAILY
Status: DISCONTINUED | OUTPATIENT
Start: 2017-01-01 | End: 2017-01-01 | Stop reason: HOSPADM

## 2017-01-01 RX ORDER — ALBUMIN, HUMAN INJ 5% 5 %
12.5 SOLUTION INTRAVENOUS ONCE
Status: COMPLETED | OUTPATIENT
Start: 2017-01-01 | End: 2017-01-01

## 2017-01-01 RX ORDER — SENNOSIDES 8.6 MG
650 CAPSULE ORAL EVERY 6 HOURS PRN
COMMUNITY
End: 2017-01-01 | Stop reason: HOSPADM

## 2017-01-01 RX ORDER — CLONIDINE HYDROCHLORIDE 0.1 MG/1
0.1 TABLET ORAL EVERY 8 HOURS SCHEDULED
Status: DISCONTINUED | OUTPATIENT
Start: 2017-01-01 | End: 2017-01-01 | Stop reason: DRUGHIGH

## 2017-01-01 RX ORDER — ZOLPIDEM TARTRATE 5 MG/1
5 TABLET ORAL NIGHTLY PRN
Status: DISPENSED | OUTPATIENT
Start: 2017-01-01 | End: 2017-01-01

## 2017-01-01 RX ORDER — SODIUM CHLORIDE 0.9 % (FLUSH) 0.9 %
1-10 SYRINGE (ML) INJECTION AS NEEDED
Status: DISCONTINUED | OUTPATIENT
Start: 2017-01-01 | End: 2017-01-01

## 2017-01-01 RX ORDER — TRAZODONE HYDROCHLORIDE 50 MG/1
50 TABLET ORAL NIGHTLY PRN
Status: DISCONTINUED | OUTPATIENT
Start: 2017-01-01 | End: 2017-01-01 | Stop reason: SDUPTHER

## 2017-01-01 RX ORDER — MORPHINE SULFATE 2 MG/ML
1 INJECTION, SOLUTION INTRAMUSCULAR; INTRAVENOUS EVERY 4 HOURS PRN
Status: DISCONTINUED | OUTPATIENT
Start: 2017-01-01 | End: 2017-01-01 | Stop reason: HOSPADM

## 2017-01-01 RX ORDER — SPIRONOLACTONE 25 MG/1
25 TABLET ORAL DAILY
Qty: 30 TABLET | Refills: 1 | Status: SHIPPED | OUTPATIENT
Start: 2017-01-01 | End: 2017-01-01 | Stop reason: SDUPTHER

## 2017-01-01 RX ORDER — FUROSEMIDE 10 MG/ML
40 INJECTION INTRAMUSCULAR; INTRAVENOUS ONCE
Status: DISCONTINUED | OUTPATIENT
Start: 2017-01-01 | End: 2017-01-01

## 2017-01-01 RX ORDER — VECURONIUM BROMIDE 20 MG/20ML
INJECTION, POWDER, LYOPHILIZED, FOR SOLUTION INTRAVENOUS AS NEEDED
Status: DISCONTINUED | OUTPATIENT
Start: 2017-01-01 | End: 2017-01-01 | Stop reason: SURG

## 2017-01-01 RX ORDER — PANTOPRAZOLE SODIUM 40 MG/1
40 TABLET, DELAYED RELEASE ORAL DAILY
Status: DISCONTINUED | OUTPATIENT
Start: 2017-01-01 | End: 2017-01-01 | Stop reason: HOSPADM

## 2017-01-01 RX ORDER — FUROSEMIDE 40 MG/1
40 TABLET ORAL 2 TIMES DAILY
Qty: 60 TABLET | Refills: 2 | Status: SHIPPED | OUTPATIENT
Start: 2017-01-01 | End: 2017-06-20

## 2017-01-01 RX ORDER — SPIRONOLACTONE 25 MG/1
25 TABLET ORAL DAILY
Qty: 30 TABLET | Refills: 1 | Status: SHIPPED | OUTPATIENT
Start: 2017-01-01 | End: 2017-01-01 | Stop reason: HOSPADM

## 2017-01-01 RX ORDER — ALUMINA, MAGNESIA, AND SIMETHICONE 2400; 2400; 240 MG/30ML; MG/30ML; MG/30ML
15 SUSPENSION ORAL EVERY 6 HOURS PRN
Status: DISCONTINUED | OUTPATIENT
Start: 2017-01-01 | End: 2017-01-01 | Stop reason: HOSPADM

## 2017-01-01 RX ORDER — SUCCINYLCHOLINE CHLORIDE 20 MG/ML
INJECTION INTRAMUSCULAR; INTRAVENOUS
Status: COMPLETED | OUTPATIENT
Start: 2017-01-01 | End: 2017-01-01

## 2017-01-01 RX ORDER — POTASSIUM CHLORIDE 750 MG/1
20 CAPSULE, EXTENDED RELEASE ORAL DAILY
Status: DISCONTINUED | OUTPATIENT
Start: 2017-01-01 | End: 2017-01-01 | Stop reason: HOSPADM

## 2017-01-01 RX ORDER — HYDRALAZINE HYDROCHLORIDE 20 MG/ML
10 INJECTION INTRAMUSCULAR; INTRAVENOUS EVERY 6 HOURS PRN
Status: DISPENSED | OUTPATIENT
Start: 2017-01-01 | End: 2017-01-01

## 2017-01-01 RX ORDER — CLONIDINE HYDROCHLORIDE 0.1 MG/1
0.1 TABLET ORAL 3 TIMES DAILY
Status: DISCONTINUED | OUTPATIENT
Start: 2017-01-01 | End: 2017-01-01

## 2017-01-01 RX ORDER — NALOXONE HCL 0.4 MG/ML
0.2 VIAL (ML) INJECTION AS NEEDED
Status: DISCONTINUED | OUTPATIENT
Start: 2017-01-01 | End: 2017-01-01 | Stop reason: HOSPADM

## 2017-01-01 RX ORDER — METHYLPREDNISOLONE SODIUM SUCCINATE 125 MG/2ML
125 INJECTION, POWDER, LYOPHILIZED, FOR SOLUTION INTRAMUSCULAR; INTRAVENOUS ONCE
Status: COMPLETED | OUTPATIENT
Start: 2017-01-01 | End: 2017-01-01

## 2017-01-01 RX ORDER — ALBUMIN, HUMAN INJ 5% 5 %
SOLUTION INTRAVENOUS CONTINUOUS PRN
Status: DISCONTINUED | OUTPATIENT
Start: 2017-01-01 | End: 2017-01-01 | Stop reason: SURG

## 2017-01-01 RX ORDER — HYDRALAZINE HYDROCHLORIDE 20 MG/ML
20 INJECTION INTRAMUSCULAR; INTRAVENOUS ONCE
Status: DISCONTINUED | OUTPATIENT
Start: 2017-01-01 | End: 2017-01-01

## 2017-01-01 RX ORDER — NYSTATIN 100000 [USP'U]/G
1 POWDER TOPICAL EVERY 12 HOURS SCHEDULED
Status: DISCONTINUED | OUTPATIENT
Start: 2017-01-01 | End: 2017-01-01 | Stop reason: HOSPADM

## 2017-01-01 RX ORDER — LABETALOL 100 MG/1
100 TABLET, FILM COATED ORAL 2 TIMES DAILY
Status: DISCONTINUED | OUTPATIENT
Start: 2017-01-01 | End: 2017-01-01 | Stop reason: HOSPADM

## 2017-01-01 RX ORDER — LABETALOL 200 MG/1
200 TABLET, FILM COATED ORAL EVERY 12 HOURS SCHEDULED
Status: DISCONTINUED | OUTPATIENT
Start: 2017-01-01 | End: 2017-01-01 | Stop reason: HOSPADM

## 2017-01-01 RX ORDER — PANTOPRAZOLE SODIUM 40 MG/1
40 TABLET, DELAYED RELEASE ORAL
Status: DISCONTINUED | OUTPATIENT
Start: 2017-01-01 | End: 2017-01-01

## 2017-01-01 RX ORDER — FUROSEMIDE 10 MG/ML
20 INJECTION INTRAMUSCULAR; INTRAVENOUS ONCE
Status: DISCONTINUED | OUTPATIENT
Start: 2017-01-01 | End: 2017-01-01 | Stop reason: ALTCHOICE

## 2017-01-01 RX ORDER — METHYLPREDNISOLONE SODIUM SUCCINATE 40 MG/ML
40 INJECTION, POWDER, LYOPHILIZED, FOR SOLUTION INTRAMUSCULAR; INTRAVENOUS EVERY 8 HOURS
Status: DISCONTINUED | OUTPATIENT
Start: 2017-01-01 | End: 2017-01-01

## 2017-01-01 RX ORDER — PSEUDOEPHEDRINE HCL 30 MG
100 TABLET ORAL 2 TIMES DAILY
Qty: 60 CAPSULE | Refills: 0 | Status: SHIPPED | OUTPATIENT
Start: 2017-01-01 | End: 2017-01-01 | Stop reason: HOSPADM

## 2017-01-01 RX ORDER — TRAZODONE HYDROCHLORIDE 100 MG/1
100 TABLET ORAL NIGHTLY
Status: DISCONTINUED | OUTPATIENT
Start: 2017-01-01 | End: 2017-01-01 | Stop reason: HOSPADM

## 2017-01-01 RX ADMIN — METOCLOPRAMIDE HYDROCHLORIDE 5 MG: 5 TABLET ORAL at 17:39

## 2017-01-01 RX ADMIN — FUROSEMIDE 40 MG: 10 INJECTION, SOLUTION INTRAMUSCULAR; INTRAVENOUS at 21:30

## 2017-01-01 RX ADMIN — METHYLPREDNISOLONE SODIUM SUCCINATE 80 MG: 125 INJECTION, POWDER, FOR SOLUTION INTRAMUSCULAR; INTRAVENOUS at 14:09

## 2017-01-01 RX ADMIN — LABETALOL HYDROCHLORIDE 200 MG: 200 TABLET, FILM COATED ORAL at 09:17

## 2017-01-01 RX ADMIN — POTASSIUM CHLORIDE, DEXTROSE MONOHYDRATE AND SODIUM CHLORIDE 125 ML/HR: 150; 5; 450 INJECTION, SOLUTION INTRAVENOUS at 18:46

## 2017-01-01 RX ADMIN — VANCOMYCIN HYDROCHLORIDE 1500 MG: 5 INJECTION, POWDER, LYOPHILIZED, FOR SOLUTION INTRAVENOUS at 14:09

## 2017-01-01 RX ADMIN — DEXTROSE AND SODIUM CHLORIDE 125 ML/HR: 5; 450 INJECTION, SOLUTION INTRAVENOUS at 16:17

## 2017-01-01 RX ADMIN — POTASSIUM CHLORIDE, DEXTROSE MONOHYDRATE AND SODIUM CHLORIDE 125 ML/HR: 150; 5; 450 INJECTION, SOLUTION INTRAVENOUS at 16:11

## 2017-01-01 RX ADMIN — FUROSEMIDE 40 MG: 10 INJECTION, SOLUTION INTRAMUSCULAR; INTRAVENOUS at 17:21

## 2017-01-01 RX ADMIN — MORPHINE SULFATE 4 MG: 4 INJECTION, SOLUTION INTRAMUSCULAR; INTRAVENOUS at 14:14

## 2017-01-01 RX ADMIN — ATROPINE SULFATE 2 DROP: 10 SOLUTION/ DROPS OPHTHALMIC at 08:47

## 2017-01-01 RX ADMIN — PROPOFOL 40 MCG/KG/MIN: 10 INJECTION, EMULSION INTRAVENOUS at 12:09

## 2017-01-01 RX ADMIN — HYDRALAZINE HYDROCHLORIDE 50 MG: 50 TABLET ORAL at 08:10

## 2017-01-01 RX ADMIN — METRONIDAZOLE 500 MG: 500 INJECTION, SOLUTION INTRAVENOUS at 14:14

## 2017-01-01 RX ADMIN — SODIUM CHLORIDE 30 ML/HR: 900 INJECTION, SOLUTION INTRAVENOUS at 13:34

## 2017-01-01 RX ADMIN — ATORVASTATIN CALCIUM 80 MG: 40 TABLET, FILM COATED ORAL at 21:31

## 2017-01-01 RX ADMIN — ALBUTEROL SULFATE 2.5 MG: 2.5 SOLUTION RESPIRATORY (INHALATION) at 14:08

## 2017-01-01 RX ADMIN — HYDRALAZINE HYDROCHLORIDE 20 MG: 20 INJECTION INTRAMUSCULAR; INTRAVENOUS at 17:49

## 2017-01-01 RX ADMIN — PROPOFOL 35 MCG/KG/MIN: 10 INJECTION, EMULSION INTRAVENOUS at 06:22

## 2017-01-01 RX ADMIN — SODIUM CHLORIDE 75 ML/HR: 900 INJECTION, SOLUTION INTRAVENOUS at 06:31

## 2017-01-01 RX ADMIN — MORPHINE SULFATE 1 MG: 2 INJECTION, SOLUTION INTRAMUSCULAR; INTRAVENOUS at 08:04

## 2017-01-01 RX ADMIN — ATORVASTATIN CALCIUM 80 MG: 40 TABLET, FILM COATED ORAL at 21:29

## 2017-01-01 RX ADMIN — ATORVASTATIN CALCIUM 80 MG: 40 TABLET, FILM COATED ORAL at 21:09

## 2017-01-01 RX ADMIN — LABETALOL HYDROCHLORIDE 200 MG: 200 TABLET, FILM COATED ORAL at 09:58

## 2017-01-01 RX ADMIN — BUDESONIDE AND FORMOTEROL FUMARATE DIHYDRATE 2 PUFF: 160; 4.5 AEROSOL RESPIRATORY (INHALATION) at 19:50

## 2017-01-01 RX ADMIN — FOLIC ACID 1 MG: 1 TABLET ORAL at 08:58

## 2017-01-01 RX ADMIN — NICOTINE 1 PATCH: 21 PATCH TRANSDERMAL at 01:57

## 2017-01-01 RX ADMIN — PANTOPRAZOLE SODIUM 40 MG: 40 TABLET, DELAYED RELEASE ORAL at 09:38

## 2017-01-01 RX ADMIN — MORPHINE SULFATE 4 MG: 4 INJECTION, SOLUTION INTRAMUSCULAR; INTRAVENOUS at 02:33

## 2017-01-01 RX ADMIN — HYDRALAZINE HYDROCHLORIDE 10 MG: 20 INJECTION INTRAMUSCULAR; INTRAVENOUS at 21:31

## 2017-01-01 RX ADMIN — TAZOBACTAM SODIUM AND PIPERACILLIN SODIUM 3.38 G: 375; 3 INJECTION, SOLUTION INTRAVENOUS at 19:42

## 2017-01-01 RX ADMIN — HYDRALAZINE HYDROCHLORIDE 100 MG: 50 TABLET ORAL at 22:58

## 2017-01-01 RX ADMIN — HYDROCODONE BITARTRATE AND ACETAMINOPHEN 1 TABLET: 7.5; 325 TABLET ORAL at 20:48

## 2017-01-01 RX ADMIN — Medication 10 ML: at 08:05

## 2017-01-01 RX ADMIN — CEFTRIAXONE 1 G: 1 INJECTION, POWDER, FOR SOLUTION INTRAMUSCULAR; INTRAVENOUS at 12:37

## 2017-01-01 RX ADMIN — LABETALOL HYDROCHLORIDE 200 MG: 200 TABLET, FILM COATED ORAL at 09:59

## 2017-01-01 RX ADMIN — MORPHINE SULFATE 4 MG: 4 INJECTION, SOLUTION INTRAMUSCULAR; INTRAVENOUS at 20:33

## 2017-01-01 RX ADMIN — AZITHROMYCIN 500 MG: 500 INJECTION, POWDER, LYOPHILIZED, FOR SOLUTION INTRAVENOUS at 13:41

## 2017-01-01 RX ADMIN — ATROPINE SULFATE 1 DROP: 10 SOLUTION/ DROPS OPHTHALMIC at 21:25

## 2017-01-01 RX ADMIN — PROPOFOL 50 MCG/KG/MIN: 10 INJECTION, EMULSION INTRAVENOUS at 08:12

## 2017-01-01 RX ADMIN — NYSTATIN: 100000 CREAM TOPICAL at 20:48

## 2017-01-01 RX ADMIN — NYSTATIN: 100000 CREAM TOPICAL at 20:02

## 2017-01-01 RX ADMIN — ALBUTEROL SULFATE 2 PUFF: 90 AEROSOL, METERED RESPIRATORY (INHALATION) at 23:47

## 2017-01-01 RX ADMIN — SODIUM CHLORIDE 250 ML: 9 INJECTION, SOLUTION INTRAVENOUS at 01:50

## 2017-01-01 RX ADMIN — ANORECTAL OINTMENT: 15.7; .44; 24; 20.6 OINTMENT TOPICAL at 21:09

## 2017-01-01 RX ADMIN — BUDESONIDE AND FORMOTEROL FUMARATE DIHYDRATE 2 PUFF: 160; 4.5 AEROSOL RESPIRATORY (INHALATION) at 19:41

## 2017-01-01 RX ADMIN — LISINOPRIL 20 MG: 20 TABLET ORAL at 20:10

## 2017-01-01 RX ADMIN — ALBUTEROL SULFATE 2.5 MG: 2.5 SOLUTION RESPIRATORY (INHALATION) at 12:22

## 2017-01-01 RX ADMIN — ALBUTEROL SULFATE 2 PUFF: 90 AEROSOL, METERED RESPIRATORY (INHALATION) at 07:03

## 2017-01-01 RX ADMIN — BUDESONIDE AND FORMOTEROL FUMARATE DIHYDRATE 2 PUFF: 160; 4.5 AEROSOL RESPIRATORY (INHALATION) at 18:34

## 2017-01-01 RX ADMIN — FUROSEMIDE 40 MG: 40 TABLET ORAL at 17:40

## 2017-01-01 RX ADMIN — ALBUTEROL SULFATE 2 PUFF: 90 AEROSOL, METERED RESPIRATORY (INHALATION) at 15:33

## 2017-01-01 RX ADMIN — IPRATROPIUM BROMIDE 2 PUFF: 17 AEROSOL, METERED RESPIRATORY (INHALATION) at 11:39

## 2017-01-01 RX ADMIN — HYDRALAZINE HYDROCHLORIDE 10 MG: 20 INJECTION INTRAMUSCULAR; INTRAVENOUS at 05:41

## 2017-01-01 RX ADMIN — PROPOFOL 40 MCG/KG/MIN: 10 INJECTION, EMULSION INTRAVENOUS at 06:49

## 2017-01-01 RX ADMIN — NITROGLYCERIN 0.4 MG: 0.4 TABLET SUBLINGUAL at 07:03

## 2017-01-01 RX ADMIN — MORPHINE SULFATE 1 MG: 2 INJECTION, SOLUTION INTRAMUSCULAR; INTRAVENOUS at 11:27

## 2017-01-01 RX ADMIN — SODIUM CHLORIDE 100 ML/HR: 9 INJECTION, SOLUTION INTRAVENOUS at 23:02

## 2017-01-01 RX ADMIN — NICOTINE 1 PATCH: 21 PATCH, EXTENDED RELEASE TRANSDERMAL at 19:22

## 2017-01-01 RX ADMIN — ASPIRIN 81 MG 324 MG: 81 TABLET ORAL at 09:50

## 2017-01-01 RX ADMIN — ALBUTEROL SULFATE 2 PUFF: 90 AEROSOL, METERED RESPIRATORY (INHALATION) at 11:39

## 2017-01-01 RX ADMIN — LABETALOL HYDROCHLORIDE 200 MG: 200 TABLET, FILM COATED ORAL at 08:22

## 2017-01-01 RX ADMIN — NYSTATIN 1 APPLICATION: 100000 POWDER TOPICAL at 10:15

## 2017-01-01 RX ADMIN — MORPHINE SULFATE 4 MG: 4 INJECTION, SOLUTION INTRAMUSCULAR; INTRAVENOUS at 08:44

## 2017-01-01 RX ADMIN — FUROSEMIDE 20 MG: 10 INJECTION, SOLUTION INTRAVENOUS at 01:00

## 2017-01-01 RX ADMIN — MORPHINE SULFATE 2 MG: 2 INJECTION, SOLUTION INTRAMUSCULAR; INTRAVENOUS at 08:01

## 2017-01-01 RX ADMIN — HYDRALAZINE HYDROCHLORIDE 100 MG: 50 TABLET ORAL at 05:20

## 2017-01-01 RX ADMIN — ANORECTAL OINTMENT: 15.7; .44; 24; 20.6 OINTMENT TOPICAL at 10:31

## 2017-01-01 RX ADMIN — LISINOPRIL 20 MG: 20 TABLET ORAL at 08:56

## 2017-01-01 RX ADMIN — OXYCODONE HYDROCHLORIDE AND ACETAMINOPHEN 1 TABLET: 10; 325 TABLET ORAL at 22:21

## 2017-01-01 RX ADMIN — MORPHINE SULFATE 1 MG: 2 INJECTION, SOLUTION INTRAMUSCULAR; INTRAVENOUS at 04:53

## 2017-01-01 RX ADMIN — SODIUM CHLORIDE 75 ML/HR: 4.5 INJECTION, SOLUTION INTRAVENOUS at 11:51

## 2017-01-01 RX ADMIN — ALBUTEROL SULFATE 2 PUFF: 90 AEROSOL, METERED RESPIRATORY (INHALATION) at 23:29

## 2017-01-01 RX ADMIN — HYDROCODONE BITARTRATE AND ACETAMINOPHEN 1 TABLET: 5; 325 TABLET ORAL at 14:36

## 2017-01-01 RX ADMIN — FOLIC ACID 1 MG: 1 TABLET ORAL at 08:13

## 2017-01-01 RX ADMIN — LABETALOL HYDROCHLORIDE 200 MG: 200 TABLET, FILM COATED ORAL at 08:54

## 2017-01-01 RX ADMIN — IPRATROPIUM BROMIDE AND ALBUTEROL SULFATE 3 ML: 2.5; .5 SOLUTION RESPIRATORY (INHALATION) at 11:32

## 2017-01-01 RX ADMIN — BUDESONIDE AND FORMOTEROL FUMARATE DIHYDRATE 2 PUFF: 160; 4.5 AEROSOL RESPIRATORY (INHALATION) at 07:07

## 2017-01-01 RX ADMIN — LINEZOLID 600 MG: 100 SUSPENSION ORAL at 15:07

## 2017-01-01 RX ADMIN — NICOTINE 1 PATCH: 21 PATCH TRANSDERMAL at 03:31

## 2017-01-01 RX ADMIN — NICOTINE 1 PATCH: 21 PATCH TRANSDERMAL at 09:56

## 2017-01-01 RX ADMIN — MORPHINE SULFATE 1 MG: 2 INJECTION, SOLUTION INTRAMUSCULAR; INTRAVENOUS at 19:23

## 2017-01-01 RX ADMIN — ALBUTEROL SULFATE 2.5 MG: 2.5 SOLUTION RESPIRATORY (INHALATION) at 00:50

## 2017-01-01 RX ADMIN — BUDESONIDE AND FORMOTEROL FUMARATE DIHYDRATE 2 PUFF: 160; 4.5 AEROSOL RESPIRATORY (INHALATION) at 07:49

## 2017-01-01 RX ADMIN — NYSTATIN 1 APPLICATION: 100000 POWDER TOPICAL at 22:19

## 2017-01-01 RX ADMIN — Medication 10 ML: at 05:53

## 2017-01-01 RX ADMIN — SODIUM CHLORIDE 5 ML/HR: 9 INJECTION, SOLUTION INTRAVENOUS at 17:03

## 2017-01-01 RX ADMIN — PROPOFOL 35 MCG/KG/MIN: 10 INJECTION, EMULSION INTRAVENOUS at 23:48

## 2017-01-01 RX ADMIN — HYDRALAZINE HYDROCHLORIDE 50 MG: 50 TABLET ORAL at 10:09

## 2017-01-01 RX ADMIN — MORPHINE SULFATE 1 MG: 2 INJECTION, SOLUTION INTRAMUSCULAR; INTRAVENOUS at 13:16

## 2017-01-01 RX ADMIN — PANTOPRAZOLE SODIUM 40 MG: 40 TABLET, DELAYED RELEASE ORAL at 06:21

## 2017-01-01 RX ADMIN — DOXYCYCLINE HYCLATE 100 MG: 100 TABLET, FILM COATED ORAL at 20:08

## 2017-01-01 RX ADMIN — CLONIDINE HYDROCHLORIDE 0.2 MG: 0.2 TABLET ORAL at 13:36

## 2017-01-01 RX ADMIN — NYSTATIN: 100000 CREAM TOPICAL at 20:46

## 2017-01-01 RX ADMIN — LABETALOL HYDROCHLORIDE 100 MG: 100 TABLET, FILM COATED ORAL at 17:17

## 2017-01-01 RX ADMIN — HYDRALAZINE HYDROCHLORIDE 50 MG: 50 TABLET ORAL at 08:32

## 2017-01-01 RX ADMIN — IPRATROPIUM BROMIDE 2 PUFF: 17 AEROSOL, METERED RESPIRATORY (INHALATION) at 03:29

## 2017-01-01 RX ADMIN — FOLIC ACID 1 MG: 1 TABLET ORAL at 08:05

## 2017-01-01 RX ADMIN — ERYTHROMYCIN: 5 OINTMENT OPHTHALMIC at 09:15

## 2017-01-01 RX ADMIN — NICOTINE 1 PATCH: 21 PATCH TRANSDERMAL at 09:03

## 2017-01-01 RX ADMIN — LISINOPRIL 20 MG: 20 TABLET ORAL at 08:52

## 2017-01-01 RX ADMIN — ALBUTEROL SULFATE 2.5 MG: 2.5 SOLUTION RESPIRATORY (INHALATION) at 00:38

## 2017-01-01 RX ADMIN — ALBUTEROL SULFATE 2.5 MG: 2.5 SOLUTION RESPIRATORY (INHALATION) at 06:58

## 2017-01-01 RX ADMIN — Medication: at 20:08

## 2017-01-01 RX ADMIN — PROPOFOL 40 MCG/KG/MIN: 10 INJECTION, EMULSION INTRAVENOUS at 12:56

## 2017-01-01 RX ADMIN — HYDRALAZINE HYDROCHLORIDE 10 MG: 20 INJECTION INTRAMUSCULAR; INTRAVENOUS at 07:00

## 2017-01-01 RX ADMIN — PROPOFOL 35 MCG/KG/MIN: 10 INJECTION, EMULSION INTRAVENOUS at 16:17

## 2017-01-01 RX ADMIN — LABETALOL HYDROCHLORIDE 200 MG: 200 TABLET, FILM COATED ORAL at 21:50

## 2017-01-01 RX ADMIN — METOCLOPRAMIDE HYDROCHLORIDE 5 MG: 5 TABLET ORAL at 09:09

## 2017-01-01 RX ADMIN — PROPOFOL 40 MCG/KG/MIN: 10 INJECTION, EMULSION INTRAVENOUS at 18:29

## 2017-01-01 RX ADMIN — ATROPINE SULFATE 2 DROP: 10 SOLUTION/ DROPS OPHTHALMIC at 16:44

## 2017-01-01 RX ADMIN — HYDRALAZINE HYDROCHLORIDE 10 MG: 20 INJECTION INTRAMUSCULAR; INTRAVENOUS at 04:34

## 2017-01-01 RX ADMIN — MIRTAZAPINE 15 MG: 15 TABLET, FILM COATED ORAL at 10:13

## 2017-01-01 RX ADMIN — ATORVASTATIN CALCIUM 80 MG: 40 TABLET, FILM COATED ORAL at 20:26

## 2017-01-01 RX ADMIN — BUDESONIDE AND FORMOTEROL FUMARATE DIHYDRATE 2 PUFF: 160; 4.5 AEROSOL RESPIRATORY (INHALATION) at 07:29

## 2017-01-01 RX ADMIN — PREDNISONE 40 MG: 20 TABLET ORAL at 08:22

## 2017-01-01 RX ADMIN — LINEZOLID 600 MG: 100 SUSPENSION ORAL at 20:48

## 2017-01-01 RX ADMIN — NYSTATIN: 100000 CREAM TOPICAL at 20:27

## 2017-01-01 RX ADMIN — SODIUM CHLORIDE, PRESERVATIVE FREE 10 ML: 5 INJECTION INTRAVENOUS at 08:06

## 2017-01-01 RX ADMIN — SPIRONOLACTONE 25 MG: 25 TABLET ORAL at 10:12

## 2017-01-01 RX ADMIN — PANTOPRAZOLE SODIUM 40 MG: 40 INJECTION, POWDER, FOR SOLUTION INTRAVENOUS at 05:57

## 2017-01-01 RX ADMIN — PROPOFOL 50 MCG/KG/MIN: 10 INJECTION, EMULSION INTRAVENOUS at 00:45

## 2017-01-01 RX ADMIN — PROPOFOL 50 MCG/KG/MIN: 10 INJECTION, EMULSION INTRAVENOUS at 17:55

## 2017-01-01 RX ADMIN — LORAZEPAM 1 MG: 2 INJECTION INTRAMUSCULAR; INTRAVENOUS at 23:41

## 2017-01-01 RX ADMIN — ALBUTEROL SULFATE 2.5 MG: 2.5 SOLUTION RESPIRATORY (INHALATION) at 14:44

## 2017-01-01 RX ADMIN — IPRATROPIUM BROMIDE AND ALBUTEROL SULFATE 3 ML: 2.5; .5 SOLUTION RESPIRATORY (INHALATION) at 07:15

## 2017-01-01 RX ADMIN — PROPOFOL 50 MCG/KG/MIN: 10 INJECTION, EMULSION INTRAVENOUS at 00:32

## 2017-01-01 RX ADMIN — LISINOPRIL 40 MG: 40 TABLET ORAL at 20:33

## 2017-01-01 RX ADMIN — FUROSEMIDE 40 MG: 10 INJECTION, SOLUTION INTRAMUSCULAR; INTRAVENOUS at 08:13

## 2017-01-01 RX ADMIN — MORPHINE SULFATE 1 MG: 2 INJECTION, SOLUTION INTRAMUSCULAR; INTRAVENOUS at 01:04

## 2017-01-01 RX ADMIN — ALBUTEROL SULFATE 2.5 MG: 2.5 SOLUTION RESPIRATORY (INHALATION) at 12:46

## 2017-01-01 RX ADMIN — SULFAMETHOXAZOLE AND TRIMETHOPRIM 160 MG: 800; 160 TABLET ORAL at 21:50

## 2017-01-01 RX ADMIN — MINERAL OIL AND PETROLATUM: 150; 830 OINTMENT OPHTHALMIC at 21:29

## 2017-01-01 RX ADMIN — ERYTHROMYCIN: 5 OINTMENT OPHTHALMIC at 09:10

## 2017-01-01 RX ADMIN — FUROSEMIDE 20 MG: 10 INJECTION INTRAMUSCULAR; INTRAVENOUS at 08:50

## 2017-01-01 RX ADMIN — POTASSIUM CHLORIDE 20 MEQ: 750 CAPSULE, EXTENDED RELEASE ORAL at 08:12

## 2017-01-01 RX ADMIN — Medication 10 ML: at 20:27

## 2017-01-01 RX ADMIN — PHYTONADIONE 10 MG: 10 INJECTION, EMULSION INTRAMUSCULAR; INTRAVENOUS; SUBCUTANEOUS at 16:19

## 2017-01-01 RX ADMIN — PANTOPRAZOLE SODIUM 40 MG: 40 INJECTION, POWDER, FOR SOLUTION INTRAVENOUS at 05:47

## 2017-01-01 RX ADMIN — CLONIDINE HYDROCHLORIDE 0.2 MG: 0.2 TABLET ORAL at 15:34

## 2017-01-01 RX ADMIN — LABETALOL HYDROCHLORIDE 20 MG: 5 INJECTION, SOLUTION INTRAVENOUS at 17:37

## 2017-01-01 RX ADMIN — SODIUM CHLORIDE 5 ML/HR: 9 INJECTION, SOLUTION INTRAVENOUS at 06:07

## 2017-01-01 RX ADMIN — ATORVASTATIN CALCIUM 80 MG: 40 TABLET, FILM COATED ORAL at 20:38

## 2017-01-01 RX ADMIN — METHYLPREDNISOLONE SODIUM SUCCINATE 40 MG: 40 INJECTION, POWDER, FOR SOLUTION INTRAMUSCULAR; INTRAVENOUS at 05:12

## 2017-01-01 RX ADMIN — SODIUM CHLORIDE, PRESERVATIVE FREE 10 ML: 5 INJECTION INTRAVENOUS at 10:32

## 2017-01-01 RX ADMIN — HYDROCODONE BITARTRATE AND ACETAMINOPHEN 1 TABLET: 5; 325 TABLET ORAL at 13:24

## 2017-01-01 RX ADMIN — PANTOPRAZOLE SODIUM 40 MG: 40 INJECTION, POWDER, FOR SOLUTION INTRAVENOUS at 06:05

## 2017-01-01 RX ADMIN — LISINOPRIL 20 MG: 20 TABLET ORAL at 09:05

## 2017-01-01 RX ADMIN — METHYLPREDNISOLONE SODIUM SUCCINATE 80 MG: 125 INJECTION, POWDER, FOR SOLUTION INTRAMUSCULAR; INTRAVENOUS at 02:31

## 2017-01-01 RX ADMIN — CLONIDINE HYDROCHLORIDE 0.2 MG: 0.2 TABLET ORAL at 05:53

## 2017-01-01 RX ADMIN — MORPHINE SULFATE 4 MG: 4 INJECTION, SOLUTION INTRAMUSCULAR; INTRAVENOUS at 14:18

## 2017-01-01 RX ADMIN — PANTOPRAZOLE SODIUM 40 MG: 40 INJECTION, POWDER, FOR SOLUTION INTRAVENOUS at 06:26

## 2017-01-01 RX ADMIN — PROPOFOL 30 MCG/KG/MIN: 10 INJECTION, EMULSION INTRAVENOUS at 09:08

## 2017-01-01 RX ADMIN — CEFTAZIDIME 500 MG: 1 INJECTION, POWDER, FOR SOLUTION INTRAMUSCULAR; INTRAVENOUS at 02:27

## 2017-01-01 RX ADMIN — SPIRONOLACTONE 25 MG: 25 TABLET ORAL at 08:12

## 2017-01-01 RX ADMIN — Medication 10 ML: at 21:26

## 2017-01-01 RX ADMIN — PROPOFOL 50 MCG/KG/MIN: 10 INJECTION, EMULSION INTRAVENOUS at 12:22

## 2017-01-01 RX ADMIN — LISINOPRIL 20 MG: 20 TABLET ORAL at 08:31

## 2017-01-01 RX ADMIN — ATORVASTATIN CALCIUM 80 MG: 40 TABLET, FILM COATED ORAL at 20:07

## 2017-01-01 RX ADMIN — CLONIDINE HYDROCHLORIDE 0.2 MG: 0.2 TABLET ORAL at 05:20

## 2017-01-01 RX ADMIN — DOXYCYCLINE HYCLATE 100 MG: 100 TABLET, FILM COATED ORAL at 20:15

## 2017-01-01 RX ADMIN — IPRATROPIUM BROMIDE 2 PUFF: 17 AEROSOL, METERED RESPIRATORY (INHALATION) at 23:29

## 2017-01-01 RX ADMIN — HYDRALAZINE HYDROCHLORIDE 50 MG: 50 TABLET ORAL at 21:09

## 2017-01-01 RX ADMIN — AZITHROMYCIN 500 MG: 500 INJECTION, POWDER, LYOPHILIZED, FOR SOLUTION INTRAVENOUS at 14:39

## 2017-01-01 RX ADMIN — HYDROCODONE BITARTRATE AND ACETAMINOPHEN 1 TABLET: 7.5; 325 TABLET ORAL at 06:43

## 2017-01-01 RX ADMIN — CLONIDINE HYDROCHLORIDE 0.2 MG: 0.2 TABLET ORAL at 01:45

## 2017-01-01 RX ADMIN — Medication 10 ML: at 10:10

## 2017-01-01 RX ADMIN — CHLORHEXIDINE GLUCONATE 15 ML: 1.2 RINSE ORAL at 21:10

## 2017-01-01 RX ADMIN — ALBUTEROL SULFATE 2 PUFF: 90 AEROSOL, METERED RESPIRATORY (INHALATION) at 03:32

## 2017-01-01 RX ADMIN — CLONIDINE HYDROCHLORIDE 0.2 MG: 0.2 TABLET ORAL at 22:08

## 2017-01-01 RX ADMIN — ALBUTEROL SULFATE 2.5 MG: 2.5 SOLUTION RESPIRATORY (INHALATION) at 07:51

## 2017-01-01 RX ADMIN — MORPHINE SULFATE 1 MG: 2 INJECTION, SOLUTION INTRAMUSCULAR; INTRAVENOUS at 01:06

## 2017-01-01 RX ADMIN — PROPOFOL 40 MCG/KG/MIN: 10 INJECTION, EMULSION INTRAVENOUS at 00:10

## 2017-01-01 RX ADMIN — MIRTAZAPINE 15 MG: 15 TABLET, FILM COATED ORAL at 20:07

## 2017-01-01 RX ADMIN — SULFAMETHOXAZOLE AND TRIMETHOPRIM 160 MG: 800; 160 TABLET ORAL at 12:43

## 2017-01-01 RX ADMIN — ALBUTEROL SULFATE 2.5 MG: 2.5 SOLUTION RESPIRATORY (INHALATION) at 18:34

## 2017-01-01 RX ADMIN — HYDRALAZINE HYDROCHLORIDE 20 MG: 20 INJECTION INTRAMUSCULAR; INTRAVENOUS at 09:45

## 2017-01-01 RX ADMIN — FUROSEMIDE 20 MG: 10 INJECTION, SOLUTION INTRAVENOUS at 09:42

## 2017-01-01 RX ADMIN — IPRATROPIUM BROMIDE 2 PUFF: 17 AEROSOL, METERED RESPIRATORY (INHALATION) at 19:11

## 2017-01-01 RX ADMIN — MORPHINE SULFATE 4 MG: 4 INJECTION, SOLUTION INTRAMUSCULAR; INTRAVENOUS at 14:39

## 2017-01-01 RX ADMIN — BUDESONIDE AND FORMOTEROL FUMARATE DIHYDRATE 2 PUFF: 160; 4.5 AEROSOL RESPIRATORY (INHALATION) at 07:38

## 2017-01-01 RX ADMIN — MORPHINE SULFATE 4 MG: 4 INJECTION, SOLUTION INTRAMUSCULAR; INTRAVENOUS at 19:02

## 2017-01-01 RX ADMIN — ATORVASTATIN CALCIUM 80 MG: 40 TABLET, FILM COATED ORAL at 22:10

## 2017-01-01 RX ADMIN — DOCUSATE SODIUM 100 MG: 50 LIQUID ORAL at 08:31

## 2017-01-01 RX ADMIN — IPRATROPIUM BROMIDE AND ALBUTEROL SULFATE 3 ML: 2.5; .5 SOLUTION RESPIRATORY (INHALATION) at 20:01

## 2017-01-01 RX ADMIN — OXYCODONE HYDROCHLORIDE AND ACETAMINOPHEN 1 TABLET: 10; 325 TABLET ORAL at 09:24

## 2017-01-01 RX ADMIN — MINERAL OIL AND PETROLATUM: 150; 830 OINTMENT OPHTHALMIC at 21:24

## 2017-01-01 RX ADMIN — MORPHINE SULFATE 1 MG: 2 INJECTION, SOLUTION INTRAMUSCULAR; INTRAVENOUS at 04:19

## 2017-01-01 RX ADMIN — MORPHINE SULFATE 4 MG: 4 INJECTION, SOLUTION INTRAMUSCULAR; INTRAVENOUS at 05:31

## 2017-01-01 RX ADMIN — ALBUTEROL SULFATE 2 PUFF: 90 AEROSOL, METERED RESPIRATORY (INHALATION) at 15:29

## 2017-01-01 RX ADMIN — FUROSEMIDE 40 MG: 10 INJECTION, SOLUTION INTRAMUSCULAR; INTRAVENOUS at 08:01

## 2017-01-01 RX ADMIN — LISINOPRIL 20 MG: 20 TABLET ORAL at 08:17

## 2017-01-01 RX ADMIN — MORPHINE SULFATE 4 MG: 4 INJECTION, SOLUTION INTRAMUSCULAR; INTRAVENOUS at 04:31

## 2017-01-01 RX ADMIN — MIDAZOLAM HYDROCHLORIDE 2 MG: 1 INJECTION, SOLUTION INTRAMUSCULAR; INTRAVENOUS at 17:21

## 2017-01-01 RX ADMIN — MORPHINE SULFATE 1 MG: 2 INJECTION, SOLUTION INTRAMUSCULAR; INTRAVENOUS at 09:59

## 2017-01-01 RX ADMIN — PROPOFOL 40 MCG/KG/MIN: 10 INJECTION, EMULSION INTRAVENOUS at 10:27

## 2017-01-01 RX ADMIN — SODIUM CHLORIDE 200 ML: 900 INJECTION, SOLUTION INTRAVENOUS at 17:35

## 2017-01-01 RX ADMIN — SPIRONOLACTONE 25 MG: 25 TABLET ORAL at 10:10

## 2017-01-01 RX ADMIN — OXYCODONE HYDROCHLORIDE AND ACETAMINOPHEN 1 TABLET: 10; 325 TABLET ORAL at 16:00

## 2017-01-01 RX ADMIN — PROPOFOL 40 MCG/KG/MIN: 10 INJECTION, EMULSION INTRAVENOUS at 05:54

## 2017-01-01 RX ADMIN — SODIUM CHLORIDE 100 ML: 900 INJECTION, SOLUTION INTRAVENOUS at 16:19

## 2017-01-01 RX ADMIN — FUROSEMIDE 40 MG: 10 INJECTION, SOLUTION INTRAMUSCULAR; INTRAVENOUS at 17:31

## 2017-01-01 RX ADMIN — POLYETHYLENE GLYCOL 3350, SODIUM CHLORIDE, POTASSIUM CHLORIDE, SODIUM BICARBONATE, AND SODIUM SULFATE 2000 ML: 240; 5.84; 2.98; 6.72; 22.72 POWDER, FOR SOLUTION ORAL at 18:23

## 2017-01-01 RX ADMIN — NICOTINE 1 PATCH: 21 PATCH TRANSDERMAL at 08:57

## 2017-01-01 RX ADMIN — BUSPIRONE HYDROCHLORIDE 15 MG: 15 TABLET ORAL at 21:53

## 2017-01-01 RX ADMIN — NYSTATIN 1 APPLICATION: 100000 POWDER TOPICAL at 20:33

## 2017-01-01 RX ADMIN — PANTOPRAZOLE SODIUM 40 MG: 40 INJECTION, POWDER, FOR SOLUTION INTRAVENOUS at 05:30

## 2017-01-01 RX ADMIN — CLONIDINE HYDROCHLORIDE 0.2 MG: 0.2 TABLET ORAL at 15:50

## 2017-01-01 RX ADMIN — MORPHINE SULFATE 4 MG: 4 INJECTION, SOLUTION INTRAMUSCULAR; INTRAVENOUS at 11:12

## 2017-01-01 RX ADMIN — HYDROCODONE BITARTRATE AND ACETAMINOPHEN 1 TABLET: 7.5; 325 TABLET ORAL at 02:13

## 2017-01-01 RX ADMIN — PROPOFOL 35 MCG/KG/MIN: 10 INJECTION, EMULSION INTRAVENOUS at 20:43

## 2017-01-01 RX ADMIN — PROPOFOL 50 MCG/KG/MIN: 10 INJECTION, EMULSION INTRAVENOUS at 21:15

## 2017-01-01 RX ADMIN — MIRTAZAPINE 15 MG: 15 TABLET, FILM COATED ORAL at 09:30

## 2017-01-01 RX ADMIN — MORPHINE SULFATE 2 MG: 2 INJECTION, SOLUTION INTRAMUSCULAR; INTRAVENOUS at 01:35

## 2017-01-01 RX ADMIN — HYDRALAZINE HYDROCHLORIDE 20 MG: 20 INJECTION INTRAMUSCULAR; INTRAVENOUS at 04:19

## 2017-01-01 RX ADMIN — NYSTATIN: 100000 POWDER TOPICAL at 08:14

## 2017-01-01 RX ADMIN — MORPHINE SULFATE 4 MG: 4 INJECTION, SOLUTION INTRAMUSCULAR; INTRAVENOUS at 03:29

## 2017-01-01 RX ADMIN — MORPHINE SULFATE 4 MG: 4 INJECTION, SOLUTION INTRAMUSCULAR; INTRAVENOUS at 22:43

## 2017-01-01 RX ADMIN — IPRATROPIUM BROMIDE 2 PUFF: 17 AEROSOL, METERED RESPIRATORY (INHALATION) at 07:18

## 2017-01-01 RX ADMIN — SODIUM CHLORIDE 200 ML: 900 INJECTION, SOLUTION INTRAVENOUS at 17:05

## 2017-01-01 RX ADMIN — TAZOBACTAM SODIUM AND PIPERACILLIN SODIUM 3.38 G: 375; 3 INJECTION, SOLUTION INTRAVENOUS at 18:29

## 2017-01-01 RX ADMIN — LEVOFLOXACIN 750 MG: 5 INJECTION, SOLUTION INTRAVENOUS at 13:32

## 2017-01-01 RX ADMIN — HYDROCODONE BITARTRATE AND ACETAMINOPHEN 1 TABLET: 5; 325 TABLET ORAL at 01:41

## 2017-01-01 RX ADMIN — ALBUTEROL SULFATE 2.5 MG: 2.5 SOLUTION RESPIRATORY (INHALATION) at 18:25

## 2017-01-01 RX ADMIN — METRONIDAZOLE 500 MG: 500 INJECTION, SOLUTION INTRAVENOUS at 04:51

## 2017-01-01 RX ADMIN — BUSPIRONE HYDROCHLORIDE 15 MG: 15 TABLET ORAL at 09:02

## 2017-01-01 RX ADMIN — BUDESONIDE AND FORMOTEROL FUMARATE DIHYDRATE 2 PUFF: 160; 4.5 AEROSOL RESPIRATORY (INHALATION) at 19:27

## 2017-01-01 RX ADMIN — NYSTATIN: 100000 POWDER TOPICAL at 10:00

## 2017-01-01 RX ADMIN — MIDAZOLAM 5 MG/HR: 5 INJECTION INTRAMUSCULAR; INTRAVENOUS at 09:20

## 2017-01-01 RX ADMIN — HYDRALAZINE HYDROCHLORIDE 10 MG: 20 INJECTION INTRAMUSCULAR; INTRAVENOUS at 21:57

## 2017-01-01 RX ADMIN — BUSPIRONE HYDROCHLORIDE 15 MG: 15 TABLET ORAL at 10:10

## 2017-01-01 RX ADMIN — Medication: at 16:18

## 2017-01-01 RX ADMIN — SODIUM CHLORIDE 500 ML: 9 INJECTION, SOLUTION INTRAVENOUS at 02:28

## 2017-01-01 RX ADMIN — FUROSEMIDE 80 MG: 10 INJECTION, SOLUTION INTRAMUSCULAR; INTRAVENOUS at 19:15

## 2017-01-01 RX ADMIN — HYDRALAZINE HYDROCHLORIDE 10 MG: 20 INJECTION INTRAMUSCULAR; INTRAVENOUS at 18:01

## 2017-01-01 RX ADMIN — METHYLPREDNISOLONE SODIUM SUCCINATE 20 MG: 40 INJECTION, POWDER, FOR SOLUTION INTRAMUSCULAR; INTRAVENOUS at 12:02

## 2017-01-01 RX ADMIN — IPRATROPIUM BROMIDE AND ALBUTEROL SULFATE 3 ML: 2.5; .5 SOLUTION RESPIRATORY (INHALATION) at 14:42

## 2017-01-01 RX ADMIN — LABETALOL HYDROCHLORIDE 20 MG: 5 INJECTION, SOLUTION INTRAVENOUS at 09:02

## 2017-01-01 RX ADMIN — PREDNISONE 20 MG: 20 TABLET ORAL at 09:15

## 2017-01-01 RX ADMIN — LORAZEPAM 1 MG: 2 INJECTION INTRAMUSCULAR; INTRAVENOUS at 23:17

## 2017-01-01 RX ADMIN — FUROSEMIDE 40 MG: 10 INJECTION, SOLUTION INTRAMUSCULAR; INTRAVENOUS at 09:30

## 2017-01-01 RX ADMIN — PROPOFOL 30 MCG/KG/MIN: 10 INJECTION, EMULSION INTRAVENOUS at 19:26

## 2017-01-01 RX ADMIN — IPRATROPIUM BROMIDE 2 PUFF: 17 AEROSOL, METERED RESPIRATORY (INHALATION) at 11:24

## 2017-01-01 RX ADMIN — HYDROCODONE BITARTRATE AND ACETAMINOPHEN 1 TABLET: 5; 325 TABLET ORAL at 07:47

## 2017-01-01 RX ADMIN — CLONIDINE HYDROCHLORIDE 0.2 MG: 0.2 TABLET ORAL at 06:22

## 2017-01-01 RX ADMIN — ALBUMIN HUMAN: 0.05 INJECTION, SOLUTION INTRAVENOUS at 16:42

## 2017-01-01 RX ADMIN — SODIUM CHLORIDE 30 ML/HR: 9 INJECTION, SOLUTION INTRAVENOUS at 20:00

## 2017-01-01 RX ADMIN — NICOTINE 1 PATCH: 21 PATCH TRANSDERMAL at 14:48

## 2017-01-01 RX ADMIN — FUROSEMIDE 20 MG: 10 INJECTION, SOLUTION INTRAVENOUS at 09:23

## 2017-01-01 RX ADMIN — HYDRALAZINE HYDROCHLORIDE 10 MG: 20 INJECTION INTRAMUSCULAR; INTRAVENOUS at 15:59

## 2017-01-01 RX ADMIN — LABETALOL HYDROCHLORIDE 20 MG: 5 INJECTION, SOLUTION INTRAVENOUS at 08:11

## 2017-01-01 RX ADMIN — MIRTAZAPINE 15 MG: 15 TABLET, FILM COATED ORAL at 08:31

## 2017-01-01 RX ADMIN — LISINOPRIL 20 MG: 20 TABLET ORAL at 08:13

## 2017-01-01 RX ADMIN — PREDNISONE 20 MG: 20 TABLET ORAL at 09:43

## 2017-01-01 RX ADMIN — NICOTINE 1 PATCH: 21 PATCH TRANSDERMAL at 04:51

## 2017-01-01 RX ADMIN — MORPHINE SULFATE 1 MG: 2 INJECTION, SOLUTION INTRAMUSCULAR; INTRAVENOUS at 17:08

## 2017-01-01 RX ADMIN — MORPHINE SULFATE 2 MG: 2 INJECTION, SOLUTION INTRAMUSCULAR; INTRAVENOUS at 05:53

## 2017-01-01 RX ADMIN — OXYCODONE HYDROCHLORIDE AND ACETAMINOPHEN 1 TABLET: 10; 325 TABLET ORAL at 03:24

## 2017-01-01 RX ADMIN — PROPOFOL 40 MCG/KG/MIN: 10 INJECTION, EMULSION INTRAVENOUS at 00:15

## 2017-01-01 RX ADMIN — SODIUM CHLORIDE 1000 ML: 900 INJECTION, SOLUTION INTRAVENOUS at 10:30

## 2017-01-01 RX ADMIN — SODIUM CHLORIDE 75 ML/HR: 900 INJECTION, SOLUTION INTRAVENOUS at 10:20

## 2017-01-01 RX ADMIN — ALBUTEROL SULFATE 2 PUFF: 90 AEROSOL, METERED RESPIRATORY (INHALATION) at 15:24

## 2017-01-01 RX ADMIN — METHYLPREDNISOLONE SODIUM SUCCINATE 20 MG: 40 INJECTION, POWDER, FOR SOLUTION INTRAMUSCULAR; INTRAVENOUS at 08:30

## 2017-01-01 RX ADMIN — PROPOFOL 20 MCG/KG/MIN: 10 INJECTION, EMULSION INTRAVENOUS at 15:08

## 2017-01-01 RX ADMIN — IPRATROPIUM BROMIDE 2 PUFF: 17 AEROSOL, METERED RESPIRATORY (INHALATION) at 07:03

## 2017-01-01 RX ADMIN — LORAZEPAM 1 MG: 2 INJECTION INTRAMUSCULAR; INTRAVENOUS at 13:16

## 2017-01-01 RX ADMIN — TAZOBACTAM SODIUM AND PIPERACILLIN SODIUM 3.38 G: 375; 3 INJECTION, SOLUTION INTRAVENOUS at 10:01

## 2017-01-01 RX ADMIN — HYDRALAZINE HYDROCHLORIDE 100 MG: 50 TABLET ORAL at 05:46

## 2017-01-01 RX ADMIN — HYDRALAZINE HYDROCHLORIDE 100 MG: 50 TABLET ORAL at 15:46

## 2017-01-01 RX ADMIN — METHYLPREDNISOLONE SODIUM SUCCINATE 80 MG: 125 INJECTION, POWDER, FOR SOLUTION INTRAMUSCULAR; INTRAVENOUS at 14:37

## 2017-01-01 RX ADMIN — CEFTRIAXONE 1 G: 1 INJECTION, POWDER, FOR SOLUTION INTRAMUSCULAR; INTRAVENOUS at 14:16

## 2017-01-01 RX ADMIN — METHYLPREDNISOLONE SODIUM SUCCINATE 80 MG: 125 INJECTION, POWDER, FOR SOLUTION INTRAMUSCULAR; INTRAVENOUS at 14:02

## 2017-01-01 RX ADMIN — Medication: at 09:03

## 2017-01-01 RX ADMIN — FUROSEMIDE 40 MG: 10 INJECTION, SOLUTION INTRAMUSCULAR; INTRAVENOUS at 18:25

## 2017-01-01 RX ADMIN — ANORECTAL OINTMENT: 15.7; .44; 24; 20.6 OINTMENT TOPICAL at 20:28

## 2017-01-01 RX ADMIN — MINERAL OIL AND PETROLATUM: 150; 830 OINTMENT OPHTHALMIC at 20:01

## 2017-01-01 RX ADMIN — MINERAL OIL AND PETROLATUM: 150; 830 OINTMENT OPHTHALMIC at 08:04

## 2017-01-01 RX ADMIN — MINERAL OIL AND PETROLATUM: 150; 830 OINTMENT OPHTHALMIC at 20:47

## 2017-01-01 RX ADMIN — LABETALOL HYDROCHLORIDE 100 MG: 100 TABLET, FILM COATED ORAL at 14:23

## 2017-01-01 RX ADMIN — METHYLPREDNISOLONE SODIUM SUCCINATE 20 MG: 40 INJECTION, POWDER, FOR SOLUTION INTRAMUSCULAR; INTRAVENOUS at 08:10

## 2017-01-01 RX ADMIN — MORPHINE SULFATE 2 MG: 2 INJECTION, SOLUTION INTRAMUSCULAR; INTRAVENOUS at 21:53

## 2017-01-01 RX ADMIN — ALBUTEROL SULFATE 2 PUFF: 90 AEROSOL, METERED RESPIRATORY (INHALATION) at 15:20

## 2017-01-01 RX ADMIN — ALBUTEROL SULFATE 2.5 MG: 2.5 SOLUTION RESPIRATORY (INHALATION) at 19:20

## 2017-01-01 RX ADMIN — PANTOPRAZOLE SODIUM 40 MG: 40 INJECTION, POWDER, FOR SOLUTION INTRAVENOUS at 09:01

## 2017-01-01 RX ADMIN — CLONIDINE HYDROCHLORIDE 0.2 MG: 0.2 TABLET ORAL at 14:05

## 2017-01-01 RX ADMIN — MINERAL OIL AND PETROLATUM: 150; 830 OINTMENT OPHTHALMIC at 09:04

## 2017-01-01 RX ADMIN — QUETIAPINE FUMARATE 200 MG: 100 TABLET, FILM COATED ORAL at 20:33

## 2017-01-01 RX ADMIN — MINERAL OIL AND PETROLATUM: 150; 830 OINTMENT OPHTHALMIC at 21:11

## 2017-01-01 RX ADMIN — FUROSEMIDE 40 MG: 10 INJECTION, SOLUTION INTRAMUSCULAR; INTRAVENOUS at 08:56

## 2017-01-01 RX ADMIN — METHYLPREDNISOLONE SODIUM SUCCINATE 125 MG: 125 INJECTION, POWDER, FOR SOLUTION INTRAMUSCULAR; INTRAVENOUS at 07:49

## 2017-01-01 RX ADMIN — NICOTINE 1 PATCH: 21 PATCH TRANSDERMAL at 08:06

## 2017-01-01 RX ADMIN — MORPHINE SULFATE 1 MG: 2 INJECTION, SOLUTION INTRAMUSCULAR; INTRAVENOUS at 03:23

## 2017-01-01 RX ADMIN — DOCUSATE SODIUM 100 MG: 100 CAPSULE, LIQUID FILLED ORAL at 09:06

## 2017-01-01 RX ADMIN — ALBUTEROL SULFATE 2.5 MG: 2.5 SOLUTION RESPIRATORY (INHALATION) at 07:21

## 2017-01-01 RX ADMIN — CLONIDINE HYDROCHLORIDE 0.2 MG: 0.2 TABLET ORAL at 21:49

## 2017-01-01 RX ADMIN — PANTOPRAZOLE SODIUM 40 MG: 40 INJECTION, POWDER, FOR SOLUTION INTRAVENOUS at 20:43

## 2017-01-01 RX ADMIN — MIDAZOLAM 5 MG/HR: 5 INJECTION INTRAMUSCULAR; INTRAVENOUS at 22:17

## 2017-01-01 RX ADMIN — SODIUM CHLORIDE 500 ML: 9 INJECTION, SOLUTION INTRAVENOUS at 05:57

## 2017-01-01 RX ADMIN — ALBUTEROL SULFATE 2.5 MG: 2.5 SOLUTION RESPIRATORY (INHALATION) at 06:45

## 2017-01-01 RX ADMIN — FUROSEMIDE 10 MG/HR: 10 INJECTION, SOLUTION INTRAVENOUS at 09:00

## 2017-01-01 RX ADMIN — LABETALOL HYDROCHLORIDE 200 MG: 200 TABLET, FILM COATED ORAL at 20:10

## 2017-01-01 RX ADMIN — ALBUTEROL SULFATE 2 PUFF: 90 AEROSOL, METERED RESPIRATORY (INHALATION) at 19:12

## 2017-01-01 RX ADMIN — MORPHINE SULFATE 1 MG: 2 INJECTION, SOLUTION INTRAMUSCULAR; INTRAVENOUS at 10:47

## 2017-01-01 RX ADMIN — ALBUTEROL SULFATE 2 PUFF: 90 AEROSOL, METERED RESPIRATORY (INHALATION) at 19:58

## 2017-01-01 RX ADMIN — NYSTATIN: 100000 POWDER TOPICAL at 08:33

## 2017-01-01 RX ADMIN — QUETIAPINE FUMARATE 200 MG: 100 TABLET, FILM COATED ORAL at 22:10

## 2017-01-01 RX ADMIN — PROPOFOL 40 MCG/KG/MIN: 10 INJECTION, EMULSION INTRAVENOUS at 15:44

## 2017-01-01 RX ADMIN — PROPOFOL 50 MG: 10 INJECTION, EMULSION INTRAVENOUS at 17:50

## 2017-01-01 RX ADMIN — SODIUM CHLORIDE 100 ML: 900 INJECTION, SOLUTION INTRAVENOUS at 15:18

## 2017-01-01 RX ADMIN — MINERAL OIL AND PETROLATUM: 150; 830 OINTMENT OPHTHALMIC at 09:10

## 2017-01-01 RX ADMIN — MORPHINE SULFATE 2 MG: 2 INJECTION, SOLUTION INTRAMUSCULAR; INTRAVENOUS at 13:56

## 2017-01-01 RX ADMIN — MINERAL OIL AND PETROLATUM: 150; 830 OINTMENT OPHTHALMIC at 08:11

## 2017-01-01 RX ADMIN — NICOTINE 1 PATCH: 21 PATCH TRANSDERMAL at 10:00

## 2017-01-01 RX ADMIN — MORPHINE SULFATE 4 MG: 4 INJECTION, SOLUTION INTRAMUSCULAR; INTRAVENOUS at 14:24

## 2017-01-01 RX ADMIN — ALBUTEROL SULFATE 2 PUFF: 90 AEROSOL, METERED RESPIRATORY (INHALATION) at 14:52

## 2017-01-01 RX ADMIN — HYDRALAZINE HYDROCHLORIDE 100 MG: 50 TABLET ORAL at 14:05

## 2017-01-01 RX ADMIN — LABETALOL HYDROCHLORIDE 100 MG: 100 TABLET, FILM COATED ORAL at 17:20

## 2017-01-01 RX ADMIN — PROPOFOL 40 MCG/KG/MIN: 10 INJECTION, EMULSION INTRAVENOUS at 09:25

## 2017-01-01 RX ADMIN — BUDESONIDE AND FORMOTEROL FUMARATE DIHYDRATE 2 PUFF: 160; 4.5 AEROSOL RESPIRATORY (INHALATION) at 19:08

## 2017-01-01 RX ADMIN — PREDNISONE 40 MG: 20 TABLET ORAL at 09:38

## 2017-01-01 RX ADMIN — LINEZOLID 600 MG: 100 SUSPENSION ORAL at 20:51

## 2017-01-01 RX ADMIN — PROPOFOL 35 MCG/KG/MIN: 10 INJECTION, EMULSION INTRAVENOUS at 01:34

## 2017-01-01 RX ADMIN — MORPHINE SULFATE 2 MG: 2 INJECTION, SOLUTION INTRAMUSCULAR; INTRAVENOUS at 08:34

## 2017-01-01 RX ADMIN — PANTOPRAZOLE SODIUM 40 MG: 40 INJECTION, POWDER, FOR SOLUTION INTRAVENOUS at 05:31

## 2017-01-01 RX ADMIN — LABETALOL HYDROCHLORIDE 20 MG: 5 INJECTION, SOLUTION INTRAVENOUS at 18:30

## 2017-01-01 RX ADMIN — HYDRALAZINE HYDROCHLORIDE 100 MG: 50 TABLET ORAL at 14:47

## 2017-01-01 RX ADMIN — HYDRALAZINE HYDROCHLORIDE 100 MG: 50 TABLET ORAL at 05:31

## 2017-01-01 RX ADMIN — BUDESONIDE AND FORMOTEROL FUMARATE DIHYDRATE 2 PUFF: 160; 4.5 AEROSOL RESPIRATORY (INHALATION) at 08:30

## 2017-01-01 RX ADMIN — METHYLPREDNISOLONE SODIUM SUCCINATE 80 MG: 125 INJECTION, POWDER, FOR SOLUTION INTRAMUSCULAR; INTRAVENOUS at 09:32

## 2017-01-01 RX ADMIN — CLONIDINE HYDROCHLORIDE 0.2 MG: 0.2 TABLET ORAL at 14:23

## 2017-01-01 RX ADMIN — METHYLPREDNISOLONE SODIUM SUCCINATE 20 MG: 40 INJECTION, POWDER, FOR SOLUTION INTRAMUSCULAR; INTRAVENOUS at 09:17

## 2017-01-01 RX ADMIN — DEXTROSE AND SODIUM CHLORIDE 125 ML/HR: 5; 450 INJECTION, SOLUTION INTRAVENOUS at 05:53

## 2017-01-01 RX ADMIN — FUROSEMIDE 40 MG: 10 INJECTION, SOLUTION INTRAMUSCULAR; INTRAVENOUS at 08:18

## 2017-01-01 RX ADMIN — FUROSEMIDE 40 MG: 10 INJECTION, SOLUTION INTRAMUSCULAR; INTRAVENOUS at 01:41

## 2017-01-01 RX ADMIN — MORPHINE SULFATE 1 MG: 2 INJECTION, SOLUTION INTRAMUSCULAR; INTRAVENOUS at 17:18

## 2017-01-01 RX ADMIN — HYDRALAZINE HYDROCHLORIDE 10 MG: 20 INJECTION INTRAMUSCULAR; INTRAVENOUS at 03:41

## 2017-01-01 RX ADMIN — SPIRONOLACTONE 25 MG: 25 TABLET ORAL at 09:52

## 2017-01-01 RX ADMIN — BUSPIRONE HYDROCHLORIDE 15 MG: 15 TABLET ORAL at 20:19

## 2017-01-01 RX ADMIN — HYDRALAZINE HYDROCHLORIDE 100 MG: 50 TABLET ORAL at 21:50

## 2017-01-01 RX ADMIN — SODIUM BICARBONATE 75 ML/HR: 84 INJECTION, SOLUTION INTRAVENOUS at 06:25

## 2017-01-01 RX ADMIN — MIDAZOLAM 4 MG/HR: 5 INJECTION INTRAMUSCULAR; INTRAVENOUS at 11:11

## 2017-01-01 RX ADMIN — Medication: at 01:45

## 2017-01-01 RX ADMIN — FUROSEMIDE 40 MG: 10 INJECTION, SOLUTION INTRAMUSCULAR; INTRAVENOUS at 08:05

## 2017-01-01 RX ADMIN — METHYLPREDNISOLONE SODIUM SUCCINATE 20 MG: 40 INJECTION, POWDER, FOR SOLUTION INTRAMUSCULAR; INTRAVENOUS at 09:02

## 2017-01-01 RX ADMIN — TAZOBACTAM SODIUM AND PIPERACILLIN SODIUM 3.38 G: 375; 3 INJECTION, SOLUTION INTRAVENOUS at 04:41

## 2017-01-01 RX ADMIN — Medication 10 ML: at 22:31

## 2017-01-01 RX ADMIN — HYDRALAZINE HYDROCHLORIDE 50 MG: 50 TABLET ORAL at 09:59

## 2017-01-01 RX ADMIN — LABETALOL HYDROCHLORIDE 200 MG: 200 TABLET, FILM COATED ORAL at 09:06

## 2017-01-01 RX ADMIN — LABETALOL HYDROCHLORIDE 200 MG: 200 TABLET, FILM COATED ORAL at 08:51

## 2017-01-01 RX ADMIN — TRAZODONE HYDROCHLORIDE 150 MG: 150 TABLET ORAL at 22:11

## 2017-01-01 RX ADMIN — HYDRALAZINE HYDROCHLORIDE 100 MG: 50 TABLET ORAL at 06:17

## 2017-01-01 RX ADMIN — HYDRALAZINE HYDROCHLORIDE 100 MG: 50 TABLET ORAL at 05:54

## 2017-01-01 RX ADMIN — PREDNISONE 20 MG: 20 TABLET ORAL at 17:20

## 2017-01-01 RX ADMIN — FUROSEMIDE 20 MG: 10 INJECTION, SOLUTION INTRAVENOUS at 06:16

## 2017-01-01 RX ADMIN — SPIRONOLACTONE 25 MG: 25 TABLET ORAL at 11:30

## 2017-01-01 RX ADMIN — HYDROCODONE BITARTRATE AND ACETAMINOPHEN 1 TABLET: 7.5; 325 TABLET ORAL at 21:49

## 2017-01-01 RX ADMIN — ERYTHROMYCIN: 5 OINTMENT OPHTHALMIC at 20:47

## 2017-01-01 RX ADMIN — MORPHINE SULFATE 4 MG: 4 INJECTION, SOLUTION INTRAMUSCULAR; INTRAVENOUS at 06:28

## 2017-01-01 RX ADMIN — MIDAZOLAM 4 MG/HR: 5 INJECTION INTRAMUSCULAR; INTRAVENOUS at 22:06

## 2017-01-01 RX ADMIN — NYSTATIN 1 APPLICATION: 100000 POWDER TOPICAL at 21:25

## 2017-01-01 RX ADMIN — MIRTAZAPINE 15 MG: 15 TABLET, FILM COATED ORAL at 07:54

## 2017-01-01 RX ADMIN — CLONIDINE HYDROCHLORIDE 0.2 MG: 0.2 TABLET ORAL at 22:57

## 2017-01-01 RX ADMIN — BUDESONIDE AND FORMOTEROL FUMARATE DIHYDRATE 2 PUFF: 160; 4.5 AEROSOL RESPIRATORY (INHALATION) at 10:00

## 2017-01-01 RX ADMIN — NYSTATIN: 100000 CREAM TOPICAL at 20:28

## 2017-01-01 RX ADMIN — HYDRALAZINE HYDROCHLORIDE 20 MG: 20 INJECTION INTRAMUSCULAR; INTRAVENOUS at 17:25

## 2017-01-01 RX ADMIN — METOCLOPRAMIDE HYDROCHLORIDE 5 MG: 5 TABLET ORAL at 11:34

## 2017-01-01 RX ADMIN — FUROSEMIDE 40 MG: 40 TABLET ORAL at 18:00

## 2017-01-01 RX ADMIN — MORPHINE SULFATE 2 MG: 2 INJECTION, SOLUTION INTRAMUSCULAR; INTRAVENOUS at 08:46

## 2017-01-01 RX ADMIN — ALBUTEROL SULFATE 2 PUFF: 90 AEROSOL, METERED RESPIRATORY (INHALATION) at 03:29

## 2017-01-01 RX ADMIN — HYDRALAZINE HYDROCHLORIDE 100 MG: 50 TABLET ORAL at 21:29

## 2017-01-01 RX ADMIN — MORPHINE SULFATE 1 MG: 2 INJECTION, SOLUTION INTRAMUSCULAR; INTRAVENOUS at 01:15

## 2017-01-01 RX ADMIN — MORPHINE SULFATE 4 MG: 4 INJECTION, SOLUTION INTRAMUSCULAR; INTRAVENOUS at 10:14

## 2017-01-01 RX ADMIN — LABETALOL HYDROCHLORIDE 100 MG: 100 TABLET, FILM COATED ORAL at 08:56

## 2017-01-01 RX ADMIN — MORPHINE SULFATE 1 MG: 2 INJECTION, SOLUTION INTRAMUSCULAR; INTRAVENOUS at 20:53

## 2017-01-01 RX ADMIN — PHYTONADIONE 5 MG: 10 INJECTION, EMULSION INTRAMUSCULAR; INTRAVENOUS; SUBCUTANEOUS at 17:19

## 2017-01-01 RX ADMIN — PANTOPRAZOLE SODIUM 40 MG: 40 INJECTION, POWDER, FOR SOLUTION INTRAVENOUS at 06:13

## 2017-01-01 RX ADMIN — NICOTINE 1 PATCH: 21 PATCH TRANSDERMAL at 09:49

## 2017-01-01 RX ADMIN — METHYLPREDNISOLONE SODIUM SUCCINATE 80 MG: 125 INJECTION, POWDER, FOR SOLUTION INTRAMUSCULAR; INTRAVENOUS at 21:28

## 2017-01-01 RX ADMIN — LABETALOL HYDROCHLORIDE 200 MG: 200 TABLET, FILM COATED ORAL at 08:05

## 2017-01-01 RX ADMIN — POTASSIUM CHLORIDE 20 MEQ: 750 CAPSULE, EXTENDED RELEASE ORAL at 09:43

## 2017-01-01 RX ADMIN — SODIUM CHLORIDE 1000 ML: 900 INJECTION, SOLUTION INTRAVENOUS at 17:30

## 2017-01-01 RX ADMIN — PANTOPRAZOLE SODIUM 40 MG: 40 INJECTION, POWDER, FOR SOLUTION INTRAVENOUS at 06:03

## 2017-01-01 RX ADMIN — LISINOPRIL 20 MG: 20 TABLET ORAL at 09:52

## 2017-01-01 RX ADMIN — MINERAL OIL AND PETROLATUM: 150; 830 OINTMENT OPHTHALMIC at 20:46

## 2017-01-01 RX ADMIN — LISINOPRIL 20 MG: 20 TABLET ORAL at 20:25

## 2017-01-01 RX ADMIN — METHYLPREDNISOLONE SODIUM SUCCINATE 80 MG: 125 INJECTION, POWDER, FOR SOLUTION INTRAMUSCULAR; INTRAVENOUS at 08:23

## 2017-01-01 RX ADMIN — FUROSEMIDE 40 MG: 10 INJECTION, SOLUTION INTRAMUSCULAR; INTRAVENOUS at 10:00

## 2017-01-01 RX ADMIN — CLONIDINE HYDROCHLORIDE 0.2 MG: 0.2 TABLET ORAL at 15:47

## 2017-01-01 RX ADMIN — BUDESONIDE AND FORMOTEROL FUMARATE DIHYDRATE 2 PUFF: 160; 4.5 AEROSOL RESPIRATORY (INHALATION) at 10:05

## 2017-01-01 RX ADMIN — MORPHINE SULFATE 1 MG: 2 INJECTION, SOLUTION INTRAMUSCULAR; INTRAVENOUS at 01:32

## 2017-01-01 RX ADMIN — LABETALOL HYDROCHLORIDE 200 MG: 200 TABLET, FILM COATED ORAL at 08:03

## 2017-01-01 RX ADMIN — NYSTATIN 1 APPLICATION: 100000 POWDER TOPICAL at 21:01

## 2017-01-01 RX ADMIN — LISINOPRIL 20 MG: 20 TABLET ORAL at 08:05

## 2017-01-01 RX ADMIN — BUDESONIDE AND FORMOTEROL FUMARATE DIHYDRATE 2 PUFF: 160; 4.5 AEROSOL RESPIRATORY (INHALATION) at 07:10

## 2017-01-01 RX ADMIN — SODIUM CHLORIDE 100 ML: 900 INJECTION, SOLUTION INTRAVENOUS at 16:10

## 2017-01-01 RX ADMIN — CLONIDINE HYDROCHLORIDE 0.2 MG: 0.2 TABLET ORAL at 21:29

## 2017-01-01 RX ADMIN — CLONIDINE HYDROCHLORIDE 0.1 MG: 0.1 TABLET ORAL at 21:49

## 2017-01-01 RX ADMIN — OXYCODONE HYDROCHLORIDE AND ACETAMINOPHEN 1 TABLET: 10; 325 TABLET ORAL at 10:31

## 2017-01-01 RX ADMIN — MORPHINE SULFATE 4 MG: 4 INJECTION, SOLUTION INTRAMUSCULAR; INTRAVENOUS at 15:54

## 2017-01-01 RX ADMIN — SODIUM CHLORIDE 30 ML/HR: 900 INJECTION, SOLUTION INTRAVENOUS at 03:55

## 2017-01-01 RX ADMIN — ASPIRIN 81 MG: 81 TABLET ORAL at 07:51

## 2017-01-01 RX ADMIN — ERYTHROMYCIN: 5 OINTMENT OPHTHALMIC at 20:46

## 2017-01-01 RX ADMIN — PANTOPRAZOLE SODIUM 40 MG: 40 TABLET, DELAYED RELEASE ORAL at 05:46

## 2017-01-01 RX ADMIN — NICOTINE 1 PATCH: 21 PATCH TRANSDERMAL at 04:04

## 2017-01-01 RX ADMIN — HYDRALAZINE HYDROCHLORIDE 100 MG: 50 TABLET ORAL at 05:40

## 2017-01-01 RX ADMIN — CHLORHEXIDINE GLUCONATE 15 ML: 1.2 RINSE ORAL at 08:29

## 2017-01-01 RX ADMIN — HYDRALAZINE HYDROCHLORIDE 100 MG: 50 TABLET ORAL at 21:57

## 2017-01-01 RX ADMIN — DEXTROSE AND SODIUM CHLORIDE 125 ML/HR: 5; 450 INJECTION, SOLUTION INTRAVENOUS at 20:25

## 2017-01-01 RX ADMIN — POTASSIUM CHLORIDE 20 MEQ: 20 SOLUTION ORAL at 17:39

## 2017-01-01 RX ADMIN — PROPOFOL 40 MCG/KG/MIN: 10 INJECTION, EMULSION INTRAVENOUS at 08:12

## 2017-01-01 RX ADMIN — SCOPALAMINE 1 PATCH: 1 PATCH, EXTENDED RELEASE TRANSDERMAL at 13:55

## 2017-01-01 RX ADMIN — PROPOFOL 50 MG: 10 INJECTION, EMULSION INTRAVENOUS at 17:55

## 2017-01-01 RX ADMIN — SODIUM CHLORIDE 5 MG/HR: 9 INJECTION, SOLUTION INTRAVENOUS at 06:21

## 2017-01-01 RX ADMIN — KIT FOR THE PREPARATION OF TECHNETIUM TC 99M PENTETATE 1 DOSE: 20 INJECTION, POWDER, LYOPHILIZED, FOR SOLUTION INTRAVENOUS; RESPIRATORY (INHALATION) at 15:20

## 2017-01-01 RX ADMIN — BUSPIRONE HYDROCHLORIDE 15 MG: 15 TABLET ORAL at 21:16

## 2017-01-01 RX ADMIN — NYSTATIN 1 APPLICATION: 100000 POWDER TOPICAL at 21:34

## 2017-01-01 RX ADMIN — HYDROCODONE BITARTRATE AND ACETAMINOPHEN 1 TABLET: 7.5; 325 TABLET ORAL at 13:34

## 2017-01-01 RX ADMIN — NITROGLYCERIN 5 MCG/MIN: 20 INJECTION INTRAVENOUS at 10:08

## 2017-01-01 RX ADMIN — PROPOFOL 30 MCG/KG/MIN: 10 INJECTION, EMULSION INTRAVENOUS at 05:25

## 2017-01-01 RX ADMIN — MIDAZOLAM 7 MG/HR: 5 INJECTION INTRAMUSCULAR; INTRAVENOUS at 07:48

## 2017-01-01 RX ADMIN — ALBUMIN HUMAN: 0.05 INJECTION, SOLUTION INTRAVENOUS at 15:38

## 2017-01-01 RX ADMIN — IPRATROPIUM BROMIDE 2 PUFF: 17 AEROSOL, METERED RESPIRATORY (INHALATION) at 07:31

## 2017-01-01 RX ADMIN — OXYCODONE HYDROCHLORIDE AND ACETAMINOPHEN 1 TABLET: 10; 325 TABLET ORAL at 11:37

## 2017-01-01 RX ADMIN — TRAZODONE HYDROCHLORIDE 150 MG: 150 TABLET ORAL at 22:01

## 2017-01-01 RX ADMIN — IPRATROPIUM BROMIDE AND ALBUTEROL SULFATE 3 ML: 2.5; .5 SOLUTION RESPIRATORY (INHALATION) at 08:30

## 2017-01-01 RX ADMIN — VANCOMYCIN HYDROCHLORIDE 1750 MG: 5 INJECTION, POWDER, LYOPHILIZED, FOR SOLUTION INTRAVENOUS at 22:18

## 2017-01-01 RX ADMIN — LISINOPRIL 20 MG: 20 TABLET ORAL at 08:02

## 2017-01-01 RX ADMIN — Medication 10 ML: at 20:19

## 2017-01-01 RX ADMIN — NYSTATIN: 100000 CREAM TOPICAL at 09:07

## 2017-01-01 RX ADMIN — MINERAL OIL AND PETROLATUM: 150; 830 OINTMENT OPHTHALMIC at 08:23

## 2017-01-01 RX ADMIN — HEPARIN SODIUM 23 UNITS/KG/HR: 10000 INJECTION, SOLUTION INTRAVENOUS at 06:16

## 2017-01-01 RX ADMIN — LABETALOL HYDROCHLORIDE 100 MG: 100 TABLET, FILM COATED ORAL at 19:02

## 2017-01-01 RX ADMIN — SPIRONOLACTONE 25 MG: 25 TABLET ORAL at 08:43

## 2017-01-01 RX ADMIN — CLONIDINE HYDROCHLORIDE 0.2 MG: 0.2 TABLET ORAL at 06:20

## 2017-01-01 RX ADMIN — Medication 10 ML: at 07:05

## 2017-01-01 RX ADMIN — PROPOFOL 40 MCG/KG/MIN: 10 INJECTION, EMULSION INTRAVENOUS at 13:45

## 2017-01-01 RX ADMIN — ATROPINE SULFATE 1 DROP: 10 SOLUTION/ DROPS OPHTHALMIC at 09:09

## 2017-01-01 RX ADMIN — MORPHINE SULFATE 2 MG: 2 INJECTION, SOLUTION INTRAMUSCULAR; INTRAVENOUS at 22:49

## 2017-01-01 RX ADMIN — ATORVASTATIN CALCIUM 80 MG: 40 TABLET, FILM COATED ORAL at 21:49

## 2017-01-01 RX ADMIN — PROPOFOL 40 MCG/KG/MIN: 10 INJECTION, EMULSION INTRAVENOUS at 11:04

## 2017-01-01 RX ADMIN — ERYTHROMYCIN: 5 OINTMENT OPHTHALMIC at 08:48

## 2017-01-01 RX ADMIN — HYDRALAZINE HYDROCHLORIDE 100 MG: 50 TABLET ORAL at 13:19

## 2017-01-01 RX ADMIN — NICOTINE 1 PATCH: 21 PATCH, EXTENDED RELEASE TRANSDERMAL at 14:40

## 2017-01-01 RX ADMIN — MIRTAZAPINE 15 MG: 15 TABLET, FILM COATED ORAL at 20:15

## 2017-01-01 RX ADMIN — MORPHINE SULFATE 4 MG: 4 INJECTION, SOLUTION INTRAMUSCULAR; INTRAVENOUS at 10:56

## 2017-01-01 RX ADMIN — LABETALOL HYDROCHLORIDE 200 MG: 200 TABLET, FILM COATED ORAL at 20:26

## 2017-01-01 RX ADMIN — PANTOPRAZOLE SODIUM 40 MG: 40 INJECTION, POWDER, FOR SOLUTION INTRAVENOUS at 06:47

## 2017-01-01 RX ADMIN — PROPOFOL 40 MCG/KG/MIN: 10 INJECTION, EMULSION INTRAVENOUS at 01:13

## 2017-01-01 RX ADMIN — HYDRALAZINE HYDROCHLORIDE 100 MG: 50 TABLET ORAL at 21:35

## 2017-01-01 RX ADMIN — SODIUM CHLORIDE 75 ML/HR: 9 INJECTION, SOLUTION INTRAVENOUS at 11:29

## 2017-01-01 RX ADMIN — QUETIAPINE FUMARATE 200 MG: 100 TABLET, FILM COATED ORAL at 21:08

## 2017-01-01 RX ADMIN — NICOTINE 1 PATCH: 21 PATCH TRANSDERMAL at 09:55

## 2017-01-01 RX ADMIN — FUROSEMIDE 40 MG: 10 INJECTION, SOLUTION INTRAMUSCULAR; INTRAVENOUS at 17:58

## 2017-01-01 RX ADMIN — ATORVASTATIN CALCIUM 80 MG: 40 TABLET, FILM COATED ORAL at 20:48

## 2017-01-01 RX ADMIN — HYDROCODONE BITARTRATE AND ACETAMINOPHEN 1 TABLET: 7.5; 325 TABLET ORAL at 09:38

## 2017-01-01 RX ADMIN — PANTOPRAZOLE SODIUM 40 MG: 40 INJECTION, POWDER, FOR SOLUTION INTRAVENOUS at 23:38

## 2017-01-01 RX ADMIN — METRONIDAZOLE 500 MG: 500 INJECTION, SOLUTION INTRAVENOUS at 22:04

## 2017-01-01 RX ADMIN — POTASSIUM CHLORIDE 20 MEQ: 20 TABLET, EXTENDED RELEASE ORAL at 08:43

## 2017-01-01 RX ADMIN — HYDROCODONE BITARTRATE AND ACETAMINOPHEN 1 TABLET: 5; 325 TABLET ORAL at 09:30

## 2017-01-01 RX ADMIN — LABETALOL HYDROCHLORIDE 200 MG: 200 TABLET, FILM COATED ORAL at 10:31

## 2017-01-01 RX ADMIN — ALBUTEROL SULFATE 2 PUFF: 90 AEROSOL, METERED RESPIRATORY (INHALATION) at 19:40

## 2017-01-01 RX ADMIN — SODIUM CHLORIDE 500 ML: 9 INJECTION, SOLUTION INTRAVENOUS at 10:14

## 2017-01-01 RX ADMIN — SODIUM CHLORIDE 5 MG/HR: 9 INJECTION, SOLUTION INTRAVENOUS at 14:28

## 2017-01-01 RX ADMIN — PROPOFOL 30 MCG/KG/MIN: 10 INJECTION, EMULSION INTRAVENOUS at 15:06

## 2017-01-01 RX ADMIN — CLONIDINE HYDROCHLORIDE 0.2 MG: 0.2 TABLET ORAL at 09:59

## 2017-01-01 RX ADMIN — HYDROCODONE BITARTRATE AND ACETAMINOPHEN 1 TABLET: 5; 325 TABLET ORAL at 21:30

## 2017-01-01 RX ADMIN — IPRATROPIUM BROMIDE AND ALBUTEROL SULFATE 3 ML: 2.5; .5 SOLUTION RESPIRATORY (INHALATION) at 09:51

## 2017-01-01 RX ADMIN — BUSPIRONE HYDROCHLORIDE 15 MG: 15 TABLET ORAL at 20:33

## 2017-01-01 RX ADMIN — FUROSEMIDE 20 MG: 20 TABLET ORAL at 08:55

## 2017-01-01 RX ADMIN — IPRATROPIUM BROMIDE 2 PUFF: 17 AEROSOL, METERED RESPIRATORY (INHALATION) at 19:12

## 2017-01-01 RX ADMIN — HYDRALAZINE HYDROCHLORIDE 100 MG: 50 TABLET ORAL at 07:06

## 2017-01-01 RX ADMIN — SODIUM CHLORIDE 200 ML: 900 INJECTION, SOLUTION INTRAVENOUS at 16:40

## 2017-01-01 RX ADMIN — CLONIDINE HYDROCHLORIDE 0.2 MG: 0.2 TABLET ORAL at 05:58

## 2017-01-01 RX ADMIN — NYSTATIN: 100000 CREAM TOPICAL at 09:25

## 2017-01-01 RX ADMIN — PROPOFOL 50 MCG/KG/MIN: 10 INJECTION, EMULSION INTRAVENOUS at 16:40

## 2017-01-01 RX ADMIN — MORPHINE SULFATE 1 MG: 2 INJECTION, SOLUTION INTRAMUSCULAR; INTRAVENOUS at 06:26

## 2017-01-01 RX ADMIN — NYSTATIN: 100000 POWDER TOPICAL at 08:05

## 2017-01-01 RX ADMIN — Medication 10 ML: at 07:52

## 2017-01-01 RX ADMIN — MINERAL OIL AND PETROLATUM: 150; 830 OINTMENT OPHTHALMIC at 08:00

## 2017-01-01 RX ADMIN — LABETALOL HYDROCHLORIDE 200 MG: 200 TABLET, FILM COATED ORAL at 07:51

## 2017-01-01 RX ADMIN — PROPOFOL 30 MCG/KG/MIN: 10 INJECTION, EMULSION INTRAVENOUS at 00:20

## 2017-01-01 RX ADMIN — DEXTROSE AND SODIUM CHLORIDE 125 ML/HR: 5; 450 INJECTION, SOLUTION INTRAVENOUS at 06:36

## 2017-01-01 RX ADMIN — ANORECTAL OINTMENT: 15.7; .44; 24; 20.6 OINTMENT TOPICAL at 12:00

## 2017-01-01 RX ADMIN — FUROSEMIDE 40 MG: 40 TABLET ORAL at 09:15

## 2017-01-01 RX ADMIN — DEXTROSE AND SODIUM CHLORIDE 100 ML/HR: 5; 450 INJECTION, SOLUTION INTRAVENOUS at 12:06

## 2017-01-01 RX ADMIN — Medication: at 20:02

## 2017-01-01 RX ADMIN — DOCUSATE SODIUM 100 MG: 50 LIQUID ORAL at 09:52

## 2017-01-01 RX ADMIN — MORPHINE SULFATE 1 MG: 2 INJECTION, SOLUTION INTRAMUSCULAR; INTRAVENOUS at 00:30

## 2017-01-01 RX ADMIN — ATORVASTATIN CALCIUM 80 MG: 40 TABLET, FILM COATED ORAL at 21:12

## 2017-01-01 RX ADMIN — CLONIDINE HYDROCHLORIDE 0.2 MG: 0.2 TABLET ORAL at 21:00

## 2017-01-01 RX ADMIN — AZITHROMYCIN 500 MG: 500 INJECTION, POWDER, LYOPHILIZED, FOR SOLUTION INTRAVENOUS at 11:11

## 2017-01-01 RX ADMIN — BUSPIRONE HYDROCHLORIDE 15 MG: 15 TABLET ORAL at 20:27

## 2017-01-01 RX ADMIN — MIDAZOLAM HYDROCHLORIDE 1 MG: 1 INJECTION, SOLUTION INTRAMUSCULAR; INTRAVENOUS at 00:47

## 2017-01-01 RX ADMIN — IPRATROPIUM BROMIDE AND ALBUTEROL SULFATE 3 ML: 2.5; .5 SOLUTION RESPIRATORY (INHALATION) at 21:00

## 2017-01-01 RX ADMIN — DOXYCYCLINE HYCLATE 100 MG: 100 TABLET, FILM COATED ORAL at 09:06

## 2017-01-01 RX ADMIN — QUETIAPINE FUMARATE 200 MG: 100 TABLET, FILM COATED ORAL at 21:31

## 2017-01-01 RX ADMIN — HEPARIN SODIUM 15 UNITS/KG/HR: 10000 INJECTION, SOLUTION INTRAVENOUS at 14:49

## 2017-01-01 RX ADMIN — Medication: at 15:03

## 2017-01-01 RX ADMIN — ALBUTEROL SULFATE 2 PUFF: 90 AEROSOL, METERED RESPIRATORY (INHALATION) at 23:07

## 2017-01-01 RX ADMIN — CLONIDINE HYDROCHLORIDE 0.2 MG: 0.2 TABLET ORAL at 14:30

## 2017-01-01 RX ADMIN — NICOTINE 1 PATCH: 21 PATCH TRANSDERMAL at 08:54

## 2017-01-01 RX ADMIN — LABETALOL HYDROCHLORIDE 100 MG: 100 TABLET, FILM COATED ORAL at 10:17

## 2017-01-01 RX ADMIN — NYSTATIN: 100000 CREAM TOPICAL at 08:11

## 2017-01-01 RX ADMIN — LABETALOL HYDROCHLORIDE 200 MG: 200 TABLET, FILM COATED ORAL at 21:30

## 2017-01-01 RX ADMIN — NYSTATIN 1 APPLICATION: 100000 POWDER TOPICAL at 21:31

## 2017-01-01 RX ADMIN — MIRTAZAPINE 15 MG: 15 TABLET, FILM COATED ORAL at 10:10

## 2017-01-01 RX ADMIN — NITROGLYCERIN 0.4 MG: 0.4 TABLET SUBLINGUAL at 06:57

## 2017-01-01 RX ADMIN — DOCUSATE SODIUM 100 MG: 50 LIQUID ORAL at 17:20

## 2017-01-01 RX ADMIN — LISINOPRIL 20 MG: 20 TABLET ORAL at 09:02

## 2017-01-01 RX ADMIN — METHYLPREDNISOLONE SODIUM SUCCINATE 60 MG: 125 INJECTION, POWDER, FOR SOLUTION INTRAMUSCULAR; INTRAVENOUS at 19:21

## 2017-01-01 RX ADMIN — MORPHINE SULFATE 1 MG: 2 INJECTION, SOLUTION INTRAMUSCULAR; INTRAVENOUS at 19:09

## 2017-01-01 RX ADMIN — FUROSEMIDE 40 MG: 10 INJECTION, SOLUTION INTRAMUSCULAR; INTRAVENOUS at 08:12

## 2017-01-01 RX ADMIN — METHYLPREDNISOLONE SODIUM SUCCINATE 80 MG: 125 INJECTION, POWDER, FOR SOLUTION INTRAMUSCULAR; INTRAVENOUS at 13:53

## 2017-01-01 RX ADMIN — BUSPIRONE HYDROCHLORIDE 15 MG: 15 TABLET ORAL at 08:05

## 2017-01-01 RX ADMIN — SODIUM BICARBONATE 50 MEQ: 84 INJECTION, SOLUTION INTRAVENOUS at 11:51

## 2017-01-01 RX ADMIN — NICOTINE 1 PATCH: 21 PATCH TRANSDERMAL at 10:40

## 2017-01-01 RX ADMIN — BUSPIRONE HYDROCHLORIDE 15 MG: 15 TABLET ORAL at 21:26

## 2017-01-01 RX ADMIN — ALBUTEROL SULFATE 2.5 MG: 2.5 SOLUTION RESPIRATORY (INHALATION) at 13:20

## 2017-01-01 RX ADMIN — QUETIAPINE FUMARATE 200 MG: 100 TABLET, FILM COATED ORAL at 21:16

## 2017-01-01 RX ADMIN — PROPOFOL 35 MCG/KG/MIN: 10 INJECTION, EMULSION INTRAVENOUS at 01:52

## 2017-01-01 RX ADMIN — MORPHINE SULFATE 4 MG: 4 INJECTION, SOLUTION INTRAMUSCULAR; INTRAVENOUS at 18:58

## 2017-01-01 RX ADMIN — LABETALOL HYDROCHLORIDE 20 MG: 5 INJECTION, SOLUTION INTRAVENOUS at 19:13

## 2017-01-01 RX ADMIN — CLONIDINE HYDROCHLORIDE 0.2 MG: 0.2 TABLET ORAL at 05:27

## 2017-01-01 RX ADMIN — Medication 10 ML: at 06:27

## 2017-01-01 RX ADMIN — FENTANYL CITRATE 25 MCG: 50 INJECTION, SOLUTION INTRAMUSCULAR; INTRAVENOUS at 16:30

## 2017-01-01 RX ADMIN — PANTOPRAZOLE SODIUM 40 MG: 40 TABLET, DELAYED RELEASE ORAL at 06:22

## 2017-01-01 RX ADMIN — SPIRONOLACTONE 25 MG: 25 TABLET ORAL at 10:28

## 2017-01-01 RX ADMIN — HYDRALAZINE HYDROCHLORIDE 10 MG: 20 INJECTION INTRAMUSCULAR; INTRAVENOUS at 13:49

## 2017-01-01 RX ADMIN — TAZOBACTAM SODIUM AND PIPERACILLIN SODIUM 3.38 G: 375; 3 INJECTION, SOLUTION INTRAVENOUS at 20:53

## 2017-01-01 RX ADMIN — NYSTATIN: 100000 POWDER TOPICAL at 10:31

## 2017-01-01 RX ADMIN — OXYCODONE HYDROCHLORIDE AND ACETAMINOPHEN 1 TABLET: 10; 325 TABLET ORAL at 03:54

## 2017-01-01 RX ADMIN — FUROSEMIDE 40 MG: 40 TABLET ORAL at 18:10

## 2017-01-01 RX ADMIN — PROPOFOL 30 MCG/KG/MIN: 10 INJECTION, EMULSION INTRAVENOUS at 08:13

## 2017-01-01 RX ADMIN — PHYTONADIONE 10 MG: 10 INJECTION, EMULSION INTRAMUSCULAR; INTRAVENOUS; SUBCUTANEOUS at 15:35

## 2017-01-01 RX ADMIN — ALBUTEROL SULFATE 2 PUFF: 90 AEROSOL, METERED RESPIRATORY (INHALATION) at 07:21

## 2017-01-01 RX ADMIN — LABETALOL HYDROCHLORIDE 200 MG: 200 TABLET, FILM COATED ORAL at 09:22

## 2017-01-01 RX ADMIN — ALBUTEROL SULFATE 2 PUFF: 90 AEROSOL, METERED RESPIRATORY (INHALATION) at 03:06

## 2017-01-01 RX ADMIN — MINERAL OIL AND PETROLATUM: 150; 830 OINTMENT OPHTHALMIC at 08:47

## 2017-01-01 RX ADMIN — IPRATROPIUM BROMIDE 2 PUFF: 17 AEROSOL, METERED RESPIRATORY (INHALATION) at 19:14

## 2017-01-01 RX ADMIN — NYSTATIN: 100000 POWDER TOPICAL at 20:31

## 2017-01-01 RX ADMIN — LABETALOL HYDROCHLORIDE 100 MG: 100 TABLET, FILM COATED ORAL at 10:11

## 2017-01-01 RX ADMIN — IPRATROPIUM BROMIDE 2 PUFF: 17 AEROSOL, METERED RESPIRATORY (INHALATION) at 11:30

## 2017-01-01 RX ADMIN — METHYLPREDNISOLONE SODIUM SUCCINATE 80 MG: 125 INJECTION, POWDER, FOR SOLUTION INTRAMUSCULAR; INTRAVENOUS at 08:01

## 2017-01-01 RX ADMIN — ATORVASTATIN CALCIUM 80 MG: 40 TABLET, FILM COATED ORAL at 20:31

## 2017-01-01 RX ADMIN — SODIUM BICARBONATE 75 ML/HR: 84 INJECTION, SOLUTION INTRAVENOUS at 16:09

## 2017-01-01 RX ADMIN — ASPIRIN 81 MG: 81 TABLET ORAL at 09:30

## 2017-01-01 RX ADMIN — FUROSEMIDE 40 MG: 10 INJECTION, SOLUTION INTRAMUSCULAR; INTRAVENOUS at 17:54

## 2017-01-01 RX ADMIN — PROPOFOL 50 MG: 10 INJECTION, EMULSION INTRAVENOUS at 17:45

## 2017-01-01 RX ADMIN — METHYLPREDNISOLONE SODIUM SUCCINATE 80 MG: 125 INJECTION, POWDER, FOR SOLUTION INTRAMUSCULAR; INTRAVENOUS at 21:51

## 2017-01-01 RX ADMIN — TRAZODONE HYDROCHLORIDE 150 MG: 150 TABLET ORAL at 20:15

## 2017-01-01 RX ADMIN — BUSPIRONE HYDROCHLORIDE 15 MG: 15 TABLET ORAL at 09:06

## 2017-01-01 RX ADMIN — ANORECTAL OINTMENT: 15.7; .44; 24; 20.6 OINTMENT TOPICAL at 07:58

## 2017-01-01 RX ADMIN — MIRTAZAPINE 15 MG: 15 TABLET, FILM COATED ORAL at 08:30

## 2017-01-01 RX ADMIN — MORPHINE SULFATE 1 MG: 2 INJECTION, SOLUTION INTRAMUSCULAR; INTRAVENOUS at 12:49

## 2017-01-01 RX ADMIN — MORPHINE SULFATE 1 MG: 2 INJECTION, SOLUTION INTRAMUSCULAR; INTRAVENOUS at 13:57

## 2017-01-01 RX ADMIN — QUETIAPINE FUMARATE 200 MG: 100 TABLET, FILM COATED ORAL at 04:00

## 2017-01-01 RX ADMIN — NYSTATIN 1 APPLICATION: 100000 POWDER TOPICAL at 10:21

## 2017-01-01 RX ADMIN — BUDESONIDE AND FORMOTEROL FUMARATE DIHYDRATE 2 PUFF: 160; 4.5 AEROSOL RESPIRATORY (INHALATION) at 19:59

## 2017-01-01 RX ADMIN — ANORECTAL OINTMENT: 15.7; .44; 24; 20.6 OINTMENT TOPICAL at 17:13

## 2017-01-01 RX ADMIN — BUDESONIDE AND FORMOTEROL FUMARATE DIHYDRATE 2 PUFF: 160; 4.5 AEROSOL RESPIRATORY (INHALATION) at 19:33

## 2017-01-01 RX ADMIN — HYDRALAZINE HYDROCHLORIDE 20 MG: 20 INJECTION INTRAMUSCULAR; INTRAVENOUS at 02:46

## 2017-01-01 RX ADMIN — PROPOFOL 30 MCG/KG/MIN: 10 INJECTION, EMULSION INTRAVENOUS at 03:32

## 2017-01-01 RX ADMIN — BUDESONIDE AND FORMOTEROL FUMARATE DIHYDRATE 2 PUFF: 160; 4.5 AEROSOL RESPIRATORY (INHALATION) at 07:11

## 2017-01-01 RX ADMIN — MORPHINE SULFATE 1 MG: 2 INJECTION, SOLUTION INTRAMUSCULAR; INTRAVENOUS at 16:44

## 2017-01-01 RX ADMIN — Medication 10 ML: at 20:29

## 2017-01-01 RX ADMIN — METHYLPREDNISOLONE SODIUM SUCCINATE 80 MG: 125 INJECTION, POWDER, FOR SOLUTION INTRAMUSCULAR; INTRAVENOUS at 10:00

## 2017-01-01 RX ADMIN — CLONIDINE HYDROCHLORIDE 0.2 MG: 0.2 TABLET ORAL at 21:50

## 2017-01-01 RX ADMIN — BUDESONIDE AND FORMOTEROL FUMARATE DIHYDRATE 2 PUFF: 160; 4.5 AEROSOL RESPIRATORY (INHALATION) at 09:35

## 2017-01-01 RX ADMIN — SODIUM CHLORIDE 500 ML: 9 INJECTION, SOLUTION INTRAVENOUS at 06:37

## 2017-01-01 RX ADMIN — SODIUM CHLORIDE 75 ML/HR: 900 INJECTION, SOLUTION INTRAVENOUS at 00:29

## 2017-01-01 RX ADMIN — FUROSEMIDE 40 MG: 40 TABLET ORAL at 17:55

## 2017-01-01 RX ADMIN — IPRATROPIUM BROMIDE 2 PUFF: 17 AEROSOL, METERED RESPIRATORY (INHALATION) at 07:36

## 2017-01-01 RX ADMIN — FUROSEMIDE 40 MG: 10 INJECTION, SOLUTION INTRAMUSCULAR; INTRAVENOUS at 17:19

## 2017-01-01 RX ADMIN — SODIUM CHLORIDE 30 ML/HR: 900 INJECTION, SOLUTION INTRAVENOUS at 15:54

## 2017-01-01 RX ADMIN — MORPHINE SULFATE 4 MG: 4 INJECTION, SOLUTION INTRAMUSCULAR; INTRAVENOUS at 23:36

## 2017-01-01 RX ADMIN — POLYETHYLENE GLYCOL 3350, SODIUM CHLORIDE, POTASSIUM CHLORIDE, SODIUM BICARBONATE, AND SODIUM SULFATE 2000 ML: 240; 5.84; 2.98; 6.72; 22.72 POWDER, FOR SOLUTION ORAL at 16:49

## 2017-01-01 RX ADMIN — Medication 10 ML: at 17:50

## 2017-01-01 RX ADMIN — PREDNISONE 20 MG: 20 TABLET ORAL at 08:30

## 2017-01-01 RX ADMIN — IPRATROPIUM BROMIDE AND ALBUTEROL SULFATE 3 ML: 2.5; .5 SOLUTION RESPIRATORY (INHALATION) at 14:32

## 2017-01-01 RX ADMIN — NICOTINE 1 PATCH: 21 PATCH TRANSDERMAL at 09:00

## 2017-01-01 RX ADMIN — MORPHINE SULFATE 1 MG: 2 INJECTION, SOLUTION INTRAMUSCULAR; INTRAVENOUS at 10:14

## 2017-01-01 RX ADMIN — PANTOPRAZOLE SODIUM 40 MG: 40 TABLET, DELAYED RELEASE ORAL at 05:56

## 2017-01-01 RX ADMIN — DOCUSATE SODIUM 100 MG: 100 CAPSULE, LIQUID FILLED ORAL at 17:08

## 2017-01-01 RX ADMIN — DOXYCYCLINE HYCLATE 100 MG: 100 TABLET, FILM COATED ORAL at 20:33

## 2017-01-01 RX ADMIN — MORPHINE SULFATE 2 MG: 2 INJECTION, SOLUTION INTRAMUSCULAR; INTRAVENOUS at 18:37

## 2017-01-01 RX ADMIN — LISINOPRIL 20 MG: 20 TABLET ORAL at 09:30

## 2017-01-01 RX ADMIN — MINERAL OIL AND PETROLATUM: 150; 830 OINTMENT OPHTHALMIC at 09:03

## 2017-01-01 RX ADMIN — FUROSEMIDE 20 MG: 10 INJECTION, SOLUTION INTRAMUSCULAR; INTRAVENOUS at 08:50

## 2017-01-01 RX ADMIN — NICOTINE 1 PATCH: 21 PATCH TRANSDERMAL at 09:17

## 2017-01-01 RX ADMIN — FOLIC ACID 1 MG: 1 TABLET ORAL at 08:51

## 2017-01-01 RX ADMIN — ATORVASTATIN CALCIUM 80 MG: 40 TABLET, FILM COATED ORAL at 20:35

## 2017-01-01 RX ADMIN — CHLORHEXIDINE GLUCONATE 15 ML: 1.2 RINSE ORAL at 08:10

## 2017-01-01 RX ADMIN — HYDROCODONE BITARTRATE AND ACETAMINOPHEN 1 TABLET: 5; 325 TABLET ORAL at 15:24

## 2017-01-01 RX ADMIN — Medication 10 ML: at 06:22

## 2017-01-01 RX ADMIN — TRAZODONE HYDROCHLORIDE 150 MG: 150 TABLET ORAL at 20:08

## 2017-01-01 RX ADMIN — Medication 10 ML: at 20:34

## 2017-01-01 RX ADMIN — PROPOFOL 40 MCG/KG/MIN: 10 INJECTION, EMULSION INTRAVENOUS at 16:46

## 2017-01-01 RX ADMIN — HYDRALAZINE HYDROCHLORIDE 10 MG: 20 INJECTION INTRAMUSCULAR; INTRAVENOUS at 00:23

## 2017-01-01 RX ADMIN — TAZOBACTAM SODIUM AND PIPERACILLIN SODIUM 3.38 G: 375; 3 INJECTION, SOLUTION INTRAVENOUS at 03:31

## 2017-01-01 RX ADMIN — MORPHINE SULFATE 2 MG: 2 INJECTION, SOLUTION INTRAMUSCULAR; INTRAVENOUS at 03:03

## 2017-01-01 RX ADMIN — PHENYLEPHRINE HYDROCHLORIDE 200 MCG: 10 INJECTION INTRAVENOUS at 15:20

## 2017-01-01 RX ADMIN — SODIUM CHLORIDE 100 ML/HR: 9 INJECTION, SOLUTION INTRAVENOUS at 10:31

## 2017-01-01 RX ADMIN — FOLIC ACID 1 MG: 1 TABLET ORAL at 19:58

## 2017-01-01 RX ADMIN — DOCUSATE SODIUM 100 MG: 50 LIQUID ORAL at 08:13

## 2017-01-01 RX ADMIN — SODIUM CHLORIDE 100 ML: 900 INJECTION, SOLUTION INTRAVENOUS at 16:00

## 2017-01-01 RX ADMIN — BUDESONIDE AND FORMOTEROL FUMARATE DIHYDRATE 2 PUFF: 160; 4.5 AEROSOL RESPIRATORY (INHALATION) at 19:51

## 2017-01-01 RX ADMIN — SPIRONOLACTONE 25 MG: 25 TABLET ORAL at 09:30

## 2017-01-01 RX ADMIN — CLONIDINE HYDROCHLORIDE 0.2 MG: 0.2 TABLET ORAL at 21:35

## 2017-01-01 RX ADMIN — LABETALOL HYDROCHLORIDE 200 MG: 200 TABLET, FILM COATED ORAL at 19:44

## 2017-01-01 RX ADMIN — PROPOFOL 50 MCG/KG/MIN: 10 INJECTION, EMULSION INTRAVENOUS at 15:13

## 2017-01-01 RX ADMIN — LABETALOL HYDROCHLORIDE 200 MG: 200 TABLET, FILM COATED ORAL at 09:09

## 2017-01-01 RX ADMIN — ACETAMINOPHEN 650 MG: 325 TABLET ORAL at 09:59

## 2017-01-01 RX ADMIN — MORPHINE SULFATE 1 MG: 2 INJECTION, SOLUTION INTRAMUSCULAR; INTRAVENOUS at 05:30

## 2017-01-01 RX ADMIN — HYDRALAZINE HYDROCHLORIDE 100 MG: 50 TABLET ORAL at 13:35

## 2017-01-01 RX ADMIN — ERYTHROMYCIN: 5 OINTMENT OPHTHALMIC at 21:11

## 2017-01-01 RX ADMIN — ERYTHROMYCIN: 5 OINTMENT OPHTHALMIC at 09:44

## 2017-01-01 RX ADMIN — ATORVASTATIN CALCIUM 80 MG: 40 TABLET, FILM COATED ORAL at 20:46

## 2017-01-01 RX ADMIN — NICOTINE 1 PATCH: 21 PATCH TRANSDERMAL at 09:07

## 2017-01-01 RX ADMIN — LABETALOL HYDROCHLORIDE 100 MG: 100 TABLET, FILM COATED ORAL at 21:30

## 2017-01-01 RX ADMIN — POTASSIUM CHLORIDE 20 MEQ: 750 CAPSULE, EXTENDED RELEASE ORAL at 09:06

## 2017-01-01 RX ADMIN — FUROSEMIDE 40 MG: 40 TABLET ORAL at 18:22

## 2017-01-01 RX ADMIN — AZTREONAM 2 G: 2 INJECTION, POWDER, FOR SOLUTION INTRAMUSCULAR; INTRAVENOUS at 09:34

## 2017-01-01 RX ADMIN — IPRATROPIUM BROMIDE 2 PUFF: 17 AEROSOL, METERED RESPIRATORY (INHALATION) at 18:46

## 2017-01-01 RX ADMIN — HYDRALAZINE HYDROCHLORIDE 10 MG: 20 INJECTION INTRAMUSCULAR; INTRAVENOUS at 05:00

## 2017-01-01 RX ADMIN — LISINOPRIL 20 MG: 20 TABLET ORAL at 08:11

## 2017-01-01 RX ADMIN — METHYLPREDNISOLONE SODIUM SUCCINATE 80 MG: 125 INJECTION, POWDER, FOR SOLUTION INTRAMUSCULAR; INTRAVENOUS at 15:46

## 2017-01-01 RX ADMIN — HYDRALAZINE HYDROCHLORIDE 20 MG: 20 INJECTION INTRAMUSCULAR; INTRAVENOUS at 09:01

## 2017-01-01 RX ADMIN — CHLORHEXIDINE GLUCONATE 15 ML: 1.2 RINSE ORAL at 21:28

## 2017-01-01 RX ADMIN — FUROSEMIDE 40 MG: 10 INJECTION, SOLUTION INTRAMUSCULAR; INTRAVENOUS at 09:50

## 2017-01-01 RX ADMIN — DEXTROSE AND SODIUM CHLORIDE 100 ML/HR: 5; 450 INJECTION, SOLUTION INTRAVENOUS at 05:28

## 2017-01-01 RX ADMIN — ALBUMIN HUMAN 12.5 G: 0.05 INJECTION, SOLUTION INTRAVENOUS at 11:28

## 2017-01-01 RX ADMIN — BUDESONIDE AND FORMOTEROL FUMARATE DIHYDRATE 2 PUFF: 160; 4.5 AEROSOL RESPIRATORY (INHALATION) at 07:21

## 2017-01-01 RX ADMIN — IPRATROPIUM BROMIDE 2 PUFF: 17 AEROSOL, METERED RESPIRATORY (INHALATION) at 07:08

## 2017-01-01 RX ADMIN — ALBUTEROL SULFATE 2 PUFF: 90 AEROSOL, METERED RESPIRATORY (INHALATION) at 11:21

## 2017-01-01 RX ADMIN — DEXTROSE AND SODIUM CHLORIDE 100 ML/HR: 5; 450 INJECTION, SOLUTION INTRAVENOUS at 17:51

## 2017-01-01 RX ADMIN — METHYLPREDNISOLONE SODIUM SUCCINATE 20 MG: 40 INJECTION, POWDER, FOR SOLUTION INTRAMUSCULAR; INTRAVENOUS at 01:50

## 2017-01-01 RX ADMIN — OXYCODONE HYDROCHLORIDE AND ACETAMINOPHEN 1 TABLET: 10; 325 TABLET ORAL at 22:53

## 2017-01-01 RX ADMIN — LABETALOL HYDROCHLORIDE 20 MG: 5 INJECTION, SOLUTION INTRAVENOUS at 09:03

## 2017-01-01 RX ADMIN — MORPHINE SULFATE 4 MG: 4 INJECTION, SOLUTION INTRAMUSCULAR; INTRAVENOUS at 05:12

## 2017-01-01 RX ADMIN — Medication: at 15:59

## 2017-01-01 RX ADMIN — PROPOFOL 40 MCG/KG/MIN: 10 INJECTION, EMULSION INTRAVENOUS at 21:57

## 2017-01-01 RX ADMIN — FUROSEMIDE 40 MG: 40 TABLET ORAL at 08:51

## 2017-01-01 RX ADMIN — PROPOFOL 50 MCG/KG/MIN: 10 INJECTION, EMULSION INTRAVENOUS at 21:28

## 2017-01-01 RX ADMIN — CHLORHEXIDINE GLUCONATE 15 ML: 1.2 RINSE ORAL at 08:11

## 2017-01-01 RX ADMIN — SODIUM CHLORIDE 125 ML/HR: 900 INJECTION, SOLUTION INTRAVENOUS at 22:37

## 2017-01-01 RX ADMIN — MORPHINE SULFATE 1 MG: 2 INJECTION, SOLUTION INTRAMUSCULAR; INTRAVENOUS at 07:15

## 2017-01-01 RX ADMIN — FUROSEMIDE 40 MG: 10 INJECTION, SOLUTION INTRAMUSCULAR; INTRAVENOUS at 19:06

## 2017-01-01 RX ADMIN — MORPHINE SULFATE 2 MG: 2 INJECTION, SOLUTION INTRAMUSCULAR; INTRAVENOUS at 02:44

## 2017-01-01 RX ADMIN — BUSPIRONE HYDROCHLORIDE 15 MG: 15 TABLET ORAL at 20:31

## 2017-01-01 RX ADMIN — NYSTATIN: 100000 CREAM TOPICAL at 08:35

## 2017-01-01 RX ADMIN — LABETALOL HYDROCHLORIDE 200 MG: 200 TABLET, FILM COATED ORAL at 20:37

## 2017-01-01 RX ADMIN — DOCUSATE SODIUM 100 MG: 100 CAPSULE, LIQUID FILLED ORAL at 08:05

## 2017-01-01 RX ADMIN — MORPHINE SULFATE 1 MG: 2 INJECTION, SOLUTION INTRAMUSCULAR; INTRAVENOUS at 15:48

## 2017-01-01 RX ADMIN — BUDESONIDE AND FORMOTEROL FUMARATE DIHYDRATE 2 PUFF: 160; 4.5 AEROSOL RESPIRATORY (INHALATION) at 19:12

## 2017-01-01 RX ADMIN — Medication: at 20:10

## 2017-01-01 RX ADMIN — HEPARIN SODIUM 8000 UNITS: 5000 INJECTION, SOLUTION INTRAVENOUS; SUBCUTANEOUS at 14:44

## 2017-01-01 RX ADMIN — VECURONIUM BROMIDE 4 MG: 1 INJECTION, POWDER, LYOPHILIZED, FOR SOLUTION INTRAVENOUS at 15:30

## 2017-01-01 RX ADMIN — HYDROCODONE BITARTRATE AND ACETAMINOPHEN 1 TABLET: 7.5; 325 TABLET ORAL at 04:08

## 2017-01-01 RX ADMIN — ALBUTEROL SULFATE 2.5 MG: 2.5 SOLUTION RESPIRATORY (INHALATION) at 14:39

## 2017-01-01 RX ADMIN — ERYTHROMYCIN: 5 OINTMENT OPHTHALMIC at 09:04

## 2017-01-01 RX ADMIN — MIDAZOLAM 10 MG/HR: 5 INJECTION INTRAMUSCULAR; INTRAVENOUS at 04:39

## 2017-01-01 RX ADMIN — MORPHINE SULFATE 1 MG: 2 INJECTION, SOLUTION INTRAMUSCULAR; INTRAVENOUS at 21:09

## 2017-01-01 RX ADMIN — LORAZEPAM 1 MG: 2 INJECTION INTRAMUSCULAR; INTRAVENOUS at 08:46

## 2017-01-01 RX ADMIN — PROPOFOL 40 MCG/KG/MIN: 10 INJECTION, EMULSION INTRAVENOUS at 03:55

## 2017-01-01 RX ADMIN — HYDRALAZINE HYDROCHLORIDE 100 MG: 50 TABLET ORAL at 20:33

## 2017-01-01 RX ADMIN — IPRATROPIUM BROMIDE 2 PUFF: 17 AEROSOL, METERED RESPIRATORY (INHALATION) at 19:57

## 2017-01-01 RX ADMIN — HYDRALAZINE HYDROCHLORIDE 20 MG: 20 INJECTION INTRAMUSCULAR; INTRAVENOUS at 00:17

## 2017-01-01 RX ADMIN — BUSPIRONE HYDROCHLORIDE 15 MG: 15 TABLET ORAL at 23:39

## 2017-01-01 RX ADMIN — ALBUTEROL SULFATE 2.5 MG: 2.5 SOLUTION RESPIRATORY (INHALATION) at 10:53

## 2017-01-01 RX ADMIN — BUDESONIDE AND FORMOTEROL FUMARATE DIHYDRATE 2 PUFF: 160; 4.5 AEROSOL RESPIRATORY (INHALATION) at 18:37

## 2017-01-01 RX ADMIN — TAZOBACTAM SODIUM AND PIPERACILLIN SODIUM 3.38 G: 375; 3 INJECTION, SOLUTION INTRAVENOUS at 19:44

## 2017-01-01 RX ADMIN — MORPHINE SULFATE 1 MG: 2 INJECTION, SOLUTION INTRAMUSCULAR; INTRAVENOUS at 18:00

## 2017-01-01 RX ADMIN — MIDAZOLAM 10 MG/HR: 5 INJECTION INTRAMUSCULAR; INTRAVENOUS at 14:45

## 2017-01-01 RX ADMIN — LABETALOL HYDROCHLORIDE 200 MG: 200 TABLET, FILM COATED ORAL at 17:13

## 2017-01-01 RX ADMIN — SODIUM CHLORIDE 5 MG/HR: 9 INJECTION, SOLUTION INTRAVENOUS at 15:47

## 2017-01-01 RX ADMIN — ALBUTEROL SULFATE 2.5 MG: 2.5 SOLUTION RESPIRATORY (INHALATION) at 11:02

## 2017-01-01 RX ADMIN — SODIUM CHLORIDE 100 ML: 900 INJECTION, SOLUTION INTRAVENOUS at 16:05

## 2017-01-01 RX ADMIN — MORPHINE SULFATE 4 MG: 4 INJECTION, SOLUTION INTRAMUSCULAR; INTRAVENOUS at 10:26

## 2017-01-01 RX ADMIN — ASPIRIN 81 MG: 81 TABLET ORAL at 08:05

## 2017-01-01 RX ADMIN — MIDAZOLAM 5 MG/HR: 5 INJECTION INTRAMUSCULAR; INTRAVENOUS at 09:22

## 2017-01-01 RX ADMIN — MORPHINE SULFATE 1 MG: 2 INJECTION, SOLUTION INTRAMUSCULAR; INTRAVENOUS at 07:33

## 2017-01-01 RX ADMIN — HYDROCODONE BITARTRATE AND ACETAMINOPHEN 1 TABLET: 5; 325 TABLET ORAL at 08:30

## 2017-01-01 RX ADMIN — LABETALOL HYDROCHLORIDE 200 MG: 200 TABLET, FILM COATED ORAL at 20:21

## 2017-01-01 RX ADMIN — FUROSEMIDE 40 MG: 10 INJECTION, SOLUTION INTRAMUSCULAR; INTRAVENOUS at 09:02

## 2017-01-01 RX ADMIN — ATORVASTATIN CALCIUM 80 MG: 40 TABLET, FILM COATED ORAL at 21:00

## 2017-01-01 RX ADMIN — CLONIDINE HYDROCHLORIDE 0.2 MG: 0.1 TABLET ORAL at 08:44

## 2017-01-01 RX ADMIN — PANTOPRAZOLE SODIUM 40 MG: 40 INJECTION, POWDER, FOR SOLUTION INTRAVENOUS at 05:42

## 2017-01-01 RX ADMIN — ATROPINE SULFATE 1 DROP: 10 SOLUTION/ DROPS OPHTHALMIC at 15:21

## 2017-01-01 RX ADMIN — HYDRALAZINE HYDROCHLORIDE 50 MG: 50 TABLET ORAL at 08:05

## 2017-01-01 RX ADMIN — CLONIDINE HYDROCHLORIDE 0.2 MG: 0.2 TABLET ORAL at 05:46

## 2017-01-01 RX ADMIN — MINERAL OIL AND PETROLATUM: 150; 830 OINTMENT OPHTHALMIC at 08:30

## 2017-01-01 RX ADMIN — ALBUTEROL SULFATE 2.5 MG: 2.5 SOLUTION RESPIRATORY (INHALATION) at 19:27

## 2017-01-01 RX ADMIN — LORAZEPAM 1 MG: 2 INJECTION INTRAMUSCULAR; INTRAVENOUS at 15:41

## 2017-01-01 RX ADMIN — TRAZODONE HYDROCHLORIDE 150 MG: 150 TABLET ORAL at 20:19

## 2017-01-01 RX ADMIN — SPIRONOLACTONE 25 MG: 25 TABLET ORAL at 09:09

## 2017-01-01 RX ADMIN — MIDAZOLAM HYDROCHLORIDE 1 MG: 1 INJECTION, SOLUTION INTRAMUSCULAR; INTRAVENOUS at 14:52

## 2017-01-01 RX ADMIN — FUROSEMIDE 40 MG: 10 INJECTION, SOLUTION INTRAMUSCULAR; INTRAVENOUS at 08:08

## 2017-01-01 RX ADMIN — VECURONIUM BROMIDE 2 MG: 1 INJECTION, POWDER, LYOPHILIZED, FOR SOLUTION INTRAVENOUS at 15:55

## 2017-01-01 RX ADMIN — LABETALOL HYDROCHLORIDE 200 MG: 200 TABLET, FILM COATED ORAL at 20:52

## 2017-01-01 RX ADMIN — FUROSEMIDE 40 MG: 40 TABLET ORAL at 08:58

## 2017-01-01 RX ADMIN — PREDNISONE 40 MG: 20 TABLET ORAL at 08:05

## 2017-01-01 RX ADMIN — MORPHINE SULFATE 4 MG: 4 INJECTION, SOLUTION INTRAMUSCULAR; INTRAVENOUS at 08:21

## 2017-01-01 RX ADMIN — ERYTHROMYCIN: 5 OINTMENT OPHTHALMIC at 20:34

## 2017-01-01 RX ADMIN — MORPHINE SULFATE 4 MG: 4 INJECTION, SOLUTION INTRAMUSCULAR; INTRAVENOUS at 01:49

## 2017-01-01 RX ADMIN — IPRATROPIUM BROMIDE AND ALBUTEROL SULFATE 3 ML: 2.5; .5 SOLUTION RESPIRATORY (INHALATION) at 12:42

## 2017-01-01 RX ADMIN — CEFTRIAXONE 1 G: 1 INJECTION, POWDER, FOR SOLUTION INTRAMUSCULAR; INTRAVENOUS at 10:09

## 2017-01-01 RX ADMIN — HYDRALAZINE HYDROCHLORIDE 20 MG: 20 INJECTION INTRAMUSCULAR; INTRAVENOUS at 00:31

## 2017-01-01 RX ADMIN — IPRATROPIUM BROMIDE AND ALBUTEROL SULFATE 3 ML: 2.5; .5 SOLUTION RESPIRATORY (INHALATION) at 19:43

## 2017-01-01 RX ADMIN — NYSTATIN: 100000 CREAM TOPICAL at 09:45

## 2017-01-01 RX ADMIN — MORPHINE SULFATE 1 MG: 2 INJECTION, SOLUTION INTRAMUSCULAR; INTRAVENOUS at 13:31

## 2017-01-01 RX ADMIN — FUROSEMIDE 40 MG: 10 INJECTION, SOLUTION INTRAMUSCULAR; INTRAVENOUS at 18:30

## 2017-01-01 RX ADMIN — LABETALOL HYDROCHLORIDE 200 MG: 200 TABLET, FILM COATED ORAL at 20:17

## 2017-01-01 RX ADMIN — MORPHINE SULFATE 4 MG: 4 INJECTION, SOLUTION INTRAMUSCULAR; INTRAVENOUS at 14:06

## 2017-01-01 RX ADMIN — HYDROCODONE BITARTRATE AND ACETAMINOPHEN 1 TABLET: 7.5; 325 TABLET ORAL at 13:59

## 2017-01-01 RX ADMIN — HYDRALAZINE HYDROCHLORIDE 100 MG: 50 TABLET ORAL at 05:26

## 2017-01-01 RX ADMIN — METHYLPREDNISOLONE SODIUM SUCCINATE 40 MG: 40 INJECTION, POWDER, FOR SOLUTION INTRAMUSCULAR; INTRAVENOUS at 12:32

## 2017-01-01 RX ADMIN — PROPOFOL 50 MCG/KG/MIN: 10 INJECTION, EMULSION INTRAVENOUS at 12:43

## 2017-01-01 RX ADMIN — NYSTATIN: 100000 CREAM TOPICAL at 08:18

## 2017-01-01 RX ADMIN — MORPHINE SULFATE 1 MG: 2 INJECTION, SOLUTION INTRAMUSCULAR; INTRAVENOUS at 15:59

## 2017-01-01 RX ADMIN — TRAZODONE HYDROCHLORIDE 150 MG: 150 TABLET ORAL at 22:19

## 2017-01-01 RX ADMIN — ERYTHROMYCIN: 5 OINTMENT OPHTHALMIC at 08:32

## 2017-01-01 RX ADMIN — CEFTRIAXONE 2 G: 2 INJECTION, POWDER, FOR SOLUTION INTRAMUSCULAR; INTRAVENOUS at 19:13

## 2017-01-01 RX ADMIN — NYSTATIN 1 APPLICATION: 100000 POWDER TOPICAL at 10:00

## 2017-01-01 RX ADMIN — PROPOFOL 50 MCG/KG/MIN: 10 INJECTION, EMULSION INTRAVENOUS at 18:02

## 2017-01-01 RX ADMIN — NYSTATIN: 100000 CREAM TOPICAL at 09:00

## 2017-01-01 RX ADMIN — POTASSIUM CHLORIDE 20 MEQ: 20 SOLUTION ORAL at 18:35

## 2017-01-01 RX ADMIN — MORPHINE SULFATE 4 MG: 4 INJECTION, SOLUTION INTRAMUSCULAR; INTRAVENOUS at 02:35

## 2017-01-01 RX ADMIN — CLONIDINE HYDROCHLORIDE 0.2 MG: 0.2 TABLET ORAL at 05:54

## 2017-01-01 RX ADMIN — ERYTHROMYCIN: 5 OINTMENT OPHTHALMIC at 09:22

## 2017-01-01 RX ADMIN — PANTOPRAZOLE SODIUM 40 MG: 40 INJECTION, POWDER, FOR SOLUTION INTRAVENOUS at 05:46

## 2017-01-01 RX ADMIN — ONDANSETRON 4 MG: 2 INJECTION INTRAMUSCULAR; INTRAVENOUS at 17:30

## 2017-01-01 RX ADMIN — ACETYLCYSTEINE 600 MG: 100 INHALANT RESPIRATORY (INHALATION) at 20:40

## 2017-01-01 RX ADMIN — MORPHINE SULFATE 4 MG: 4 INJECTION, SOLUTION INTRAMUSCULAR; INTRAVENOUS at 01:03

## 2017-01-01 RX ADMIN — ATORVASTATIN CALCIUM 80 MG: 40 TABLET, FILM COATED ORAL at 20:15

## 2017-01-01 RX ADMIN — VANCOMYCIN HYDROCHLORIDE 1750 MG: 5 INJECTION, POWDER, LYOPHILIZED, FOR SOLUTION INTRAVENOUS at 23:44

## 2017-01-01 RX ADMIN — ANORECTAL OINTMENT: 15.7; .44; 24; 20.6 OINTMENT TOPICAL at 13:30

## 2017-01-01 RX ADMIN — MORPHINE SULFATE 4 MG: 4 INJECTION, SOLUTION INTRAMUSCULAR; INTRAVENOUS at 17:16

## 2017-01-01 RX ADMIN — METHYLPREDNISOLONE SODIUM SUCCINATE 80 MG: 125 INJECTION, POWDER, FOR SOLUTION INTRAMUSCULAR; INTRAVENOUS at 14:30

## 2017-01-01 RX ADMIN — QUETIAPINE FUMARATE 200 MG: 100 TABLET, FILM COATED ORAL at 20:15

## 2017-01-01 RX ADMIN — HYDRALAZINE HYDROCHLORIDE 10 MG: 20 INJECTION INTRAMUSCULAR; INTRAVENOUS at 20:16

## 2017-01-01 RX ADMIN — POTASSIUM CHLORIDE 20 MEQ: 20 TABLET, EXTENDED RELEASE ORAL at 19:22

## 2017-01-01 RX ADMIN — DOXYCYCLINE HYCLATE 100 MG: 100 TABLET, FILM COATED ORAL at 08:44

## 2017-01-01 RX ADMIN — FUROSEMIDE 40 MG: 10 INJECTION, SOLUTION INTRAMUSCULAR; INTRAVENOUS at 20:54

## 2017-01-01 RX ADMIN — QUETIAPINE FUMARATE 200 MG: 100 TABLET, FILM COATED ORAL at 20:32

## 2017-01-01 RX ADMIN — FENTANYL CITRATE 25 MCG: 50 INJECTION, SOLUTION INTRAMUSCULAR; INTRAVENOUS at 10:44

## 2017-01-01 RX ADMIN — ATROPINE SULFATE 1 DROP: 10 SOLUTION/ DROPS OPHTHALMIC at 17:40

## 2017-01-01 RX ADMIN — MORPHINE SULFATE 4 MG: 4 INJECTION, SOLUTION INTRAMUSCULAR; INTRAVENOUS at 02:32

## 2017-01-01 RX ADMIN — CLONIDINE HYDROCHLORIDE 0.1 MG: 0.1 TABLET ORAL at 06:11

## 2017-01-01 RX ADMIN — FUROSEMIDE 20 MG: 10 INJECTION, SOLUTION INTRAVENOUS at 15:34

## 2017-01-01 RX ADMIN — IPRATROPIUM BROMIDE 2 PUFF: 17 AEROSOL, METERED RESPIRATORY (INHALATION) at 21:10

## 2017-01-01 RX ADMIN — FUROSEMIDE 40 MG: 40 TABLET ORAL at 17:19

## 2017-01-01 RX ADMIN — CHLORHEXIDINE GLUCONATE 15 ML: 1.2 RINSE ORAL at 09:00

## 2017-01-01 RX ADMIN — CEFTRIAXONE 1 G: 1 INJECTION, POWDER, FOR SOLUTION INTRAMUSCULAR; INTRAVENOUS at 08:32

## 2017-01-01 RX ADMIN — MINERAL OIL AND PETROLATUM: 150; 830 OINTMENT OPHTHALMIC at 20:48

## 2017-01-01 RX ADMIN — LABETALOL HYDROCHLORIDE 100 MG: 100 TABLET, FILM COATED ORAL at 08:20

## 2017-01-01 RX ADMIN — ATORVASTATIN CALCIUM 80 MG: 40 TABLET, FILM COATED ORAL at 21:16

## 2017-01-01 RX ADMIN — CHLORHEXIDINE GLUCONATE 15 ML: 1.2 RINSE ORAL at 20:43

## 2017-01-01 RX ADMIN — CEFTRIAXONE 2 G: 2 INJECTION, POWDER, FOR SOLUTION INTRAMUSCULAR; INTRAVENOUS at 18:30

## 2017-01-01 RX ADMIN — FENTANYL CITRATE 25 MCG: 50 INJECTION, SOLUTION INTRAMUSCULAR; INTRAVENOUS at 17:50

## 2017-01-01 RX ADMIN — NICOTINE 1 PATCH: 21 PATCH TRANSDERMAL at 10:26

## 2017-01-01 RX ADMIN — ALBUTEROL SULFATE 2.5 MG: 2.5 SOLUTION RESPIRATORY (INHALATION) at 12:36

## 2017-01-01 RX ADMIN — SODIUM CHLORIDE 125 ML/HR: 900 INJECTION, SOLUTION INTRAVENOUS at 08:32

## 2017-01-01 RX ADMIN — MIDAZOLAM HYDROCHLORIDE 1 MG: 1 INJECTION, SOLUTION INTRAMUSCULAR; INTRAVENOUS at 19:54

## 2017-01-01 RX ADMIN — Medication: at 09:00

## 2017-01-01 RX ADMIN — LABETALOL HYDROCHLORIDE 200 MG: 200 TABLET, FILM COATED ORAL at 20:46

## 2017-01-01 RX ADMIN — SODIUM CHLORIDE 200 ML: 900 INJECTION, SOLUTION INTRAVENOUS at 16:55

## 2017-01-01 RX ADMIN — BUDESONIDE AND FORMOTEROL FUMARATE DIHYDRATE 2 PUFF: 160; 4.5 AEROSOL RESPIRATORY (INHALATION) at 19:10

## 2017-01-01 RX ADMIN — PROPOFOL 50 MCG/KG/MIN: 10 INJECTION, EMULSION INTRAVENOUS at 05:56

## 2017-01-01 RX ADMIN — ATORVASTATIN CALCIUM 80 MG: 40 TABLET, FILM COATED ORAL at 04:01

## 2017-01-01 RX ADMIN — HYDROCODONE BITARTRATE AND ACETAMINOPHEN 1 TABLET: 5; 325 TABLET ORAL at 11:53

## 2017-01-01 RX ADMIN — MORPHINE SULFATE 4 MG: 4 INJECTION, SOLUTION INTRAMUSCULAR; INTRAVENOUS at 22:19

## 2017-01-01 RX ADMIN — DOCUSATE SODIUM 100 MG: 50 LIQUID ORAL at 19:24

## 2017-01-01 RX ADMIN — METHYLPREDNISOLONE SODIUM SUCCINATE 40 MG: 40 INJECTION, POWDER, FOR SOLUTION INTRAMUSCULAR; INTRAVENOUS at 16:58

## 2017-01-01 RX ADMIN — NICOTINE 1 PATCH: 21 PATCH TRANSDERMAL at 09:04

## 2017-01-01 RX ADMIN — PROPOFOL 40 MCG/KG/MIN: 10 INJECTION, EMULSION INTRAVENOUS at 16:35

## 2017-01-01 RX ADMIN — PANTOPRAZOLE SODIUM 40 MG: 40 INJECTION, POWDER, FOR SOLUTION INTRAVENOUS at 05:48

## 2017-01-01 RX ADMIN — HYDROCODONE BITARTRATE AND ACETAMINOPHEN 1 TABLET: 5; 325 TABLET ORAL at 10:20

## 2017-01-01 RX ADMIN — SODIUM CHLORIDE, PRESERVATIVE FREE 10 ML: 5 INJECTION INTRAVENOUS at 09:59

## 2017-01-01 RX ADMIN — MINERAL OIL AND PETROLATUM: 150; 830 OINTMENT OPHTHALMIC at 20:10

## 2017-01-01 RX ADMIN — BUSPIRONE HYDROCHLORIDE 15 MG: 15 TABLET ORAL at 22:19

## 2017-01-01 RX ADMIN — IPRATROPIUM BROMIDE 2 PUFF: 17 AEROSOL, METERED RESPIRATORY (INHALATION) at 03:06

## 2017-01-01 RX ADMIN — MIRTAZAPINE 15 MG: 15 TABLET, FILM COATED ORAL at 10:17

## 2017-01-01 RX ADMIN — PANTOPRAZOLE SODIUM 40 MG: 40 TABLET, DELAYED RELEASE ORAL at 08:54

## 2017-01-01 RX ADMIN — SODIUM CHLORIDE 30 ML/HR: 900 INJECTION, SOLUTION INTRAVENOUS at 10:07

## 2017-01-01 RX ADMIN — Medication 10 ML: at 07:57

## 2017-01-01 RX ADMIN — LINEZOLID 600 MG: 100 SUSPENSION ORAL at 10:58

## 2017-01-01 RX ADMIN — MIRTAZAPINE 15 MG: 15 TABLET, FILM COATED ORAL at 08:43

## 2017-01-01 RX ADMIN — ALBUTEROL SULFATE 2 PUFF: 90 AEROSOL, METERED RESPIRATORY (INHALATION) at 06:40

## 2017-01-01 RX ADMIN — DOCUSATE SODIUM 100 MG: 100 CAPSULE, LIQUID FILLED ORAL at 07:50

## 2017-01-01 RX ADMIN — ANORECTAL OINTMENT: 15.7; .44; 24; 20.6 OINTMENT TOPICAL at 08:59

## 2017-01-01 RX ADMIN — HYDRALAZINE HYDROCHLORIDE 50 MG: 50 TABLET ORAL at 09:52

## 2017-01-01 RX ADMIN — MINERAL OIL AND PETROLATUM: 150; 830 OINTMENT OPHTHALMIC at 20:27

## 2017-01-01 RX ADMIN — LISINOPRIL 40 MG: 40 TABLET ORAL at 09:16

## 2017-01-01 RX ADMIN — MINERAL OIL AND PETROLATUM: 150; 830 OINTMENT OPHTHALMIC at 20:08

## 2017-01-01 RX ADMIN — SODIUM BICARBONATE 50 MEQ: 84 INJECTION INTRAVENOUS at 17:10

## 2017-01-01 RX ADMIN — MORPHINE SULFATE 1 MG: 2 INJECTION, SOLUTION INTRAMUSCULAR; INTRAVENOUS at 01:10

## 2017-01-01 RX ADMIN — ALBUTEROL SULFATE 2.5 MG: 2.5 SOLUTION RESPIRATORY (INHALATION) at 01:06

## 2017-01-01 RX ADMIN — BUSPIRONE HYDROCHLORIDE 15 MG: 15 TABLET ORAL at 09:30

## 2017-01-01 RX ADMIN — MORPHINE SULFATE 4 MG: 4 INJECTION, SOLUTION INTRAMUSCULAR; INTRAVENOUS at 20:23

## 2017-01-01 RX ADMIN — MORPHINE SULFATE 2 MG: 2 INJECTION, SOLUTION INTRAMUSCULAR; INTRAVENOUS at 12:06

## 2017-01-01 RX ADMIN — AZTREONAM 2 G: 2 INJECTION, POWDER, FOR SOLUTION INTRAMUSCULAR; INTRAVENOUS at 01:35

## 2017-01-01 RX ADMIN — SODIUM CHLORIDE 75 ML/HR: 9 INJECTION, SOLUTION INTRAVENOUS at 09:24

## 2017-01-01 RX ADMIN — MORPHINE SULFATE 1 MG: 2 INJECTION, SOLUTION INTRAMUSCULAR; INTRAVENOUS at 14:57

## 2017-01-01 RX ADMIN — DOCUSATE SODIUM 100 MG: 50 LIQUID ORAL at 17:28

## 2017-01-01 RX ADMIN — LABETALOL HYDROCHLORIDE 100 MG: 100 TABLET, FILM COATED ORAL at 18:05

## 2017-01-01 RX ADMIN — PHYTONADIONE 5 MG: 10 INJECTION, EMULSION INTRAMUSCULAR; INTRAVENOUS; SUBCUTANEOUS at 13:45

## 2017-01-01 RX ADMIN — DOCUSATE SODIUM 100 MG: 100 CAPSULE, LIQUID FILLED ORAL at 18:11

## 2017-01-01 RX ADMIN — CLONIDINE HYDROCHLORIDE 0.2 MG: 0.2 TABLET ORAL at 21:57

## 2017-01-01 RX ADMIN — ATORVASTATIN CALCIUM 80 MG: 40 TABLET, FILM COATED ORAL at 21:17

## 2017-01-01 RX ADMIN — LISINOPRIL 40 MG: 40 TABLET ORAL at 09:43

## 2017-01-01 RX ADMIN — FUROSEMIDE 40 MG: 10 INJECTION, SOLUTION INTRAMUSCULAR; INTRAVENOUS at 18:01

## 2017-01-01 RX ADMIN — FOLIC ACID 1 MG: 1 TABLET ORAL at 09:26

## 2017-01-01 RX ADMIN — LABETALOL HYDROCHLORIDE 100 MG: 100 TABLET, FILM COATED ORAL at 10:12

## 2017-01-01 RX ADMIN — HYDRALAZINE HYDROCHLORIDE 100 MG: 50 TABLET ORAL at 14:30

## 2017-01-01 RX ADMIN — Medication 10 ML: at 21:16

## 2017-01-01 RX ADMIN — BUDESONIDE AND FORMOTEROL FUMARATE DIHYDRATE 2 PUFF: 160; 4.5 AEROSOL RESPIRATORY (INHALATION) at 18:46

## 2017-01-01 RX ADMIN — ERYTHROMYCIN: 5 OINTMENT OPHTHALMIC at 09:03

## 2017-01-01 RX ADMIN — MORPHINE SULFATE 1 MG: 2 INJECTION, SOLUTION INTRAMUSCULAR; INTRAVENOUS at 21:24

## 2017-01-01 RX ADMIN — SODIUM CHLORIDE 75 ML/HR: 900 INJECTION, SOLUTION INTRAVENOUS at 17:54

## 2017-01-01 RX ADMIN — VANCOMYCIN HYDROCHLORIDE 1750 MG: 5 INJECTION, POWDER, LYOPHILIZED, FOR SOLUTION INTRAVENOUS at 01:47

## 2017-01-01 RX ADMIN — MINERAL OIL AND PETROLATUM: 150; 830 OINTMENT OPHTHALMIC at 20:26

## 2017-01-01 RX ADMIN — FUROSEMIDE 20 MG: 10 INJECTION, SOLUTION INTRAVENOUS at 14:11

## 2017-01-01 RX ADMIN — LABETALOL HYDROCHLORIDE 200 MG: 200 TABLET, FILM COATED ORAL at 17:39

## 2017-01-01 RX ADMIN — PROPOFOL 50 MCG/KG/MIN: 10 INJECTION, EMULSION INTRAVENOUS at 02:36

## 2017-01-01 RX ADMIN — MORPHINE SULFATE 1 MG: 2 INJECTION, SOLUTION INTRAMUSCULAR; INTRAVENOUS at 01:21

## 2017-01-01 RX ADMIN — SILVER NITRATE APPLICATORS 1 EACH: 25; 75 STICK TOPICAL at 21:50

## 2017-01-01 RX ADMIN — IPRATROPIUM BROMIDE 2 PUFF: 17 AEROSOL, METERED RESPIRATORY (INHALATION) at 23:47

## 2017-01-01 RX ADMIN — BUSPIRONE HYDROCHLORIDE 15 MG: 15 TABLET ORAL at 08:30

## 2017-01-01 RX ADMIN — LISINOPRIL 20 MG: 20 TABLET ORAL at 08:54

## 2017-01-01 RX ADMIN — MIRTAZAPINE 15 MG: 15 TABLET, FILM COATED ORAL at 11:30

## 2017-01-01 RX ADMIN — MORPHINE SULFATE 1 MG: 2 INJECTION, SOLUTION INTRAMUSCULAR; INTRAVENOUS at 16:43

## 2017-01-01 RX ADMIN — SODIUM CHLORIDE 125 ML/HR: 900 INJECTION, SOLUTION INTRAVENOUS at 02:05

## 2017-01-01 RX ADMIN — IPRATROPIUM BROMIDE 2 PUFF: 17 AEROSOL, METERED RESPIRATORY (INHALATION) at 15:21

## 2017-01-01 RX ADMIN — FOLIC ACID 1 MG: 1 TABLET ORAL at 08:22

## 2017-01-01 RX ADMIN — NYSTATIN: 100000 CREAM TOPICAL at 08:32

## 2017-01-01 RX ADMIN — SPIRONOLACTONE 25 MG: 25 TABLET ORAL at 08:30

## 2017-01-01 RX ADMIN — CLONIDINE HYDROCHLORIDE 0.2 MG: 0.2 TABLET ORAL at 05:43

## 2017-01-01 RX ADMIN — POTASSIUM CHLORIDE 20 MEQ: 20 SOLUTION ORAL at 09:02

## 2017-01-01 RX ADMIN — HYDROCODONE BITARTRATE AND ACETAMINOPHEN 1 TABLET: 7.5; 325 TABLET ORAL at 03:23

## 2017-01-01 RX ADMIN — POTASSIUM CHLORIDE, DEXTROSE MONOHYDRATE AND SODIUM CHLORIDE 125 ML/HR: 150; 5; 450 INJECTION, SOLUTION INTRAVENOUS at 06:24

## 2017-01-01 RX ADMIN — SODIUM CHLORIDE 75 ML/HR: 9 INJECTION, SOLUTION INTRAVENOUS at 00:08

## 2017-01-01 RX ADMIN — MORPHINE SULFATE 1 MG: 2 INJECTION, SOLUTION INTRAMUSCULAR; INTRAVENOUS at 21:32

## 2017-01-01 RX ADMIN — IOPAMIDOL 100 ML: 612 INJECTION, SOLUTION INTRAVENOUS at 17:00

## 2017-01-01 RX ADMIN — FUROSEMIDE 40 MG: 40 TABLET ORAL at 17:38

## 2017-01-01 RX ADMIN — LABETALOL HYDROCHLORIDE 200 MG: 200 TABLET, FILM COATED ORAL at 17:20

## 2017-01-01 RX ADMIN — ATORVASTATIN CALCIUM 80 MG: 40 TABLET, FILM COATED ORAL at 22:01

## 2017-01-01 RX ADMIN — HYDRALAZINE HYDROCHLORIDE 20 MG: 20 INJECTION INTRAMUSCULAR; INTRAVENOUS at 16:18

## 2017-01-01 RX ADMIN — BUSPIRONE HYDROCHLORIDE 15 MG: 15 TABLET ORAL at 08:29

## 2017-01-01 RX ADMIN — HYDRALAZINE HYDROCHLORIDE 10 MG: 20 INJECTION INTRAMUSCULAR; INTRAVENOUS at 09:08

## 2017-01-01 RX ADMIN — VANCOMYCIN HYDROCHLORIDE 1750 MG: 5 INJECTION, POWDER, LYOPHILIZED, FOR SOLUTION INTRAVENOUS at 23:55

## 2017-01-01 RX ADMIN — SODIUM CHLORIDE 100 ML: 900 INJECTION, SOLUTION INTRAVENOUS at 15:30

## 2017-01-01 RX ADMIN — NITROGLYCERIN 0.4 MG: 0.4 TABLET SUBLINGUAL at 06:20

## 2017-01-01 RX ADMIN — DOCUSATE SODIUM 100 MG: 50 LIQUID ORAL at 17:13

## 2017-01-01 RX ADMIN — NICOTINE 1 PATCH: 21 PATCH TRANSDERMAL at 09:51

## 2017-01-01 RX ADMIN — BUSPIRONE HYDROCHLORIDE 15 MG: 15 TABLET ORAL at 21:01

## 2017-01-01 RX ADMIN — BUDESONIDE AND FORMOTEROL FUMARATE DIHYDRATE 2 PUFF: 160; 4.5 AEROSOL RESPIRATORY (INHALATION) at 06:50

## 2017-01-01 RX ADMIN — IPRATROPIUM BROMIDE 2 PUFF: 17 AEROSOL, METERED RESPIRATORY (INHALATION) at 03:17

## 2017-01-01 RX ADMIN — MORPHINE SULFATE 4 MG: 4 INJECTION, SOLUTION INTRAMUSCULAR; INTRAVENOUS at 22:56

## 2017-01-01 RX ADMIN — Medication 10 ML: at 09:42

## 2017-01-01 RX ADMIN — FUROSEMIDE 40 MG: 10 INJECTION, SOLUTION INTRAMUSCULAR; INTRAVENOUS at 18:29

## 2017-01-01 RX ADMIN — Medication: at 08:14

## 2017-01-01 RX ADMIN — OXYCODONE HYDROCHLORIDE AND ACETAMINOPHEN 1 TABLET: 10; 325 TABLET ORAL at 14:06

## 2017-01-01 RX ADMIN — ALBUTEROL SULFATE 2 PUFF: 90 AEROSOL, METERED RESPIRATORY (INHALATION) at 07:36

## 2017-01-01 RX ADMIN — ERYTHROMYCIN: 5 OINTMENT OPHTHALMIC at 21:50

## 2017-01-01 RX ADMIN — LISINOPRIL 20 MG: 20 TABLET ORAL at 10:01

## 2017-01-01 RX ADMIN — CLONIDINE HYDROCHLORIDE 0.2 MG: 0.2 TABLET ORAL at 13:07

## 2017-01-01 RX ADMIN — CLONIDINE HYDROCHLORIDE 0.2 MG: 0.2 TABLET ORAL at 06:21

## 2017-01-01 RX ADMIN — HYDRALAZINE HYDROCHLORIDE 10 MG: 20 INJECTION INTRAMUSCULAR; INTRAVENOUS at 02:13

## 2017-01-01 RX ADMIN — SODIUM CHLORIDE: 9 INJECTION, SOLUTION INTRAVENOUS at 23:10

## 2017-01-01 RX ADMIN — SODIUM CHLORIDE 125 ML/HR: 900 INJECTION, SOLUTION INTRAVENOUS at 22:35

## 2017-01-01 RX ADMIN — ATROPINE SULFATE 1 DROP: 10 SOLUTION/ DROPS OPHTHALMIC at 20:21

## 2017-01-01 RX ADMIN — NYSTATIN: 100000 CREAM TOPICAL at 09:17

## 2017-01-01 RX ADMIN — MORPHINE SULFATE 1 MG: 2 INJECTION, SOLUTION INTRAMUSCULAR; INTRAVENOUS at 12:04

## 2017-01-01 RX ADMIN — NYSTATIN: 100000 POWDER TOPICAL at 21:31

## 2017-01-01 RX ADMIN — NYSTATIN 1 APPLICATION: 100000 POWDER TOPICAL at 21:00

## 2017-01-01 RX ADMIN — FUROSEMIDE 40 MG: 10 INJECTION, SOLUTION INTRAMUSCULAR; INTRAVENOUS at 17:23

## 2017-01-01 RX ADMIN — IPRATROPIUM BROMIDE 2 PUFF: 17 AEROSOL, METERED RESPIRATORY (INHALATION) at 06:41

## 2017-01-01 RX ADMIN — FUROSEMIDE 40 MG: 10 INJECTION, SOLUTION INTRAMUSCULAR; INTRAVENOUS at 08:11

## 2017-01-01 RX ADMIN — HYDRALAZINE HYDROCHLORIDE 100 MG: 50 TABLET ORAL at 05:27

## 2017-01-01 RX ADMIN — TRAZODONE HYDROCHLORIDE 150 MG: 150 TABLET ORAL at 21:26

## 2017-01-01 RX ADMIN — HYDRALAZINE HYDROCHLORIDE 100 MG: 50 TABLET ORAL at 05:53

## 2017-01-01 RX ADMIN — PROPOFOL 50 MCG/KG/MIN: 10 INJECTION, EMULSION INTRAVENOUS at 13:31

## 2017-01-01 RX ADMIN — POTASSIUM CHLORIDE 20 MEQ: 750 CAPSULE, EXTENDED RELEASE ORAL at 09:22

## 2017-01-01 RX ADMIN — BUDESONIDE AND FORMOTEROL FUMARATE DIHYDRATE 2 PUFF: 160; 4.5 AEROSOL RESPIRATORY (INHALATION) at 20:29

## 2017-01-01 RX ADMIN — PANTOPRAZOLE SODIUM 40 MG: 40 INJECTION, POWDER, FOR SOLUTION INTRAVENOUS at 05:27

## 2017-01-01 RX ADMIN — IPRATROPIUM BROMIDE 2 PUFF: 17 AEROSOL, METERED RESPIRATORY (INHALATION) at 03:32

## 2017-01-01 RX ADMIN — CHLORHEXIDINE GLUCONATE 15 ML: 1.2 RINSE ORAL at 20:02

## 2017-01-01 RX ADMIN — ALBUTEROL SULFATE 2 PUFF: 90 AEROSOL, METERED RESPIRATORY (INHALATION) at 11:30

## 2017-01-01 RX ADMIN — MIDAZOLAM 5 MG/HR: 5 INJECTION INTRAMUSCULAR; INTRAVENOUS at 21:43

## 2017-01-01 RX ADMIN — ALBUTEROL SULFATE 2.5 MG: 2.5 SOLUTION RESPIRATORY (INHALATION) at 07:05

## 2017-01-01 RX ADMIN — METRONIDAZOLE 500 MG: 500 INJECTION, SOLUTION INTRAVENOUS at 16:08

## 2017-01-01 RX ADMIN — POTASSIUM CHLORIDE 20 MEQ: 750 CAPSULE, EXTENDED RELEASE ORAL at 09:16

## 2017-01-01 RX ADMIN — TAZOBACTAM SODIUM AND PIPERACILLIN SODIUM 3.38 G: 375; 3 INJECTION, SOLUTION INTRAVENOUS at 01:03

## 2017-01-01 RX ADMIN — PROPOFOL 30 MCG/KG/MIN: 10 INJECTION, EMULSION INTRAVENOUS at 22:01

## 2017-01-01 RX ADMIN — NICOTINE 1 PATCH: 21 PATCH TRANSDERMAL at 09:59

## 2017-01-01 RX ADMIN — FOLIC ACID 1 MG: 1 TABLET ORAL at 08:17

## 2017-01-01 RX ADMIN — SODIUM CHLORIDE 30 ML/HR: 900 INJECTION, SOLUTION INTRAVENOUS at 22:18

## 2017-01-01 RX ADMIN — ATORVASTATIN CALCIUM 80 MG: 40 TABLET, FILM COATED ORAL at 20:24

## 2017-01-01 RX ADMIN — ERYTHROMYCIN: 5 OINTMENT OPHTHALMIC at 09:30

## 2017-01-01 RX ADMIN — ACETAMINOPHEN 650 MG: 325 TABLET ORAL at 11:32

## 2017-01-01 RX ADMIN — POTASSIUM CHLORIDE 20 MEQ: 20 SOLUTION ORAL at 13:16

## 2017-01-01 RX ADMIN — HYDRALAZINE HYDROCHLORIDE 50 MG: 50 TABLET ORAL at 21:00

## 2017-01-01 RX ADMIN — NYSTATIN: 100000 POWDER TOPICAL at 20:24

## 2017-01-01 RX ADMIN — METHYLPREDNISOLONE SODIUM SUCCINATE 80 MG: 125 INJECTION, POWDER, FOR SOLUTION INTRAMUSCULAR; INTRAVENOUS at 14:47

## 2017-01-01 RX ADMIN — LORAZEPAM 1 MG: 2 INJECTION INTRAMUSCULAR; INTRAVENOUS at 19:49

## 2017-01-01 RX ADMIN — TRAZODONE HYDROCHLORIDE 150 MG: 150 TABLET ORAL at 20:32

## 2017-01-01 RX ADMIN — MIDAZOLAM HYDROCHLORIDE 2 MG: 1 INJECTION, SOLUTION INTRAMUSCULAR; INTRAVENOUS at 19:55

## 2017-01-01 RX ADMIN — LABETALOL HYDROCHLORIDE 20 MG: 5 INJECTION, SOLUTION INTRAVENOUS at 11:16

## 2017-01-01 RX ADMIN — PROPOFOL 30 MCG/KG/MIN: 10 INJECTION, EMULSION INTRAVENOUS at 13:17

## 2017-01-01 RX ADMIN — ALBUTEROL SULFATE 2.5 MG: 2.5 SOLUTION RESPIRATORY (INHALATION) at 19:13

## 2017-01-01 RX ADMIN — LABETALOL HYDROCHLORIDE 20 MG: 5 INJECTION, SOLUTION INTRAVENOUS at 14:57

## 2017-01-01 RX ADMIN — PROPOFOL 30 MCG/KG/MIN: 10 INJECTION, EMULSION INTRAVENOUS at 00:57

## 2017-01-01 RX ADMIN — MORPHINE SULFATE 4 MG: 4 INJECTION, SOLUTION INTRAMUSCULAR; INTRAVENOUS at 07:51

## 2017-01-01 RX ADMIN — CLONIDINE HYDROCHLORIDE 0.2 MG: 0.2 TABLET ORAL at 06:02

## 2017-01-01 RX ADMIN — ERYTHROMYCIN: 5 OINTMENT OPHTHALMIC at 08:00

## 2017-01-01 RX ADMIN — LISINOPRIL 20 MG: 20 TABLET ORAL at 10:39

## 2017-01-01 RX ADMIN — ANORECTAL OINTMENT: 15.7; .44; 24; 20.6 OINTMENT TOPICAL at 23:06

## 2017-01-01 RX ADMIN — NYSTATIN: 100000 POWDER TOPICAL at 21:04

## 2017-01-01 RX ADMIN — PROPOFOL 100 MG: 10 INJECTION, EMULSION INTRAVENOUS at 15:14

## 2017-01-01 RX ADMIN — PROPOFOL 50 MCG/KG/MIN: 10 INJECTION, EMULSION INTRAVENOUS at 21:51

## 2017-01-01 RX ADMIN — TAZOBACTAM SODIUM AND PIPERACILLIN SODIUM 3.38 G: 375; 3 INJECTION, SOLUTION INTRAVENOUS at 02:37

## 2017-01-01 RX ADMIN — MORPHINE SULFATE 1 MG: 2 INJECTION, SOLUTION INTRAMUSCULAR; INTRAVENOUS at 08:08

## 2017-01-01 RX ADMIN — NYSTATIN: 100000 CREAM TOPICAL at 09:04

## 2017-01-01 RX ADMIN — IPRATROPIUM BROMIDE AND ALBUTEROL SULFATE 3 ML: 2.5; .5 SOLUTION RESPIRATORY (INHALATION) at 07:37

## 2017-01-01 RX ADMIN — PREDNISONE 40 MG: 20 TABLET ORAL at 08:17

## 2017-01-01 RX ADMIN — IPRATROPIUM BROMIDE 2 PUFF: 17 AEROSOL, METERED RESPIRATORY (INHALATION) at 22:50

## 2017-01-01 RX ADMIN — PANTOPRAZOLE SODIUM 40 MG: 40 TABLET, DELAYED RELEASE ORAL at 05:20

## 2017-01-01 RX ADMIN — MORPHINE SULFATE 1 MG: 2 INJECTION, SOLUTION INTRAMUSCULAR; INTRAVENOUS at 14:40

## 2017-01-01 RX ADMIN — LISINOPRIL 20 MG: 40 TABLET ORAL at 08:43

## 2017-01-01 RX ADMIN — MINERAL OIL AND PETROLATUM: 150; 830 OINTMENT OPHTHALMIC at 19:55

## 2017-01-01 RX ADMIN — NYSTATIN: 100000 CREAM TOPICAL at 20:09

## 2017-01-01 RX ADMIN — PROPOFOL 10 MCG/KG/MIN: 10 INJECTION, EMULSION INTRAVENOUS at 05:15

## 2017-01-01 RX ADMIN — ATORVASTATIN CALCIUM 80 MG: 40 TABLET, FILM COATED ORAL at 20:22

## 2017-01-01 RX ADMIN — SODIUM CHLORIDE: 900 INJECTION, SOLUTION INTRAVENOUS at 16:26

## 2017-01-01 RX ADMIN — ATORVASTATIN CALCIUM 80 MG: 40 TABLET, FILM COATED ORAL at 20:18

## 2017-01-01 RX ADMIN — NICOTINE 1 PATCH: 21 PATCH TRANSDERMAL at 09:29

## 2017-01-01 RX ADMIN — IPRATROPIUM BROMIDE 2 PUFF: 17 AEROSOL, METERED RESPIRATORY (INHALATION) at 03:37

## 2017-01-01 RX ADMIN — FUROSEMIDE 40 MG: 40 TABLET ORAL at 20:26

## 2017-01-01 RX ADMIN — MINERAL OIL AND PETROLATUM: 150; 830 OINTMENT OPHTHALMIC at 09:44

## 2017-01-01 RX ADMIN — MORPHINE SULFATE 1 MG: 2 INJECTION, SOLUTION INTRAMUSCULAR; INTRAVENOUS at 22:13

## 2017-01-01 RX ADMIN — HYDRALAZINE HYDROCHLORIDE 10 MG: 20 INJECTION INTRAMUSCULAR; INTRAVENOUS at 08:20

## 2017-01-01 RX ADMIN — ETOMIDATE 20 MG: 2 INJECTION, SOLUTION INTRAVENOUS at 19:15

## 2017-01-01 RX ADMIN — LABETALOL HYDROCHLORIDE 200 MG: 200 TABLET, FILM COATED ORAL at 08:31

## 2017-01-01 RX ADMIN — METRONIDAZOLE 500 MG: 500 INJECTION, SOLUTION INTRAVENOUS at 14:05

## 2017-01-01 RX ADMIN — MORPHINE SULFATE 1 MG: 2 INJECTION, SOLUTION INTRAMUSCULAR; INTRAVENOUS at 13:06

## 2017-01-01 RX ADMIN — ATORVASTATIN CALCIUM 80 MG: 40 TABLET, FILM COATED ORAL at 20:17

## 2017-01-01 RX ADMIN — BUDESONIDE AND FORMOTEROL FUMARATE DIHYDRATE 2 PUFF: 160; 4.5 AEROSOL RESPIRATORY (INHALATION) at 18:58

## 2017-01-01 RX ADMIN — FUROSEMIDE 40 MG: 10 INJECTION, SOLUTION INTRAMUSCULAR; INTRAVENOUS at 17:49

## 2017-01-01 RX ADMIN — MIRTAZAPINE 15 MG: 15 TABLET, FILM COATED ORAL at 08:21

## 2017-01-01 RX ADMIN — MORPHINE SULFATE 1 MG: 2 INJECTION, SOLUTION INTRAMUSCULAR; INTRAVENOUS at 18:56

## 2017-01-01 RX ADMIN — CEFTAZIDIME 500 MG: 1 INJECTION, POWDER, FOR SOLUTION INTRAMUSCULAR; INTRAVENOUS at 03:25

## 2017-01-01 RX ADMIN — PROPOFOL 50 MCG/KG/MIN: 10 INJECTION, EMULSION INTRAVENOUS at 04:06

## 2017-01-01 RX ADMIN — PANTOPRAZOLE SODIUM 40 MG: 40 INJECTION, POWDER, FOR SOLUTION INTRAVENOUS at 05:05

## 2017-01-01 RX ADMIN — METHYLPREDNISOLONE SODIUM SUCCINATE 80 MG: 125 INJECTION, POWDER, FOR SOLUTION INTRAMUSCULAR; INTRAVENOUS at 21:12

## 2017-01-01 RX ADMIN — MORPHINE SULFATE 1 MG: 2 INJECTION, SOLUTION INTRAMUSCULAR; INTRAVENOUS at 16:49

## 2017-01-01 RX ADMIN — HYDRALAZINE HYDROCHLORIDE 100 MG: 50 TABLET ORAL at 21:49

## 2017-01-01 RX ADMIN — ALBUTEROL SULFATE 2 PUFF: 90 AEROSOL, METERED RESPIRATORY (INHALATION) at 11:43

## 2017-01-01 RX ADMIN — ALBUTEROL SULFATE 2.5 MG: 2.5 SOLUTION RESPIRATORY (INHALATION) at 12:00

## 2017-01-01 RX ADMIN — DOCUSATE SODIUM 100 MG: 100 CAPSULE, LIQUID FILLED ORAL at 17:20

## 2017-01-01 RX ADMIN — LABETALOL HYDROCHLORIDE 200 MG: 200 TABLET, FILM COATED ORAL at 17:19

## 2017-01-01 RX ADMIN — OXYCODONE HYDROCHLORIDE AND ACETAMINOPHEN 1 TABLET: 10; 325 TABLET ORAL at 21:14

## 2017-01-01 RX ADMIN — METHYLPREDNISOLONE SODIUM SUCCINATE 80 MG: 125 INJECTION, POWDER, FOR SOLUTION INTRAMUSCULAR; INTRAVENOUS at 01:03

## 2017-01-01 RX ADMIN — IPRATROPIUM BROMIDE 2 PUFF: 17 AEROSOL, METERED RESPIRATORY (INHALATION) at 14:52

## 2017-01-01 RX ADMIN — ANORECTAL OINTMENT 1 APPLICATION: 15.7; .44; 24; 20.6 OINTMENT TOPICAL at 08:14

## 2017-01-01 RX ADMIN — ACETYLCYSTEINE 600 MG: 100 INHALANT RESPIRATORY (INHALATION) at 08:43

## 2017-01-01 RX ADMIN — PANTOPRAZOLE SODIUM 40 MG: 40 TABLET, DELAYED RELEASE ORAL at 05:40

## 2017-01-01 RX ADMIN — FUROSEMIDE 40 MG: 40 TABLET ORAL at 09:05

## 2017-01-01 RX ADMIN — METHYLPREDNISOLONE SODIUM SUCCINATE 80 MG: 125 INJECTION, POWDER, FOR SOLUTION INTRAMUSCULAR; INTRAVENOUS at 13:39

## 2017-01-01 RX ADMIN — HYDRALAZINE HYDROCHLORIDE 10 MG: 20 INJECTION INTRAMUSCULAR; INTRAVENOUS at 06:41

## 2017-01-01 RX ADMIN — SPIRONOLACTONE 25 MG: 25 TABLET ORAL at 10:17

## 2017-01-01 RX ADMIN — IPRATROPIUM BROMIDE AND ALBUTEROL SULFATE 3 ML: 2.5; .5 SOLUTION RESPIRATORY (INHALATION) at 20:41

## 2017-01-01 RX ADMIN — HYDRALAZINE HYDROCHLORIDE 100 MG: 50 TABLET ORAL at 14:09

## 2017-01-01 RX ADMIN — BUDESONIDE AND FORMOTEROL FUMARATE DIHYDRATE 2 PUFF: 160; 4.5 AEROSOL RESPIRATORY (INHALATION) at 07:36

## 2017-01-01 RX ADMIN — DOXYCYCLINE 100 MG: 100 INJECTION, POWDER, LYOPHILIZED, FOR SOLUTION INTRAVENOUS at 04:40

## 2017-01-01 RX ADMIN — BUDESONIDE AND FORMOTEROL FUMARATE DIHYDRATE 2 PUFF: 160; 4.5 AEROSOL RESPIRATORY (INHALATION) at 07:25

## 2017-01-01 RX ADMIN — HYDROCODONE BITARTRATE AND ACETAMINOPHEN 1 TABLET: 5; 325 TABLET ORAL at 03:46

## 2017-01-01 RX ADMIN — CEFTRIAXONE 1 G: 1 INJECTION, POWDER, FOR SOLUTION INTRAMUSCULAR; INTRAVENOUS at 13:02

## 2017-01-01 RX ADMIN — PROPOFOL 40 MCG/KG/MIN: 10 INJECTION, EMULSION INTRAVENOUS at 03:30

## 2017-01-01 RX ADMIN — PROPOFOL 40 MCG/KG/MIN: 10 INJECTION, EMULSION INTRAVENOUS at 15:47

## 2017-01-01 RX ADMIN — METHYLPREDNISOLONE SODIUM SUCCINATE 80 MG: 125 INJECTION, POWDER, FOR SOLUTION INTRAMUSCULAR; INTRAVENOUS at 03:25

## 2017-01-01 RX ADMIN — SODIUM CHLORIDE 125 ML/HR: 900 INJECTION, SOLUTION INTRAVENOUS at 05:54

## 2017-01-01 RX ADMIN — LABETALOL HYDROCHLORIDE 200 MG: 200 TABLET, FILM COATED ORAL at 17:04

## 2017-01-01 RX ADMIN — MORPHINE SULFATE 4 MG: 4 INJECTION, SOLUTION INTRAMUSCULAR; INTRAVENOUS at 07:33

## 2017-01-01 RX ADMIN — MORPHINE SULFATE 4 MG: 4 INJECTION, SOLUTION INTRAMUSCULAR; INTRAVENOUS at 12:30

## 2017-01-01 RX ADMIN — DOXYCYCLINE HYCLATE 100 MG: 100 TABLET, FILM COATED ORAL at 08:05

## 2017-01-01 RX ADMIN — NYSTATIN: 100000 POWDER TOPICAL at 09:53

## 2017-01-01 RX ADMIN — ALBUTEROL SULFATE 2.5 MG: 2.5 SOLUTION RESPIRATORY (INHALATION) at 18:54

## 2017-01-01 RX ADMIN — PROPOFOL 50 MCG/KG/MIN: 10 INJECTION, EMULSION INTRAVENOUS at 21:26

## 2017-01-01 RX ADMIN — CLONIDINE HYDROCHLORIDE 0.2 MG: 0.2 TABLET ORAL at 05:31

## 2017-01-01 RX ADMIN — LABETALOL HYDROCHLORIDE 200 MG: 200 TABLET, FILM COATED ORAL at 08:24

## 2017-01-01 RX ADMIN — TRAZODONE HYDROCHLORIDE 150 MG: 150 TABLET ORAL at 21:01

## 2017-01-01 RX ADMIN — HYDRALAZINE HYDROCHLORIDE 100 MG: 50 TABLET ORAL at 21:38

## 2017-01-01 RX ADMIN — ERYTHROMYCIN: 5 OINTMENT OPHTHALMIC at 22:10

## 2017-01-01 RX ADMIN — LABETALOL HYDROCHLORIDE 200 MG: 200 TABLET, FILM COATED ORAL at 08:10

## 2017-01-01 RX ADMIN — FUROSEMIDE 40 MG: 10 INJECTION, SOLUTION INTRAMUSCULAR; INTRAVENOUS at 08:29

## 2017-01-01 RX ADMIN — MORPHINE SULFATE 4 MG: 4 INJECTION, SOLUTION INTRAMUSCULAR; INTRAVENOUS at 21:40

## 2017-01-01 RX ADMIN — LISINOPRIL 20 MG: 20 TABLET ORAL at 09:23

## 2017-01-01 RX ADMIN — LABETALOL HYDROCHLORIDE 200 MG: 200 TABLET, FILM COATED ORAL at 20:25

## 2017-01-01 RX ADMIN — METHYLPREDNISOLONE SODIUM SUCCINATE 80 MG: 125 INJECTION, POWDER, FOR SOLUTION INTRAMUSCULAR; INTRAVENOUS at 21:36

## 2017-01-01 RX ADMIN — MORPHINE SULFATE 4 MG: 4 INJECTION, SOLUTION INTRAMUSCULAR; INTRAVENOUS at 19:23

## 2017-01-01 RX ADMIN — Medication 10 ML: at 08:28

## 2017-01-01 RX ADMIN — BUDESONIDE AND FORMOTEROL FUMARATE DIHYDRATE 2 PUFF: 160; 4.5 AEROSOL RESPIRATORY (INHALATION) at 06:58

## 2017-01-01 RX ADMIN — MORPHINE SULFATE 2 MG: 2 INJECTION, SOLUTION INTRAMUSCULAR; INTRAVENOUS at 20:39

## 2017-01-01 RX ADMIN — MORPHINE SULFATE 4 MG: 4 INJECTION, SOLUTION INTRAMUSCULAR; INTRAVENOUS at 13:06

## 2017-01-01 RX ADMIN — NICOTINE 1 PATCH: 21 PATCH TRANSDERMAL at 04:23

## 2017-01-01 RX ADMIN — HYDRALAZINE HYDROCHLORIDE 50 MG: 50 TABLET ORAL at 20:54

## 2017-01-01 RX ADMIN — ERYTHROMYCIN: 5 OINTMENT OPHTHALMIC at 22:28

## 2017-01-01 RX ADMIN — NICOTINE 1 PATCH: 21 PATCH TRANSDERMAL at 03:37

## 2017-01-01 RX ADMIN — PREDNISONE 40 MG: 20 TABLET ORAL at 08:12

## 2017-01-01 RX ADMIN — NYSTATIN: 100000 CREAM TOPICAL at 08:23

## 2017-01-01 RX ADMIN — PROPOFOL 30 MCG/KG/MIN: 10 INJECTION, EMULSION INTRAVENOUS at 04:01

## 2017-01-01 RX ADMIN — PROPOFOL 30 MCG/KG/MIN: 10 INJECTION, EMULSION INTRAVENOUS at 04:34

## 2017-01-01 RX ADMIN — PANTOPRAZOLE SODIUM 40 MG: 40 TABLET, DELAYED RELEASE ORAL at 05:27

## 2017-01-01 RX ADMIN — LABETALOL HYDROCHLORIDE 200 MG: 200 TABLET, FILM COATED ORAL at 08:17

## 2017-01-01 RX ADMIN — Medication 10 ML: at 08:24

## 2017-01-01 RX ADMIN — HYDRALAZINE HYDROCHLORIDE 100 MG: 50 TABLET ORAL at 13:07

## 2017-01-01 RX ADMIN — Medication 10 ML: at 08:48

## 2017-01-01 RX ADMIN — HYDRALAZINE HYDROCHLORIDE 100 MG: 50 TABLET ORAL at 06:10

## 2017-01-01 RX ADMIN — BUDESONIDE AND FORMOTEROL FUMARATE DIHYDRATE 2 PUFF: 160; 4.5 AEROSOL RESPIRATORY (INHALATION) at 19:16

## 2017-01-01 RX ADMIN — FUROSEMIDE 40 MG: 40 TABLET ORAL at 08:17

## 2017-01-01 RX ADMIN — DOCUSATE SODIUM 100 MG: 50 LIQUID ORAL at 17:04

## 2017-01-01 RX ADMIN — IPRATROPIUM BROMIDE 2 PUFF: 17 AEROSOL, METERED RESPIRATORY (INHALATION) at 19:40

## 2017-01-01 RX ADMIN — ERYTHROMYCIN: 5 OINTMENT OPHTHALMIC at 22:05

## 2017-01-01 RX ADMIN — BUSPIRONE HYDROCHLORIDE 15 MG: 15 TABLET ORAL at 21:31

## 2017-01-01 RX ADMIN — BUSPIRONE HYDROCHLORIDE 15 MG: 15 TABLET ORAL at 08:54

## 2017-01-01 RX ADMIN — VANCOMYCIN HYDROCHLORIDE 1500 MG: 5 INJECTION, POWDER, LYOPHILIZED, FOR SOLUTION INTRAVENOUS at 13:56

## 2017-01-01 RX ADMIN — ANORECTAL OINTMENT: 15.7; .44; 24; 20.6 OINTMENT TOPICAL at 12:54

## 2017-01-01 RX ADMIN — MORPHINE SULFATE 2 MG: 2 INJECTION, SOLUTION INTRAMUSCULAR; INTRAVENOUS at 18:25

## 2017-01-01 RX ADMIN — ALBUTEROL SULFATE 2.5 MG: 2.5 SOLUTION RESPIRATORY (INHALATION) at 10:47

## 2017-01-01 RX ADMIN — ATORVASTATIN CALCIUM 80 MG: 40 TABLET, FILM COATED ORAL at 20:54

## 2017-01-01 RX ADMIN — LABETALOL HYDROCHLORIDE 200 MG: 200 TABLET, FILM COATED ORAL at 08:12

## 2017-01-01 RX ADMIN — DOXYCYCLINE HYCLATE 100 MG: 100 TABLET, FILM COATED ORAL at 09:30

## 2017-01-01 RX ADMIN — ERYTHROMYCIN: 5 OINTMENT OPHTHALMIC at 10:49

## 2017-01-01 RX ADMIN — SODIUM CHLORIDE 30 ML/HR: 900 INJECTION, SOLUTION INTRAVENOUS at 10:27

## 2017-01-01 RX ADMIN — HYDROCODONE BITARTRATE AND ACETAMINOPHEN 1 TABLET: 7.5; 325 TABLET ORAL at 21:16

## 2017-01-01 RX ADMIN — PROPOFOL 40 MCG/KG/MIN: 10 INJECTION, EMULSION INTRAVENOUS at 02:46

## 2017-01-01 RX ADMIN — LABETALOL HYDROCHLORIDE 200 MG: 200 TABLET, FILM COATED ORAL at 17:08

## 2017-01-01 RX ADMIN — Medication 10 ML: at 10:14

## 2017-01-01 RX ADMIN — LABETALOL HYDROCHLORIDE 200 MG: 200 TABLET, FILM COATED ORAL at 18:31

## 2017-01-01 RX ADMIN — HYDRALAZINE HYDROCHLORIDE 50 MG: 50 TABLET ORAL at 08:04

## 2017-01-01 RX ADMIN — METHYLPREDNISOLONE SODIUM SUCCINATE 40 MG: 40 INJECTION, POWDER, FOR SOLUTION INTRAMUSCULAR; INTRAVENOUS at 22:48

## 2017-01-01 RX ADMIN — POTASSIUM CHLORIDE, DEXTROSE MONOHYDRATE AND SODIUM CHLORIDE 125 ML/HR: 150; 5; 450 INJECTION, SOLUTION INTRAVENOUS at 08:07

## 2017-01-01 RX ADMIN — DOCUSATE SODIUM 100 MG: 100 CAPSULE, LIQUID FILLED ORAL at 09:32

## 2017-01-01 RX ADMIN — Medication 10 ML: at 09:10

## 2017-01-01 RX ADMIN — LABETALOL HYDROCHLORIDE 200 MG: 200 TABLET, FILM COATED ORAL at 09:52

## 2017-01-01 RX ADMIN — NYSTATIN: 100000 CREAM TOPICAL at 08:14

## 2017-01-01 RX ADMIN — MIRTAZAPINE 15 MG: 15 TABLET, FILM COATED ORAL at 09:09

## 2017-01-01 RX ADMIN — BUDESONIDE AND FORMOTEROL FUMARATE DIHYDRATE 2 PUFF: 160; 4.5 AEROSOL RESPIRATORY (INHALATION) at 20:35

## 2017-01-01 RX ADMIN — IPRATROPIUM BROMIDE AND ALBUTEROL SULFATE 3 ML: 2.5; .5 SOLUTION RESPIRATORY (INHALATION) at 19:16

## 2017-01-01 RX ADMIN — METHYLPREDNISOLONE SODIUM SUCCINATE 80 MG: 125 INJECTION, POWDER, FOR SOLUTION INTRAMUSCULAR; INTRAVENOUS at 21:49

## 2017-01-01 RX ADMIN — BUSPIRONE HYDROCHLORIDE 15 MG: 15 TABLET ORAL at 07:51

## 2017-01-01 RX ADMIN — LINEZOLID 600 MG: 100 SUSPENSION ORAL at 22:46

## 2017-01-01 RX ADMIN — LABETALOL HYDROCHLORIDE 100 MG: 100 TABLET, FILM COATED ORAL at 17:58

## 2017-01-01 RX ADMIN — HYDRALAZINE HYDROCHLORIDE 100 MG: 50 TABLET ORAL at 21:14

## 2017-01-01 RX ADMIN — LISINOPRIL 20 MG: 20 TABLET ORAL at 21:38

## 2017-01-01 RX ADMIN — LABETALOL HYDROCHLORIDE 200 MG: 200 TABLET, FILM COATED ORAL at 21:17

## 2017-01-01 RX ADMIN — LABETALOL HYDROCHLORIDE 200 MG: 200 TABLET, FILM COATED ORAL at 20:35

## 2017-01-01 RX ADMIN — DEXTROSE AND SODIUM CHLORIDE 125 ML/HR: 5; 450 INJECTION, SOLUTION INTRAVENOUS at 02:14

## 2017-01-01 RX ADMIN — PANTOPRAZOLE SODIUM 40 MG: 40 INJECTION, POWDER, FOR SOLUTION INTRAVENOUS at 09:02

## 2017-01-01 RX ADMIN — PREDNISONE 20 MG: 20 TABLET ORAL at 09:07

## 2017-01-01 RX ADMIN — SPIRONOLACTONE 25 MG: 25 TABLET ORAL at 08:21

## 2017-01-01 RX ADMIN — MINERAL OIL AND PETROLATUM: 150; 830 OINTMENT OPHTHALMIC at 20:51

## 2017-01-01 RX ADMIN — ALBUTEROL SULFATE 2 PUFF: 90 AEROSOL, METERED RESPIRATORY (INHALATION) at 22:50

## 2017-01-01 RX ADMIN — MIRTAZAPINE 15 MG: 15 TABLET, FILM COATED ORAL at 10:28

## 2017-01-01 RX ADMIN — HYDRALAZINE HYDROCHLORIDE 10 MG: 20 INJECTION INTRAMUSCULAR; INTRAVENOUS at 20:01

## 2017-01-01 RX ADMIN — Medication: at 16:35

## 2017-01-01 RX ADMIN — HYDRALAZINE HYDROCHLORIDE 20 MG: 20 INJECTION INTRAMUSCULAR; INTRAVENOUS at 16:44

## 2017-01-01 RX ADMIN — ALBUTEROL SULFATE 2 PUFF: 90 AEROSOL, METERED RESPIRATORY (INHALATION) at 19:14

## 2017-01-01 RX ADMIN — CEFAZOLIN 1 G: 1 INJECTION, POWDER, FOR SOLUTION INTRAMUSCULAR; INTRAVENOUS; PARENTERAL at 23:35

## 2017-01-01 RX ADMIN — LABETALOL HYDROCHLORIDE 20 MG: 5 INJECTION, SOLUTION INTRAVENOUS at 09:20

## 2017-01-01 RX ADMIN — METOCLOPRAMIDE HYDROCHLORIDE 5 MG: 5 TABLET ORAL at 14:00

## 2017-01-01 RX ADMIN — Medication 10 ML: at 20:30

## 2017-01-01 RX ADMIN — HYDRALAZINE HYDROCHLORIDE 100 MG: 50 TABLET ORAL at 13:39

## 2017-01-01 RX ADMIN — PROPOFOL 50 MCG/KG/MIN: 10 INJECTION, EMULSION INTRAVENOUS at 19:56

## 2017-01-01 RX ADMIN — NYSTATIN: 100000 CREAM TOPICAL at 21:11

## 2017-01-01 RX ADMIN — ERYTHROMYCIN: 5 OINTMENT OPHTHALMIC at 08:35

## 2017-01-01 RX ADMIN — MORPHINE SULFATE 4 MG: 4 INJECTION, SOLUTION INTRAMUSCULAR; INTRAVENOUS at 20:51

## 2017-01-01 RX ADMIN — MINERAL OIL AND PETROLATUM: 150; 830 OINTMENT OPHTHALMIC at 09:19

## 2017-01-01 RX ADMIN — MORPHINE SULFATE 1 MG: 2 INJECTION, SOLUTION INTRAMUSCULAR; INTRAVENOUS at 18:48

## 2017-01-01 RX ADMIN — MINERAL OIL AND PETROLATUM: 150; 830 OINTMENT OPHTHALMIC at 09:15

## 2017-01-01 RX ADMIN — ALBUTEROL SULFATE 2.5 MG: 2.5 SOLUTION RESPIRATORY (INHALATION) at 07:25

## 2017-01-01 RX ADMIN — HYDROCODONE BITARTRATE AND ACETAMINOPHEN 1 TABLET: 5; 325 TABLET ORAL at 23:17

## 2017-01-01 RX ADMIN — BUDESONIDE AND FORMOTEROL FUMARATE DIHYDRATE 2 PUFF: 160; 4.5 AEROSOL RESPIRATORY (INHALATION) at 07:22

## 2017-01-01 RX ADMIN — CLONIDINE HYDROCHLORIDE 0.2 MG: 0.2 TABLET ORAL at 14:01

## 2017-01-01 RX ADMIN — MORPHINE SULFATE 4 MG: 4 INJECTION, SOLUTION INTRAMUSCULAR; INTRAVENOUS at 17:58

## 2017-01-01 RX ADMIN — BUDESONIDE AND FORMOTEROL FUMARATE DIHYDRATE 2 PUFF: 160; 4.5 AEROSOL RESPIRATORY (INHALATION) at 21:28

## 2017-01-01 RX ADMIN — SODIUM CHLORIDE 30 ML/HR: 900 INJECTION, SOLUTION INTRAVENOUS at 23:25

## 2017-01-01 RX ADMIN — CLONIDINE HYDROCHLORIDE 0.2 MG: 0.2 TABLET ORAL at 14:39

## 2017-01-01 RX ADMIN — CHLORHEXIDINE GLUCONATE 15 ML: 1.2 RINSE ORAL at 20:07

## 2017-01-01 RX ADMIN — NICOTINE 1 PATCH: 21 PATCH TRANSDERMAL at 03:24

## 2017-01-01 RX ADMIN — SODIUM CHLORIDE 200 ML: 900 INJECTION, SOLUTION INTRAVENOUS at 17:15

## 2017-01-01 RX ADMIN — CLONIDINE HYDROCHLORIDE 0.2 MG: 0.2 TABLET ORAL at 21:15

## 2017-01-01 RX ADMIN — MORPHINE SULFATE 1 MG: 2 INJECTION, SOLUTION INTRAMUSCULAR; INTRAVENOUS at 03:45

## 2017-01-01 RX ADMIN — TRAZODONE HYDROCHLORIDE 150 MG: 150 TABLET ORAL at 20:31

## 2017-01-01 RX ADMIN — MORPHINE SULFATE 4 MG: 4 INJECTION, SOLUTION INTRAMUSCULAR; INTRAVENOUS at 02:09

## 2017-01-01 RX ADMIN — NICOTINE 1 PATCH: 21 PATCH, EXTENDED RELEASE TRANSDERMAL at 15:18

## 2017-01-01 RX ADMIN — HYDRALAZINE HYDROCHLORIDE 50 MG: 50 TABLET ORAL at 21:30

## 2017-01-01 RX ADMIN — ALBUTEROL SULFATE 2.5 MG: 2.5 SOLUTION RESPIRATORY (INHALATION) at 19:08

## 2017-01-01 RX ADMIN — ALBUTEROL SULFATE 2.5 MG: 2.5 SOLUTION RESPIRATORY (INHALATION) at 07:07

## 2017-01-01 RX ADMIN — BUSPIRONE HYDROCHLORIDE 15 MG: 15 TABLET ORAL at 20:32

## 2017-01-01 RX ADMIN — METHYLPREDNISOLONE SODIUM SUCCINATE 20 MG: 40 INJECTION, POWDER, FOR SOLUTION INTRAMUSCULAR; INTRAVENOUS at 08:43

## 2017-01-01 RX ADMIN — ERYTHROMYCIN: 5 OINTMENT OPHTHALMIC at 21:36

## 2017-01-01 RX ADMIN — NYSTATIN 1 APPLICATION: 100000 POWDER TOPICAL at 22:13

## 2017-01-01 RX ADMIN — BUSPIRONE HYDROCHLORIDE 15 MG: 15 TABLET ORAL at 08:20

## 2017-01-01 RX ADMIN — METHYLPREDNISOLONE SODIUM SUCCINATE 80 MG: 125 INJECTION, POWDER, FOR SOLUTION INTRAMUSCULAR; INTRAVENOUS at 01:36

## 2017-01-01 RX ADMIN — PANTOPRAZOLE SODIUM 40 MG: 40 TABLET, DELAYED RELEASE ORAL at 06:17

## 2017-01-01 RX ADMIN — NICOTINE 1 PATCH: 21 PATCH TRANSDERMAL at 09:26

## 2017-01-01 RX ADMIN — SODIUM CHLORIDE 5 MG/HR: 9 INJECTION, SOLUTION INTRAVENOUS at 21:43

## 2017-01-01 RX ADMIN — LINEZOLID 600 MG: 100 SUSPENSION ORAL at 08:05

## 2017-01-01 RX ADMIN — HYDRALAZINE HYDROCHLORIDE 10 MG: 20 INJECTION INTRAMUSCULAR; INTRAVENOUS at 09:42

## 2017-01-01 RX ADMIN — PROPOFOL 40 MCG/KG/MIN: 10 INJECTION, EMULSION INTRAVENOUS at 08:44

## 2017-01-01 RX ADMIN — LISINOPRIL 40 MG: 40 TABLET ORAL at 14:36

## 2017-01-01 RX ADMIN — PANTOPRAZOLE SODIUM 40 MG: 40 INJECTION, POWDER, FOR SOLUTION INTRAVENOUS at 05:36

## 2017-01-01 RX ADMIN — SPIRONOLACTONE 25 MG: 25 TABLET ORAL at 08:54

## 2017-01-01 RX ADMIN — ALBUTEROL SULFATE 2.5 MG: 2.5 SOLUTION RESPIRATORY (INHALATION) at 18:47

## 2017-01-01 RX ADMIN — MINERAL OIL AND PETROLATUM: 150; 830 OINTMENT OPHTHALMIC at 20:21

## 2017-01-01 RX ADMIN — SODIUM CHLORIDE 5 MG/HR: 9 INJECTION, SOLUTION INTRAVENOUS at 21:54

## 2017-01-01 RX ADMIN — SODIUM CHLORIDE 50 ML/HR: 900 INJECTION, SOLUTION INTRAVENOUS at 21:15

## 2017-01-01 RX ADMIN — TRAZODONE HYDROCHLORIDE 150 MG: 150 TABLET ORAL at 20:27

## 2017-01-01 RX ADMIN — Medication: at 20:38

## 2017-01-01 RX ADMIN — HYDRALAZINE HYDROCHLORIDE 100 MG: 50 TABLET ORAL at 21:16

## 2017-01-01 RX ADMIN — BUDESONIDE AND FORMOTEROL FUMARATE DIHYDRATE 2 PUFF: 160; 4.5 AEROSOL RESPIRATORY (INHALATION) at 19:20

## 2017-01-01 RX ADMIN — NITROGLYCERIN 1 INCH: 20 OINTMENT TOPICAL at 06:20

## 2017-01-01 RX ADMIN — PANTOPRAZOLE SODIUM 40 MG: 40 INJECTION, POWDER, FOR SOLUTION INTRAVENOUS at 05:59

## 2017-01-01 RX ADMIN — MORPHINE SULFATE 1 MG: 2 INJECTION, SOLUTION INTRAMUSCULAR; INTRAVENOUS at 12:52

## 2017-01-01 RX ADMIN — LABETALOL HYDROCHLORIDE 200 MG: 200 TABLET, FILM COATED ORAL at 09:27

## 2017-01-01 RX ADMIN — HYDRALAZINE HYDROCHLORIDE 20 MG: 20 INJECTION INTRAMUSCULAR; INTRAVENOUS at 08:33

## 2017-01-01 RX ADMIN — TRAZODONE HYDROCHLORIDE 150 MG: 150 TABLET ORAL at 21:33

## 2017-01-01 RX ADMIN — LABETALOL HYDROCHLORIDE 100 MG: 100 TABLET, FILM COATED ORAL at 08:31

## 2017-01-01 RX ADMIN — FENTANYL CITRATE 25 MCG: 50 INJECTION, SOLUTION INTRAMUSCULAR; INTRAVENOUS at 14:51

## 2017-01-01 RX ADMIN — LABETALOL HYDROCHLORIDE 100 MG: 100 TABLET, FILM COATED ORAL at 17:19

## 2017-01-01 RX ADMIN — HYDRALAZINE HYDROCHLORIDE 10 MG: 20 INJECTION INTRAMUSCULAR; INTRAVENOUS at 04:12

## 2017-01-01 RX ADMIN — DEXTROSE AND SODIUM CHLORIDE 125 ML/HR: 5; 450 INJECTION, SOLUTION INTRAVENOUS at 10:40

## 2017-01-01 RX ADMIN — CLONIDINE HYDROCHLORIDE 0.2 MG: 0.2 TABLET ORAL at 21:11

## 2017-01-01 RX ADMIN — CLONIDINE HYDROCHLORIDE 0.2 MG: 0.2 TABLET ORAL at 22:05

## 2017-01-01 RX ADMIN — ALBUTEROL SULFATE 10 MG: 2.5 SOLUTION RESPIRATORY (INHALATION) at 13:08

## 2017-01-01 RX ADMIN — HYDRALAZINE HYDROCHLORIDE 10 MG: 20 INJECTION INTRAMUSCULAR; INTRAVENOUS at 21:15

## 2017-01-01 RX ADMIN — MORPHINE SULFATE 1 MG: 2 INJECTION, SOLUTION INTRAMUSCULAR; INTRAVENOUS at 05:37

## 2017-01-01 RX ADMIN — BUDESONIDE AND FORMOTEROL FUMARATE DIHYDRATE 2 PUFF: 160; 4.5 AEROSOL RESPIRATORY (INHALATION) at 07:05

## 2017-01-01 RX ADMIN — MORPHINE SULFATE 1 MG: 2 INJECTION, SOLUTION INTRAMUSCULAR; INTRAVENOUS at 22:53

## 2017-01-01 RX ADMIN — MINERAL OIL AND PETROLATUM: 150; 830 OINTMENT OPHTHALMIC at 20:17

## 2017-01-01 RX ADMIN — LEVOFLOXACIN 500 MG: 5 INJECTION, SOLUTION INTRAVENOUS at 08:52

## 2017-01-01 RX ADMIN — CEFTAZIDIME 500 MG: 1 INJECTION, POWDER, FOR SOLUTION INTRAMUSCULAR; INTRAVENOUS at 15:52

## 2017-01-01 RX ADMIN — LABETALOL HYDROCHLORIDE 100 MG: 100 TABLET, FILM COATED ORAL at 16:54

## 2017-01-01 RX ADMIN — MORPHINE SULFATE 1 MG: 2 INJECTION, SOLUTION INTRAMUSCULAR; INTRAVENOUS at 05:48

## 2017-01-01 RX ADMIN — ALBUTEROL SULFATE 2.5 MG: 2.5 SOLUTION RESPIRATORY (INHALATION) at 19:58

## 2017-01-01 RX ADMIN — HYDRALAZINE HYDROCHLORIDE 20 MG: 20 INJECTION INTRAMUSCULAR; INTRAVENOUS at 08:15

## 2017-01-01 RX ADMIN — TAZOBACTAM SODIUM AND PIPERACILLIN SODIUM 3.38 G: 375; 3 INJECTION, SOLUTION INTRAVENOUS at 15:57

## 2017-01-01 RX ADMIN — LABETALOL HYDROCHLORIDE 200 MG: 200 TABLET, FILM COATED ORAL at 21:12

## 2017-01-01 RX ADMIN — LABETALOL HYDROCHLORIDE 200 MG: 200 TABLET, FILM COATED ORAL at 20:48

## 2017-01-01 RX ADMIN — HYDRALAZINE HYDROCHLORIDE 10 MG: 20 INJECTION INTRAMUSCULAR; INTRAVENOUS at 04:24

## 2017-01-01 RX ADMIN — DOCUSATE SODIUM 100 MG: 50 LIQUID ORAL at 18:31

## 2017-01-01 RX ADMIN — PROPOFOL 40 MCG/KG/MIN: 10 INJECTION, EMULSION INTRAVENOUS at 18:44

## 2017-01-01 RX ADMIN — BUSPIRONE HYDROCHLORIDE 15 MG: 15 TABLET ORAL at 11:30

## 2017-01-01 RX ADMIN — PROMETHAZINE HYDROCHLORIDE 12.5 MG: 25 INJECTION INTRAMUSCULAR; INTRAVENOUS at 13:56

## 2017-01-01 RX ADMIN — PANTOPRAZOLE SODIUM 40 MG: 40 INJECTION, POWDER, FOR SOLUTION INTRAVENOUS at 06:22

## 2017-01-01 RX ADMIN — DOXYCYCLINE HYCLATE 100 MG: 100 TABLET, FILM COATED ORAL at 20:35

## 2017-01-01 RX ADMIN — SODIUM CHLORIDE 200 ML: 900 INJECTION, SOLUTION INTRAVENOUS at 17:25

## 2017-01-01 RX ADMIN — PANTOPRAZOLE SODIUM 40 MG: 40 TABLET, DELAYED RELEASE ORAL at 09:02

## 2017-01-01 RX ADMIN — LABETALOL HYDROCHLORIDE 200 MG: 200 TABLET, FILM COATED ORAL at 08:32

## 2017-01-01 RX ADMIN — LINEZOLID 600 MG: 600 TABLET, FILM COATED ORAL at 09:06

## 2017-01-01 RX ADMIN — Medication: at 21:30

## 2017-01-01 RX ADMIN — MORPHINE SULFATE 2 MG: 2 INJECTION, SOLUTION INTRAMUSCULAR; INTRAVENOUS at 14:47

## 2017-01-01 RX ADMIN — POTASSIUM CHLORIDE 20 MEQ: 750 CAPSULE, EXTENDED RELEASE ORAL at 20:15

## 2017-01-01 RX ADMIN — PROPOFOL 30 MCG/KG/MIN: 10 INJECTION, EMULSION INTRAVENOUS at 22:12

## 2017-01-01 RX ADMIN — FUROSEMIDE 40 MG: 10 INJECTION, SOLUTION INTRAMUSCULAR; INTRAVENOUS at 08:30

## 2017-01-01 RX ADMIN — SODIUM CHLORIDE 30 ML/HR: 900 INJECTION, SOLUTION INTRAVENOUS at 01:57

## 2017-01-01 RX ADMIN — TAZOBACTAM SODIUM AND PIPERACILLIN SODIUM 3.38 G: 375; 3 INJECTION, SOLUTION INTRAVENOUS at 08:29

## 2017-01-01 RX ADMIN — IPRATROPIUM BROMIDE AND ALBUTEROL SULFATE 3 ML: 2.5; .5 SOLUTION RESPIRATORY (INHALATION) at 16:12

## 2017-01-01 RX ADMIN — PROPOFOL 50 MCG/KG/MIN: 10 INJECTION, EMULSION INTRAVENOUS at 14:46

## 2017-01-01 RX ADMIN — NICOTINE 1 PATCH: 21 PATCH TRANSDERMAL at 02:42

## 2017-01-01 RX ADMIN — SODIUM CHLORIDE 30 ML/HR: 9 INJECTION, SOLUTION INTRAVENOUS at 16:32

## 2017-01-01 RX ADMIN — ERYTHROMYCIN: 5 OINTMENT OPHTHALMIC at 21:06

## 2017-01-01 RX ADMIN — HYDRALAZINE HYDROCHLORIDE 100 MG: 50 TABLET ORAL at 14:22

## 2017-01-01 RX ADMIN — ATORVASTATIN CALCIUM 80 MG: 40 TABLET, FILM COATED ORAL at 21:05

## 2017-01-01 RX ADMIN — FUROSEMIDE 40 MG: 10 INJECTION, SOLUTION INTRAMUSCULAR; INTRAVENOUS at 09:00

## 2017-01-01 RX ADMIN — SODIUM CHLORIDE 100 ML: 900 INJECTION, SOLUTION INTRAVENOUS at 16:25

## 2017-01-01 RX ADMIN — Medication 10 ML: at 22:21

## 2017-01-01 RX ADMIN — PANTOPRAZOLE SODIUM 40 MG: 40 INJECTION, POWDER, FOR SOLUTION INTRAVENOUS at 08:09

## 2017-01-01 RX ADMIN — DOCUSATE SODIUM 100 MG: 50 LIQUID ORAL at 08:04

## 2017-01-01 RX ADMIN — LABETALOL HYDROCHLORIDE 200 MG: 200 TABLET, FILM COATED ORAL at 20:18

## 2017-01-01 RX ADMIN — Medication: at 09:19

## 2017-01-01 RX ADMIN — TRAZODONE HYDROCHLORIDE 150 MG: 150 TABLET ORAL at 21:31

## 2017-01-01 RX ADMIN — MINERAL OIL AND PETROLATUM: 150; 830 OINTMENT OPHTHALMIC at 09:55

## 2017-01-01 RX ADMIN — HYDRALAZINE HYDROCHLORIDE 20 MG: 20 INJECTION INTRAMUSCULAR; INTRAVENOUS at 16:46

## 2017-01-01 RX ADMIN — CEFTRIAXONE 1 G: 1 INJECTION, POWDER, FOR SOLUTION INTRAMUSCULAR; INTRAVENOUS at 09:30

## 2017-01-01 RX ADMIN — ALBUTEROL SULFATE 2 PUFF: 90 AEROSOL, METERED RESPIRATORY (INHALATION) at 03:37

## 2017-01-01 RX ADMIN — IPRATROPIUM BROMIDE 2 PUFF: 17 AEROSOL, METERED RESPIRATORY (INHALATION) at 15:33

## 2017-01-01 RX ADMIN — SPIRONOLACTONE 25 MG: 25 TABLET ORAL at 09:02

## 2017-01-01 RX ADMIN — LINEZOLID 600 MG: 600 TABLET, FILM COATED ORAL at 14:56

## 2017-01-01 RX ADMIN — HYDRALAZINE HYDROCHLORIDE 100 MG: 50 TABLET ORAL at 06:02

## 2017-01-01 RX ADMIN — FUROSEMIDE 40 MG: 10 INJECTION, SOLUTION INTRAMUSCULAR; INTRAVENOUS at 08:23

## 2017-01-01 RX ADMIN — POTASSIUM CHLORIDE 40 MEQ: 20 SOLUTION ORAL at 14:56

## 2017-01-01 RX ADMIN — MINERAL OIL AND PETROLATUM: 150; 830 OINTMENT OPHTHALMIC at 20:09

## 2017-01-01 RX ADMIN — BUSPIRONE HYDROCHLORIDE 15 MG: 15 TABLET ORAL at 09:09

## 2017-01-01 RX ADMIN — ALBUTEROL SULFATE 2 PUFF: 90 AEROSOL, METERED RESPIRATORY (INHALATION) at 03:44

## 2017-01-01 RX ADMIN — BUDESONIDE AND FORMOTEROL FUMARATE DIHYDRATE 2 PUFF: 160; 4.5 AEROSOL RESPIRATORY (INHALATION) at 07:53

## 2017-01-01 RX ADMIN — ATROPINE SULFATE 2 DROP: 10 SOLUTION/ DROPS OPHTHALMIC at 20:39

## 2017-01-01 RX ADMIN — ALBUTEROL SULFATE 2.5 MG: 2.5 SOLUTION RESPIRATORY (INHALATION) at 07:47

## 2017-01-01 RX ADMIN — PROPOFOL 45 MCG/KG/MIN: 10 INJECTION, EMULSION INTRAVENOUS at 11:15

## 2017-01-01 RX ADMIN — LEVOFLOXACIN 500 MG: 5 INJECTION, SOLUTION INTRAVENOUS at 07:20

## 2017-01-01 RX ADMIN — LISINOPRIL 20 MG: 20 TABLET ORAL at 08:55

## 2017-01-01 RX ADMIN — PROPOFOL 50 MCG/KG/MIN: 10 INJECTION, EMULSION INTRAVENOUS at 18:32

## 2017-01-01 RX ADMIN — MORPHINE SULFATE 4 MG: 4 INJECTION, SOLUTION INTRAMUSCULAR; INTRAVENOUS at 06:13

## 2017-01-01 RX ADMIN — PROPOFOL 30 MCG/KG/MIN: 10 INJECTION, EMULSION INTRAVENOUS at 16:57

## 2017-01-01 RX ADMIN — SUCCINYLCHOLINE CHLORIDE 140 MG: 20 INJECTION, SOLUTION INTRAMUSCULAR; INTRAVENOUS at 15:14

## 2017-01-01 RX ADMIN — MINERAL OIL AND PETROLATUM: 150; 830 OINTMENT OPHTHALMIC at 09:06

## 2017-01-01 RX ADMIN — NICOTINE 1 PATCH: 21 PATCH TRANSDERMAL at 09:35

## 2017-01-01 RX ADMIN — DOCUSATE SODIUM 100 MG: 50 LIQUID ORAL at 08:11

## 2017-01-01 RX ADMIN — ERYTHROMYCIN: 5 OINTMENT OPHTHALMIC at 09:00

## 2017-01-01 RX ADMIN — POTASSIUM CHLORIDE, DEXTROSE MONOHYDRATE AND SODIUM CHLORIDE 125 ML/HR: 150; 5; 450 INJECTION, SOLUTION INTRAVENOUS at 01:20

## 2017-01-01 RX ADMIN — LABETALOL HYDROCHLORIDE 200 MG: 200 TABLET, FILM COATED ORAL at 22:10

## 2017-01-01 RX ADMIN — Medication 10 ML: at 12:35

## 2017-01-01 RX ADMIN — Medication 10 ML: at 21:37

## 2017-01-01 RX ADMIN — HYDROCODONE BITARTRATE AND ACETAMINOPHEN 1 TABLET: 5; 325 TABLET ORAL at 07:31

## 2017-01-01 RX ADMIN — BUDESONIDE AND FORMOTEROL FUMARATE DIHYDRATE 2 PUFF: 160; 4.5 AEROSOL RESPIRATORY (INHALATION) at 07:56

## 2017-01-01 RX ADMIN — DOCUSATE SODIUM 100 MG: 100 CAPSULE, LIQUID FILLED ORAL at 08:31

## 2017-01-01 RX ADMIN — IPRATROPIUM BROMIDE AND ALBUTEROL SULFATE 3 ML: 2.5; .5 SOLUTION RESPIRATORY (INHALATION) at 08:19

## 2017-01-01 RX ADMIN — IPRATROPIUM BROMIDE 2 PUFF: 17 AEROSOL, METERED RESPIRATORY (INHALATION) at 11:22

## 2017-01-01 RX ADMIN — CLONIDINE HYDROCHLORIDE 0.2 MG: 0.2 TABLET ORAL at 22:58

## 2017-01-01 RX ADMIN — CLONIDINE HYDROCHLORIDE 0.2 MG: 0.2 TABLET ORAL at 13:39

## 2017-01-01 RX ADMIN — ATORVASTATIN CALCIUM 80 MG: 40 TABLET, FILM COATED ORAL at 20:33

## 2017-01-01 RX ADMIN — Medication 10 ML: at 10:13

## 2017-01-01 RX ADMIN — FUROSEMIDE 20 MG: 10 INJECTION, SOLUTION INTRAVENOUS at 12:14

## 2017-01-01 RX ADMIN — ATORVASTATIN CALCIUM 80 MG: 40 TABLET, FILM COATED ORAL at 21:30

## 2017-01-01 RX ADMIN — METHYLPREDNISOLONE SODIUM SUCCINATE 60 MG: 125 INJECTION, POWDER, FOR SOLUTION INTRAMUSCULAR; INTRAVENOUS at 09:30

## 2017-01-01 RX ADMIN — POLYETHYLENE GLYCOL 3350, SODIUM CHLORIDE, POTASSIUM CHLORIDE, SODIUM BICARBONATE, AND SODIUM SULFATE 2000 ML: 240; 5.84; 2.98; 6.72; 22.72 POWDER, FOR SOLUTION ORAL at 14:10

## 2017-01-01 RX ADMIN — NYSTATIN: 100000 CREAM TOPICAL at 21:56

## 2017-01-01 RX ADMIN — CHLORHEXIDINE GLUCONATE 15 ML: 1.2 RINSE ORAL at 09:02

## 2017-01-01 RX ADMIN — PROPOFOL 40 MCG/KG/MIN: 10 INJECTION, EMULSION INTRAVENOUS at 19:08

## 2017-01-01 RX ADMIN — LABETALOL HYDROCHLORIDE 100 MG: 100 TABLET, FILM COATED ORAL at 07:53

## 2017-01-01 RX ADMIN — BUSPIRONE HYDROCHLORIDE 15 MG: 15 TABLET ORAL at 22:01

## 2017-01-01 RX ADMIN — ALBUTEROL SULFATE 2 PUFF: 90 AEROSOL, METERED RESPIRATORY (INHALATION) at 23:10

## 2017-01-01 RX ADMIN — HYDRALAZINE HYDROCHLORIDE 20 MG: 20 INJECTION INTRAMUSCULAR; INTRAVENOUS at 09:03

## 2017-01-01 RX ADMIN — PANTOPRAZOLE SODIUM 40 MG: 40 TABLET, DELAYED RELEASE ORAL at 09:09

## 2017-01-01 RX ADMIN — ERYTHROMYCIN: 5 OINTMENT OPHTHALMIC at 08:54

## 2017-01-01 RX ADMIN — MORPHINE SULFATE 4 MG: 4 INJECTION, SOLUTION INTRAMUSCULAR; INTRAVENOUS at 10:10

## 2017-01-01 RX ADMIN — FUROSEMIDE 20 MG: 20 TABLET ORAL at 09:09

## 2017-01-01 RX ADMIN — OXYCODONE HYDROCHLORIDE AND ACETAMINOPHEN 1 TABLET: 10; 325 TABLET ORAL at 15:30

## 2017-01-01 RX ADMIN — POTASSIUM CHLORIDE 20 MEQ: 20 SOLUTION ORAL at 09:09

## 2017-01-01 RX ADMIN — CEFTRIAXONE 1 G: 1 INJECTION, POWDER, FOR SOLUTION INTRAMUSCULAR; INTRAVENOUS at 10:29

## 2017-01-01 RX ADMIN — PANTOPRAZOLE SODIUM 40 MG: 40 INJECTION, POWDER, FOR SOLUTION INTRAVENOUS at 05:39

## 2017-01-01 RX ADMIN — ALBUTEROL SULFATE 2.5 MG: 2.5 SOLUTION RESPIRATORY (INHALATION) at 13:11

## 2017-01-01 RX ADMIN — CLONIDINE HYDROCHLORIDE 0.2 MG: 0.2 TABLET ORAL at 22:28

## 2017-01-01 RX ADMIN — CLONIDINE HYDROCHLORIDE 0.2 MG: 0.2 TABLET ORAL at 06:10

## 2017-01-01 RX ADMIN — MORPHINE SULFATE 1 MG: 2 INJECTION, SOLUTION INTRAMUSCULAR; INTRAVENOUS at 08:48

## 2017-01-01 RX ADMIN — ALBUTEROL SULFATE 2.5 MG: 2.5 SOLUTION RESPIRATORY (INHALATION) at 01:20

## 2017-01-01 RX ADMIN — SODIUM CHLORIDE 30 ML/HR: 900 INJECTION, SOLUTION INTRAVENOUS at 20:00

## 2017-01-01 RX ADMIN — MINERAL OIL AND PETROLATUM: 150; 830 OINTMENT OPHTHALMIC at 08:32

## 2017-01-01 RX ADMIN — METHYLPREDNISOLONE SODIUM SUCCINATE 60 MG: 125 INJECTION, POWDER, FOR SOLUTION INTRAMUSCULAR; INTRAVENOUS at 09:06

## 2017-01-01 RX ADMIN — Medication 10 ML: at 01:48

## 2017-01-01 RX ADMIN — CEFTRIAXONE 2 G: 2 INJECTION, POWDER, FOR SOLUTION INTRAMUSCULAR; INTRAVENOUS at 17:21

## 2017-01-01 RX ADMIN — ERYTHROMYCIN: 5 OINTMENT OPHTHALMIC at 21:29

## 2017-01-01 RX ADMIN — QUETIAPINE FUMARATE 200 MG: 100 TABLET, FILM COATED ORAL at 21:01

## 2017-01-01 RX ADMIN — MORPHINE SULFATE 1 MG: 2 INJECTION, SOLUTION INTRAMUSCULAR; INTRAVENOUS at 20:45

## 2017-01-01 RX ADMIN — DOCUSATE SODIUM 100 MG: 100 CAPSULE, LIQUID FILLED ORAL at 13:23

## 2017-01-01 RX ADMIN — POTASSIUM CHLORIDE 40 MEQ: 20 TABLET, EXTENDED RELEASE ORAL at 14:30

## 2017-01-01 RX ADMIN — FUROSEMIDE 40 MG: 10 INJECTION, SOLUTION INTRAMUSCULAR; INTRAVENOUS at 17:37

## 2017-01-01 RX ADMIN — MORPHINE SULFATE 2 MG: 2 INJECTION, SOLUTION INTRAMUSCULAR; INTRAVENOUS at 19:49

## 2017-01-01 RX ADMIN — NICOTINE 1 PATCH: 21 PATCH TRANSDERMAL at 02:12

## 2017-01-01 RX ADMIN — LABETALOL HYDROCHLORIDE 100 MG: 100 TABLET, FILM COATED ORAL at 08:30

## 2017-01-01 RX ADMIN — LABETALOL HYDROCHLORIDE 20 MG: 5 INJECTION, SOLUTION INTRAVENOUS at 08:29

## 2017-01-01 RX ADMIN — LABETALOL HYDROCHLORIDE 200 MG: 200 TABLET, FILM COATED ORAL at 17:31

## 2017-01-01 RX ADMIN — SUCCINYLCHOLINE CHLORIDE 100 MG: 20 INJECTION, SOLUTION INTRAMUSCULAR; INTRAVENOUS at 19:16

## 2017-01-01 RX ADMIN — IPRATROPIUM BROMIDE AND ALBUTEROL SULFATE 3 ML: 2.5; .5 SOLUTION RESPIRATORY (INHALATION) at 11:19

## 2017-01-01 RX ADMIN — HEPARIN SODIUM 8000 UNITS: 5000 INJECTION, SOLUTION INTRAVENOUS; SUBCUTANEOUS at 06:19

## 2017-01-01 RX ADMIN — ALBUTEROL SULFATE 2.5 MG: 2.5 SOLUTION RESPIRATORY (INHALATION) at 07:14

## 2017-01-01 RX ADMIN — HYDRALAZINE HYDROCHLORIDE 100 MG: 50 TABLET ORAL at 21:05

## 2017-01-01 RX ADMIN — QUETIAPINE FUMARATE 200 MG: 100 TABLET, FILM COATED ORAL at 22:01

## 2017-01-01 RX ADMIN — PROPOFOL 40 MCG/KG/MIN: 10 INJECTION, EMULSION INTRAVENOUS at 08:30

## 2017-01-01 RX ADMIN — MORPHINE SULFATE 0.5 MG: 2 INJECTION, SOLUTION INTRAMUSCULAR; INTRAVENOUS at 09:59

## 2017-01-01 RX ADMIN — BUDESONIDE AND FORMOTEROL FUMARATE DIHYDRATE 2 PUFF: 160; 4.5 AEROSOL RESPIRATORY (INHALATION) at 06:45

## 2017-01-01 RX ADMIN — IPRATROPIUM BROMIDE 2 PUFF: 17 AEROSOL, METERED RESPIRATORY (INHALATION) at 15:24

## 2017-01-01 RX ADMIN — MORPHINE SULFATE 4 MG: 4 INJECTION, SOLUTION INTRAMUSCULAR; INTRAVENOUS at 06:04

## 2017-01-01 RX ADMIN — QUETIAPINE FUMARATE 200 MG: 100 TABLET, FILM COATED ORAL at 20:08

## 2017-01-01 RX ADMIN — NYSTATIN: 100000 CREAM TOPICAL at 21:39

## 2017-01-01 RX ADMIN — MINERAL OIL AND PETROLATUM: 150; 830 OINTMENT OPHTHALMIC at 09:17

## 2017-01-01 RX ADMIN — ERYTHROMYCIN: 5 OINTMENT OPHTHALMIC at 08:16

## 2017-01-01 RX ADMIN — MIDAZOLAM 1 MG: 1 INJECTION INTRAMUSCULAR; INTRAVENOUS at 15:05

## 2017-01-01 RX ADMIN — SODIUM CHLORIDE 30 ML/HR: 900 INJECTION, SOLUTION INTRAVENOUS at 00:22

## 2017-01-01 RX ADMIN — NYSTATIN: 100000 POWDER TOPICAL at 21:09

## 2017-01-01 RX ADMIN — NYSTATIN: 100000 CREAM TOPICAL at 20:38

## 2017-01-01 RX ADMIN — NICOTINE 1 PATCH: 21 PATCH TRANSDERMAL at 02:33

## 2017-01-01 RX ADMIN — LINEZOLID 600 MG: 100 SUSPENSION ORAL at 20:21

## 2017-01-01 RX ADMIN — IPRATROPIUM BROMIDE AND ALBUTEROL SULFATE 3 ML: 2.5; .5 SOLUTION RESPIRATORY (INHALATION) at 08:29

## 2017-01-01 RX ADMIN — CEFTRIAXONE 2 G: 2 INJECTION, POWDER, FOR SOLUTION INTRAMUSCULAR; INTRAVENOUS at 17:54

## 2017-01-01 RX ADMIN — MINERAL OIL AND PETROLATUM: 150; 830 OINTMENT OPHTHALMIC at 20:38

## 2017-01-01 RX ADMIN — CHLORHEXIDINE GLUCONATE 15 ML: 1.2 RINSE ORAL at 20:11

## 2017-01-01 RX ADMIN — PROPOFOL 40 MCG/KG/MIN: 10 INJECTION, EMULSION INTRAVENOUS at 21:33

## 2017-01-01 RX ADMIN — FUROSEMIDE 40 MG: 10 INJECTION, SOLUTION INTRAMUSCULAR; INTRAVENOUS at 09:16

## 2017-01-01 RX ADMIN — NYSTATIN: 100000 CREAM TOPICAL at 09:19

## 2017-01-01 RX ADMIN — LABETALOL HYDROCHLORIDE 200 MG: 200 TABLET, FILM COATED ORAL at 08:02

## 2017-01-01 RX ADMIN — SODIUM CHLORIDE 500 ML: 9 INJECTION, SOLUTION INTRAVENOUS at 21:56

## 2017-01-01 RX ADMIN — BUDESONIDE AND FORMOTEROL FUMARATE DIHYDRATE 2 PUFF: 160; 4.5 AEROSOL RESPIRATORY (INHALATION) at 08:45

## 2017-01-01 RX ADMIN — IPRATROPIUM BROMIDE 2 PUFF: 17 AEROSOL, METERED RESPIRATORY (INHALATION) at 01:21

## 2017-01-01 RX ADMIN — ALBUTEROL SULFATE 2.5 MG: 2.5 SOLUTION RESPIRATORY (INHALATION) at 13:04

## 2017-01-01 RX ADMIN — MINERAL OIL AND PETROLATUM: 150; 830 OINTMENT OPHTHALMIC at 08:34

## 2017-01-01 RX ADMIN — METHYLPREDNISOLONE SODIUM SUCCINATE 80 MG: 125 INJECTION, POWDER, FOR SOLUTION INTRAMUSCULAR; INTRAVENOUS at 07:59

## 2017-01-01 RX ADMIN — PROPOFOL 30 MCG/KG/MIN: 10 INJECTION, EMULSION INTRAVENOUS at 03:52

## 2017-01-01 RX ADMIN — TRAZODONE HYDROCHLORIDE 150 MG: 150 TABLET ORAL at 20:36

## 2017-01-01 RX ADMIN — HYDROCODONE BITARTRATE AND ACETAMINOPHEN 1 TABLET: 7.5; 325 TABLET ORAL at 12:30

## 2017-01-01 RX ADMIN — LISINOPRIL 20 MG: 20 TABLET ORAL at 10:10

## 2017-01-01 RX ADMIN — PANTOPRAZOLE SODIUM 40 MG: 40 INJECTION, POWDER, FOR SOLUTION INTRAVENOUS at 05:53

## 2017-01-01 RX ADMIN — AZTREONAM 2 G: 2 INJECTION, POWDER, FOR SOLUTION INTRAMUSCULAR; INTRAVENOUS at 10:10

## 2017-01-01 RX ADMIN — Medication 1 ML: at 20:28

## 2017-01-01 RX ADMIN — LABETALOL HYDROCHLORIDE 200 MG: 200 TABLET, FILM COATED ORAL at 09:30

## 2017-01-01 RX ADMIN — MAGESIUM CITRATE 296 ML: 1.75 LIQUID ORAL at 13:45

## 2017-01-01 RX ADMIN — NYSTATIN 1 APPLICATION: 100000 POWDER TOPICAL at 08:46

## 2017-01-01 RX ADMIN — ALBUTEROL SULFATE 2.5 MG: 2.5 SOLUTION RESPIRATORY (INHALATION) at 00:55

## 2017-01-01 RX ADMIN — LABETALOL HYDROCHLORIDE 100 MG: 100 TABLET, FILM COATED ORAL at 16:57

## 2017-01-01 RX ADMIN — NYSTATIN: 100000 POWDER TOPICAL at 21:15

## 2017-01-01 RX ADMIN — SODIUM CHLORIDE: 9 INJECTION, SOLUTION INTRAVENOUS at 12:31

## 2017-01-01 RX ADMIN — BUSPIRONE HYDROCHLORIDE 15 MG: 15 TABLET ORAL at 22:10

## 2017-01-01 RX ADMIN — MORPHINE SULFATE 1 MG: 2 INJECTION, SOLUTION INTRAMUSCULAR; INTRAVENOUS at 05:12

## 2017-01-01 RX ADMIN — LISINOPRIL 20 MG: 20 TABLET ORAL at 08:10

## 2017-01-01 RX ADMIN — LINEZOLID 600 MG: 100 SUSPENSION ORAL at 20:18

## 2017-01-01 RX ADMIN — PANTOPRAZOLE SODIUM 40 MG: 40 INJECTION, POWDER, FOR SOLUTION INTRAVENOUS at 08:30

## 2017-01-01 RX ADMIN — Medication: at 08:32

## 2017-01-01 RX ADMIN — ALBUTEROL SULFATE 2 PUFF: 90 AEROSOL, METERED RESPIRATORY (INHALATION) at 18:46

## 2017-01-01 RX ADMIN — METHYLPREDNISOLONE SODIUM SUCCINATE 20 MG: 40 INJECTION, POWDER, FOR SOLUTION INTRAMUSCULAR; INTRAVENOUS at 09:04

## 2017-01-01 RX ADMIN — METHYLPREDNISOLONE SODIUM SUCCINATE 80 MG: 125 INJECTION, POWDER, FOR SOLUTION INTRAMUSCULAR; INTRAVENOUS at 21:05

## 2017-01-01 RX ADMIN — Medication 10 ML: at 20:36

## 2017-01-01 RX ADMIN — ERYTHROMYCIN: 5 OINTMENT OPHTHALMIC at 21:57

## 2017-01-01 RX ADMIN — MORPHINE SULFATE 4 MG: 4 INJECTION, SOLUTION INTRAMUSCULAR; INTRAVENOUS at 18:46

## 2017-01-01 RX ADMIN — BUDESONIDE AND FORMOTEROL FUMARATE DIHYDRATE 2 PUFF: 160; 4.5 AEROSOL RESPIRATORY (INHALATION) at 19:32

## 2017-01-01 RX ADMIN — Medication: at 09:04

## 2017-01-01 RX ADMIN — HYDRALAZINE HYDROCHLORIDE 100 MG: 50 TABLET ORAL at 22:08

## 2017-01-01 RX ADMIN — BUDESONIDE AND FORMOTEROL FUMARATE DIHYDRATE 2 PUFF: 160; 4.5 AEROSOL RESPIRATORY (INHALATION) at 21:10

## 2017-01-01 RX ADMIN — AZTREONAM 2 G: 2 INJECTION, POWDER, FOR SOLUTION INTRAMUSCULAR; INTRAVENOUS at 18:01

## 2017-01-01 RX ADMIN — PROPOFOL 50 MCG/KG/MIN: 10 INJECTION, EMULSION INTRAVENOUS at 16:09

## 2017-01-01 RX ADMIN — PREDNISONE 40 MG: 20 TABLET ORAL at 08:51

## 2017-01-01 RX ADMIN — ALBUTEROL SULFATE 2.5 MG: 2.5 SOLUTION RESPIRATORY (INHALATION) at 18:58

## 2017-01-01 RX ADMIN — HYDROCODONE BITARTRATE AND ACETAMINOPHEN 1 TABLET: 7.5; 325 TABLET ORAL at 14:24

## 2017-01-01 RX ADMIN — CLONIDINE HYDROCHLORIDE 0.2 MG: 0.2 TABLET ORAL at 13:19

## 2017-01-01 RX ADMIN — SULFAMETHOXAZOLE AND TRIMETHOPRIM 160 MG: 800; 160 TABLET ORAL at 08:43

## 2017-01-01 RX ADMIN — HYDRALAZINE HYDROCHLORIDE 20 MG: 20 INJECTION INTRAMUSCULAR; INTRAVENOUS at 01:08

## 2017-01-01 RX ADMIN — METHYLPREDNISOLONE SODIUM SUCCINATE 80 MG: 125 INJECTION, POWDER, FOR SOLUTION INTRAMUSCULAR; INTRAVENOUS at 09:30

## 2017-01-01 RX ADMIN — HYDRALAZINE HYDROCHLORIDE 50 MG: 50 TABLET ORAL at 20:31

## 2017-01-01 RX ADMIN — FUROSEMIDE 40 MG: 40 TABLET ORAL at 09:38

## 2017-01-01 RX ADMIN — PANTOPRAZOLE SODIUM 40 MG: 40 INJECTION, POWDER, FOR SOLUTION INTRAVENOUS at 06:11

## 2017-01-01 RX ADMIN — METRONIDAZOLE 500 MG: 500 INJECTION, SOLUTION INTRAVENOUS at 21:35

## 2017-01-01 RX ADMIN — Medication 10 ML: at 15:50

## 2017-01-01 RX ADMIN — DOXYCYCLINE HYCLATE 100 MG: 100 TABLET, FILM COATED ORAL at 21:30

## 2017-01-01 RX ADMIN — DEXTROSE AND SODIUM CHLORIDE 100 ML/HR: 5; 450 INJECTION, SOLUTION INTRAVENOUS at 22:28

## 2017-01-01 RX ADMIN — CLONIDINE HYDROCHLORIDE 0.2 MG: 0.2 TABLET ORAL at 06:17

## 2017-01-01 RX ADMIN — HYDROCODONE BITARTRATE AND ACETAMINOPHEN 1 TABLET: 5; 325 TABLET ORAL at 19:34

## 2017-01-01 RX ADMIN — ALBUTEROL SULFATE 2 PUFF: 90 AEROSOL, METERED RESPIRATORY (INHALATION) at 07:18

## 2017-01-01 RX ADMIN — MORPHINE SULFATE 1 MG: 2 INJECTION, SOLUTION INTRAMUSCULAR; INTRAVENOUS at 16:46

## 2017-01-01 RX ADMIN — METHYLPREDNISOLONE SODIUM SUCCINATE 80 MG: 125 INJECTION, POWDER, FOR SOLUTION INTRAMUSCULAR; INTRAVENOUS at 02:27

## 2017-01-01 RX ADMIN — MORPHINE SULFATE 1 MG: 2 INJECTION, SOLUTION INTRAMUSCULAR; INTRAVENOUS at 16:09

## 2017-01-01 RX ADMIN — QUETIAPINE FUMARATE 200 MG: 100 TABLET, FILM COATED ORAL at 20:35

## 2017-01-01 RX ADMIN — LABETALOL HYDROCHLORIDE 20 MG: 5 INJECTION, SOLUTION INTRAVENOUS at 17:49

## 2017-01-01 RX ADMIN — SODIUM CHLORIDE 30 ML/HR: 9 INJECTION, SOLUTION INTRAVENOUS at 10:07

## 2017-01-01 RX ADMIN — CLONIDINE HYDROCHLORIDE 0.2 MG: 0.2 TABLET ORAL at 20:54

## 2017-01-01 RX ADMIN — BUSPIRONE HYDROCHLORIDE 15 MG: 15 TABLET ORAL at 08:43

## 2017-01-01 RX ADMIN — CEFTRIAXONE 2 G: 2 INJECTION, POWDER, FOR SOLUTION INTRAMUSCULAR; INTRAVENOUS at 17:41

## 2017-01-01 RX ADMIN — HYDRALAZINE HYDROCHLORIDE 100 MG: 50 TABLET ORAL at 22:05

## 2017-01-01 RX ADMIN — SPIRONOLACTONE 25 MG: 25 TABLET ORAL at 08:32

## 2017-01-01 RX ADMIN — ERYTHROMYCIN: 5 OINTMENT OPHTHALMIC at 21:18

## 2017-01-01 RX ADMIN — BUDESONIDE AND FORMOTEROL FUMARATE DIHYDRATE 2 PUFF: 160; 4.5 AEROSOL RESPIRATORY (INHALATION) at 18:25

## 2017-01-01 RX ADMIN — PROPOFOL 50 MCG/KG/MIN: 10 INJECTION, EMULSION INTRAVENOUS at 23:21

## 2017-01-01 RX ADMIN — FENTANYL CITRATE 50 MCG: 50 INJECTION, SOLUTION INTRAMUSCULAR; INTRAVENOUS at 15:14

## 2017-01-01 RX ADMIN — LABETALOL HYDROCHLORIDE 100 MG: 100 TABLET, FILM COATED ORAL at 11:30

## 2017-01-01 RX ADMIN — MORPHINE SULFATE 4 MG: 4 INJECTION, SOLUTION INTRAMUSCULAR; INTRAVENOUS at 23:27

## 2017-01-01 RX ADMIN — IPRATROPIUM BROMIDE 2 PUFF: 17 AEROSOL, METERED RESPIRATORY (INHALATION) at 23:10

## 2017-01-01 RX ADMIN — HYDROCODONE BITARTRATE AND ACETAMINOPHEN 1 TABLET: 7.5; 325 TABLET ORAL at 23:33

## 2017-01-01 RX ADMIN — MORPHINE SULFATE 2 MG: 2 INJECTION, SOLUTION INTRAMUSCULAR; INTRAVENOUS at 13:58

## 2017-01-01 RX ADMIN — PROPOFOL 40 MCG/KG/MIN: 10 INJECTION, EMULSION INTRAVENOUS at 21:09

## 2017-01-01 RX ADMIN — NICOTINE 1 PATCH: 21 PATCH TRANSDERMAL at 08:51

## 2017-01-01 RX ADMIN — ALBUTEROL SULFATE 2.5 MG: 2.5 SOLUTION RESPIRATORY (INHALATION) at 14:32

## 2017-01-01 RX ADMIN — CEFTAZIDIME: 1 INJECTION, POWDER, FOR SOLUTION INTRAMUSCULAR; INTRAVENOUS at 08:06

## 2017-01-01 RX ADMIN — MORPHINE SULFATE 2 MG: 2 INJECTION, SOLUTION INTRAMUSCULAR; INTRAVENOUS at 01:03

## 2017-01-01 RX ADMIN — TRAZODONE HYDROCHLORIDE 150 MG: 150 TABLET ORAL at 21:16

## 2017-01-01 RX ADMIN — NYSTATIN: 100000 POWDER TOPICAL at 21:10

## 2017-01-01 RX ADMIN — TRAZODONE HYDROCHLORIDE 150 MG: 150 TABLET ORAL at 23:39

## 2017-01-01 RX ADMIN — FUROSEMIDE 10 MG/HR: 10 INJECTION, SOLUTION INTRAVENOUS at 17:20

## 2017-01-01 RX ADMIN — HYDRALAZINE HYDROCHLORIDE 100 MG: 50 TABLET ORAL at 22:57

## 2017-01-01 RX ADMIN — CEFTRIAXONE 1 G: 1 INJECTION, POWDER, FOR SOLUTION INTRAMUSCULAR; INTRAVENOUS at 08:44

## 2017-01-01 RX ADMIN — MINERAL OIL AND PETROLATUM: 150; 830 OINTMENT OPHTHALMIC at 20:20

## 2017-01-01 RX ADMIN — HYDRALAZINE HYDROCHLORIDE 100 MG: 50 TABLET ORAL at 15:34

## 2017-01-01 RX ADMIN — PROPOFOL 40 MCG/KG/MIN: 10 INJECTION, EMULSION INTRAVENOUS at 08:42

## 2017-01-01 RX ADMIN — LINEZOLID 600 MG: 600 TABLET, FILM COATED ORAL at 21:31

## 2017-01-01 RX ADMIN — CLONIDINE HYDROCHLORIDE 0.2 MG: 0.2 TABLET ORAL at 05:26

## 2017-01-01 RX ADMIN — CLONIDINE HYDROCHLORIDE 0.2 MG: 0.1 TABLET ORAL at 20:35

## 2017-01-01 RX ADMIN — PROPOFOL 5 MCG/KG/MIN: 10 INJECTION, EMULSION INTRAVENOUS at 19:38

## 2017-01-01 RX ADMIN — ERYTHROMYCIN: 5 OINTMENT OPHTHALMIC at 22:08

## 2017-01-01 RX ADMIN — MORPHINE SULFATE 1 MG: 2 INJECTION, SOLUTION INTRAMUSCULAR; INTRAVENOUS at 17:33

## 2017-01-01 RX ADMIN — LABETALOL HYDROCHLORIDE 200 MG: 200 TABLET, FILM COATED ORAL at 10:10

## 2017-01-01 RX ADMIN — Medication 10 ML: at 10:12

## 2017-01-01 RX ADMIN — HYDRALAZINE HYDROCHLORIDE 10 MG: 20 INJECTION INTRAMUSCULAR; INTRAVENOUS at 22:59

## 2017-01-01 RX ADMIN — FUROSEMIDE 40 MG: 40 TABLET ORAL at 08:22

## 2017-01-01 RX ADMIN — NYSTATIN: 100000 CREAM TOPICAL at 20:36

## 2017-01-01 RX ADMIN — FUROSEMIDE 40 MG: 10 INJECTION, SOLUTION INTRAMUSCULAR; INTRAVENOUS at 21:17

## 2017-01-01 RX ADMIN — IPRATROPIUM BROMIDE AND ALBUTEROL SULFATE 3 ML: 2.5; .5 SOLUTION RESPIRATORY (INHALATION) at 19:47

## 2017-01-01 RX ADMIN — MORPHINE SULFATE 4 MG: 4 INJECTION, SOLUTION INTRAMUSCULAR; INTRAVENOUS at 02:24

## 2017-01-01 RX ADMIN — SODIUM CHLORIDE 125 ML/HR: 900 INJECTION, SOLUTION INTRAVENOUS at 18:57

## 2017-01-01 RX ADMIN — FUROSEMIDE 40 MG: 10 INJECTION, SOLUTION INTRAMUSCULAR; INTRAVENOUS at 08:04

## 2017-01-01 RX ADMIN — CHLORHEXIDINE GLUCONATE 15 ML: 1.2 RINSE ORAL at 09:16

## 2017-01-01 RX ADMIN — ERYTHROMYCIN: 5 OINTMENT OPHTHALMIC at 20:33

## 2017-01-01 RX ADMIN — LABETALOL HYDROCHLORIDE 100 MG: 100 TABLET, FILM COATED ORAL at 18:57

## 2017-01-01 RX ADMIN — MORPHINE SULFATE 1 MG: 2 INJECTION, SOLUTION INTRAMUSCULAR; INTRAVENOUS at 04:42

## 2017-01-01 RX ADMIN — NYSTATIN 1 APPLICATION: 100000 POWDER TOPICAL at 08:35

## 2017-01-01 RX ADMIN — LABETALOL HYDROCHLORIDE 100 MG: 100 TABLET, FILM COATED ORAL at 08:43

## 2017-01-01 RX ADMIN — MORPHINE SULFATE 1 MG: 2 INJECTION, SOLUTION INTRAMUSCULAR; INTRAVENOUS at 07:59

## 2017-01-01 RX ADMIN — ALBUTEROL SULFATE 2.5 MG: 2.5 SOLUTION RESPIRATORY (INHALATION) at 18:57

## 2017-01-01 RX ADMIN — MORPHINE SULFATE 4 MG: 4 INJECTION, SOLUTION INTRAMUSCULAR; INTRAVENOUS at 16:01

## 2017-01-01 RX ADMIN — BUDESONIDE AND FORMOTEROL FUMARATE DIHYDRATE 2 PUFF: 160; 4.5 AEROSOL RESPIRATORY (INHALATION) at 08:31

## 2017-01-01 RX ADMIN — BUDESONIDE AND FORMOTEROL FUMARATE DIHYDRATE 2 PUFF: 160; 4.5 AEROSOL RESPIRATORY (INHALATION) at 09:11

## 2017-01-01 RX ADMIN — FUROSEMIDE 10 MG/HR: 10 INJECTION, SOLUTION INTRAVENOUS at 04:29

## 2017-01-01 RX ADMIN — ALBUTEROL SULFATE 2 PUFF: 90 AEROSOL, METERED RESPIRATORY (INHALATION) at 11:23

## 2017-01-01 RX ADMIN — FUROSEMIDE 40 MG: 40 TABLET ORAL at 09:24

## 2017-01-01 RX ADMIN — SPIRONOLACTONE 25 MG: 25 TABLET ORAL at 07:55

## 2017-01-01 RX ADMIN — BUDESONIDE AND FORMOTEROL FUMARATE DIHYDRATE 2 PUFF: 160; 4.5 AEROSOL RESPIRATORY (INHALATION) at 18:57

## 2017-01-01 RX ADMIN — FUROSEMIDE 20 MG: 20 TABLET ORAL at 09:02

## 2017-01-01 RX ADMIN — SODIUM CHLORIDE, PRESERVATIVE FREE 10 ML: 5 INJECTION INTRAVENOUS at 21:10

## 2017-01-01 RX ADMIN — METHYLPREDNISOLONE SODIUM SUCCINATE 60 MG: 125 INJECTION, POWDER, FOR SOLUTION INTRAMUSCULAR; INTRAVENOUS at 08:42

## 2017-01-01 RX ADMIN — FUROSEMIDE 40 MG: 40 TABLET ORAL at 09:43

## 2017-01-01 RX ADMIN — BUDESONIDE AND FORMOTEROL FUMARATE DIHYDRATE 2 PUFF: 160; 4.5 AEROSOL RESPIRATORY (INHALATION) at 10:08

## 2017-01-01 RX ADMIN — PANTOPRAZOLE SODIUM 40 MG: 40 INJECTION, POWDER, FOR SOLUTION INTRAVENOUS at 08:11

## 2017-01-01 RX ADMIN — HYDRALAZINE HYDROCHLORIDE 100 MG: 50 TABLET ORAL at 06:23

## 2017-01-01 RX ADMIN — MIRTAZAPINE 15 MG: 15 TABLET, FILM COATED ORAL at 08:54

## 2017-01-01 RX ADMIN — Medication 10 ML: at 21:17

## 2017-01-01 RX ADMIN — PANTOPRAZOLE SODIUM 40 MG: 40 TABLET, DELAYED RELEASE ORAL at 05:42

## 2017-01-01 RX ADMIN — FOLIC ACID 1 MG: 1 TABLET ORAL at 10:49

## 2017-01-01 RX ADMIN — BUSPIRONE HYDROCHLORIDE 15 MG: 15 TABLET ORAL at 20:07

## 2017-01-01 RX ADMIN — MIDAZOLAM 1 MG: 1 INJECTION INTRAMUSCULAR; INTRAVENOUS at 14:52

## 2017-01-01 RX ADMIN — NICOTINE 1 PATCH: 21 PATCH TRANSDERMAL at 04:02

## 2017-01-01 RX ADMIN — ERYTHROMYCIN: 5 OINTMENT OPHTHALMIC at 21:43

## 2017-01-01 RX ADMIN — QUETIAPINE FUMARATE 200 MG: 100 TABLET, FILM COATED ORAL at 21:26

## 2017-01-01 RX ADMIN — IPRATROPIUM BROMIDE 2 PUFF: 17 AEROSOL, METERED RESPIRATORY (INHALATION) at 23:07

## 2017-01-01 RX ADMIN — MINERAL OIL AND PETROLATUM: 150; 830 OINTMENT OPHTHALMIC at 08:16

## 2017-01-01 RX ADMIN — ERYTHROMYCIN 1 APPLICATION: 5 OINTMENT OPHTHALMIC at 09:54

## 2017-01-01 RX ADMIN — ERYTHROMYCIN: 5 OINTMENT OPHTHALMIC at 10:03

## 2017-01-01 RX ADMIN — HYDRALAZINE HYDROCHLORIDE 10 MG: 20 INJECTION INTRAMUSCULAR; INTRAVENOUS at 00:41

## 2017-01-01 RX ADMIN — NYSTATIN 1 APPLICATION: 100000 POWDER TOPICAL at 09:30

## 2017-01-01 RX ADMIN — MORPHINE SULFATE 4 MG: 4 INJECTION, SOLUTION INTRAMUSCULAR; INTRAVENOUS at 09:50

## 2017-01-01 RX ADMIN — METHYLPREDNISOLONE SODIUM SUCCINATE 60 MG: 125 INJECTION, POWDER, FOR SOLUTION INTRAMUSCULAR; INTRAVENOUS at 08:06

## 2017-01-01 RX ADMIN — LISINOPRIL 20 MG: 20 TABLET ORAL at 08:22

## 2017-01-01 RX ADMIN — METHYLPREDNISOLONE SODIUM SUCCINATE 80 MG: 125 INJECTION, POWDER, FOR SOLUTION INTRAMUSCULAR; INTRAVENOUS at 20:47

## 2017-01-01 RX ADMIN — QUETIAPINE FUMARATE 200 MG: 100 TABLET, FILM COATED ORAL at 21:33

## 2017-01-01 RX ADMIN — NYSTATIN 1 APPLICATION: 100000 POWDER TOPICAL at 10:12

## 2017-01-01 RX ADMIN — SODIUM CHLORIDE 30 ML/HR: 900 INJECTION, SOLUTION INTRAVENOUS at 12:02

## 2017-01-01 RX ADMIN — LISINOPRIL 20 MG: 20 TABLET ORAL at 08:59

## 2017-01-01 RX ADMIN — MORPHINE SULFATE 4 MG: 4 INJECTION, SOLUTION INTRAMUSCULAR; INTRAVENOUS at 21:17

## 2017-01-01 RX ADMIN — LISINOPRIL 20 MG: 20 TABLET ORAL at 08:32

## 2017-01-01 RX ADMIN — MINERAL OIL AND PETROLATUM: 150; 830 OINTMENT OPHTHALMIC at 09:09

## 2017-01-01 RX ADMIN — LABETALOL HYDROCHLORIDE 100 MG: 100 TABLET, FILM COATED ORAL at 09:30

## 2017-01-01 RX ADMIN — HYDRALAZINE HYDROCHLORIDE 100 MG: 50 TABLET ORAL at 05:42

## 2017-01-01 RX ADMIN — ALBUTEROL SULFATE 2.5 MG: 2.5 SOLUTION RESPIRATORY (INHALATION) at 12:23

## 2017-01-01 RX ADMIN — HYDROCODONE BITARTRATE AND ACETAMINOPHEN 1 TABLET: 7.5; 325 TABLET ORAL at 20:26

## 2017-01-01 RX ADMIN — MORPHINE SULFATE 1 MG: 2 INJECTION, SOLUTION INTRAMUSCULAR; INTRAVENOUS at 13:45

## 2017-01-01 RX ADMIN — MIDAZOLAM 5 MG/HR: 5 INJECTION INTRAMUSCULAR; INTRAVENOUS at 21:55

## 2017-01-01 RX ADMIN — PROPOFOL 30 MCG/KG/MIN: 10 INJECTION, EMULSION INTRAVENOUS at 05:57

## 2017-01-01 RX ADMIN — LABETALOL HYDROCHLORIDE 100 MG: 100 TABLET, FILM COATED ORAL at 10:29

## 2017-01-01 RX ADMIN — HYDRALAZINE HYDROCHLORIDE 10 MG: 20 INJECTION INTRAMUSCULAR; INTRAVENOUS at 20:45

## 2017-01-01 RX ADMIN — MORPHINE SULFATE 1 MG: 2 INJECTION, SOLUTION INTRAMUSCULAR; INTRAVENOUS at 17:54

## 2017-01-01 RX ADMIN — BUSPIRONE HYDROCHLORIDE 15 MG: 15 TABLET ORAL at 07:52

## 2017-01-01 RX ADMIN — ALBUTEROL SULFATE 2 PUFF: 90 AEROSOL, METERED RESPIRATORY (INHALATION) at 03:17

## 2017-01-01 RX ADMIN — ALBUTEROL SULFATE 2 PUFF: 90 AEROSOL, METERED RESPIRATORY (INHALATION) at 07:31

## 2017-01-01 RX ADMIN — FOLIC ACID 1 MG: 1 TABLET ORAL at 09:16

## 2017-01-01 RX ADMIN — FUROSEMIDE 20 MG: 10 INJECTION, SOLUTION INTRAVENOUS at 03:37

## 2017-01-01 RX ADMIN — HYDROCODONE BITARTRATE AND ACETAMINOPHEN 1 TABLET: 5; 325 TABLET ORAL at 20:07

## 2017-01-01 RX ADMIN — FUROSEMIDE 40 MG: 40 TABLET ORAL at 17:22

## 2017-01-01 RX ADMIN — HYDRALAZINE HYDROCHLORIDE 10 MG: 20 INJECTION INTRAMUSCULAR; INTRAVENOUS at 20:46

## 2017-01-01 RX ADMIN — PREDNISONE 40 MG: 20 TABLET ORAL at 08:59

## 2017-01-01 RX ADMIN — MINERAL OIL AND PETROLATUM: 150; 830 OINTMENT OPHTHALMIC at 08:53

## 2017-01-01 RX ADMIN — ALBUTEROL SULFATE 2.5 MG: 2.5 SOLUTION RESPIRATORY (INHALATION) at 19:29

## 2017-01-01 RX ADMIN — HYDRALAZINE HYDROCHLORIDE 10 MG: 20 INJECTION INTRAMUSCULAR; INTRAVENOUS at 01:52

## 2017-01-01 RX ADMIN — OXYCODONE HYDROCHLORIDE AND ACETAMINOPHEN 1 TABLET: 10; 325 TABLET ORAL at 19:21

## 2017-01-01 RX ADMIN — Medication 10 ML: at 12:30

## 2017-01-01 RX ADMIN — QUETIAPINE FUMARATE 200 MG: 100 TABLET, FILM COATED ORAL at 23:39

## 2017-01-01 RX ADMIN — LISINOPRIL 20 MG: 20 TABLET ORAL at 08:04

## 2017-01-01 RX ADMIN — PANTOPRAZOLE SODIUM 40 MG: 40 INJECTION, POWDER, FOR SOLUTION INTRAVENOUS at 06:04

## 2017-01-01 RX ADMIN — LABETALOL HYDROCHLORIDE 20 MG: 5 INJECTION, SOLUTION INTRAVENOUS at 17:25

## 2017-01-01 RX ADMIN — PROPOFOL 50 MCG/KG/MIN: 10 INJECTION, EMULSION INTRAVENOUS at 12:38

## 2017-01-01 RX ADMIN — MIDAZOLAM HYDROCHLORIDE 1 MG: 1 INJECTION, SOLUTION INTRAMUSCULAR; INTRAVENOUS at 18:53

## 2017-01-01 RX ADMIN — DOCUSATE SODIUM 100 MG: 100 CAPSULE, LIQUID FILLED ORAL at 08:43

## 2017-01-01 RX ADMIN — NYSTATIN: 100000 CREAM TOPICAL at 09:03

## 2017-01-01 RX ADMIN — ATORVASTATIN CALCIUM 80 MG: 40 TABLET, FILM COATED ORAL at 21:50

## 2017-01-01 RX ADMIN — FUROSEMIDE 40 MG: 40 TABLET ORAL at 08:05

## 2017-01-01 RX ADMIN — ERYTHROMYCIN: 5 OINTMENT OPHTHALMIC at 20:25

## 2017-01-01 RX ADMIN — BUSPIRONE HYDROCHLORIDE 15 MG: 15 TABLET ORAL at 10:17

## 2017-01-01 RX ADMIN — MORPHINE SULFATE 1 MG: 2 INJECTION, SOLUTION INTRAMUSCULAR; INTRAVENOUS at 09:15

## 2017-01-01 RX ADMIN — CLONIDINE HYDROCHLORIDE 0.2 MG: 0.2 TABLET ORAL at 14:47

## 2017-01-01 RX ADMIN — BUDESONIDE AND FORMOTEROL FUMARATE DIHYDRATE 2 PUFF: 160; 4.5 AEROSOL RESPIRATORY (INHALATION) at 19:58

## 2017-01-01 RX ADMIN — METHYLPREDNISOLONE SODIUM SUCCINATE 80 MG: 125 INJECTION, POWDER, FOR SOLUTION INTRAMUSCULAR; INTRAVENOUS at 09:16

## 2017-01-01 RX ADMIN — POTASSIUM CHLORIDE 20 MEQ: 20 SOLUTION ORAL at 08:55

## 2017-01-01 RX ADMIN — METRONIDAZOLE 500 MG: 500 INJECTION, SOLUTION INTRAVENOUS at 06:04

## 2017-01-01 RX ADMIN — OXYCODONE HYDROCHLORIDE AND ACETAMINOPHEN 1 TABLET: 10; 325 TABLET ORAL at 14:52

## 2017-01-01 RX ADMIN — POTASSIUM CHLORIDE 20 MEQ: 750 CAPSULE, EXTENDED RELEASE ORAL at 08:51

## 2017-01-01 RX ADMIN — FUROSEMIDE 40 MG: 40 TABLET ORAL at 08:13

## 2017-01-01 RX ADMIN — MINERAL OIL AND PETROLATUM: 150; 830 OINTMENT OPHTHALMIC at 08:14

## 2017-01-01 RX ADMIN — MORPHINE SULFATE 1 MG: 2 INJECTION, SOLUTION INTRAMUSCULAR; INTRAVENOUS at 03:07

## 2017-01-01 RX ADMIN — CLONIDINE HYDROCHLORIDE 0.1 MG: 0.1 TABLET ORAL at 14:09

## 2017-01-01 RX ADMIN — LABETALOL HYDROCHLORIDE 200 MG: 200 TABLET, FILM COATED ORAL at 19:22

## 2017-01-01 RX ADMIN — METHYLPREDNISOLONE SODIUM SUCCINATE 80 MG: 125 INJECTION, POWDER, FOR SOLUTION INTRAMUSCULAR; INTRAVENOUS at 21:29

## 2017-01-01 RX ADMIN — IPRATROPIUM BROMIDE AND ALBUTEROL SULFATE 3 ML: 2.5; .5 SOLUTION RESPIRATORY (INHALATION) at 17:02

## 2017-01-01 RX ADMIN — METHYLPREDNISOLONE SODIUM SUCCINATE 60 MG: 125 INJECTION, POWDER, FOR SOLUTION INTRAMUSCULAR; INTRAVENOUS at 07:51

## 2017-01-01 RX ADMIN — Medication 10 ML: at 01:41

## 2017-01-01 RX ADMIN — MORPHINE SULFATE 4 MG: 4 INJECTION, SOLUTION INTRAMUSCULAR; INTRAVENOUS at 18:04

## 2017-01-01 RX ADMIN — PROPOFOL 35 MCG/KG/MIN: 10 INJECTION, EMULSION INTRAVENOUS at 22:00

## 2017-01-01 RX ADMIN — HYDRALAZINE HYDROCHLORIDE 100 MG: 50 TABLET ORAL at 21:17

## 2017-01-01 RX ADMIN — PROPOFOL 40 MCG/KG/MIN: 10 INJECTION, EMULSION INTRAVENOUS at 20:45

## 2017-01-01 RX ADMIN — ALBUTEROL SULFATE 2.5 MG: 2.5 SOLUTION RESPIRATORY (INHALATION) at 00:37

## 2017-01-01 RX ADMIN — PANTOPRAZOLE SODIUM 40 MG: 40 TABLET, DELAYED RELEASE ORAL at 05:31

## 2017-01-01 RX ADMIN — HYDRALAZINE HYDROCHLORIDE 100 MG: 50 TABLET ORAL at 10:01

## 2017-01-01 RX ADMIN — CLONIDINE HYDROCHLORIDE 0.2 MG: 0.2 TABLET ORAL at 21:38

## 2017-01-01 RX ADMIN — SPIRONOLACTONE 25 MG: 25 TABLET ORAL at 08:10

## 2017-01-01 RX ADMIN — HYDRALAZINE HYDROCHLORIDE 10 MG: 20 INJECTION INTRAMUSCULAR; INTRAVENOUS at 15:38

## 2017-01-01 RX ADMIN — Medication 10 ML: at 13:00

## 2017-01-01 RX ADMIN — PANTOPRAZOLE SODIUM 40 MG: 40 TABLET, DELAYED RELEASE ORAL at 06:10

## 2017-01-01 RX ADMIN — ERYTHROMYCIN: 5 OINTMENT OPHTHALMIC at 08:05

## 2017-01-01 RX ADMIN — MORPHINE SULFATE 1 MG: 2 INJECTION, SOLUTION INTRAMUSCULAR; INTRAVENOUS at 01:09

## 2017-01-01 RX ADMIN — HYDROCODONE BITARTRATE AND ACETAMINOPHEN 1 TABLET: 7.5; 325 TABLET ORAL at 03:29

## 2017-01-01 RX ADMIN — Medication 10 ML: at 09:07

## 2017-01-01 RX ADMIN — MORPHINE SULFATE 2 MG: 2 INJECTION, SOLUTION INTRAMUSCULAR; INTRAVENOUS at 23:41

## 2017-01-01 RX ADMIN — HYDRALAZINE HYDROCHLORIDE 20 MG: 20 INJECTION INTRAMUSCULAR; INTRAVENOUS at 01:10

## 2017-01-01 RX ADMIN — LABETALOL HYDROCHLORIDE 100 MG: 100 TABLET, FILM COATED ORAL at 21:49

## 2017-01-01 RX ADMIN — MORPHINE SULFATE 2 MG: 2 INJECTION, SOLUTION INTRAMUSCULAR; INTRAVENOUS at 13:43

## 2017-01-01 RX ADMIN — BUSPIRONE HYDROCHLORIDE 15 MG: 15 TABLET ORAL at 20:36

## 2017-01-01 RX ADMIN — IPRATROPIUM BROMIDE 2 PUFF: 17 AEROSOL, METERED RESPIRATORY (INHALATION) at 11:44

## 2017-01-01 RX ADMIN — HYDROCODONE BITARTRATE AND ACETAMINOPHEN 1 TABLET: 7.5; 325 TABLET ORAL at 17:40

## 2017-01-01 RX ADMIN — LABETALOL HYDROCHLORIDE 200 MG: 200 TABLET, FILM COATED ORAL at 09:03

## 2017-01-01 RX ADMIN — MORPHINE SULFATE 1 MG: 2 INJECTION, SOLUTION INTRAMUSCULAR; INTRAVENOUS at 20:29

## 2017-01-01 RX ADMIN — HYDRALAZINE HYDROCHLORIDE 10 MG: 20 INJECTION INTRAMUSCULAR; INTRAVENOUS at 23:51

## 2017-01-01 RX ADMIN — ATORVASTATIN CALCIUM 80 MG: 40 TABLET, FILM COATED ORAL at 20:09

## 2017-01-01 RX ADMIN — OXYCODONE HYDROCHLORIDE AND ACETAMINOPHEN 1 TABLET: 10; 325 TABLET ORAL at 17:45

## 2017-01-01 RX ADMIN — VANCOMYCIN HYDROCHLORIDE 1500 MG: 5 INJECTION, POWDER, LYOPHILIZED, FOR SOLUTION INTRAVENOUS at 15:47

## 2017-01-01 RX ADMIN — IPRATROPIUM BROMIDE 2 PUFF: 17 AEROSOL, METERED RESPIRATORY (INHALATION) at 03:44

## 2017-01-01 RX ADMIN — SODIUM CHLORIDE 5 ML/HR: 9 INJECTION, SOLUTION INTRAVENOUS at 02:42

## 2017-01-01 RX ADMIN — NYSTATIN 1 APPLICATION: 100000 POWDER TOPICAL at 08:29

## 2017-01-01 RX ADMIN — MINERAL OIL AND PETROLATUM: 150; 830 OINTMENT OPHTHALMIC at 09:05

## 2017-01-01 RX ADMIN — MORPHINE SULFATE 2 MG: 2 INJECTION, SOLUTION INTRAMUSCULAR; INTRAVENOUS at 17:39

## 2017-01-01 RX ADMIN — TAZOBACTAM SODIUM AND PIPERACILLIN SODIUM 3.38 G: 375; 3 INJECTION, SOLUTION INTRAVENOUS at 02:34

## 2017-01-01 RX ADMIN — PROPOFOL 30 MCG/KG/MIN: 10 INJECTION, EMULSION INTRAVENOUS at 14:37

## 2017-01-01 RX ADMIN — SULFAMETHOXAZOLE AND TRIMETHOPRIM 160 MG: 800; 160 TABLET ORAL at 20:33

## 2017-01-01 RX ADMIN — QUETIAPINE FUMARATE 200 MG: 100 TABLET, FILM COATED ORAL at 20:31

## 2017-01-01 RX ADMIN — Medication 10 ML: at 23:15

## 2017-01-01 RX ADMIN — OXYCODONE HYDROCHLORIDE AND ACETAMINOPHEN 1 TABLET: 10; 325 TABLET ORAL at 22:00

## 2017-01-01 RX ADMIN — MORPHINE SULFATE 1 MG: 2 INJECTION, SOLUTION INTRAMUSCULAR; INTRAVENOUS at 10:02

## 2017-01-01 RX ADMIN — MINERAL OIL AND PETROLATUM: 150; 830 OINTMENT OPHTHALMIC at 20:11

## 2017-01-01 RX ADMIN — LABETALOL HYDROCHLORIDE 200 MG: 200 TABLET, FILM COATED ORAL at 18:36

## 2017-01-01 RX ADMIN — CLONIDINE HYDROCHLORIDE 0.2 MG: 0.2 TABLET ORAL at 14:29

## 2017-01-01 RX ADMIN — MIRTAZAPINE 15 MG: 15 TABLET, FILM COATED ORAL at 21:31

## 2017-01-01 RX ADMIN — METHYLPREDNISOLONE SODIUM SUCCINATE 20 MG: 40 INJECTION, POWDER, FOR SOLUTION INTRAMUSCULAR; INTRAVENOUS at 06:17

## 2017-01-01 RX ADMIN — NITROGLYCERIN 1 INCH: 20 OINTMENT TOPICAL at 20:30

## 2017-01-01 RX ADMIN — NICOTINE 1 PATCH: 21 PATCH TRANSDERMAL at 08:33

## 2017-01-01 RX ADMIN — AZITHROMYCIN 500 MG: 500 INJECTION, POWDER, LYOPHILIZED, FOR SOLUTION INTRAVENOUS at 10:13

## 2017-01-01 RX ADMIN — PROPOFOL 30 MCG/KG/MIN: 10 INJECTION, EMULSION INTRAVENOUS at 09:53

## 2017-01-01 RX ADMIN — PANTOPRAZOLE SODIUM 40 MG: 40 INJECTION, POWDER, FOR SOLUTION INTRAVENOUS at 17:31

## 2017-01-01 RX ADMIN — LABETALOL HYDROCHLORIDE 200 MG: 200 TABLET, FILM COATED ORAL at 08:43

## 2017-01-01 RX ADMIN — ALBUTEROL SULFATE 2.5 MG: 2.5 SOLUTION RESPIRATORY (INHALATION) at 21:09

## 2017-01-01 RX ADMIN — MIRTAZAPINE 15 MG: 15 TABLET, FILM COATED ORAL at 09:02

## 2017-01-01 RX ADMIN — ERYTHROMYCIN: 5 OINTMENT OPHTHALMIC at 09:05

## 2017-01-01 RX ADMIN — Medication 10 ML: at 10:20

## 2017-01-01 RX ADMIN — IPRATROPIUM BROMIDE 2 PUFF: 17 AEROSOL, METERED RESPIRATORY (INHALATION) at 15:29

## 2017-01-01 RX ADMIN — QUETIAPINE FUMARATE 200 MG: 100 TABLET, FILM COATED ORAL at 20:19

## 2017-01-01 RX ADMIN — NYSTATIN: 100000 CREAM TOPICAL at 09:06

## 2017-01-01 RX ADMIN — SPIRONOLACTONE 25 MG: 25 TABLET ORAL at 09:22

## 2017-01-01 RX ADMIN — LEVOFLOXACIN 750 MG: 5 INJECTION, SOLUTION INTRAVENOUS at 12:54

## 2017-01-01 RX ADMIN — ATORVASTATIN CALCIUM 80 MG: 40 TABLET, FILM COATED ORAL at 03:35

## 2017-01-01 RX ADMIN — PHENYLEPHRINE HYDROCHLORIDE 200 MCG: 10 INJECTION INTRAVENOUS at 15:18

## 2017-01-01 RX ADMIN — HYDRALAZINE HYDROCHLORIDE 100 MG: 50 TABLET ORAL at 14:39

## 2017-01-01 RX ADMIN — PROPOFOL 35 MCG/KG/MIN: 10 INJECTION, EMULSION INTRAVENOUS at 17:51

## 2017-01-01 RX ADMIN — PANTOPRAZOLE SODIUM 40 MG: 40 TABLET, DELAYED RELEASE ORAL at 06:30

## 2017-01-01 RX ADMIN — METRONIDAZOLE 500 MG: 500 INJECTION, SOLUTION INTRAVENOUS at 06:23

## 2017-01-01 RX ADMIN — MORPHINE SULFATE 4 MG: 4 INJECTION, SOLUTION INTRAMUSCULAR; INTRAVENOUS at 22:09

## 2017-01-01 RX ADMIN — LINEZOLID 600 MG: 100 SUSPENSION ORAL at 09:30

## 2017-01-01 RX ADMIN — Medication: at 09:29

## 2017-01-01 RX ADMIN — FENTANYL CITRATE 25 MCG: 50 INJECTION, SOLUTION INTRAMUSCULAR; INTRAVENOUS at 10:01

## 2017-01-01 RX ADMIN — LEVOFLOXACIN 500 MG: 5 INJECTION, SOLUTION INTRAVENOUS at 08:05

## 2017-01-01 RX ADMIN — HYDROCODONE BITARTRATE AND ACETAMINOPHEN 1 TABLET: 5; 325 TABLET ORAL at 11:20

## 2017-01-01 RX ADMIN — MORPHINE SULFATE 4 MG: 4 INJECTION, SOLUTION INTRAMUSCULAR; INTRAVENOUS at 11:41

## 2017-01-01 RX ADMIN — OXYCODONE HYDROCHLORIDE AND ACETAMINOPHEN 1 TABLET: 10; 325 TABLET ORAL at 06:50

## 2017-01-01 RX ADMIN — Medication: at 16:47

## 2017-01-01 RX ADMIN — BUDESONIDE AND FORMOTEROL FUMARATE DIHYDRATE 2 PUFF: 160; 4.5 AEROSOL RESPIRATORY (INHALATION) at 18:47

## 2017-01-01 RX ADMIN — LISINOPRIL 20 MG: 20 TABLET ORAL at 08:24

## 2017-01-01 RX ADMIN — SODIUM CHLORIDE: 900 INJECTION, SOLUTION INTRAVENOUS at 14:51

## 2017-01-01 RX ADMIN — Medication 1 DOSE: at 15:59

## 2017-01-01 RX ADMIN — DOXYCYCLINE HYCLATE 100 MG: 100 TABLET, FILM COATED ORAL at 07:50

## 2017-01-01 RX ADMIN — MIDAZOLAM 1 MG/HR: 5 INJECTION INTRAMUSCULAR; INTRAVENOUS at 22:05

## 2017-01-01 RX ADMIN — LABETALOL HYDROCHLORIDE 200 MG: 200 TABLET, FILM COATED ORAL at 09:02

## 2017-01-01 RX ADMIN — LABETALOL HYDROCHLORIDE 200 MG: 200 TABLET, FILM COATED ORAL at 09:43

## 2017-01-01 RX ADMIN — AZITHROMYCIN 500 MG: 500 INJECTION, POWDER, LYOPHILIZED, FOR SOLUTION INTRAVENOUS at 11:31

## 2017-01-01 RX ADMIN — DEXTROSE AND SODIUM CHLORIDE 100 ML/HR: 5; 450 INJECTION, SOLUTION INTRAVENOUS at 09:08

## 2017-01-01 RX ADMIN — MINERAL OIL AND PETROLATUM: 150; 830 OINTMENT OPHTHALMIC at 21:38

## 2017-01-01 RX ADMIN — FUROSEMIDE 40 MG: 10 INJECTION, SOLUTION INTRAMUSCULAR; INTRAVENOUS at 10:39

## 2017-01-01 RX ADMIN — SODIUM CHLORIDE 5 ML/HR: 9 INJECTION, SOLUTION INTRAVENOUS at 21:08

## 2017-01-01 RX ADMIN — PROPOFOL 35 MCG/KG/MIN: 10 INJECTION, EMULSION INTRAVENOUS at 02:57

## 2017-01-01 RX ADMIN — AZITHROMYCIN 500 MG: 500 INJECTION, POWDER, LYOPHILIZED, FOR SOLUTION INTRAVENOUS at 14:06

## 2017-01-01 RX ADMIN — FUROSEMIDE 40 MG: 10 INJECTION, SOLUTION INTRAMUSCULAR; INTRAVENOUS at 17:16

## 2017-01-01 RX ADMIN — LISINOPRIL 20 MG: 20 TABLET ORAL at 09:22

## 2017-01-01 RX ADMIN — METHYLPREDNISOLONE SODIUM SUCCINATE 80 MG: 125 INJECTION, POWDER, FOR SOLUTION INTRAMUSCULAR; INTRAVENOUS at 03:51

## 2017-01-01 RX ADMIN — NICOTINE 1 PATCH: 21 PATCH TRANSDERMAL at 02:27

## 2017-01-01 RX ADMIN — SODIUM CHLORIDE 100 ML: 900 INJECTION, SOLUTION INTRAVENOUS at 16:30

## 2017-01-01 RX ADMIN — METHYLPREDNISOLONE SODIUM SUCCINATE 20 MG: 40 INJECTION, POWDER, FOR SOLUTION INTRAMUSCULAR; INTRAVENOUS at 21:26

## 2017-01-01 RX ADMIN — LABETALOL HYDROCHLORIDE 200 MG: 200 TABLET, FILM COATED ORAL at 18:12

## 2017-01-01 RX ADMIN — HYDRALAZINE HYDROCHLORIDE 10 MG: 20 INJECTION INTRAMUSCULAR; INTRAVENOUS at 12:02

## 2017-01-01 RX ADMIN — PANTOPRAZOLE SODIUM 40 MG: 40 INJECTION, POWDER, FOR SOLUTION INTRAVENOUS at 10:20

## 2017-01-01 RX ADMIN — ERYTHROMYCIN: 5 OINTMENT OPHTHALMIC at 20:37

## 2017-01-01 RX ADMIN — SODIUM CHLORIDE, PRESERVATIVE FREE 10 ML: 5 INJECTION INTRAVENOUS at 11:30

## 2017-01-01 RX ADMIN — BUDESONIDE AND FORMOTEROL FUMARATE DIHYDRATE 2 PUFF: 160; 4.5 AEROSOL RESPIRATORY (INHALATION) at 08:29

## 2017-01-01 RX ADMIN — AZITHROMYCIN 500 MG: 500 INJECTION, POWDER, LYOPHILIZED, FOR SOLUTION INTRAVENOUS at 13:23

## 2017-01-01 RX ADMIN — METHYLPREDNISOLONE SODIUM SUCCINATE 20 MG: 40 INJECTION, POWDER, FOR SOLUTION INTRAMUSCULAR; INTRAVENOUS at 17:58

## 2017-01-01 RX ADMIN — PROPOFOL 50 MCG/KG/MIN: 10 INJECTION, EMULSION INTRAVENOUS at 08:15

## 2017-01-01 RX ADMIN — BUSPIRONE HYDROCHLORIDE 15 MG: 15 TABLET ORAL at 12:24

## 2017-01-01 RX ADMIN — MORPHINE SULFATE 1 MG: 2 INJECTION, SOLUTION INTRAMUSCULAR; INTRAVENOUS at 13:14

## 2017-01-01 RX ADMIN — BUDESONIDE AND FORMOTEROL FUMARATE DIHYDRATE 2 PUFF: 160; 4.5 AEROSOL RESPIRATORY (INHALATION) at 07:18

## 2017-01-01 RX ADMIN — CLONIDINE HYDROCHLORIDE 0.2 MG: 0.2 TABLET ORAL at 21:17

## 2017-01-01 RX ADMIN — BUSPIRONE HYDROCHLORIDE 15 MG: 15 TABLET ORAL at 21:33

## 2017-01-01 RX ADMIN — MINERAL OIL AND PETROLATUM: 150; 830 OINTMENT OPHTHALMIC at 20:36

## 2017-01-01 RX ADMIN — DEXTROSE AND SODIUM CHLORIDE 125 ML/HR: 5; 450 INJECTION, SOLUTION INTRAVENOUS at 19:31

## 2017-01-01 RX ADMIN — LEVOFLOXACIN 750 MG: 5 INJECTION, SOLUTION INTRAVENOUS at 13:57

## 2017-01-01 RX ADMIN — ASPIRIN 81 MG: 81 TABLET ORAL at 09:06

## 2017-01-01 RX ADMIN — Medication: at 21:10

## 2017-01-01 RX ADMIN — SODIUM CHLORIDE 125 ML/HR: 900 INJECTION, SOLUTION INTRAVENOUS at 16:22

## 2017-01-01 RX ADMIN — SODIUM BICARBONATE 50 MEQ: 84 INJECTION INTRAVENOUS at 16:15

## 2017-01-01 RX ADMIN — ERYTHROMYCIN: 5 OINTMENT OPHTHALMIC at 09:17

## 2017-01-01 RX ADMIN — LISINOPRIL 20 MG: 20 TABLET ORAL at 09:09

## 2017-01-01 RX ADMIN — LABETALOL HYDROCHLORIDE 100 MG: 100 TABLET, FILM COATED ORAL at 18:01

## 2017-01-01 RX ADMIN — CLONIDINE HYDROCHLORIDE 0.2 MG: 0.1 TABLET ORAL at 20:32

## 2017-01-01 RX ADMIN — LABETALOL HYDROCHLORIDE 200 MG: 200 TABLET, FILM COATED ORAL at 20:49

## 2017-01-01 RX ADMIN — VANCOMYCIN HYDROCHLORIDE 1750 MG: 5 INJECTION, POWDER, LYOPHILIZED, FOR SOLUTION INTRAVENOUS at 23:50

## 2017-01-01 RX ADMIN — TAZOBACTAM SODIUM AND PIPERACILLIN SODIUM 3.38 G: 375; 3 INJECTION, SOLUTION INTRAVENOUS at 10:45

## 2017-01-01 RX ADMIN — ONDANSETRON 4 MG: 2 INJECTION INTRAMUSCULAR; INTRAVENOUS at 09:50

## 2017-01-01 RX ADMIN — PANTOPRAZOLE SODIUM 40 MG: 40 INJECTION, POWDER, FOR SOLUTION INTRAVENOUS at 09:17

## 2017-01-01 RX ADMIN — NYSTATIN 1 APPLICATION: 100000 POWDER TOPICAL at 11:32

## 2017-01-01 RX ADMIN — PROPOFOL 20 MCG/KG/MIN: 10 INJECTION, EMULSION INTRAVENOUS at 11:00

## 2017-01-01 RX ADMIN — HYDRALAZINE HYDROCHLORIDE 100 MG: 50 TABLET ORAL at 15:50

## 2017-01-01 RX ADMIN — Medication 10 ML: at 09:24

## 2017-01-01 RX ADMIN — ERYTHROMYCIN: 5 OINTMENT OPHTHALMIC at 21:45

## 2017-01-01 RX ADMIN — MORPHINE SULFATE 1 MG: 2 INJECTION, SOLUTION INTRAMUSCULAR; INTRAVENOUS at 01:36

## 2017-01-01 RX ADMIN — BUSPIRONE HYDROCHLORIDE 15 MG: 15 TABLET ORAL at 10:13

## 2017-01-01 RX ADMIN — QUETIAPINE FUMARATE 200 MG: 100 TABLET, FILM COATED ORAL at 22:19

## 2017-01-01 RX ADMIN — FUROSEMIDE 40 MG: 10 INJECTION, SOLUTION INTRAMUSCULAR; INTRAVENOUS at 13:16

## 2017-01-01 RX ADMIN — PREDNISONE 40 MG: 20 TABLET ORAL at 09:23

## 2017-01-01 RX ADMIN — IPRATROPIUM BROMIDE AND ALBUTEROL SULFATE 3 ML: 2.5; .5 SOLUTION RESPIRATORY (INHALATION) at 16:40

## 2017-01-01 RX ADMIN — ATORVASTATIN CALCIUM 80 MG: 40 TABLET, FILM COATED ORAL at 21:03

## 2017-01-01 RX ADMIN — SODIUM CHLORIDE 500 ML: 9 INJECTION, SOLUTION INTRAVENOUS at 23:49

## 2017-01-01 RX ADMIN — NICOTINE 1 PATCH: 21 PATCH TRANSDERMAL at 08:47

## 2017-01-01 RX ADMIN — CLONIDINE HYDROCHLORIDE 0.2 MG: 0.2 TABLET ORAL at 05:40

## 2017-01-01 RX ADMIN — LISINOPRIL 40 MG: 40 TABLET ORAL at 20:53

## 2017-01-01 RX ADMIN — HYDRALAZINE HYDROCHLORIDE 100 MG: 50 TABLET ORAL at 21:00

## 2017-01-01 RX ADMIN — NYSTATIN: 100000 POWDER TOPICAL at 20:55

## 2017-01-01 RX ADMIN — DEXTROSE AND SODIUM CHLORIDE 100 ML/HR: 5; 450 INJECTION, SOLUTION INTRAVENOUS at 21:58

## 2017-01-01 RX ADMIN — PANTOPRAZOLE SODIUM 40 MG: 40 TABLET, DELAYED RELEASE ORAL at 07:56

## 2017-01-01 RX ADMIN — NICOTINE 1 PATCH: 21 PATCH, EXTENDED RELEASE TRANSDERMAL at 14:54

## 2017-01-01 RX ADMIN — DOCUSATE SODIUM 100 MG: 50 LIQUID ORAL at 10:44

## 2017-01-01 RX ADMIN — ALBUTEROL SULFATE 2.5 MG: 2.5 SOLUTION RESPIRATORY (INHALATION) at 13:52

## 2017-01-01 RX ADMIN — ANORECTAL OINTMENT: 15.7; .44; 24; 20.6 OINTMENT TOPICAL at 18:17

## 2017-01-01 RX ADMIN — ERYTHROMYCIN: 5 OINTMENT OPHTHALMIC at 08:31

## 2017-01-01 RX ADMIN — OXYCODONE HYDROCHLORIDE AND ACETAMINOPHEN 1 TABLET: 10; 325 TABLET ORAL at 04:27

## 2017-01-01 RX ADMIN — MORPHINE SULFATE 2 MG: 2 INJECTION, SOLUTION INTRAMUSCULAR; INTRAVENOUS at 21:33

## 2017-01-01 RX ADMIN — LEVOFLOXACIN 750 MG: 5 INJECTION, SOLUTION INTRAVENOUS at 15:09

## 2017-01-01 RX ADMIN — METHYLPREDNISOLONE SODIUM SUCCINATE 80 MG: 125 INJECTION, POWDER, FOR SOLUTION INTRAMUSCULAR; INTRAVENOUS at 05:54

## 2017-01-01 RX ADMIN — CLONIDINE HYDROCHLORIDE 0.2 MG: 0.2 TABLET ORAL at 20:26

## 2017-01-01 RX ADMIN — TRAZODONE HYDROCHLORIDE 150 MG: 150 TABLET ORAL at 04:01

## 2017-01-01 RX ADMIN — SODIUM CHLORIDE 125 ML/HR: 900 INJECTION, SOLUTION INTRAVENOUS at 13:07

## 2017-01-01 RX ADMIN — VANCOMYCIN HYDROCHLORIDE 1750 MG: 5 INJECTION, POWDER, LYOPHILIZED, FOR SOLUTION INTRAVENOUS at 22:59

## 2017-01-01 RX ADMIN — IPRATROPIUM BROMIDE 2 PUFF: 17 AEROSOL, METERED RESPIRATORY (INHALATION) at 07:21

## 2017-01-01 RX ADMIN — NYSTATIN: 100000 POWDER TOPICAL at 09:59

## 2017-01-01 RX ADMIN — FOLIC ACID 1 MG: 1 TABLET ORAL at 09:43

## 2017-01-01 RX ADMIN — OXYCODONE HYDROCHLORIDE AND ACETAMINOPHEN 1 TABLET: 10; 325 TABLET ORAL at 16:40

## 2017-01-01 RX ADMIN — SODIUM CHLORIDE, PRESERVATIVE FREE 10 ML: 5 INJECTION INTRAVENOUS at 20:28

## 2017-01-01 RX ADMIN — HYDRALAZINE HYDROCHLORIDE 100 MG: 50 TABLET ORAL at 14:01

## 2017-01-01 RX ADMIN — MORPHINE SULFATE 4 MG: 4 INJECTION, SOLUTION INTRAMUSCULAR; INTRAVENOUS at 18:01

## 2017-01-01 RX ADMIN — HEPARIN SODIUM 8000 UNITS: 5000 INJECTION, SOLUTION INTRAVENOUS; SUBCUTANEOUS at 23:37

## 2017-01-01 RX ADMIN — HYDRALAZINE HYDROCHLORIDE 20 MG: 20 INJECTION INTRAMUSCULAR; INTRAVENOUS at 07:37

## 2017-01-01 RX ADMIN — HYDRALAZINE HYDROCHLORIDE 50 MG: 50 TABLET ORAL at 10:31

## 2017-01-01 RX ADMIN — MORPHINE SULFATE 1 MG: 2 INJECTION, SOLUTION INTRAMUSCULAR; INTRAVENOUS at 20:08

## 2017-01-01 RX ADMIN — HYDRALAZINE HYDROCHLORIDE 10 MG: 20 INJECTION INTRAMUSCULAR; INTRAVENOUS at 16:39

## 2017-01-01 RX ADMIN — BUDESONIDE AND FORMOTEROL FUMARATE DIHYDRATE 2 PUFF: 160; 4.5 AEROSOL RESPIRATORY (INHALATION) at 06:41

## 2017-01-01 RX ADMIN — MORPHINE SULFATE 1 MG: 2 INJECTION, SOLUTION INTRAMUSCULAR; INTRAVENOUS at 06:15

## 2017-02-06 PROBLEM — K92.2 ACUTE GI HEMORRHAGE: Status: ACTIVE | Noted: 2017-01-01

## 2017-02-06 NOTE — ED PROVIDER NOTES
Subjective   HPI Comments: 56yo female pmh significant htn/mdd/ckd/copd/hcv/cirrhosis/dm2/sher presents ED c/o 1d hx intermittent brisk bright red bleeding per colostomy.  ROS (+) fatigue/abdominal discomfort.  Denies fever/cough/chest pain/syncope/dizziness/hematemesis/melena/coagulopathy.    Patient is a 57 y.o. female presenting with GI bleeding.   GI Bleeding   Severity:  Severe  Onset quality:  Sudden  Duration:  1 day  Timing:  Intermittent  Associated symptoms: abdominal pain    Associated symptoms: no chest pain, no diarrhea, no nausea, no shortness of breath and no vomiting        Review of Systems   Respiratory: Negative for shortness of breath.    Cardiovascular: Negative for chest pain.   Gastrointestinal: Positive for abdominal pain and blood in stool. Negative for abdominal distention, diarrhea, nausea and vomiting.       Past Medical History   Diagnosis Date   • Abnormal mammography    • Anxiety state    • Backache    • Chest pain      unspecified    • Chronic depression    • Chronic obstructive lung disease      worsening dyspnea    • Chronic pain    • Colostomy present    • Compression fracture of lumbar spine, non-traumatic      pt was recommended kyphoplasty . pt denied surgey   • Contact with and (suspected) exposure to viral hepatitis    • Contusion of lower leg    • Delayed wound healing      Impaired wound healing - gluteal cleft   • Depressive disorder    • Disorder of muscle, ligament, and fascia    • Dyspnea on exertion      advised to stop smoking    • Encounter for therapeutic drug monitoring    • Essential hypertension    • H/O tracheostomy    • Heavy tobacco smoker    • History of appendectomy      emergency appendectomy.   • History of colonoscopy 04/09/2014     53631 (1) - Ostomy had stenosis allowing only adult upper endoscopy. Mildly oozing around stomal opening. Suture is present near ostomy opening-but htere is no evidence of ulceration associated with this   • History of  colonoscopy 09/2015     Colonoscopy, diagnostic (screening) 76302 (2) - [place]   • History of esophagogastroduodenoscopy 04/09/2014     EGD w/ tube 72337 (1) - Normal hyopharynx, esophagus, GE junction, symmetrical & patent pylorus. Moderately severe chronic gastritis in antrum. Biopsy taken. Normal duodenum. Biopsy taken   • History of screening mammography    • Low back pain    • Obesity    • Osteopenia    • Otitis media    • Pain in wrist    • Pain management 10/21/2015     Consult, Pain Management (1) - [facility]   • Pericardial effusion    • Pulmonary function studies abnormal    • Screening      Screening for disorder   • Screening for malignant neoplasm of breast    • Shortness of breath    • Shoulder pain    • Therapy 03/17/2015     Consult, Physical Therapy (1) -  [facility]   • Urinary incontinence      since after L1 acute fracture       Allergies   Allergen Reactions   • Other      MRSA   • Stadol [Butorphanol]      Itching   • Toradol [Ketorolac Tromethamine]      CONFUSION/HALLUCINAT       Past Surgical History   Procedure Laterality Date   • Cardiac catheterization  05/13/2014     69649 (1)   -  Successful PCI to the proximal LAD with a V stent reducing 99% stenosis to 0%   • Appendectomy  12/10/2012     Laparoscopic (1) - Acute suppurative appendicitis   • Shoulder surgery  05/14/2015     (1) - Communited 3 part fractured humerus, proximal left shoulder. Open reduction and internal fixation   • Tracheostomy  05/30/2014     (1) - Percutaneous endoscopic gastrostomy tube placement.;     • Other surgical history  05/24/2013     TREAT TAIL BONE FRACTURE 21845 (1) - Evaluation of chronic wound, gluteal cleft, with incisional biopsies   • Other surgical history  07/18/2016     Central line insertion (1) - Successful placement of right neck 6-[place] triple lumen PICC line   • Other surgical history  05/14/2014     Chest procedure (1)  -  Pericardial window. ULtrasound of chest.       No family history on  file.    Social History     Social History   • Marital status:      Spouse name: N/A   • Number of children: N/A   • Years of education: N/A     Social History Main Topics   • Smoking status: Current Every Day Smoker   • Smokeless tobacco: Never Used      Comment: Smokes 1 ppd for 40 years   • Alcohol use No   • Drug use: None   • Sexual activity: Not Asked     Other Topics Concern   • None     Social History Narrative           Objective   Physical Exam   Constitutional: She appears well-developed and well-nourished.   HENT:   Head: Normocephalic and atraumatic.   Mouth/Throat: Oropharynx is clear and moist.   Eyes: Pupils are equal, round, and reactive to light.   Neck: No JVD present.   Cardiovascular: Normal rate, regular rhythm, normal heart sounds and intact distal pulses.  Exam reveals no gallop and no friction rub.    No murmur heard.  Pulmonary/Chest: Effort normal and breath sounds normal. No respiratory distress. She has no wheezes. She has no rales. She exhibits no tenderness.   Abdominal: Soft. Normal appearance and bowel sounds are normal. There is tenderness in the right lower quadrant and left upper quadrant. There is no rebound and no guarding.       Lymphadenopathy:     She has no cervical adenopathy.   Nursing note and vitals reviewed.      Procedures         ED Course  ED Course   Comment By Time   D/w Dr. Osei, recommends ngt/resuscitation/evaluation. Scott Jin MD 02/06 8666   D/w Dr. Hayes (GI)/Dr. Song, hospitalist admitting. Scott Jin MD 02/06 3456          Labs Reviewed   COMPREHENSIVE METABOLIC PANEL - Abnormal; Notable for the following:        Result Value    Calcium 8.3 (*)     Total Protein 6.2 (*)     Albumin 3.30 (*)     ALT (SGPT) 107 (*)     AST (SGOT) 86 (*)     All other components within normal limits   LIPASE - Abnormal; Notable for the following:     Lipase 416 (*)     All other components within normal limits   CBC WITH AUTO DIFFERENTIAL - Abnormal;  Notable for the following:     RBC 3.57 (*)     Hemoglobin 11.4 (*)     Hematocrit 32.0 (*)     Platelets 120 (*)     All other components within normal limits   AMYLASE - Normal   PROTIME-INR - Normal    Narrative:     Therapeutic range for most indications is 2.0-3.0 INR,  or 2.5-3.5 for mechanical heart valves.   APTT - Normal    Narrative:     The recommended Heparin therapeutic range is 68-97 seconds.   LACTIC ACID, PLASMA - Normal   RAINBOW DRAW    Narrative:     The following orders were created for panel order [place] Draw.  Procedure                               Abnormality         Status                     ---------                               -----------         ------                     Gold Top - SST[[id number]]                                    In process                   Please view results for these tests on the individual orders.   TYPE AND SCREEN   PREPARE RBC   PREVIOUS HISTORY   CBC AND DIFFERENTIAL    Narrative:     The following orders were created for panel order CBC & Differential.  Procedure                               Abnormality         Status                     ---------                               -----------         ------                     CBC Auto Differential[[id number]]         Abnormal            Final result                 Please view results for these tests on the individual orders.   [facility]   Ct Abdomen Pelvis Without Contrast    Result Date: 2/6/2017  Narrative: PROCEDURE: CT abdomen and pelvis without contrast COMPARISON: CT abdomen and pelvis without contrast August 26, 2016 HISTORY: Generalized abdominal pain TECHNIQUE: Multiple contiguous noncontrast axial images are obtained of the abdomen and pelvis. Coronal and sagittal multiplanar reformatted images are also reconstructed and reviewed. The dose length product is 809 FINDINGS: Small nodular opacity in the inferior aspect of the lingula is probably stable. The lung bases are otherwise clear. Heart size  normal. There are mildly enlarged bilateral cardiophrenic lymph nodes, stable. Gallbladder is distended, measuring 4.6 cm. No gallbladder wall thickening or pericholecystic inflammation to suggest acute cholecystitis. Tiny low-attenuation lesion in the liver statistically represents a cyst or hemangioma if the patient has no underlying liver disease or history of malignancy. This is grossly stable. Liver is otherwise unremarkable. Spleen, pancreas, adrenal glands and kidneys are grossly unremarkable for noncontrast exam. Stomach is collapsed. No lymphadenopathy by CT size criteria. Bladder is unremarkable. Uterus is grossly normal in size. Prior partial colonic resection with a blind-ending rectal/sigmoid callus in the left lower quadrant colostomy. Bowel is otherwise unremarkable. Mild skin thickening at the colostomy site. No fluid collection associated with a colostomy. No free fluid. Stable chronic compression deformity of L1. No acute osseous abnormality.     Impression: 1. No acute findings in the abdomen or pelvis. 2. Gallbladder is mildly distended. However, this is most likely related to prolonged fasting. Acute cholecystitis may also present with abnormal bowel distention although there is no wall thickening or pericholecystic inflammation to suggest acute cholecystitis. Recommend correlation Electronically signed by:  Yoni Quinn MD  2/6/2017 6:18 PM CST Workstation: [email or web address] Chest 1 View    Result Date: 2/6/2017  Narrative: PROCEDURE: One view chest COMPARISON: Acute abdominal series August 25, 2016. HISTORY: Gastrointestinal bleeding FINDINGS: Single portable view of the chest is obtained. The heart and mediastinal contours are within normal limits. The lungs are clear and well-expanded bilaterally.     Impression: 1. No acute cardiopulmonary disease. Electronically signed by:  Yoni Quinn MD  2/6/2017 4:12 PM CST Workstation: [employer]          [facility]    Final diagnoses:    Acute GI hemorrhage            Scott Jin MD  02/06/17 7748

## 2017-02-07 NOTE — H&P
History and Physical  Alexis Ring MD  Hospitalist      Patient Care Team:  Nader Paul MD as PCP - General  Nancy Downing as Care Coordinator (Population Health)    Chief complaint   Chief Complaint   Patient presents with   • Bleeding inside colostomy bag       Subjective     Patient is a 57 y.o. female complaining of bleeding in her colostomy bag. She has had this problem on and off but never so severe. She denies hematemesis.     History  Past Medical History   Diagnosis Date   • Anxiety state    • Chronic depression    • Chronic pain    • Compression fracture of lumbar spine, non-traumatic    • Depressive disorder    • Essential hypertension    • Obesity    • Pericardial effusion      Past Surgical History   Procedure Laterality Date   • Cardiac catheterization  05/13/2014     32997 (1)   -  Successful PCI to the proximal LAD with a V stent reducing 99% stenosis to 0%   • Appendectomy  12/10/2012     Laparoscopic (1) - Acute suppurative appendicitis   • Shoulder surgery  05/14/2015     (1) - Communited 3 part fractured humerus, proximal left shoulder. Open reduction and internal fixation   • Tracheostomy  05/30/2014     (1) - Percutaneous endoscopic gastrostomy tube placement.;       No family history on file.  Social History   Substance Use Topics   • Smoking status: Current Every Day Smoker   • Smokeless tobacco: Never Used      Comment: Smokes 1 ppd for 40 years   • Alcohol use No     Prescriptions Prior to Admission   Medication Sig Dispense Refill Last Dose   • Aspirin-Salicylamide-Caffeine (BC HEADACHE POWDER PO) Take  by mouth As Needed (for pain).      • busPIRone (BUSPAR) 15 MG tablet Take  by mouth. Take 1 tablet 2 times per day.   Past Month at Unknown time   • QUEtiapine (SEROquel) 200 MG tablet Take 200 mg by mouth Every Night.   2/5/2017 at Unknown time   • spironolactone (ALDACTONE) 25 MG tablet Take 25 mg by mouth Daily.   2/5/2017 at Unknown time   • traZODone (DESYREL) 150 MG tablet  Take 150 mg by mouth Every Night.   2/5/2017 at Unknown time   • furosemide (LASIX) 20 MG tablet Take 20 mg by mouth Daily As Needed (for fluids).   More than a month at Unknown time   • labetalol (NORMODYNE) 100 MG tablet Take 100 mg by mouth 2 (Two) Times a Day.   More than a month at Unknown time   • mirtazapine (REMERON) 15 MG tablet Take 15 mg by mouth Daily.   More than a month at Unknown time   • NICOTINE TD Place  on the skin. 1 patch 24 hour at bedtime.   More than a month at Unknown time     Allergies:  Other; Stadol [butorphanol]; and Toradol [ketorolac tromethamine]    Review of Systems  Review of Systems   Respiratory: Positive for cough.    Cardiovascular: Negative for chest pain and leg swelling.   Gastrointestinal: Positive for abdominal distention and abdominal pain. Anal bleeding: blood in colostomy bag.   Musculoskeletal: Positive for arthralgias and back pain.   Skin: Positive for pallor.   Neurological: Positive for weakness and light-headedness.   All other systems reviewed and are negative.      Objective     Vital Signs  Temp:  [98.4 °F (36.9 °C)-98.5 °F (36.9 °C)] 98.5 °F (36.9 °C)  Heart Rate:  [81-91] 86  Resp:  [16-20] 18  BP: (150-219)/() 150/90    Physical Exam:  Physical Exam   Constitutional: She is oriented to person, place, and time. She appears well-developed and well-nourished.   HENT:   Head: Normocephalic and atraumatic.   Eyes: EOM are normal. Pupils are equal, round, and reactive to light.   Cardiovascular: Normal rate and regular rhythm.    Pulmonary/Chest: Effort normal and breath sounds normal. No respiratory distress.   Abdominal: Soft. Bowel sounds are normal. She exhibits distension. Mass: colostomy bag present. There is tenderness (minimal).   Musculoskeletal: Normal range of motion. She exhibits no edema.   Neurological: She is alert and oriented to person, place, and time.   Skin: Skin is warm and dry.   Psychiatric: She has a normal mood and affect.   Vitals  reviewed.      Results Review:   Lab Results (last 24 hours)     Procedure Component Value Units Date/Time    CBC & Differential [[id number]] Collected:  02/06/17 1638    Specimen:  Blood Updated:  02/06/17 1650    Narrative:       The following orders were created for panel order CBC & Differential.  Procedure                               Abnormality         Status                     ---------                               -----------         ------                     CBC Auto Differential[[id number]]         Abnormal            Final result                 Please view results for these tests on the individual orders.    CBC Auto Differential [[id number]]  (Abnormal) Collected:  02/06/17 1638    Specimen:  Blood Updated:  02/06/17 1650     WBC 5.82 10*3/mm3      RBC 3.57 (L) 10*6/mm3      Hemoglobin 11.4 (L) g/dL      Hematocrit 32.0 (L) %      MCV 89.6 fL      MCH 31.9 pg      MCHC 35.6 g/dL      RDW 12.8 %      RDW-SD 42.3 fl      MPV 10.2 fL      Platelets 120 (L) 10*3/mm3      Neutrophil % 59.2 %      Lymphocyte % 28.7 %      Monocyte % 9.3 %      Eosinophil % 2.4 %      Basophil % 0.2 %      Immature Grans % 0.2 %      Neutrophils, Absolute 3.45 10*3/mm3      Lymphocytes, Absolute 1.67 10*3/mm3      Monocytes, Absolute 0.54 10*3/mm3      Eosinophils, Absolute 0.14 10*3/mm3      Basophils, Absolute 0.01 10*3/mm3      Immature Grans, Absolute 0.01 10*3/mm3     Comprehensive Metabolic Panel [[id number]]  (Abnormal) Collected:  02/06/17 1638    Specimen:  Blood Updated:  02/06/17 1727     Glucose 80 mg/dL      BUN 17 mg/dL      Creatinine 0.84 mg/dL      Sodium 139 mmol/L      Potassium 3.7 mmol/L      Chloride 108 mmol/L      CO2 24.0 mmol/L      Calcium 8.3 (L) mg/dL      Total Protein 6.2 (L) g/dL      Albumin 3.30 (L) g/dL      ALT (SGPT) 107 (H) U/L      AST (SGOT) 86 (H) U/L      Alkaline Phosphatase 124 U/L      Total Bilirubin 0.4 mg/dL      eGFR Non African Amer 70 mL/min/1.73      Globulin 2.9 gm/dL      A/G  Ratio 1.1 g/dL      BUN/Creatinine Ratio 20.2      Anion Gap 7.0 mmol/L     Amylase [[id number]]  (Normal) Collected:  02/06/17 1638    Specimen:  Blood Updated:  02/06/17 1727     Amylase 106 U/L     Lipase [[id number]]  (Abnormal) Collected:  02/06/17 1638    Specimen:  Blood Updated:  02/06/17 1727     Lipase 416 (H) U/L     Lactic Acid, Plasma [[id number]]  (Normal) Collected:  02/06/17 1638    Specimen:  Blood Updated:  02/06/17 1741     Lactate 0.7 mmol/L     Protime-INR [[id number]]  (Normal) Collected:  02/06/17 1638    Specimen:  Blood Updated:  02/06/17 1807     Protime 13.7 Seconds      INR 1.05     Narrative:       Therapeutic range for most indications is 2.0-3.0 INR,  or 2.5-3.5 for mechanical heart valves.    aPTT [[id number]]  (Normal) Collected:  02/06/17 1638    Specimen:  Blood Updated:  02/06/17 1807     PTT 27.1 seconds     Narrative:       The recommended Heparin therapeutic range is 68-97 seconds.    [place] Draw [[id number]] Collected:  02/06/17 1640    Specimen:  Blood Updated:  02/06/17 2101    Narrative:       The following orders were created for panel order [place] Draw.  Procedure                               Abnormality         Status                     ---------                               -----------         ------                     Gold Top - SST[[id number]]                                    Final result                 Please view results for these tests on the individual orders.    Gold Top - SST [[id number]] Collected:  02/06/17 1640    Specimen:  Blood Updated:  02/06/17 2101     Extra Tube Hold for add-ons.       Auto resulted.             Imaging Results (last 24 hours)     Procedure Component Value Units Date/Time    XR Chest 1 View [[id number]] Collected:  02/06/17 1611     Updated:  02/06/17 1613    Narrative:       PROCEDURE: One view chest    COMPARISON: Acute abdominal series August 25, 2016.    HISTORY: Gastrointestinal bleeding    FINDINGS: Single portable view of the chest is  obtained. The  heart and mediastinal contours are within normal limits. The  lungs are clear and well-expanded bilaterally.      Impression:       1. No acute cardiopulmonary disease.    Electronically signed by:  Yoni Quinn MD  2/6/2017 4:12  PM CST Workstation: [id number]    CT Abdomen Pelvis Without Contrast [[id number]] Collected:  02/06/17 1813     Updated:  02/06/17 1819    Narrative:       PROCEDURE: CT abdomen and pelvis without contrast    COMPARISON: CT abdomen and pelvis without contrast August 26, 2016    HISTORY: Generalized abdominal pain    TECHNIQUE: Multiple contiguous noncontrast axial images are  obtained of the abdomen and pelvis. Coronal and sagittal  multiplanar reformatted images are also reconstructed and  reviewed. The dose length product is 809    FINDINGS: Small nodular opacity in the inferior aspect of the  lingula is probably stable. The lung bases are otherwise clear.  Heart size normal. There are mildly enlarged bilateral  cardiophrenic lymph nodes, stable.    Gallbladder is distended, measuring 4.6 cm. No gallbladder wall  thickening or pericholecystic inflammation to suggest acute  cholecystitis. Tiny low-attenuation lesion in the liver  statistically represents a cyst or hemangioma if the patient has  no underlying liver disease or history of malignancy. This is  grossly stable. Liver is otherwise unremarkable. Spleen,  pancreas, adrenal glands and kidneys are grossly unremarkable for  noncontrast exam.    Stomach is collapsed. No lymphadenopathy by CT size criteria.    Bladder is unremarkable. Uterus is grossly normal in size. Prior  partial colonic resection with a blind-ending rectal/sigmoid  callus in the left lower quadrant colostomy. Bowel is otherwise  unremarkable. Mild skin thickening at the colostomy site. No  fluid collection associated with a colostomy. No free fluid.     Stable chronic compression deformity of L1. No acute osseous  abnormality.       Impression:       1. No acute findings in the abdomen or pelvis. 2.  Gallbladder is mildly distended. However, this is most likely  related to prolonged fasting. Acute cholecystitis may also  present with abnormal bowel distention although there is no wall  thickening or pericholecystic inflammation to suggest acute  cholecystitis. Recommend correlation    Electronically signed by:  Yoni Quinn MD  2/6/2017 6:18  PM CST Workstation: [email or web address]          Assessment/Plan     Principal Problem:    Acute GI hemorrhage - lower GI bleed.  Active Problems:    Colostomy present    Hypertension - poorly controlled      Hgb of 11.2 on admission. Will obtain repeat CBC, consult GI if bleeding persists or if her H/H drop much lower.    Alexis Ring MD  02/06/17  10:15 PM

## 2017-02-07 NOTE — PLAN OF CARE
Problem: Patient Care Overview (Adult)  Goal: Plan of Care Review  Outcome: Ongoing (interventions implemented as appropriate)  Hgb on admit was 11.5; pt is tolerating Normal Saline IVF @ 125 ml/hr.         02/07/17 0620   Coping/Psychosocial Response Interventions   Plan Of Care Reviewed With patient   Patient Care Overview   Progress improving       Goal: Discharge Needs Assessment  Outcome: Ongoing (interventions implemented as appropriate)    Problem: Gastrointestinal Bleeding (Adult)  Goal: Signs and Symptoms of Listed Potential Problems Will be Absent or Manageable (Gastrointestinal Bleeding)  Outcome: Ongoing (interventions implemented as appropriate)    Problem: Colostomy (Adult)  Goal: Signs and Symptoms of Listed Potential Problems Will be Absent or Manageable (Colostomy)  Outcome: Ongoing (interventions implemented as appropriate)

## 2017-02-07 NOTE — PROGRESS NOTES
"    [facility] Medicine Services  INPATIENT PROGRESS NOTE    Length of Stay: 1  Date of Admission: 2/6/2017  Primary Care Physician: Nader Paul MD    Subjective   Chief Complaint: \"bleeding from colostomy\"  HPI:  57 year old female who presented to ED related to passing bright red blood through her colostomy.  She has been admitted but since admission the bleeding has stopped and H/H levels are stable.      Review of Systems   Constitutional: Negative for chills, fatigue and fever.   Respiratory: Negative for cough, chest tightness, shortness of breath and wheezing.    Cardiovascular: Negative for chest pain, palpitations and leg swelling.   Gastrointestinal: Positive for blood in stool and nausea. Negative for abdominal distention, abdominal pain, constipation, diarrhea and vomiting.   Musculoskeletal: Negative for back pain, gait problem and neck pain.   Skin: Negative for pallor.   Neurological: Negative for dizziness, tremors, syncope, weakness, light-headedness and numbness.   Psychiatric/Behavioral: Negative for confusion.        All pertinent negatives and positives are as above. All other systems have been reviewed and are negative unless otherwise stated.     Objective    Temp:  [97.2 °F (36.2 °C)-98.5 °F (36.9 °C)] 97.3 °F (36.3 °C)  Heart Rate:  [73-93] 79  Resp:  [16-20] 18  BP: (148-219)/() 158/82    Physical Exam   Constitutional: She is oriented to person, place, and time. She appears well-developed and well-nourished.   HENT:   Head: Normocephalic and atraumatic.   Eyes: EOM are normal. Pupils are equal, round, and reactive to light.   Neck: Normal range of motion. Neck supple.   Cardiovascular: Normal rate, regular rhythm, normal heart sounds and intact distal pulses.  Exam reveals no gallop and no friction rub.    No murmur heard.  Pulmonary/Chest: Effort normal and breath sounds normal. No respiratory distress. She has no wheezes. She has no rales. " She exhibits no tenderness.   Abdominal: Soft. Bowel sounds are normal. She exhibits no distension. There is no tenderness.   Musculoskeletal: Normal range of motion. She exhibits no edema or deformity.   Neurological: She is alert and oriented to person, place, and time.   Skin: Skin is warm and dry. No rash noted. No erythema. No pallor.   Psychiatric: She has a normal mood and affect. Her behavior is normal. Judgment and thought content normal.           Results Review:  I have reviewed the labs, radiology results, and diagnostic studies.    Laboratory Data:   Recent Results (from the past 24 hour(s))   Comprehensive Metabolic Panel    Collection Time: 02/06/17  4:38 PM   Result Value Ref Range    Glucose 80 60 - 100 mg/dL    BUN 17 7 - 21 mg/dL    Creatinine 0.84 0.50 - 1.00 mg/dL    Sodium 139 137 - 145 mmol/L    Potassium 3.7 3.5 - 5.1 mmol/L    Chloride 108 95 - 110 mmol/L    CO2 24.0 22.0 - 31.0 mmol/L    Calcium 8.3 (L) 8.4 - 10.2 mg/dL    Total Protein 6.2 (L) 6.3 - 8.6 g/dL    Albumin 3.30 (L) 3.40 - 4.80 g/dL    ALT (SGPT) 107 (H) 9 - 52 U/L    AST (SGOT) 86 (H) 14 - 36 U/L    Alkaline Phosphatase 124 38 - 126 U/L    Total Bilirubin 0.4 0.2 - 1.3 mg/dL    eGFR Non African Amer 70 >60 mL/min/1.73    Globulin 2.9 2.3 - 3.5 gm/dL    A/G Ratio 1.1 1.1 - 1.8 g/dL    BUN/Creatinine Ratio 20.2 7.0 - 25.0    Anion Gap 7.0 5.0 - 15.0 mmol/L   Amylase    Collection Time: 02/06/17  4:38 PM   Result Value Ref Range    Amylase 106 50 - 130 U/L   Lipase    Collection Time: 02/06/17  4:38 PM   Result Value Ref Range    Lipase 416 (H) 23 - 300 U/L   Protime-INR    Collection Time: 02/06/17  4:38 PM   Result Value Ref Range    Protime 13.7 11.1 - 15.3 Seconds    INR 1.05 0.80 - 1.20   aPTT    Collection Time: 02/06/17  4:38 PM   Result Value Ref Range    PTT 27.1 20.0 - 40.3 seconds   Lactic Acid, Plasma    Collection Time: 02/06/17  4:38 PM   Result Value Ref Range    Lactate 0.7 0.5 - 2.0 mmol/L   CBC Auto Differential     Collection Time: 02/06/17  4:38 PM   Result Value Ref Range    WBC 5.82 3.20 - 9.80 10*3/mm3    RBC 3.57 (L) 3.77 - 5.16 10*6/mm3    Hemoglobin 11.4 (L) 12.0 - 15.5 g/dL    Hematocrit 32.0 (L) 35.0 - 45.0 %    MCV 89.6 80.0 - 98.0 fL    MCH 31.9 26.5 - 34.0 pg    MCHC 35.6 31.4 - 36.0 g/dL    RDW 12.8 11.5 - 14.5 %    RDW-SD 42.3 36.4 - 46.3 fl    MPV 10.2 8.0 - 12.0 fL    Platelets 120 (L) 150 - 450 10*3/mm3    Neutrophil % 59.2 37.0 - 80.0 %    Lymphocyte % 28.7 10.0 - 50.0 %    Monocyte % 9.3 0.0 - 12.0 %    Eosinophil % 2.4 0.0 - 7.0 %    Basophil % 0.2 0.0 - 2.0 %    Immature Grans % 0.2 0.0 - 0.5 %    Neutrophils, Absolute 3.45 2.00 - 8.60 10*3/mm3    Lymphocytes, Absolute 1.67 0.60 - 4.20 10*3/mm3    Monocytes, Absolute 0.54 0.00 - 0.90 10*3/mm3    Eosinophils, Absolute 0.14 0.00 - 0.70 10*3/mm3    Basophils, Absolute 0.01 0.00 - 0.20 10*3/mm3    Immature Grans, Absolute 0.01 0.00 - 0.02 10*3/mm3   Gold Top - SST    Collection Time: 02/06/17  4:40 PM   Result Value Ref Range    Extra Tube Hold for add-ons.    Type & Screen    Collection Time: 02/06/17  6:17 PM   Result Value Ref Range    ABO Type O     RH type Positive     Antibody Screen Negative    PREVIOUS HISTORY    Collection Time: 02/06/17  6:17 PM   Result Value Ref Range    Previous History Previous Record on File    Prepare RBC, 2 Units    Collection Time: 02/06/17  7:34 PM   Result Value Ref Range    Product Code G4292D59     Unit Number [id number]     UNIT  ABO O     UNIT  RH POS     Dispense Status XM     Blood Type OPOS     Blood Expiration Date 201703142359     Blood Type Barcode 5100     Product Code D5106G17     Unit Number [id number]     UNIT  ABO O     UNIT  RH POS     Dispense Status XM     Blood Type OPOS     Blood Expiration Date 201703142359     Blood Type Barcode 5100    CBC (No Diff)    Collection Time: 02/07/17  5:59 AM   Result Value Ref Range    WBC 3.18 (L) 3.20 - 9.80 10*3/mm3    RBC 3.86 3.77 - 5.16 10*6/mm3     Hemoglobin 11.9 (L) 12.0 - 15.5 g/dL    Hematocrit 35.2 35.0 - 45.0 %    MCV 91.2 80.0 - 98.0 fL    MCH 30.8 26.5 - 34.0 pg    MCHC 33.8 31.4 - 36.0 g/dL    RDW 13.5 11.5 - 14.5 %    RDW-SD 44.7 36.4 - 46.3 fl    MPV 9.5 8.0 - 12.0 fL    Platelets 111 (L) 150 - 450 10*3/mm3   Basic Metabolic Panel    Collection Time: 02/07/17  5:59 AM   Result Value Ref Range    Glucose 90 60 - 100 mg/dL    BUN 11 7 - 21 mg/dL    Creatinine 0.74 0.50 - 1.00 mg/dL    Sodium 142 137 - 145 mmol/L    Potassium 3.6 3.5 - 5.1 mmol/L    Chloride 111 (H) 95 - 110 mmol/L    CO2 25.0 22.0 - 31.0 mmol/L    Calcium 8.0 (L) 8.4 - 10.2 mg/dL    eGFR Non African Amer 81 >60 mL/min/1.73    BUN/Creatinine Ratio 14.9 7.0 - 25.0    Anion Gap 6.0 5.0 - 15.0 mmol/L   Hemoglobin & Hematocrit, Blood    Collection Time: 02/07/17  3:47 PM   Result Value Ref Range    Hemoglobin 11.9 (L) 12.0 - 15.5 g/dL    Hematocrit 34.6 (L) 35.0 - 45.0 %       Culture Data:   No results found for: BLOODCX  No results found for: URINECX  No results found for: RESPCX  No results found for: WOUNDCX  No results found for: STOOLCX  No components found for: BODYFLD    Radiology Data:   Imaging Results (last 24 hours)     Procedure Component Value Units Date/Time    CT Abdomen Pelvis Without Contrast [[id number]] Collected:  02/06/17 1813     Updated:  02/06/17 1819    Narrative:       PROCEDURE: CT abdomen and pelvis without contrast    COMPARISON: CT abdomen and pelvis without contrast August 26, 2016    HISTORY: Generalized abdominal pain    TECHNIQUE: Multiple contiguous noncontrast axial images are  obtained of the abdomen and pelvis. Coronal and sagittal  multiplanar reformatted images are also reconstructed and  reviewed. The dose length product is 809    FINDINGS: Small nodular opacity in the inferior aspect of the  lingula is probably stable. The lung bases are otherwise clear.  Heart size normal. There are mildly enlarged bilateral  cardiophrenic lymph nodes,  stable.    Gallbladder is distended, measuring 4.6 cm. No gallbladder wall  thickening or pericholecystic inflammation to suggest acute  cholecystitis. Tiny low-attenuation lesion in the liver  statistically represents a cyst or hemangioma if the patient has  no underlying liver disease or history of malignancy. This is  grossly stable. Liver is otherwise unremarkable. Spleen,  pancreas, adrenal glands and kidneys are grossly unremarkable for  noncontrast exam.    Stomach is collapsed. No lymphadenopathy by CT size criteria.    Bladder is unremarkable. Uterus is grossly normal in size. Prior  partial colonic resection with a blind-ending rectal/sigmoid  callus in the left lower quadrant colostomy. Bowel is otherwise  unremarkable. Mild skin thickening at the colostomy site. No  fluid collection associated with a colostomy. No free fluid.     Stable chronic compression deformity of L1. No acute osseous  abnormality.      Impression:       1. No acute findings in the abdomen or pelvis. 2.  Gallbladder is mildly distended. However, this is most likely  related to prolonged fasting. Acute cholecystitis may also  present with abnormal bowel distention although there is no wall  thickening or pericholecystic inflammation to suggest acute  cholecystitis. Recommend correlation    Electronically signed by:  Yoni Quinn MD  2/6/2017 6:18  PM CST Workstation: [id number]          I have reviewed the patient current medications.     Assessment/Plan     Hospital Problem List     * (Principal)Acute GI hemorrhage    Essential hypertension    Colostomy present          Plan:   1. GI bleeding: no further bleeding noted. H/H levels are stable.  Hemoccults are pending. On Protonix.  Will repeat H/H this afternoon.  If stable, patient may be able to be discharged.   2. HTN: poorly controlled.  Improved.  Currently 150/80's.  Was 219/97 yesterday.  3. Tobacco abuse: Nicotine patch.  Counseled on cessation.  4. Morbid obesity:  counseled on lifestyle modifications.        Discharge Planning: I expect patient to be discharged to home in 1 days if repeat h/h levels are stable and no further bleeding is noted.     CEDRICK Tripp   02/07/17   4:27 PM

## 2017-02-07 NOTE — PLAN OF CARE
Problem: Colostomy (Adult)  Goal: Signs and Symptoms of Listed Potential Problems Will be Absent or Manageable (Colostomy)  Outcome: Ongoing (interventions implemented as appropriate)

## 2017-02-08 NOTE — CONSULTS
Referring Provider: [facility]       Patient Care Team:  Nancy Downing as Care Coordinator ([facility])    Chief complaint asked to evaluate ostomy    Subjective .  57-year-old female who was admitted a few days ago with bleeding per ostomy.  Patient had a colostomy done open [place] after she had some type of pelvic injury and sounds like this was done for diversion of her fecal stream.   Ostomy is been present for 4 years.  She states that she's had off of intermittent episodes of blood passed per her ostomy.  She says she seen Dr. Osei in the past but is not able to tell me when the last time she had any type of GI workup.  Last output per patient or nursing staff has been 2 days.  Not been bleeding over that time as well. heme Globin on admission was 11. 4 and yesterday was 11.9.  Not tachycardic.  No abdominal pain.  No blood thinners.      History of present illness:       Review of Systems   Constitutional: Negative for appetite change, chills and fatigue.   Respiratory: Negative for cough, chest tightness and shortness of breath.    Cardiovascular: Negative for chest pain.   Gastrointestinal: Positive for blood in stool and constipation. Negative for abdominal pain, nausea and rectal pain.   Genitourinary: Negative for difficulty urinating.         History  Past Medical History   Diagnosis Date   • Anxiety state    • Chronic depression    • Chronic pain    • Compression fracture of lumbar spine, non-traumatic    • Depressive disorder    • Essential hypertension    • Obesity    • Pericardial effusion    , Past Surgical History   Procedure Laterality Date   • Cardiac catheterization  05/13/2014     33780 (1)   -  Successful PCI to the proximal LAD with a V stent reducing 99% stenosis to 0%   • Appendectomy  12/10/2012     Laparoscopic (1) - Acute suppurative appendicitis   • Shoulder surgery  05/14/2015     (1) - Communited 3 part fractured humerus, proximal left shoulder. Open reduction and  internal fixation   • Tracheostomy  05/30/2014     (1) - Percutaneous endoscopic gastrostomy tube placement.;     , No family history on file., Social History   Substance Use Topics   • Smoking status: Current Every Day Smoker   • Smokeless tobacco: Never Used      Comment: Smokes 1 ppd for 40 years   • Alcohol use No   , Prescriptions Prior to Admission   Medication Sig Dispense Refill Last Dose   • busPIRone (BUSPAR) 15 MG tablet Take  by mouth. Take 1 tablet 2 times per day.      • labetalol (NORMODYNE) 100 MG tablet Take 100 mg by mouth 2 (Two) Times a Day.      • mirtazapine (REMERON) 15 MG tablet Take 15 mg by mouth Daily.      • QUEtiapine (SEROquel) 200 MG tablet Take 200 mg by mouth Every Night.   2/5/2017 at Unknown time   • traZODone (DESYREL) 150 MG tablet Take 150 mg by mouth Every Night.   2/5/2017 at Unknown time   • Aspirin-Salicylamide-Caffeine (BC HEADACHE POWDER PO) Take  by mouth As Needed (for pain).      • furosemide (LASIX) 20 MG tablet Take 20 mg by mouth Daily As Needed (for fluids).   More than a month at Unknown time   • NICOTINE TD Place  on the skin. 1 patch 24 hour at bedtime.   More than a month at Unknown time   • spironolactone (ALDACTONE) 25 MG tablet Take 25 mg by mouth Daily.      , Scheduled Meds:    busPIRone 15 mg Oral Q12H   hydrALAZINE 10 mg Intravenous Once   labetalol 100 mg Oral BID   metroNIDAZOLE 500 mg Intravenous Q8H   mirtazapine 15 mg Oral Daily   pantoprazole 40 mg Intravenous Q AM   piperacillin-tazobactam 3.375 g Intravenous Q6H   QUEtiapine 200 mg Oral Nightly   spironolactone 25 mg Oral Daily   traZODone 150 mg Oral Nightly   , Continuous Infusions:    sodium chloride 125 mL/hr Last Rate: 125 mL/hr (02/08/17 1627)   , PRN Meds:  hydrALAZINE  •  Morphine  •  [DISCONTINUED] Morphine **AND** naloxone  •  sodium chloride  •  Insert peripheral IV **AND** sodium chloride and Allergies:  Other; Stadol [butorphanol]; and Toradol [ketorolac tromethamine]    Objective      Vital Signs   Temp:  [98.3 °F (36.8 °C)-101 °F (38.3 °C)] 99.9 °F (37.7 °C)  Heart Rate:  [77-98] 77  Resp:  [16-20] 20  BP: (140-182)/(67-88) 147/67    Physical Exam:     General Appearance:    Alert, cooperative, in no acute distress   Head:    Normocephalic, without obvious abnormality, atraumatic                           Chest Wall:    No abnormalities observed   Abdomen:     Normal bowel sounds, no masses, no organomegaly, soft        non-tender, non-distended, no guarding, no rebound                Tenderness  Ostomy pink and patent.  Tolerated small finger  But bled easily afterwards.  Abd wall thick and not able to get finger below fascia.  No hernia or mass or ttp.         Skin:   No bleeding, bruising or rash               Results Review:   Discussed with patient findings and recommendations      Assessment/Plan     Principal Problem:    Acute GI hemorrhage  Active Problems:    Essential hypertension    Colostomy present      Needs GI eval.  Patient is seen Dr. Osei in the past so recommend scope through colostomy.  I would formally consult Dr. Osei and I can speak with him about putting the scope through the ostomy and he wishes.  We'll review CT scan done today but reported no obstruction    I discussed the patients findings and my recommendations with patient    Alfred Cary MD  02/08/17  5:50 PM

## 2017-02-08 NOTE — PROGRESS NOTES
"    [facility] Medicine Services  INPATIENT PROGRESS NOTE    Length of Stay: 2  Date of Admission: 2/6/2017  Primary Care Physician: No primary care provider on file.    Subjective   Chief Complaint: \"nothing coming out of colostomy\"  HPI:  57 year old female who presented to ED related to passing bright red blood through her colostomy.  She has been admitted but since admission the bleeding has stopped and H/H levels are stable.  She reports today that she has not had output from her ostomy since \"early yesterday morning\" (>24 hours).  Stoma was pink yesterday but today is much lighter in color and has no output.      Review of Systems   Constitutional: Positive for fever. Negative for chills and fatigue.   Respiratory: Negative for cough, chest tightness, shortness of breath and wheezing.    Cardiovascular: Negative for chest pain, palpitations and leg swelling.   Gastrointestinal: Positive for nausea. Negative for abdominal distention, abdominal pain, blood in stool, constipation, diarrhea and vomiting.        Patient has had no output from ostomy in >24 hours.   Musculoskeletal: Negative for back pain, gait problem and neck pain.   Skin: Negative for pallor.   Neurological: Negative for dizziness, tremors, syncope, weakness, light-headedness and numbness.   Psychiatric/Behavioral: Negative for confusion.        All pertinent negatives and positives are as above. All other systems have been reviewed and are negative unless otherwise stated.     Objective    Temp:  [97.8 °F (36.6 °C)-101 °F (38.3 °C)] 101 °F (38.3 °C)  Heart Rate:  [81-98] 81  Resp:  [16-20] 20  BP: (140-182)/(70-88) 140/78    Physical Exam   Constitutional: She is oriented to person, place, and time. She appears well-developed and well-nourished.   HENT:   Head: Normocephalic and atraumatic.   Eyes: EOM are normal. Pupils are equal, round, and reactive to light.   Neck: Normal range of motion. Neck supple. "   Cardiovascular: Normal rate, regular rhythm, normal heart sounds and intact distal pulses.  Exam reveals no gallop and no friction rub.    No murmur heard.  Pulmonary/Chest: Effort normal and breath sounds normal. No respiratory distress. She has no wheezes. She has no rales. She exhibits no tenderness.   Abdominal: Soft. She exhibits no distension. There is no tenderness.   Bowel sounds sluggish compared to yesterday.    Stoma is pink but pale in appearance compared to yesterday's exam.      Musculoskeletal: Normal range of motion. She exhibits no edema or deformity.   Neurological: She is alert and oriented to person, place, and time.   Skin: Skin is warm and dry. No rash noted. No erythema. No pallor.   Psychiatric: She has a normal mood and affect. Her behavior is normal. Judgment and thought content normal.           Results Review:  I have reviewed the labs, radiology results, and diagnostic studies.    Laboratory Data:   Lab Results (last 72 hours)     Procedure Component Value Units Date/Time    CBC & Differential [[id number]] Collected:  02/06/17 1638    Specimen:  Blood Updated:  02/06/17 1650    Narrative:       The following orders were created for panel order CBC & Differential.  Procedure                               Abnormality         Status                     ---------                               -----------         ------                     CBC Auto Differential[[id number]]         Abnormal            Final result                 Please view results for these tests on the individual orders.    CBC Auto Differential [[id number]]  (Abnormal) Collected:  02/06/17 1638    Specimen:  Blood Updated:  02/06/17 1650     WBC 5.82 10*3/mm3      RBC 3.57 (L) 10*6/mm3      Hemoglobin 11.4 (L) g/dL      Hematocrit 32.0 (L) %      MCV 89.6 fL      MCH 31.9 pg      MCHC 35.6 g/dL      RDW 12.8 %      RDW-SD 42.3 fl      MPV 10.2 fL      Platelets 120 (L) 10*3/mm3      Neutrophil % 59.2 %      Lymphocyte % 28.7 %       Monocyte % 9.3 %      Eosinophil % 2.4 %      Basophil % 0.2 %      Immature Grans % 0.2 %      Neutrophils, Absolute 3.45 10*3/mm3      Lymphocytes, Absolute 1.67 10*3/mm3      Monocytes, Absolute 0.54 10*3/mm3      Eosinophils, Absolute 0.14 10*3/mm3      Basophils, Absolute 0.01 10*3/mm3      Immature Grans, Absolute 0.01 10*3/mm3     Comprehensive Metabolic Panel [[id number]]  (Abnormal) Collected:  02/06/17 1638    Specimen:  Blood Updated:  02/06/17 1727     Glucose 80 mg/dL      BUN 17 mg/dL      Creatinine 0.84 mg/dL      Sodium 139 mmol/L      Potassium 3.7 mmol/L      Chloride 108 mmol/L      CO2 24.0 mmol/L      Calcium 8.3 (L) mg/dL      Total Protein 6.2 (L) g/dL      Albumin 3.30 (L) g/dL      ALT (SGPT) 107 (H) U/L      AST (SGOT) 86 (H) U/L      Alkaline Phosphatase 124 U/L      Total Bilirubin 0.4 mg/dL      eGFR Non African Amer 70 mL/min/1.73      Globulin 2.9 gm/dL      A/G Ratio 1.1 g/dL      BUN/Creatinine Ratio 20.2      Anion Gap 7.0 mmol/L     Amylase [[id number]]  (Normal) Collected:  02/06/17 1638    Specimen:  Blood Updated:  02/06/17 1727     Amylase 106 U/L     Lipase [[id number]]  (Abnormal) Collected:  02/06/17 1638    Specimen:  Blood Updated:  02/06/17 1727     Lipase 416 (H) U/L     Lactic Acid, Plasma [[id number]]  (Normal) Collected:  02/06/17 1638    Specimen:  Blood Updated:  02/06/17 1741     Lactate 0.7 mmol/L     Protime-INR [[id number]]  (Normal) Collected:  02/06/17 1638    Specimen:  Blood Updated:  02/06/17 1807     Protime 13.7 Seconds      INR 1.05     Narrative:       Therapeutic range for most indications is 2.0-3.0 INR,  or 2.5-3.5 for mechanical heart valves.    aPTT [[id number]]  (Normal) Collected:  02/06/17 1638    Specimen:  Blood Updated:  02/06/17 1807     PTT 27.1 seconds     Narrative:       The recommended Heparin therapeutic range is 68-97 seconds.    [place] Draw [[id number]] Collected:  02/06/17 1640    Specimen:  Blood Updated:  02/06/17 2101    Narrative:        The following orders were created for panel order [place] Draw.  Procedure                               Abnormality         Status                     ---------                               -----------         ------                     [facility][[id number]]                                    Final result                 Please view results for these tests on the individual orders.    [facility] - [facility] [[id number]] Collected:  02/06/17 1640    Specimen:  Blood Updated:  02/06/17 2101     Extra Tube Hold for add-ons.       Auto resulted.       CBC (No Diff) [[id number]]  (Abnormal) Collected:  02/07/17 0559    Specimen:  Blood Updated:  02/07/17 0652     WBC 3.18 (L) 10*3/mm3      RBC 3.86 10*6/mm3      Hemoglobin 11.9 (L) g/dL      Hematocrit 35.2 %      MCV 91.2 fL      MCH 30.8 pg      MCHC 33.8 g/dL      RDW 13.5 %      RDW-SD 44.7 fl      MPV 9.5 fL      Platelets 111 (L) 10*3/mm3     Basic Metabolic Panel [[id number]]  (Abnormal) Collected:  02/07/17 0559    Specimen:  Blood Updated:  02/07/17 0704     Glucose 90 mg/dL      BUN 11 mg/dL      Creatinine 0.74 mg/dL      Sodium 142 mmol/L      Potassium 3.6 mmol/L      Chloride 111 (H) mmol/L      CO2 25.0 mmol/L      Calcium 8.0 (L) mg/dL      eGFR Non African Amer 81 mL/min/1.73      BUN/Creatinine Ratio 14.9      Anion Gap 6.0 mmol/L     Hemoglobin & Hematocrit, Blood [[id number]]  (Abnormal) Collected:  02/07/17 1547    Specimen:  Blood Updated:  02/07/17 1612     Hemoglobin 11.9 (L) g/dL      Hematocrit 34.6 (L) %     Blood Culture [[id number]] Collected:  02/08/17 1200    Specimen:  Blood from Hand, Right Updated:  02/08/17 1229    Blood Culture [[id number]] Collected:  02/08/17 1200    Specimen:  Blood from Arm, Left Updated:  02/08/17 1229        Imaging Results (last 72 hours)     Procedure Component Value Units Date/Time    XR Chest 1 View [[id number]] Collected:  02/06/17 1611     Updated:  02/06/17 1613    Narrative:       PROCEDURE: One view  chest    COMPARISON: Acute abdominal series August 25, 2016.    HISTORY: Gastrointestinal bleeding    FINDINGS: Single portable view of the chest is obtained. The  heart and mediastinal contours are within normal limits. The  lungs are clear and well-expanded bilaterally.      Impression:       1. No acute cardiopulmonary disease.    Electronically signed by:  Yoni Quinn MD  2/6/2017 4:12  PM CST Workstation: [email or web address]    CT Abdomen Pelvis Without Contrast [[id number]] Collected:  02/06/17 1813     Updated:  02/06/17 1819    Narrative:       PROCEDURE: CT abdomen and pelvis without contrast    COMPARISON: CT abdomen and pelvis without contrast August 26, 2016    HISTORY: Generalized abdominal pain    TECHNIQUE: Multiple contiguous noncontrast axial images are  obtained of the abdomen and pelvis. Coronal and sagittal  multiplanar reformatted images are also reconstructed and  reviewed. The dose length product is 809    FINDINGS: Small nodular opacity in the inferior aspect of the  lingula is probably stable. The lung bases are otherwise clear.  Heart size normal. There are mildly enlarged bilateral  cardiophrenic lymph nodes, stable.    Gallbladder is distended, measuring 4.6 cm. No gallbladder wall  thickening or pericholecystic inflammation to suggest acute  cholecystitis. Tiny low-attenuation lesion in the liver  statistically represents a cyst or hemangioma if the patient has  no underlying liver disease or history of malignancy. This is  grossly stable. Liver is otherwise unremarkable. Spleen,  pancreas, adrenal glands and kidneys are grossly unremarkable for  noncontrast exam.    Stomach is collapsed. No lymphadenopathy by CT size criteria.    Bladder is unremarkable. Uterus is grossly normal in size. Prior  partial colonic resection with a blind-ending rectal/sigmoid  callus in the left lower quadrant colostomy. Bowel is otherwise  unremarkable. Mild skin thickening at the colostomy site.  No  fluid collection associated with a colostomy. No free fluid.     Stable chronic compression deformity of L1. No acute osseous  abnormality.      Impression:       1. No acute findings in the abdomen or pelvis. 2.  Gallbladder is mildly distended. However, this is most likely  related to prolonged fasting. Acute cholecystitis may also  present with abnormal bowel distention although there is no wall  thickening or pericholecystic inflammation to suggest acute  cholecystitis. Recommend correlation    Electronically signed by:  Yoni Quinn MD  2/6/2017 6:18  PM CST Workstation: [email or web address]          Culture Data:   No results found for: BLOODCX  No results found for: URINECX  No results found for: RESPCX  No results found for: WOUNDCX  No results found for: STOOLCX  No components found for: BODYFLD    Radiology Data:   Imaging Results (last 24 hours)     Procedure Component Value Units Date/Time    CT Abdomen Pelvis Without Contrast [[id number]] Collected:  02/06/17 1813     Updated:  02/06/17 1819    Narrative:       PROCEDURE: CT abdomen and pelvis without contrast    COMPARISON: CT abdomen and pelvis without contrast August 26, 2016    HISTORY: Generalized abdominal pain    TECHNIQUE: Multiple contiguous noncontrast axial images are  obtained of the abdomen and pelvis. Coronal and sagittal  multiplanar reformatted images are also reconstructed and  reviewed. The dose length product is 809    FINDINGS: Small nodular opacity in the inferior aspect of the  lingula is probably stable. The lung bases are otherwise clear.  Heart size normal. There are mildly enlarged bilateral  cardiophrenic lymph nodes, stable.    Gallbladder is distended, measuring 4.6 cm. No gallbladder wall  thickening or pericholecystic inflammation to suggest acute  cholecystitis. Tiny low-attenuation lesion in the liver  statistically represents a cyst or hemangioma if the patient has  no underlying liver disease or history of  malignancy. This is  grossly stable. Liver is otherwise unremarkable. Spleen,  pancreas, adrenal glands and kidneys are grossly unremarkable for  noncontrast exam.    Stomach is collapsed. No lymphadenopathy by CT size criteria.    Bladder is unremarkable. Uterus is grossly normal in size. Prior  partial colonic resection with a blind-ending rectal/sigmoid  callus in the left lower quadrant colostomy. Bowel is otherwise  unremarkable. Mild skin thickening at the colostomy site. No  fluid collection associated with a colostomy. No free fluid.     Stable chronic compression deformity of L1. No acute osseous  abnormality.      Impression:       1. No acute findings in the abdomen or pelvis. 2.  Gallbladder is mildly distended. However, this is most likely  related to prolonged fasting. Acute cholecystitis may also  present with abnormal bowel distention although there is no wall  thickening or pericholecystic inflammation to suggest acute  cholecystitis. Recommend correlation    Electronically signed by:  Yoni Quinn MD  2/6/2017 6:18  PM CST Workstation: [id number]          I have reviewed the patient current medications.     Assessment/Plan     Hospital Problem List     * (Principal)Acute GI hemorrhage    Essential hypertension    Colostomy present          Plan:   1. GI bleeding: no further bleeding noted. H/H levels are stable.  Hemoccults are pending. On Protonix.   2. Pale stoma with no ostomy output: Repeat CT scan of abdomen (with contrast) to assess ostomy.  Case has been discussed at 11:00 am with on call general surgeon, Dr. Cary, as well and he will assess and make recommendations.  3. Fever of unknown origin: urine, blood, stool cultures ordered.  Zosyn and Flagyl added for coverage of possible GI source.  4. HTN: poorly controlled.  Improved.  Continue current management.  5. Tobacco abuse: Nicotine patch.  Counseled on cessation.  6. Morbid obesity    Vivien Islas, CEDRICK   02/08/17   12:50  PM

## 2017-02-08 NOTE — PLAN OF CARE
Problem: Patient Care Overview (Adult)  Goal: Plan of Care Review  Outcome: Ongoing (interventions implemented as appropriate)    02/08/17 0406   Coping/Psychosocial Response Interventions   Plan Of Care Reviewed With patient   Patient Care Overview   Progress improving   Outcome Evaluation   Outcome Summary/Follow up Plan No blood output throuhg colostomy during shift; pain controlled with regular prn pain meds       Goal: Adult Individualization and Mutuality  Outcome: Ongoing (interventions implemented as appropriate)    Problem: Gastrointestinal Bleeding (Adult)  Goal: Signs and Symptoms of Listed Potential Problems Will be Absent or Manageable (Gastrointestinal Bleeding)  Outcome: Ongoing (interventions implemented as appropriate)    Problem: Colostomy (Adult)  Goal: Signs and Symptoms of Listed Potential Problems Will be Absent or Manageable (Colostomy)  Outcome: Ongoing (interventions implemented as appropriate)    Problem: Pressure Ulcer Risk (Chaim Scale) (Adult,Obstetrics,Pediatric)  Goal: Identify Related Risk Factors and Signs and Symptoms  Outcome: Outcome(s) achieved Date Met:  02/08/17 02/08/17 0406   Pressure Ulcer Risk (Chaim Scale)   Related Risk Factors (Pressure Ulcer Risk (Chaim Scale)) excretions/secretions;fluid intake inadequate;mobility impaired       Goal: Skin Integrity  Outcome: Ongoing (interventions implemented as appropriate)

## 2017-02-09 NOTE — PROGRESS NOTES
[facility] Medicine Services  INPATIENT PROGRESS NOTE    Length of Stay: 3  Date of Admission: 2/6/2017  Primary Care Physician: No primary care provider on file.    Subjective   Chief Complaint: Bleeding and poor ostomy output  HPI: Patient is a 57 y.o. female complaining of bleeding in her colostomy bag. She has had this problem on and off but never so severe.  She was evaluated by Dr. Osei and Dr. Cary.  She is recommended colonoscopy tomorrow.  Recommended clear liquids diet for now and full prep for colonoscopy.    Review of Systems   Constitutional: Positive for fever (intermittent). Negative for chills.   Respiratory: Negative for cough, shortness of breath and wheezing.    Cardiovascular: Negative for chest pain.   Gastrointestinal: Positive for blood in stool. Negative for abdominal distention, abdominal pain, diarrhea, nausea and vomiting.      All pertinent negatives and positives are as above. All other systems have been reviewed and are negative unless otherwise stated.     Objective    Temp:  [98 °F (36.7 °C)-101.2 °F (38.4 °C)] 101.2 °F (38.4 °C)  Heart Rate:  [67-86] 86  Resp:  [16-20] 18  BP: (120-162)/(60-76) 136/74  Physical Exam   Constitutional: She is oriented to person, place, and time. She appears well-developed and well-nourished.   HENT:   Head: Normocephalic and atraumatic.   Eyes: Conjunctivae and EOM are normal.   Neck: Normal range of motion. Neck supple.   Cardiovascular: Normal rate, regular rhythm, normal heart sounds and intact distal pulses.  Exam reveals no gallop and no friction rub.    No murmur heard.  Pulmonary/Chest: Effort normal and breath sounds normal. No respiratory distress. She has no wheezes. She has no rales.   Abdominal: Soft. Bowel sounds are normal. She exhibits no distension. There is no tenderness.   Colostomy with small amount of bloody output   Musculoskeletal: Normal range of motion. She exhibits no edema.    Neurological: She is alert and oriented to person, place, and time.   Skin: Skin is warm and dry. No erythema.   Psychiatric: She has a normal mood and affect. Her behavior is normal. Judgment and thought content normal.     Results Review:  I have reviewed the labs, radiology results, and diagnostic studies.    Laboratory Data:   Lab Results (last 24 hours)     Procedure Component Value Units Date/Time    Blood Culture [[id number]]  (Normal) Collected:  02/08/17 1200    Specimen:  Blood from Arm, Left Updated:  02/09/17 1301     Blood Culture No growth at 24 hours     Blood Culture [[id number]]  (Normal) Collected:  02/08/17 1200    Specimen:  Blood from Hand, Right Updated:  02/09/17 1301     Blood Culture No growth at 24 hours           Culture Data:   BLOOD CULTURE   Date Value Ref Range Status   02/08/2017 No growth at 24 hours  Preliminary   02/08/2017 No growth at 24 hours  Preliminary     No results found for: URINECX  No results found for: RESPCX  No results found for: WOUNDCX  No results found for: STOOLCX  No components found for: BODYFLD    Radiology Data:   Imaging Results (last 24 hours)     Procedure Component Value Units Date/Time    CT Abdomen Pelvis With Contrast [[id number]] Collected:  02/08/17 1649     Updated:  02/08/17 1659    Narrative:       PROCEDURE: CT abdomen and pelvis with contrast    COMPARISON: CT abdomen pelvis without contrast February 6, 2017    HISTORY: Gastrointestinal hemorrhage, unspecified. Pale ostomy  stoma and no output    TECHNIQUE: Multiple contiguous axial images are obtained of the  abdomen and pelvis axial the intravenous administration of 94 mL  Isovue-300. Coronal and sagittal multiplanar reformatted images  are also reconstructed and reviewed. The dose length product is  822    FINDINGS: Minimal bibasilar atelectasis with trace bilateral  pleural effusions. Heart size is normal. Borderline enlarged  cardiophrenic lymph nodes.    Subcentimeter low-attenuation lesion in  the left lateral segment  of the liver is nonspecific although statistically represents a  cyst or hemangioma if patient has no underlying liver disease or  history of malignancy. Liver is otherwise unremarkable. There may  be gallbladder wall thickening with questionable perivesicular  haziness or fat stranding which is new compared to the prior  exam. The spleen, pancreas, adrenal glands and kidneys are  unremarkable.    No lymphadenopathy in the abdomen, retroperitoneum or pelvis by  CT size criteria.    Bladder is unremarkable. Uterus and ovaries are grossly normal in  size. Postoperative changes from prior partial colectomy. Left  lower quadrant colostomy. There is probably a small amount of  intraperitoneal fat herniating through the colostomy defect.  Bowel is otherwise unremarkable. No bowel obstruction. No  abnormal bowel dilatation or wall thickening. No free fluid.    No acute osseous abnormality.      Impression:       1. There may be mild gallbladder wall thickening with  questionable pericholecystic stranding or inflammation. Acute  cholecystitis may have a similar appearance in the appropriate  clinical setting.2. Minimal bibasilar atelectasis with trace  bilateral pleural effusions..3. Left lower quadrant colostomy.  There is probably a small amount of fat herniating through the  colostomy defect. Bowel is otherwise unremarkable. No bowel  obstruction, dilatation or wall thickening.4. There are several  borderline enlarged cardiophrenic lymph nodes, nonspecific.  However, these are unchanged compared to a CT chest may 18 2014    Electronically signed by:  Yoni Quinn MD  2/8/2017 4:58  PM CST Workstation: [email or web address]          I have reviewed the patient current medications.     Assessment/Plan     Principal Problem:    Acute GI hemorrhage  Active Problems:    Essential hypertension    Colostomy present      Plan:  Colonoscopy in AM  GI/General surgery following  UA and culture, blood  culture to eval fever  PRN tylenol  Follow h/h, wbc    Discharge Planning: I expect patient to be discharged to home in 2-3 days.    Harvey Mcintyre, APRN   02/09/17   4:21 PM

## 2017-02-09 NOTE — CONSULTS
Jun Morelos DO,[facility]  Gastroenterology  Hepatology  Endoscopy  Board Certified in Internal Medicine and gastroenterology  [address], [address]  [place]. [address]  - [phone]   F - [phone]     GASTROENTEROLOGY CONSULT NOTE   JUN MORELOS DO.         SUBJECTIVE:   2/8/2017    Name: Eva Troncoso  DOD: 1960    REASON FOR CONSULT: Bleeding through the colostomy    Chief Complaint:     Chief Complaint   Patient presents with   • Bleeding from colostomy       Subjective     Patient is 57 y.o. female presents with previous history of a diverting colostomy requiring revision on one occasion because of colostomy related stenosis.  The patient had this performed in [place].  Her last colonoscopy was 1 year ago and I believe that that was done in [place].    She has had problems that have been occurring because of colostomy bleeding.  This colostomy bleeding is usually scant amounts.  It is in the bag.  Its never in the stool.  The patient presented to the emergency department with severe bleeding.  This was described as being at least 1 unit of blood.  It was present throughout her lower legs causing overflow to her colostomy bag.  It was with blood clots and bright blood.  There was no stool in this output.  She denied any significant abdominal pain.  The patient has had a CT scan that shows no evidence to suggest that the patient has infarction or herniation of this colostomy.    The patient has had the original colostomy that was performed because of significant diarrhea that required the diverting colostomy for hygiene after the patient's pelvic fracture.-----based upon the patient's history    Lab tests have been closely followed.  There has been no evidence of any further decline.  The patient has then no further bleeding that has been occurring through the colostomy.    The patient was seen by Dr. Cary.  Dr. Cary has requested a colonoscopy to be performed.   Whenever he did the patient's digital examination of the colostomy there was a tightness to this but there was no evidence of any stenosis that was of any significance and the  colostomy was viable..     Nasogastric tube has been placed.  There has been no evidence of any active bleeding.    The patient does have a history of hepatitis C.  There is no evidence of any cirrhosis of the liver.     ROS/HISTORY/ CURRENT MEDICATIONS/OBJECTIVE/VS/PE:   Review of Systems:   Review of Systems   Constitutional: Positive for activity change and fatigue. Negative for fever and unexpected weight change.   Eyes: Negative.    Respiratory: Positive for choking, chest tightness and shortness of breath. Negative for wheezing and stridor.    Cardiovascular: Positive for chest pain, palpitations and leg swelling.   Gastrointestinal: Positive for abdominal pain, anal bleeding, blood in stool and constipation.   Endocrine: Positive for cold intolerance and heat intolerance.   Genitourinary: Negative.    Musculoskeletal: Positive for arthralgias, back pain, gait problem, joint swelling, myalgias, neck pain and neck stiffness.   Skin: Negative.    Allergic/Immunologic: Negative.    Neurological: Positive for dizziness, facial asymmetry, weakness, light-headedness, numbness and headaches.   Hematological: Negative.    Psychiatric/Behavioral: Positive for agitation and sleep disturbance. The patient is nervous/anxious.        History:     Past Medical History   Diagnosis Date   • Anxiety state    • Chronic depression    • Chronic pain    • Compression fracture of lumbar spine, non-traumatic    • Depressive disorder    • Essential hypertension    • Obesity    • Pericardial effusion      Past Surgical History   Procedure Laterality Date   • Cardiac catheterization  05/13/2014     27770 (1)   -  Successful PCI to the proximal LAD with a V stent reducing 99% stenosis to 0%   • Appendectomy  12/10/2012     Laparoscopic (1) - Acute suppurative  appendicitis   • Shoulder surgery  05/14/2015     (1) - Communited 3 part fractured humerus, proximal left shoulder. Open reduction and internal fixation   • Tracheostomy  05/30/2014     (1) - Percutaneous endoscopic gastrostomy tube placement.;       No family history on file.  Social History   Substance Use Topics   • Smoking status: Current Every Day Smoker   • Smokeless tobacco: Never Used      Comment: Smokes 1 ppd for 40 years   • Alcohol use No     Prescriptions Prior to Admission   Medication Sig Dispense Refill Last Dose   • busPIRone (BUSPAR) 15 MG tablet Take  by mouth. Take 1 tablet 2 times per day.      • labetalol (NORMODYNE) 100 MG tablet Take 100 mg by mouth 2 (Two) Times a Day.      • mirtazapine (REMERON) 15 MG tablet Take 15 mg by mouth Daily.      • QUEtiapine (SEROquel) 200 MG tablet Take 200 mg by mouth Every Night.   2/5/2017 at Unknown time   • traZODone (DESYREL) 150 MG tablet Take 150 mg by mouth Every Night.   2/5/2017 at Unknown time   • Aspirin-Salicylamide-Caffeine (BC HEADACHE POWDER PO) Take  by mouth As Needed (for pain).      • furosemide (LASIX) 20 MG tablet Take 20 mg by mouth Daily As Needed (for fluids).   More than a month at Unknown time   • NICOTINE TD Place  on the skin. 1 patch 24 hour at bedtime.   More than a month at Unknown time   • spironolactone (ALDACTONE) 25 MG tablet Take 25 mg by mouth Daily.        Allergies:  Other; Stadol [butorphanol]; and Toradol [ketorolac tromethamine]    I have reviewed the patients medical history, surgical history and family history in the available medical record system.     Current Medications:     Current Facility-Administered Medications   Medication Dose Route Frequency Provider Last Rate Last Dose   • busPIRone (BUSPAR) tablet 15 mg  15 mg Oral Q12H Alexis Ring MD   15 mg at 02/08/17 0820   • hydrALAZINE (APRESOLINE) injection 10 mg  10 mg Intravenous Once Fco Song MD   10 mg at 02/06/17 2766   • hydrALAZINE  (APRESOLINE) injection 10 mg  10 mg Intravenous Q6H PRN Fco Song MD   10 mg at 02/08/17 0500   • labetalol (NORMODYNE) tablet 100 mg  100 mg Oral BID Alexis Ring MD   100 mg at 02/08/17 1717   • metroNIDAZOLE (FLAGYL) IVPB 500 mg  500 mg Intravenous Q8H CEDRICK Tripp   Stopped at 02/08/17 1505   • mirtazapine (REMERON) tablet 15 mg  15 mg Oral Daily Alexis Ring MD   15 mg at 02/08/17 0821   • Morphine sulfate (PF) injection 4 mg  4 mg Intravenous Q3H PRN Alexis Ring MD   4 mg at 02/08/17 1716   • naloxone (NARCAN) injection 0.4 mg  0.4 mg Intravenous Q5 Min PRN Alexis Ring MD       • nystatin (MYCOSTATIN) powder 1 application  1 application Topical Q12H CEDRICK Tripp       • pantoprazole (PROTONIX) injection 40 mg  40 mg Intravenous Q AM Alexis Ring MD   40 mg at 02/08/17 0557   • piperacillin-tazobactam (ZOSYN) 3.375 g in dextrose 50 mL IVPB (premix)  3.375 g Intravenous Q6H CEDRICK Tripp   Stopped at 02/08/17 1627   • QUEtiapine (SEROquel) tablet 200 mg  200 mg Oral Nightly Alexis Ring MD   200 mg at 02/07/17 2108   • sodium chloride 0.9 % flush 1-10 mL  1-10 mL Intravenous PRN Alexis Ring MD   1 mL at 02/07/17 0755   • sodium chloride 0.9 % flush 10 mL  10 mL Intravenous PRN Scott Jin MD   10 mL at 02/07/17 0752   • sodium chloride 0.9 % infusion  125 mL/hr Intravenous Continuous Scott Jin  mL/hr at 02/08/17 1627 125 mL/hr at 02/08/17 1627   • spironolactone (ALDACTONE) tablet 25 mg  25 mg Oral Daily Alexis Ring MD   25 mg at 02/08/17 0821   • traZODone (DESYREL) tablet 150 mg  150 mg Oral Nightly Alexis Ring MD   150 mg at 02/07/17 2027       Objective     Physical Exam:   Temp:  [98.3 °F (36.8 °C)-101 °F (38.3 °C)] 99.9 °F (37.7 °C)  Heart Rate:  [77-98] 77  Resp:  [16-20] 20  BP: (140-182)/(67-88) 147/67    Physical Exam:  General Appearance:    Alert, cooperative, in no acute distress   Head:    Normocephalic, without obvious abnormality,  atraumatic   Eyes:            Lids and lashes normal, conjunctivae and sclerae normal, no   icterus, no pallor, corneas clear, PERRLA   Ears:    Ears appear intact with no abnormalities noted   Throat:   No oral lesions, no thrush, oral mucosa moist   Neck:   No adenopathy, supple, trachea midline, no thyromegaly, no     carotid bruit, no JVD   Back:     No kyphosis present, no scoliosis present, no skin lesions,       erythema or scars, no tenderness to percussion or                   palpation,   range of motion normal   Lungs:     Clear to auscultation,respirations regular, even and                   unlabored    Heart:    Regular rhythm and normal rate, normal S1 and S2, no            murmur, no gallop, no rub, no click   Breast Exam:    Deferred   Abdomen:     Normal bowel sounds, no masses, no organomegaly, soft        non-tender, non-distended, no guarding, no rebound                 Tenderness----well healed surgical wound---colostomy viable with small amount of blood at the os--stenotic colostomy   Genitalia:    Deferred   Extremities:   Moves all extremities well, no edema, no cyanosis, no              redness   Pulses:   Pulses palpable and equal bilaterally   Skin:   No bleeding, bruising or rash   Lymph nodes:   No palpable adenopathy   Neurologic:   Cranial nerves 2 - 12 grossly intact, sensation intact, DTR        present and equal bilaterally      Results Review:     Lab Results   Component Value Date    WBC 5.93 02/08/2017    WBC 3.18 (L) 02/07/2017    WBC 5.82 02/06/2017    HGB 10.5 (L) 02/08/2017    HGB 11.9 (L) 02/07/2017    HGB 11.9 (L) 02/07/2017    HCT 31.0 (L) 02/08/2017    HCT 34.6 (L) 02/07/2017    HCT 35.2 02/07/2017     (L) 02/08/2017     (L) 02/07/2017     (L) 02/06/2017       Results from last 7 days  Lab Units 02/06/17  1638   ALK PHOS U/L 124   ALT (SGPT) U/L 107*   AST (SGOT) U/L 86*       Results from last 7 days  Lab Units 02/06/17  1638   BILIRUBIN mg/dL 0.4    ALK PHOS U/L 124     LIPASE   Date Value Ref Range Status   02/08/2017 73 23 - 300 U/L Final   02/06/2017 416 (H) 23 - 300 U/L Final     Lab Results   Component Value Date    INR 1.05 02/06/2017    INR 1.1 12/04/2016    INR 1.1 08/08/2016          Radiology Review:  Imaging Results (last 72 hours)     Procedure Component Value Units Date/Time    XR Chest 1 View [[id number]] Collected:  02/06/17 1611     Updated:  02/06/17 1613    Narrative:       PROCEDURE: One view chest    COMPARISON: Acute abdominal series August 25, 2016.    HISTORY: Gastrointestinal bleeding    FINDINGS: Single portable view of the chest is obtained. The  heart and mediastinal contours are within normal limits. The  lungs are clear and well-expanded bilaterally.      Impression:       1. No acute cardiopulmonary disease.    Electronically signed by:  Yoni Quinn MD  2/6/2017 4:12  PM CST Workstation: [email or web address]    CT Abdomen Pelvis Without Contrast [[id number]] Collected:  02/06/17 1813     Updated:  02/06/17 1819    Narrative:       PROCEDURE: CT abdomen and pelvis without contrast    COMPARISON: CT abdomen and pelvis without contrast August 26, 2016    HISTORY: Generalized abdominal pain    TECHNIQUE: Multiple contiguous noncontrast axial images are  obtained of the abdomen and pelvis. Coronal and sagittal  multiplanar reformatted images are also reconstructed and  reviewed. The dose length product is 809    FINDINGS: Small nodular opacity in the inferior aspect of the  lingula is probably stable. The lung bases are otherwise clear.  Heart size normal. There are mildly enlarged bilateral  cardiophrenic lymph nodes, stable.    Gallbladder is distended, measuring 4.6 cm. No gallbladder wall  thickening or pericholecystic inflammation to suggest acute  cholecystitis. Tiny low-attenuation lesion in the liver  statistically represents a cyst or hemangioma if the patient has  no underlying liver disease or history of malignancy. This  is  grossly stable. Liver is otherwise unremarkable. Spleen,  pancreas, adrenal glands and kidneys are grossly unremarkable for  noncontrast exam.    Stomach is collapsed. No lymphadenopathy by CT size criteria.    Bladder is unremarkable. Uterus is grossly normal in size. Prior  partial colonic resection with a blind-ending rectal/sigmoid  callus in the left lower quadrant colostomy. Bowel is otherwise  unremarkable. Mild skin thickening at the colostomy site. No  fluid collection associated with a colostomy. No free fluid.     Stable chronic compression deformity of L1. No acute osseous  abnormality.      Impression:       1. No acute findings in the abdomen or pelvis. 2.  Gallbladder is mildly distended. However, this is most likely  related to prolonged fasting. Acute cholecystitis may also  present with abnormal bowel distention although there is no wall  thickening or pericholecystic inflammation to suggest acute  cholecystitis. Recommend correlation    Electronically signed by:  Yoni Quinn MD  2/6/2017 6:18  PM CST Workstation: "[id number]    CT Abdomen Pelvis With Contrast [[id number]] Collected:  02/08/17 1649     Updated:  02/08/17 1659    Narrative:         I reviewed the patient's new clinical results.  I reviewed the patient's new imaging results and agree with the interpretation.     ASSESSMENT/PLAN:   ASSESSMENT:   1.  Bleeding through colostomy.  I suspect that this is related to a vascular lesion that is present.  This could be also related to the stenosis that is occurring within the colostomy opening.  This also could be related to a surgical suture that may have eroded.  2.  Anemia secondary to acute blood loss  3.  Hepatitis C, not treated at the present time    PLAN:   1.  We will proceed with a colonoscopy.  The patient will need a full prep for this.  It is going to be very important that we get a very good examination of this.  2.  We will try to get the patient's operative notes  from [facility] colon and rectal surgeons.  3.  I discussed this with Dr. Cary today  4.  No plans of present time for any treatment of the hepatitis C area and we need to get this acute problem  treated  5.  Follow the CBCs    Risk and benefits associated with the procedure are reviewed with the patient.  She wishes to proceed.       Nader Osei DO  02/08/17  7:23 PM

## 2017-02-09 NOTE — PLAN OF CARE
Problem: Patient Care Overview (Adult)  Goal: Plan of Care Review  Outcome: Ongoing (interventions implemented as appropriate)    02/09/17 0458   Coping/Psychosocial Response Interventions   Plan Of Care Reviewed With patient   Patient Care Overview   Progress no change   Outcome Evaluation   Outcome Summary/Follow up Plan Scant amount blood in colostomy bag. Pt requires prn pain med throughout night, slept well at times.       Goal: Adult Individualization and Mutuality  Outcome: Ongoing (interventions implemented as appropriate)    Problem: Gastrointestinal Bleeding (Adult)  Goal: Signs and Symptoms of Listed Potential Problems Will be Absent or Manageable (Gastrointestinal Bleeding)  Outcome: Ongoing (interventions implemented as appropriate)    Problem: Colostomy (Adult)  Goal: Signs and Symptoms of Listed Potential Problems Will be Absent or Manageable (Colostomy)  Outcome: Ongoing (interventions implemented as appropriate)    Problem: Pressure Ulcer Risk (Chaim Scale) (Adult,Obstetrics,Pediatric)  Goal: Skin Integrity  Outcome: Ongoing (interventions implemented as appropriate)

## 2017-02-10 PROBLEM — J96.01 ACUTE RESPIRATORY FAILURE WITH HYPOXIA (HCC): Status: ACTIVE | Noted: 2017-01-01

## 2017-02-10 PROBLEM — J98.11 ATELECTASIS: Status: ACTIVE | Noted: 2017-01-01

## 2017-02-10 PROBLEM — J18.9 PNEUMONIA: Status: ACTIVE | Noted: 2017-01-01

## 2017-02-10 NOTE — PLAN OF CARE
Problem: Patient Care Overview (Adult)  Goal: Plan of Care Review  Outcome: Ongoing (interventions implemented as appropriate)  Pt had fluro procedure done today r/t stoma.  Has some pain that is controlled with meds. Orders to d/c IVFs    02/10/17 1650   Coping/Psychosocial Response Interventions   Plan Of Care Reviewed With patient   Patient Care Overview   Progress no change       Goal: Adult Individualization and Mutuality  Outcome: Ongoing (interventions implemented as appropriate)  Goal: Discharge Needs Assessment  Outcome: Ongoing (interventions implemented as appropriate)    Problem: Gastrointestinal Bleeding (Adult)  Goal: Signs and Symptoms of Listed Potential Problems Will be Absent or Manageable (Gastrointestinal Bleeding)  Outcome: Ongoing (interventions implemented as appropriate)    Problem: Colostomy (Adult)  Goal: Signs and Symptoms of Listed Potential Problems Will be Absent or Manageable (Colostomy)  Outcome: Ongoing (interventions implemented as appropriate)    Problem: Pressure Ulcer Risk (Chaim Scale) (Adult,Obstetrics,Pediatric)  Goal: Skin Integrity  Outcome: Ongoing (interventions implemented as appropriate)

## 2017-02-10 NOTE — PROGRESS NOTES
[facility] Medicine Services  INPATIENT PROGRESS NOTE    Length of Stay: 4  Date of Admission: 2/6/2017  Primary Care Physician: No primary care provider on file.    Subjective   Chief Complaint: Bleeding and poor ostomy output  HPI: Patient is a 57 y.o. female complaining of bleeding in her colostomy bag. She has had this problem on and off but never so severe.  She was evaluated by Dr. Osei and Dr. Cary.  She was recommended colonoscopy, but overnight developed fevers and respiratory failure with hypoxia.  CXR shows trace pleural effusions and minimal basilar atelectasis.  Colonoscopy on hold for now.        Review of Systems   Constitutional: Positive for fever (intermittent). Negative for chills.   Respiratory: Positive for cough and shortness of breath. Negative for wheezing.    Cardiovascular: Negative for chest pain.   Gastrointestinal: Positive for blood in stool (no ostomy output). Negative for abdominal distention, abdominal pain, diarrhea, nausea and vomiting.      All pertinent negatives and positives are as above. All other systems have been reviewed and are negative unless otherwise stated.     Objective    Temp:  [97.4 °F (36.3 °C)-101.2 °F (38.4 °C)] 97.4 °F (36.3 °C)  Heart Rate:  [78-87] 78  Resp:  [18-24] 20  BP: (136-158)/(74-88) 158/88  Physical Exam   Constitutional: She is oriented to person, place, and time. She appears well-developed and well-nourished.   HENT:   Head: Normocephalic and atraumatic.   Eyes: Conjunctivae and EOM are normal.   Neck: Normal range of motion. Neck supple.   Cardiovascular: Normal rate, regular rhythm, normal heart sounds and intact distal pulses.  Exam reveals no gallop and no friction rub.    No murmur heard.  Pulmonary/Chest: Effort normal. No respiratory distress. She has no wheezes. She has rhonchi. She has rales.   Abdominal: Soft. Bowel sounds are normal. She exhibits no distension. There is no tenderness.    Colostomy with no output   Musculoskeletal: Normal range of motion. She exhibits no edema.   Neurological: She is alert and oriented to person, place, and time.   Skin: Skin is warm and dry. No erythema.   Psychiatric: She has a normal mood and affect. Her behavior is normal. Judgment and thought content normal.     Results Review:  I have reviewed the labs, radiology results, and diagnostic studies.    Laboratory Data:   Recent Results (from the past 24 hour(s))   Comprehensive Metabolic Panel    Collection Time: 02/09/17  4:49 PM   Result Value Ref Range    Glucose 82 60 - 100 mg/dL    BUN 10 7 - 21 mg/dL    Creatinine 0.90 0.50 - 1.00 mg/dL    Sodium 140 137 - 145 mmol/L    Potassium 4.2 3.5 - 5.1 mmol/L    Chloride 107 95 - 110 mmol/L    CO2 26.0 22.0 - 31.0 mmol/L    Calcium 8.3 (L) 8.4 - 10.2 mg/dL    Total Protein 6.0 (L) 6.3 - 8.6 g/dL    Albumin 3.00 (L) 3.40 - 4.80 g/dL    ALT (SGPT) 77 (H) 9 - 52 U/L    AST (SGOT) 51 (H) 14 - 36 U/L    Alkaline Phosphatase 93 38 - 126 U/L    Total Bilirubin 0.5 0.2 - 1.3 mg/dL    eGFR Non  Amer 64 51 - 120 mL/min/1.73    Globulin 3.0 2.3 - 3.5 gm/dL    A/G Ratio 1.0 (L) 1.1 - 1.8 g/dL    BUN/Creatinine Ratio 11.1 7.0 - 25.0    Anion Gap 7.0 5.0 - 15.0 mmol/L   CBC Auto Differential    Collection Time: 02/09/17  4:49 PM   Result Value Ref Range    WBC 6.36 3.20 - 9.80 10*3/mm3    RBC 3.51 (L) 3.77 - 5.16 10*6/mm3    Hemoglobin 11.0 (L) 12.0 - 15.5 g/dL    Hematocrit 33.0 (L) 35.0 - 45.0 %    MCV 94.0 80.0 - 98.0 fL    MCH 31.3 26.5 - 34.0 pg    MCHC 33.3 31.4 - 36.0 g/dL    RDW 13.4 11.5 - 14.5 %    RDW-SD 46.2 36.4 - 46.3 fl    MPV 10.7 8.0 - 12.0 fL    Platelets 136 (L) 150 - 450 10*3/mm3    Neutrophil % 75.7 37.0 - 80.0 %    Lymphocyte % 13.4 10.0 - 50.0 %    Monocyte % 7.2 0.0 - 12.0 %    Eosinophil % 3.3 0.0 - 7.0 %    Basophil % 0.2 0.0 - 2.0 %    Immature Grans % 0.2 0.0 - 0.5 %    Neutrophils, Absolute 4.82 2.00 - 8.60 10*3/mm3    Lymphocytes, Absolute  0.85 0.60 - 4.20 10*3/mm3    Monocytes, Absolute 0.46 0.00 - 0.90 10*3/mm3    Eosinophils, Absolute 0.21 0.00 - 0.70 10*3/mm3    Basophils, Absolute 0.01 0.00 - 0.20 10*3/mm3    Immature Grans, Absolute 0.01 0.00 - 0.02 10*3/mm3    nRBC 0.0 0.0 - 0.0 /100 WBC   Prepare RBC, 2 Units    Collection Time: 02/09/17  5:45 PM   Result Value Ref Range    Product Code W6372S59     Unit Number [id number]     UNIT  ABO O     UNIT  RH POS     Dispense Status XM     Blood Type OPOS     Blood Expiration Date 201703142359     Blood Type Barcode 5100     Product Code V7072V11     Unit Number [id number]     UNIT  ABO O     UNIT  RH POS     Dispense Status RE     Blood Type OPOS     Blood Expiration Date 201703142359     Blood Type Barcode 5100    Urinalysis With / Culture If Indicated    Collection Time: 02/09/17  6:04 PM   Result Value Ref Range    Color, UA Yellow Yellow, Straw, Dark Yellow, Anita    Appearance, UA Clear Clear    pH, UA 6.0 5.0 - 9.0    Specific Gravity, UA 1.016 1.003 - 1.030    Glucose, UA Negative Negative    Ketones, UA Trace (A) Negative    Bilirubin, UA Negative Negative    Blood, UA Large (3+) (A) Negative    Protein, UA 30 mg/dL (1+) (A) Negative    Leuk Esterase, UA Trace (A) Negative    Nitrite, UA Negative Negative    Urobilinogen, UA 1.0 E.U./dL 0.2 - 1.0 E.U./dL   Urinalysis, Microscopic Only    Collection Time: 02/09/17  6:04 PM   Result Value Ref Range    RBC, UA 31-50 (A) None Seen /HPF    WBC, UA None Seen None Seen, 0-2, 3-5 /HPF    Bacteria, UA None Seen None Seen /HPF    Squamous Epithelial Cells, UA None Seen None Seen, 0-2 /HPF    Hyaline Casts, UA 0-2 None Seen /LPF    Methodology Automated Microscopy    Comprehensive Metabolic Panel    Collection Time: 02/10/17  7:06 AM   Result Value Ref Range    Glucose 87 60 - 100 mg/dL    BUN 11 7 - 21 mg/dL    Creatinine 0.92 0.50 - 1.00 mg/dL    Sodium 137 137 - 145 mmol/L    Potassium 3.8 3.5 - 5.1 mmol/L    Chloride 105 95 - 110 mmol/L     CO2 25.0 22.0 - 31.0 mmol/L    Calcium 8.3 (L) 8.4 - 10.2 mg/dL    Total Protein 6.4 6.3 - 8.6 g/dL    Albumin 3.10 (L) 3.40 - 4.80 g/dL    ALT (SGPT) 70 (H) 9 - 52 U/L    AST (SGOT) 48 (H) 14 - 36 U/L    Alkaline Phosphatase 87 38 - 126 U/L    Total Bilirubin 0.7 0.2 - 1.3 mg/dL    eGFR Non African Amer 63 >60 mL/min/1.73    Globulin 3.3 2.3 - 3.5 gm/dL    A/G Ratio 0.9 (L) 1.1 - 1.8 g/dL    BUN/Creatinine Ratio 12.0 7.0 - 25.0    Anion Gap 7.0 5.0 - 15.0 mmol/L   CBC Auto Differential    Collection Time: 02/10/17  7:06 AM   Result Value Ref Range    WBC 6.77 3.20 - 9.80 10*3/mm3    RBC 3.50 (L) 3.77 - 5.16 10*6/mm3    Hemoglobin 10.9 (L) 12.0 - 15.5 g/dL    Hematocrit 32.5 (L) 35.0 - 45.0 %    MCV 92.9 80.0 - 98.0 fL    MCH 31.1 26.5 - 34.0 pg    MCHC 33.5 31.4 - 36.0 g/dL    RDW 13.3 11.5 - 14.5 %    RDW-SD 44.6 36.4 - 46.3 fl    MPV 9.6 8.0 - 12.0 fL    Platelets 120 (L) 150 - 450 10*3/mm3    Neutrophil % 70.0 37.0 - 80.0 %    Lymphocyte % 16.2 10.0 - 50.0 %    Monocyte % 10.2 0.0 - 12.0 %    Eosinophil % 3.2 0.0 - 7.0 %    Basophil % 0.3 0.0 - 2.0 %    Immature Grans % 0.1 0.0 - 0.5 %    Neutrophils, Absolute 4.73 2.00 - 8.60 10*3/mm3    Lymphocytes, Absolute 1.10 0.60 - 4.20 10*3/mm3    Monocytes, Absolute 0.69 0.00 - 0.90 10*3/mm3    Eosinophils, Absolute 0.22 0.00 - 0.70 10*3/mm3    Basophils, Absolute 0.02 0.00 - 0.20 10*3/mm3    Immature Grans, Absolute 0.01 0.00 - 0.02 10*3/mm3    nRBC 0.0 0.0 - 0.0 /100 WBC   Blood Gas, Arterial    Collection Time: 02/10/17  2:01 PM   Result Value Ref Range    Site      Gera's Test      pH, Arterial 7.334 (L) 7.350 - 7.450 pH units    pCO2, Arterial 50.3 (H) 35.0 - 45.0 mm Hg    pO2, Arterial 90.1 80.0 - 105.0 mm Hg    HCO3, Arterial 26.2 (H) 22.0 - 26.0 mmol/L    Base Excess, Arterial -0.2 -2.4 - 2.4 mmol/L    O2 Saturation, Arterial 96.5 %    Hemoglobin, Blood Gas 11.1 (L) 12 - 16 g/dL    Hematocrit, Blood Gas 33.0 (L) 38.0 - 47.0 %    CO2 Content 27.7 (H) 23 - 27     Sodium, Arterial 136.9 (L) 138 - 146 mmol/L    Potassium, Arterial 3.94 3.6 - 4.9 mmol/L    Glucose, Arterial 92 mmol/L    Barometric Pressure for Blood Gas  mmHg    Modality      Ionized Calcium 4.8 4.5 - 4.9 mg/dL       Culture Data:   BLOOD CULTURE   Date Value Ref Range Status   02/08/2017 No growth at 2 days  Preliminary   02/08/2017 No growth at 2 days  Preliminary     No results found for: URINECX  No results found for: RESPCX  No results found for: WOUNDCX  No results found for: STOOLCX  No components found for: BODYFLD    Radiology Data:   Imaging Results (last 24 hours)     Procedure Component Value Units Date/Time    CT Abdomen Pelvis With Contrast [[id number]] Collected:  02/08/17 1649     Updated:  02/08/17 1659    Narrative:       PROCEDURE: CT abdomen and pelvis with contrast    COMPARISON: CT abdomen pelvis without contrast February 6, 2017    HISTORY: Gastrointestinal hemorrhage, unspecified. Pale ostomy  stoma and no output    TECHNIQUE: Multiple contiguous axial images are obtained of the  abdomen and pelvis axial the intravenous administration of 94 mL  Isovue-300. Coronal and sagittal multiplanar reformatted images  are also reconstructed and reviewed. The dose length product is  822    FINDINGS: Minimal bibasilar atelectasis with trace bilateral  pleural effusions. Heart size is normal. Borderline enlarged  cardiophrenic lymph nodes.    Subcentimeter low-attenuation lesion in the left lateral segment  of the liver is nonspecific although statistically represents a  cyst or hemangioma if patient has no underlying liver disease or  history of malignancy. Liver is otherwise unremarkable. There may  be gallbladder wall thickening with questionable perivesicular  haziness or fat stranding which is new compared to the prior  exam. The spleen, pancreas, adrenal glands and kidneys are  unremarkable.    No lymphadenopathy in the abdomen, retroperitoneum or pelvis by  CT size criteria.    Bladder is  unremarkable. Uterus and ovaries are grossly normal in  size. Postoperative changes from prior partial colectomy. Left  lower quadrant colostomy. There is probably a small amount of  intraperitoneal fat herniating through the colostomy defect.  Bowel is otherwise unremarkable. No bowel obstruction. No  abnormal bowel dilatation or wall thickening. No free fluid.    No acute osseous abnormality.      Impression:       1. There may be mild gallbladder wall thickening with  questionable pericholecystic stranding or inflammation. Acute  cholecystitis may have a similar appearance in the appropriate  clinical setting.2. Minimal bibasilar atelectasis with trace  bilateral pleural effusions..3. Left lower quadrant colostomy.  There is probably a small amount of fat herniating through the  colostomy defect. Bowel is otherwise unremarkable. No bowel  obstruction, dilatation or wall thickening.4. There are several  borderline enlarged cardiophrenic lymph nodes, nonspecific.  However, these are unchanged compared to a CT chest may 18 2014    Electronically signed by:  Yoni Quinn MD  2/8/2017 4:58  PM CST Workstation: [email or web address]          I have reviewed the patient current medications.     Assessment/Plan     Principal Problem:    Acute respiratory failure with hypoxia  Active Problems:    Essential hypertension    Colostomy present    Acute GI hemorrhage    Pneumonia    Atelectasis      Plan:  Hypaque enema  GI/General surgery following  PRN tylenol for fever  Follow cultures  Off IV fluid, received lasix IV x 1 last night  Rocephin/azithromycin, continue flagyl  Follow h/h, wbc    Discharge Planning: I expect patient to be discharged to home in 2-3 days.    Harvey Mcintyre, APRN   02/10/17   2:29 PM

## 2017-02-10 NOTE — PLAN OF CARE
Problem: Patient Care Overview (Adult)  Goal: Plan of Care Review  Outcome: Ongoing (interventions implemented as appropriate)    02/10/17 0506   Coping/Psychosocial Response Interventions   Plan Of Care Reviewed With patient   Patient Care Overview   Progress declining   Outcome Evaluation   Outcome Summary/Follow up Plan Pt requires O2 via NC throughout shift and c/o SOA. No colostomy output. Edema present BUE and BLE. pt requests pain medicine during shift. AM GI procedure cancelled due to O2 desaturation at beginning of shift.         Problem: Gastrointestinal Bleeding (Adult)  Goal: Signs and Symptoms of Listed Potential Problems Will be Absent or Manageable (Gastrointestinal Bleeding)  Outcome: Ongoing (interventions implemented as appropriate)    Problem: Colostomy (Adult)  Goal: Signs and Symptoms of Listed Potential Problems Will be Absent or Manageable (Colostomy)  Outcome: Ongoing (interventions implemented as appropriate)    Problem: Pressure Ulcer Risk (Chaim Scale) (Adult,Obstetrics,Pediatric)  Goal: Skin Integrity  Outcome: Ongoing (interventions implemented as appropriate)

## 2017-02-10 NOTE — PROGRESS NOTES
Nader Osei DO,[facility]  Gastroenterology  Hepatology  Endoscopy  Board Certified in Internal Medicine and gastroenterology  [address], [address]  [place]. [address]  T- [phone]   F - [phone]     GASTROENTEROLOGY PROGRESS NOTE   NADER OSEI DO.         SUBJECTIVE:   2/9/2017  Chief Complaint:     Subjective  : Concern about stoma    Patient is 57 y.o. female presents with concern about stoma.  I was called by the nursing staff regarding her stoma.  There has been no output today.  She is drank one third of her GoLYTELY prep.  There has been no evidence of any active bleeding.  There has been no pain in the abdomen.  There's been no nausea or vomiting.  The patient has been spiking fevers for the past 2 days up to 101.2° there has been no evidence of any abdominal change.  She has had no ability to get out of bed.  She's had some coughing.  She's had some problems with low oxygen saturation to 71%.  Hospitalist have decreased IV fluids and given intravenous Lasix..      CURRENT MEDICATIONS/OBJECTIVE/VS/PE:     Current Medications:     Current Facility-Administered Medications   Medication Dose Route Frequency Provider Last Rate Last Dose   • acetaminophen (TYLENOL) tablet 650 mg  650 mg Oral Q6H PRN CEDRICK Loyola       • busPIRone (BUSPAR) tablet 15 mg  15 mg Oral Q12H Alexis Ring MD   15 mg at 02/09/17 0829   • hydrALAZINE (APRESOLINE) injection 10 mg  10 mg Intravenous Once Fco Song MD   10 mg at 02/06/17 2316   • hydrALAZINE (APRESOLINE) injection 10 mg  10 mg Intravenous Q6H PRN Fco Song MD   10 mg at 02/08/17 0500   • labetalol (NORMODYNE) tablet 100 mg  100 mg Oral BID Alexis Ring MD   100 mg at 02/09/17 1801   • metroNIDAZOLE (FLAGYL) IVPB 500 mg  500 mg Intravenous Q8H CEDRICK Tripp 100 mL/hr at 02/09/17 0604 500 mg at 02/09/17 1414   • mirtazapine (REMERON) tablet 15 mg  15 mg Oral Daily Alexis Ring MD   15 mg at 02/09/17  0830   • Morphine sulfate (PF) injection 4 mg  4 mg Intravenous Q3H PRN Alexis Ring MD   4 mg at 02/09/17 1801   • naloxone (NARCAN) injection 0.4 mg  0.4 mg Intravenous Q5 Min PRN Alexis Ring MD       • nystatin (MYCOSTATIN) powder 1 application  1 application Topical Q12H Vivien Islas APRN   1 application at 02/09/17 0829   • pantoprazole (PROTONIX) injection 40 mg  40 mg Intravenous Q AM Alexis Ring MD   40 mg at 02/09/17 0604   • piperacillin-tazobactam (ZOSYN) 3.375 g in dextrose 50 mL IVPB (premix)  3.375 g Intravenous Q6H Vivien Islas APRN 0 mL/hr at 02/09/17 0200 3.375 g at 02/09/17 1942   • polyethylene glycol with electrolytes (GOLYTELY) solution 2,000 mL  2,000 mL Oral Daily Harvey Mcintyre, APRN   2,000 mL at 02/09/17 1823   • QUEtiapine (SEROquel) tablet 200 mg  200 mg Oral Nightly Alexis Ring MD   200 mg at 02/08/17 2210   • sodium chloride 0.9 % flush 1-10 mL  1-10 mL Intravenous PRN Alexis Ring MD   1 mL at 02/07/17 0755   • sodium chloride 0.9 % flush 10 mL  10 mL Intravenous PRN Scott Jin MD   10 mL at 02/07/17 0752   • sodium chloride 0.9 % infusion  50 mL/hr Intravenous Continuous Gosia Merchant MD 50 mL/hr at 02/09/17 2115 50 mL/hr at 02/09/17 2115   • spironolactone (ALDACTONE) tablet 25 mg  25 mg Oral Daily Alexis Ring MD   25 mg at 02/09/17 0830   • traZODone (DESYREL) tablet 150 mg  150 mg Oral Nightly Alexis Ring MD   150 mg at 02/08/17 2211       Objective     Physical Exam:   Temp:  [98 °F (36.7 °C)-101.2 °F (38.4 °C)] 98.9 °F (37.2 °C)  Heart Rate:  [67-87] 87  Resp:  [16-24] 24  BP: (120-162)/(60-76) 149/74     Physical Exam:  General Appearance:    Alert, cooperative, in no acute distress   Head:    Normocephalic, without obvious abnormality, atraumatic   Eyes:            Lids and lashes normal, conjunctivae and sclerae normal, no   icterus, no pallor, corneas clear, PERRLA   Ears:    Ears appear intact with no abnormalities noted   Throat:   No  oral lesions, no thrush, oral mucosa moist   Neck:   No adenopathy, supple, trachea midline, no thyromegaly, no     carotid bruit, no JVD   Back:     No kyphosis present, no scoliosis present, no skin lesions,       erythema or scars, no tenderness to percussion or                   palpation,   range of motion normal   Lungs:     Clear to auscultation,respirations regular, even and                   unlabored    Heart:    Regular rhythm and normal rate, normal S1 and S2, no            murmur, no gallop, no rub, no click   Breast Exam:    Deferred   Abdomen:     Normal bowel sounds, no masses, no organomegaly, soft        non-tender, non-distended, no guarding, no rebound                 Tenderness------stoma retracted and viable   Genitalia:    Deferred   Extremities:   Moves all extremities well, no edema, no cyanosis, no              redness   Pulses:   Pulses palpable and equal bilaterally   Skin:   No bleeding, bruising or rash   Lymph nodes:   No palpable adenopathy   Neurologic:   Cranial nerves 2 - 12 grossly intact, sensation intact, DTR        present and equal bilaterally      Results Review:     Lab Results (last 24 hours)     Procedure Component Value Units Date/Time    Blood Culture [[id number]]  (Normal) Collected:  02/08/17 1200    Specimen:  Blood from Arm, Left Updated:  02/09/17 1301     Blood Culture No growth at 24 hours     Blood Culture [[id number]]  (Normal) Collected:  02/08/17 1200    Specimen:  Blood from Hand, Right Updated:  02/09/17 1301     Blood Culture No growth at 24 hours     CBC Auto Differential [[id number]]  (Abnormal) Collected:  02/09/17 1649    Specimen:  Blood Updated:  02/09/17 1731     WBC 6.36 10*3/mm3      RBC 3.51 (L) 10*6/mm3      Hemoglobin 11.0 (L) g/dL      Hematocrit 33.0 (L) %      MCV 94.0 fL      MCH 31.3 pg      MCHC 33.3 g/dL      RDW 13.4 %      RDW-SD 46.2 fl      MPV 10.7 fL      Platelets 136 (L) 10*3/mm3      Neutrophil % 75.7 %      Lymphocyte % 13.4 %       Monocyte % 7.2 %      Eosinophil % 3.3 %      Basophil % 0.2 %      Immature Grans % 0.2 %      Neutrophils, Absolute 4.82 10*3/mm3      Lymphocytes, Absolute 0.85 10*3/mm3      Monocytes, Absolute 0.46 10*3/mm3      Eosinophils, Absolute 0.21 10*3/mm3      Basophils, Absolute 0.01 10*3/mm3      Immature Grans, Absolute 0.01 10*3/mm3      nRBC 0.0 /100 WBC     CBC & Differential [[id number]] Collected:  02/09/17 1649    Specimen:  Blood Updated:  02/09/17 1731    Narrative:       The following orders were created for panel order CBC & Differential.  Procedure                               Abnormality         Status                     ---------                               -----------         ------                     Scan Slide[[id number]]                                                                   CBC Auto Differential[[id number]]         Abnormal            Final result                 Please view results for these tests on the individual orders.    Comprehensive Metabolic Panel [[id number]]  (Abnormal) Collected:  02/09/17 1649    Specimen:  Blood Updated:  02/09/17 1746     Glucose 82 mg/dL      BUN 10 mg/dL      Creatinine 0.90 mg/dL      Sodium 140 mmol/L      Potassium 4.2 mmol/L      Chloride 107 mmol/L      CO2 26.0 mmol/L      Calcium 8.3 (L) mg/dL      Total Protein 6.0 (L) g/dL      Albumin 3.00 (L) g/dL      ALT (SGPT) 77 (H) U/L      AST (SGOT) 51 (H) U/L      Alkaline Phosphatase 93 U/L      Total Bilirubin 0.5 mg/dL      eGFR Non African Amer 64 mL/min/1.73      Globulin 3.0 gm/dL      A/G Ratio 1.0 (L) g/dL      BUN/Creatinine Ratio 11.1      Anion Gap 7.0 mmol/L     Urinalysis With / Culture If Indicated [[id number]]  (Abnormal) Collected:  02/09/17 1804    Specimen:  Urine from Urine, Clean Catch Updated:  02/09/17 1809     Color, UA Yellow      Appearance, UA Clear      pH, UA 6.0      Specific Gravity, UA 1.016      Glucose, UA Negative      Ketones, UA Trace (A)      Bilirubin, UA Negative       Blood, UA Large (3+) (A)      Protein, UA 30 mg/dL (1+) (A)      Leuk Esterase, UA Trace (A)      Nitrite, UA Negative      Urobilinogen, UA 1.0 E.U./dL     Urinalysis, Microscopic Only [[id number]]  (Abnormal) Collected:  02/09/17 1804    Specimen:  Urine from Urine, Clean Catch Updated:  02/09/17 1809     RBC, UA 31-50 (A) /HPF      WBC, UA None Seen /HPF      Bacteria, UA None Seen /HPF      Squamous Epithelial Cells, UA None Seen /HPF      Hyaline Casts, UA 0-2 /LPF      Methodology Automated Microscopy            I reviewed the patient's new clinical results.  I reviewed the patient's new imaging results and agree with the interpretation.     ASSESSMENT/PLAN:   ASSESSMENT:   1.  Stomal retractions and bleeding secondary to stomal irritation cannot exclude the possibility of colonic issues.  2.  Stomal bleeding, resolved  3.  Probable atelectasis with hypoxia  4.  Fevers, probably pulmonary      PLAN:   1.  Chest x-ray  2.  We'll hold on colonoscopy for now  3.  Stop GoLYTELY prep  4.  We'll do Hypaque enema through the patient's colostomy  5.  If abnormalities are seen on the Hypaque enema then we will proceed on with colonoscopy in the operating room  6.  Reviewed with Dr. Raeann Osei,   02/09/17  9:33 PM

## 2017-02-10 NOTE — PROGRESS NOTES
"  Subjective:  As per Dr Carmona note.  Pt was hypoxic with O2 sats in 70's last evening.  Also had fevers up to 101.2 every day since she's been here.  Patient was able to drink 3 glasses of the bowel prep but she's had no gas or stool out of her ostomy.  She denies any new abdominal pain and states that her abdomen feels about like didn't she first came in.  She is not any further bleeding out of the ostomy.  She is able to get out of bed and move around to she's not been doing much of that recently.  She is somewhat short of breath this morning.  She was put on oxygen given Lasix last evening.    New information since my consultation.  Patient did have to have her ostomy revised one time by the surgeons in [place].  She was discharged to go back in the hospital have that done and sounds like to do a local procedure that she had what sounds like stricturing of her ostomy at that point.  He presented to the emergency room significant bleeding out of the ostomy and she's not had any obstructive symptoms to date other than no output now for 2 days.  No nausea no vomiting.       Visit Vitals   • /76 (BP Location: Left arm, Patient Position: Lying)   • Pulse 81   • Temp 99.4 °F (37.4 °C) (Oral)   • Resp 24   • Ht 65\" (165.1 cm)   • Wt 225 lb 5 oz (102 kg)   • LMP 03/11/1999 (Within Months)   • SpO2 93%   • BMI 37.49 kg/m2       Lab Results (last 24 hours)     Procedure Component Value Units Date/Time    Blood Culture [[id number]]  (Normal) Collected:  02/08/17 1200    Specimen:  Blood from Arm, Left Updated:  02/09/17 1301     Blood Culture No growth at 24 hours     Blood Culture [[id number]]  (Normal) Collected:  02/08/17 1200    Specimen:  Blood from Hand, Right Updated:  02/09/17 1301     Blood Culture No growth at 24 hours     CBC Auto Differential [[id number]]  (Abnormal) Collected:  02/09/17 1649    Specimen:  Blood Updated:  02/09/17 1731     WBC 6.36 10*3/mm3      RBC 3.51 (L) 10*6/mm3      Hemoglobin 11.0 " (L) g/dL      Hematocrit 33.0 (L) %      MCV 94.0 fL      MCH 31.3 pg      MCHC 33.3 g/dL      RDW 13.4 %      RDW-SD 46.2 fl      MPV 10.7 fL      Platelets 136 (L) 10*3/mm3      Neutrophil % 75.7 %      Lymphocyte % 13.4 %      Monocyte % 7.2 %      Eosinophil % 3.3 %      Basophil % 0.2 %      Immature Grans % 0.2 %      Neutrophils, Absolute 4.82 10*3/mm3      Lymphocytes, Absolute 0.85 10*3/mm3      Monocytes, Absolute 0.46 10*3/mm3      Eosinophils, Absolute 0.21 10*3/mm3      Basophils, Absolute 0.01 10*3/mm3      Immature Grans, Absolute 0.01 10*3/mm3      nRBC 0.0 /100 WBC     CBC & Differential [[id number]] Collected:  02/09/17 1649    Specimen:  Blood Updated:  02/09/17 9201    Narrative:       The following orders were created for panel order CBC & Differential.  Procedure                               Abnormality         Status                     ---------                               -----------         ------                     Scan Slide[[id number]]                                                                   CBC Auto Differential[[id number]]         Abnormal            Final result                 Please view results for these tests on the individual orders.    Comprehensive Metabolic Panel [[id number]]  (Abnormal) Collected:  02/09/17 1649    Specimen:  Blood Updated:  02/09/17 1746     Glucose 82 mg/dL      BUN 10 mg/dL      Creatinine 0.90 mg/dL      Sodium 140 mmol/L      Potassium 4.2 mmol/L      Chloride 107 mmol/L      CO2 26.0 mmol/L      Calcium 8.3 (L) mg/dL      Total Protein 6.0 (L) g/dL      Albumin 3.00 (L) g/dL      ALT (SGPT) 77 (H) U/L      AST (SGOT) 51 (H) U/L      Alkaline Phosphatase 93 U/L      Total Bilirubin 0.5 mg/dL      eGFR Non African Amer 64 mL/min/1.73      Globulin 3.0 gm/dL      A/G Ratio 1.0 (L) g/dL      BUN/Creatinine Ratio 11.1      Anion Gap 7.0 mmol/L     Urinalysis With / Culture If Indicated [[id number]]  (Abnormal) Collected:  02/09/17 9011    Specimen:  Urine  from Urine, Clean Catch Updated:  02/09/17 1809     Color, UA Yellow      Appearance, UA Clear      pH, UA 6.0      Specific Gravity, UA 1.016      Glucose, UA Negative      Ketones, UA Trace (A)      Bilirubin, UA Negative      Blood, UA Large (3+) (A)      Protein, UA 30 mg/dL (1+) (A)      Leuk Esterase, UA Trace (A)      Nitrite, UA Negative      Urobilinogen, UA 1.0 E.U./dL     Urinalysis, Microscopic Only [[id number]]  (Abnormal) Collected:  02/09/17 1804    Specimen:  Urine from Urine, Clean Catch Updated:  02/09/17 1809     RBC, UA 31-50 (A) /HPF      WBC, UA None Seen /HPF      Bacteria, UA None Seen /HPF      Squamous Epithelial Cells, UA None Seen /HPF      Hyaline Casts, UA 0-2 /LPF      Methodology Automated Microscopy             Physical exam: Alert and appropriate nontoxic-appearing  Lungs clear distant  Abdomen obese but soft with active bowel sounds  Ostomy opening is small and retracted but pink and viable.  I'm able to put my finger down nearly to the fascia and the mucosa appears to be viable.  The wall is thick caliber    Assessment : Retracted ostomy likely related to patient's body habitus.  Has been revised one time in the past and likely will need to be revised again.  Do not have an obvious reason for the bleeding however and agree with Dr. Carmona workup to address that .  unclear why she's been having fevers and agree with a fever workup as well.    Plan: We'll continue to follow with you.

## 2017-02-11 NOTE — PROGRESS NOTES
Nader Osei DO,[facility]  Gastroenterology  Hepatology  Endoscopy  Board Certified in Internal Medicine and gastroenterology  [address], [address]  [place]. [address]  T- [phone]   F - [phone]     GASTROENTEROLOGY PROGRESS NOTE   NADER OSEI DO.         SUBJECTIVE:   2/11/2017  Chief Complaint:     Subjective  : No further problems with any bleeding per the colostomy    Patient is 57 y.o. female presents with now having movements through the colostomy.  There is no further problems with any colostomy bleeding.   I spoke with Mr. Mcintyre yesterday, Dr. Cary and Dr. Aldridge.  The patient had a plain x-ray that showed no evidence of any obstruction.  The manipulation of the colostomy was not performed. Therefore, the patient is having spontaneous movement through the colostomy.  There has been no evidence of any nausea or vomiting.  She is hungry and wants something to eat.    Her pulmonary symptoms are improving.  She still is having some cough.  There has been no evidence of any fevers.  Oxygen saturation is doing well.    I spoke with Dr. Cary yesterday.  It is his plan to proceed on with the structural study of the colon and possible revision of the colostomy if required.   CURRENT MEDICATIONS/OBJECTIVE/VS/PE:     Current Medications:     Current Facility-Administered Medications   Medication Dose Route Frequency Provider Last Rate Last Dose   • acetaminophen (TYLENOL) tablet 650 mg  650 mg Oral Q6H PRN CEDRICK Loyola   650 mg at 02/10/17 1132   • AZITHROMYCIN 500 MG/250 ML 0.9% NS IVPB (vial-mate)  500 mg Intravenous Q24H Harvey Mcintyre APRN 0 mL/hr at 02/10/17 1230 500 mg at 02/11/17 1013   • busPIRone (BUSPAR) tablet 15 mg  15 mg Oral Q12H Alexis Ring MD   15 mg at 02/11/17 1013   • cefTRIAXone (ROCEPHIN) 1 g/100 mL 0.9% NS (MBP)  1 g Intravenous Q24H CEDRICK Loyola   Stopped at 02/10/17 1445   • hydrALAZINE (APRESOLINE)  injection 10 mg  10 mg Intravenous Once Fco Song MD   10 mg at 02/06/17 2316   • hydrALAZINE (APRESOLINE) injection 10 mg  10 mg Intravenous Q6H PRN Fco Song MD   10 mg at 02/08/17 0500   • labetalol (NORMODYNE) tablet 100 mg  100 mg Oral BID Alexis Ring MD   100 mg at 02/11/17 1012   • metroNIDAZOLE (FLAGYL) IVPB 500 mg  500 mg Intravenous Q8H Vivien Islas APRN 0 mL/hr at 02/10/17 2133 500 mg at 02/11/17 0451   • mirtazapine (REMERON) tablet 15 mg  15 mg Oral Daily Alexis Ring MD   15 mg at 02/11/17 1013   • Morphine sulfate (PF) injection 4 mg  4 mg Intravenous Q3H PRN Alexis Ring MD   4 mg at 02/11/17 1014   • naloxone (NARCAN) injection 0.4 mg  0.4 mg Intravenous Q5 Min PRN Alexis Ring MD       • nystatin (MYCOSTATIN) powder 1 application  1 application Topical Q12H Vivien Islas APRN   1 application at 02/11/17 1012   • pantoprazole (PROTONIX) injection 40 mg  40 mg Intravenous Q AM Alexis Ring MD   40 mg at 02/11/17 0603   • QUEtiapine (SEROquel) tablet 200 mg  200 mg Oral Nightly Alexis Ring MD   200 mg at 02/10/17 2101   • sodium chloride 0.9 % flush 1-10 mL  1-10 mL Intravenous PRN Alexis Ring MD   1 mL at 02/07/17 0755   • sodium chloride 0.9 % flush 10 mL  10 mL Intravenous PRN Scott Jin MD   10 mL at 02/07/17 0752   • spironolactone (ALDACTONE) tablet 25 mg  25 mg Oral Daily Alexis Ring MD   25 mg at 02/11/17 1012   • traZODone (DESYREL) tablet 150 mg  150 mg Oral Nightly Alexis Ring MD   150 mg at 02/10/17 2101       Objective     Physical Exam:   Temp:  [97.4 °F (36.3 °C)-99.6 °F (37.6 °C)] 97.5 °F (36.4 °C)  Heart Rate:  [71-76] 72  Resp:  [18] 18  BP: (128-157)/(60-75) 157/75     Physical Exam:  General Appearance:    Alert, cooperative, in no acute distress   Head:    Normocephalic, without obvious abnormality, atraumatic   Eyes:            Lids and lashes normal, conjunctivae and sclerae normal, no   icterus, no pallor, corneas clear, PERRLA    Ears:    Ears appear intact with no abnormalities noted   Throat:   No oral lesions, no thrush, oral mucosa moist   Neck:   No adenopathy, supple, trachea midline, no thyromegaly, no     carotid bruit, no JVD   Back:     No kyphosis present, no scoliosis present, no skin lesions,       erythema or scars, no tenderness to percussion or                   palpation,   range of motion normal   Lungs:   Rhocnhi    Heart:    Regular rhythm and normal rate, normal S1 and S2, no            murmur, no gallop, no rub, no click   Breast Exam:    Deferred   Abdomen:     Normal bowel sounds, no masses, no organomegaly, soft        non-tender, non-distended, no guarding, no rebound                 Tenderness----colostomy viable with brown stool in bag   Genitalia:    Deferred   Extremities:   Moves all extremities well, no edema, no cyanosis, no              redness   Pulses:   Pulses palpable and equal bilaterally   Skin:   No bleeding, bruising or rash   Lymph nodes:   No palpable adenopathy   Neurologic:   Cranial nerves 2 - 12 grossly intact, sensation intact, DTR        present and equal bilaterally      Results Review:     Lab Results (last 24 hours)     Procedure Component Value Units Date/Time    Blood Culture [[id number]]  (Normal) Collected:  02/08/17 1200    Specimen:  Blood from Arm, Left Updated:  02/10/17 1301     Blood Culture No growth at 2 days     Blood Culture [[id number]]  (Normal) Collected:  02/08/17 1200    Specimen:  Blood from Hand, Right Updated:  02/10/17 1301     Blood Culture No growth at 2 days     Blood Gas, Arterial [[id number]]  (Abnormal) Collected:  02/10/17 1401    Specimen:  Arterial Blood Updated:  02/10/17 1411     Site --       Not performed at this site.        Gera's Test --       Not performed at this site.        pH, Arterial 7.334 (L) pH units      pCO2, Arterial 50.3 (H) mm Hg      pO2, Arterial 90.1 mm Hg      HCO3, Arterial 26.2 (H) mmol/L      Base Excess, Arterial -0.2 mmol/L      O2  Saturation, Arterial 96.5 %      Hemoglobin, Blood Gas 11.1 (L) g/dL      Hematocrit, Blood Gas 33.0 (L) %      CO2 Content 27.7 (H)      Sodium, Arterial 136.9 (L) mmol/L      Potassium, Arterial 3.94 mmol/L      Glucose, Arterial 92 mmol/L      Barometric Pressure for Blood Gas -- mmHg       Not performed at this site.        Modality --       Not performed at this site.        Ionized Calcium 4.8 mg/dL     POC Glucose Fingerstick [[id number]]  (Normal) Collected:  02/11/17 0432    Specimen:  Blood Updated:  02/11/17 0543     Glucose 105 mg/dL       Meter: [id number]: [id number] [facility]       Comprehensive Metabolic Panel [[id number]]  (Abnormal) Collected:  02/11/17 0540    Specimen:  Blood Updated:  02/11/17 0633     Glucose 85 mg/dL      BUN 11 mg/dL      Creatinine 0.75 mg/dL      Sodium 139 mmol/L      Potassium 3.9 mmol/L      Chloride 108 mmol/L      CO2 25.0 mmol/L      Calcium 8.0 (L) mg/dL      Total Protein 5.6 (L) g/dL      Albumin 2.80 (L) g/dL      ALT (SGPT) 58 (H) U/L      AST (SGOT) 44 (H) U/L      Alkaline Phosphatase 68 U/L      Total Bilirubin 0.4 mg/dL      eGFR Non African Amer 79 mL/min/1.73      Globulin 2.8 gm/dL      A/G Ratio 1.0 (L) g/dL      BUN/Creatinine Ratio 14.7      Anion Gap 6.0 mmol/L     Manual Differential [[id number]] Collected:  02/11/17 0540    Specimen:  Blood Updated:  02/11/17 0643    CBC & Differential [[id number]] Collected:  02/11/17 0540    Specimen:  Blood Updated:  02/11/17 0644    Narrative:       The following orders were created for panel order CBC & Differential.  Procedure                               Abnormality         Status                     ---------                               -----------         ------                     Manual Differential[[id number]]                               In process                 CBC Auto Differential[[id number]]         Abnormal            Final result                 Please view results for these tests on the  individual orders.    CBC Auto Differential [[id number]]  (Abnormal) Collected:  02/11/17 0540    Specimen:  Blood Updated:  02/11/17 0644     WBC 3.75 10*3/mm3      RBC 3.22 (L) 10*6/mm3      Hemoglobin 10.0 (L) g/dL      Hematocrit 29.9 (L) %      MCV 92.9 fL      MCH 31.1 pg      MCHC 33.4 g/dL      RDW 12.8 %      RDW-SD 43.6 fl      MPV 10.2 fL      Platelets 94 (L) 10*3/mm3     Narrative:       The previously reported component NRBC is no longer being reported.    POC Glucose Fingerstick [[id number]]  (Normal) Collected:  02/10/17 2036    Specimen:  Blood Updated:  02/11/17 0802     Glucose 95 mg/dL       Meter: [id number]: [id number] DAPHNEY RON       POC Glucose Fingerstick [[id number]]  (Normal) Collected:  02/11/17 0741    Specimen:  Blood Updated:  02/11/17 0837     Glucose 89 mg/dL       Meter: [id number]: [id number] DONOVAN BAKER              I reviewed the patient's new clinical results.  I reviewed the patient's new imaging results and agree with the interpretation.     ASSESSMENT/PLAN:   ASSESSMENT:   1.  Bleeding per colostomy, resolved.  Suspect this is due to a stomal issue.  2.  Changes in the stomal output with no evidence of any obstruction  3.  Pneumonia, suspected  4.  Respiratory failure, improving  5.  Anemia, secondary to blood loss as well as intravenous fluid rehydration.     PLAN;  1.  Magnesium citrate prep  2.  Continue clear liquid diet  3.  Plan on a colonoscopy through the colostomy on Monday.  4.  I don't want to give the patient a large volume prep especially in light of the patient's respiratory status.       Nader Osei,   02/11/17  11:39 AM

## 2017-02-11 NOTE — PLAN OF CARE
Problem: Patient Care Overview (Adult)  Goal: Adult Individualization and Mutuality  Outcome: Outcome(s) achieved Date Met:  02/11/17  Goal: Discharge Needs Assessment  Outcome: Ongoing (interventions implemented as appropriate)    Problem: Gastrointestinal Bleeding (Adult)  Goal: Signs and Symptoms of Listed Potential Problems Will be Absent or Manageable (Gastrointestinal Bleeding)  Outcome: Ongoing (interventions implemented as appropriate)    Problem: Colostomy (Adult)  Goal: Signs and Symptoms of Listed Potential Problems Will be Absent or Manageable (Colostomy)  Outcome: Ongoing (interventions implemented as appropriate)    Problem: Pressure Ulcer Risk (Chaim Scale) (Adult,Obstetrics,Pediatric)  Goal: Skin Integrity  Outcome: Ongoing (interventions implemented as appropriate)

## 2017-02-11 NOTE — PROGRESS NOTES
[facility] Medicine Services  INPATIENT PROGRESS NOTE    Length of Stay: 5  Date of Admission: 2/6/2017  Primary Care Physician: No primary care provider on file.    Subjective   Chief Complaint: Dyspnea  HPI: Patient is a 57 y.o. female complaining of bleeding in her colostomy bag. She has had this problem on and off but never so severe.  She was evaluated by Dr. Osei and Dr. Cary.  She was recommended colonoscopy, but overnight developed fevers and respiratory failure with hypoxia.  CXR shows trace pleural effusions and minimal basilar atelectasis.     Patient was started on IV rocephin and azithromycin yesterday for suspected pneumonia.  She has improved with this.  Fever has resolved and O2 sat is 97% on 2.5 l/min nc.  Stoma output is present today.  GI plans colonoscopy Monday with mag citrate prep.    Review of Systems   Constitutional: Negative for chills and fever (resolved).   Respiratory: Positive for cough and shortness of breath. Negative for wheezing.    Cardiovascular: Negative for chest pain.   Gastrointestinal: Negative for abdominal distention, abdominal pain, blood in stool (stool output present today), diarrhea, nausea and vomiting.      All pertinent negatives and positives are as above. All other systems have been reviewed and are negative unless otherwise stated.     Objective    Temp:  [97.4 °F (36.3 °C)-99.6 °F (37.6 °C)] 97.5 °F (36.4 °C)  Heart Rate:  [71-76] 72  Resp:  [18] 18  BP: (128-157)/(60-75) 157/75  Physical Exam   Constitutional: She is oriented to person, place, and time. She appears well-developed and well-nourished.   HENT:   Head: Normocephalic and atraumatic.   Eyes: Conjunctivae and EOM are normal.   Neck: Normal range of motion. Neck supple.   Cardiovascular: Normal rate, regular rhythm, normal heart sounds and intact distal pulses.  Exam reveals no gallop and no friction rub.    No murmur heard.  Pulmonary/Chest: Effort  normal. No respiratory distress. She has no wheezes. She has no rhonchi. She has rales.   Abdominal: Soft. Bowel sounds are normal. She exhibits no distension. There is no tenderness.   Colostomy output present, no obvious blood, no melena   Musculoskeletal: Normal range of motion. She exhibits no edema.   Neurological: She is alert and oriented to person, place, and time.   Skin: Skin is warm and dry. No erythema.   Psychiatric: She has a normal mood and affect. Her behavior is normal. Judgment and thought content normal.     Results Review:  I have reviewed the labs, radiology results, and diagnostic studies.    Laboratory Data:   Recent Results (from the past 24 hour(s))   Blood Gas, Arterial    Collection Time: 02/10/17  2:01 PM   Result Value Ref Range    Site      Gera's Test      pH, Arterial 7.334 (L) 7.350 - 7.450 pH units    pCO2, Arterial 50.3 (H) 35.0 - 45.0 mm Hg    pO2, Arterial 90.1 80.0 - 105.0 mm Hg    HCO3, Arterial 26.2 (H) 22.0 - 26.0 mmol/L    Base Excess, Arterial -0.2 -2.4 - 2.4 mmol/L    O2 Saturation, Arterial 96.5 %    Hemoglobin, Blood Gas 11.1 (L) 12 - 16 g/dL    Hematocrit, Blood Gas 33.0 (L) 38.0 - 47.0 %    CO2 Content 27.7 (H) 23 - 27    Sodium, Arterial 136.9 (L) 138 - 146 mmol/L    Potassium, Arterial 3.94 3.6 - 4.9 mmol/L    Glucose, Arterial 92 mmol/L    Barometric Pressure for Blood Gas  mmHg    Modality      Ionized Calcium 4.8 4.5 - 4.9 mg/dL   POC Glucose Fingerstick    Collection Time: 02/10/17  8:36 PM   Result Value Ref Range    Glucose 95 70 - 130 mg/dL   POC Glucose Fingerstick    Collection Time: 02/11/17  4:32 AM   Result Value Ref Range    Glucose 105 70 - 130 mg/dL   Comprehensive Metabolic Panel    Collection Time: 02/11/17  5:40 AM   Result Value Ref Range    Glucose 85 60 - 100 mg/dL    BUN 11 7 - 21 mg/dL    Creatinine 0.75 0.50 - 1.00 mg/dL    Sodium 139 137 - 145 mmol/L    Potassium 3.9 3.5 - 5.1 mmol/L    Chloride 108 95 - 110 mmol/L    CO2 25.0 22.0 - 31.0  mmol/L    Calcium 8.0 (L) 8.4 - 10.2 mg/dL    Total Protein 5.6 (L) 6.3 - 8.6 g/dL    Albumin 2.80 (L) 3.40 - 4.80 g/dL    ALT (SGPT) 58 (H) 9 - 52 U/L    AST (SGOT) 44 (H) 14 - 36 U/L    Alkaline Phosphatase 68 38 - 126 U/L    Total Bilirubin 0.4 0.2 - 1.3 mg/dL    eGFR Non  Amer 79 51 - 120 mL/min/1.73    Globulin 2.8 2.3 - 3.5 gm/dL    A/G Ratio 1.0 (L) 1.1 - 1.8 g/dL    BUN/Creatinine Ratio 14.7 7.0 - 25.0    Anion Gap 6.0 5.0 - 15.0 mmol/L   CBC Auto Differential    Collection Time: 02/11/17  5:40 AM   Result Value Ref Range    WBC 3.75 3.20 - 9.80 10*3/mm3    RBC 3.22 (L) 3.77 - 5.16 10*6/mm3    Hemoglobin 10.0 (L) 12.0 - 15.5 g/dL    Hematocrit 29.9 (L) 35.0 - 45.0 %    MCV 92.9 80.0 - 98.0 fL    MCH 31.1 26.5 - 34.0 pg    MCHC 33.4 31.4 - 36.0 g/dL    RDW 12.8 11.5 - 14.5 %    RDW-SD 43.6 36.4 - 46.3 fl    MPV 10.2 8.0 - 12.0 fL    Platelets 94 (L) 150 - 450 10*3/mm3   POC Glucose Fingerstick    Collection Time: 02/11/17  7:41 AM   Result Value Ref Range    Glucose 89 70 - 130 mg/dL       Culture Data:   BLOOD CULTURE   Date Value Ref Range Status   02/08/2017 No growth at 2 days  Preliminary   02/08/2017 No growth at 2 days  Preliminary     No results found for: URINECX  No results found for: RESPCX  No results found for: WOUNDCX  No results found for: STOOLCX  No components found for: BODYFLD    Radiology Data:   Imaging Results (last 24 hours)     Procedure Component Value Units Date/Time    CT Abdomen Pelvis With Contrast [[id number]] Collected:  02/08/17 1649     Updated:  02/08/17 1659    Narrative:       PROCEDURE: CT abdomen and pelvis with contrast    COMPARISON: CT abdomen pelvis without contrast February 6, 2017    HISTORY: Gastrointestinal hemorrhage, unspecified. Pale ostomy  stoma and no output    TECHNIQUE: Multiple contiguous axial images are obtained of the  abdomen and pelvis axial the intravenous administration of 94 mL  Isovue-300. Coronal and sagittal multiplanar reformatted  images  are also reconstructed and reviewed. The dose length product is  822    FINDINGS: Minimal bibasilar atelectasis with trace bilateral  pleural effusions. Heart size is normal. Borderline enlarged  cardiophrenic lymph nodes.    Subcentimeter low-attenuation lesion in the left lateral segment  of the liver is nonspecific although statistically represents a  cyst or hemangioma if patient has no underlying liver disease or  history of malignancy. Liver is otherwise unremarkable. There may  be gallbladder wall thickening with questionable perivesicular  haziness or fat stranding which is new compared to the prior  exam. The spleen, pancreas, adrenal glands and kidneys are  unremarkable.    No lymphadenopathy in the abdomen, retroperitoneum or pelvis by  CT size criteria.    Bladder is unremarkable. Uterus and ovaries are grossly normal in  size. Postoperative changes from prior partial colectomy. Left  lower quadrant colostomy. There is probably a small amount of  intraperitoneal fat herniating through the colostomy defect.  Bowel is otherwise unremarkable. No bowel obstruction. No  abnormal bowel dilatation or wall thickening. No free fluid.    No acute osseous abnormality.      Impression:       1. There may be mild gallbladder wall thickening with  questionable pericholecystic stranding or inflammation. Acute  cholecystitis may have a similar appearance in the appropriate  clinical setting.2. Minimal bibasilar atelectasis with trace  bilateral pleural effusions..3. Left lower quadrant colostomy.  There is probably a small amount of fat herniating through the  colostomy defect. Bowel is otherwise unremarkable. No bowel  obstruction, dilatation or wall thickening.4. There are several  borderline enlarged cardiophrenic lymph nodes, nonspecific.  However, these are unchanged compared to a CT chest may 18 2014    Electronically signed by:  Yoni Quinn MD  2/8/2017 4:58  PM CST Workstation: [email or web address]           I have reviewed the patient current medications.     Assessment/Plan     Principal Problem:    Acute respiratory failure with hypoxia  Active Problems:    Essential hypertension    Colostomy present    Acute GI hemorrhage    Pneumonia    Atelectasis      Plan:  Rocephin/azithromycin, discontinue flagyl  GI/General surgery following  PRN tylenol for fever  Follow cultures  Follow h/h, wbc  Clear liquid diet  Colonoscopy planned for monday      Discharge Planning: I expect patient to be discharged to home in 3 days.    Harvey Mcintyre, CEDRICK   02/11/17   11:46 AM

## 2017-02-11 NOTE — PROGRESS NOTES
LOS: 5 days   Patient Care Team:  Nancy Downing as Care Coordinator ([facility])    Chief Complaint: acute GI hemorrhage via ostomy    Subjective     Pt stable.  Still having generalized abdominal pain.  Feeling nauseous, no emesis.  Voiding without issues.  Colostomy output has returned to baseline.  Has not been ambulating.  Tolerating clear liquid diet without difficulty.    History of Present Illness    Subjective:  Symptoms:  Stable.  She reports shortness of breath and weakness.  No malaise, cough, chest pain, headache, chest pressure, anorexia, diarrhea or anxiety.    Diet:  Adequate intake.  She reports  nausea.  No vomiting.    Activity level: Returning to normal.    Pain:  She complains of pain that is moderate.  She reports pain is unchanged.  Pain is well controlled.        History taken from: patient    Objective     Vital Signs  Temp:  [97.4 °F (36.3 °C)-100.6 °F (38.1 °C)] 97.5 °F (36.4 °C)  Heart Rate:  [71-78] 72  Resp:  [18-20] 18  BP: (128-158)/(60-88) 157/75    Objective    Gen: comfortable, AOx3  CV: RRR  Lungs: has nasal cannula 2.5 L/min; +rales bilat., occasional expiratory wheeze on left  Abd: soft, nontender, nondistended, obese; ostomy in LLQ with brown output    Results Review:     I reviewed the patient's new clinical results.    Medication Review: performed    Assessment/Plan     Principal Problem:    Acute respiratory failure with hypoxia  Active Problems:    Essential hypertension    Colostomy present    Acute GI hemorrhage    Pneumonia    Atelectasis      Assessment:    Condition: In stable condition.  Unchanged.       Plan:   (1. Hold on colonoscopy, proceed with GI bleeding workup per Dr. Osei.  2. Continue to encourage ambulation.  3. Maintain clear liquid diet.).   no acute bleed, no operation at this time  Colonoscopy to identify bleeding source    Clarisse Akins, Medical Student  02/11/17  7:49 AM    Time: 5 min

## 2017-02-12 NOTE — PLAN OF CARE
Problem: Patient Care Overview (Adult)  Goal: Plan of Care Review  Outcome: Ongoing (interventions implemented as appropriate)    02/10/17 1650 02/12/17 0317   Coping/Psychosocial Response Interventions   Plan Of Care Reviewed With patient --    Patient Care Overview   Progress no change --    Outcome Evaluation   Outcome Summary/Follow up Plan --  Pt slept well through the night.       Goal: Discharge Needs Assessment  Outcome: Ongoing (interventions implemented as appropriate)    Problem: Gastrointestinal Bleeding (Adult)  Goal: Signs and Symptoms of Listed Potential Problems Will be Absent or Manageable (Gastrointestinal Bleeding)  Outcome: Ongoing (interventions implemented as appropriate)    Problem: Colostomy (Adult)  Goal: Signs and Symptoms of Listed Potential Problems Will be Absent or Manageable (Colostomy)  Outcome: Ongoing (interventions implemented as appropriate)    Problem: Pressure Ulcer Risk (Chaim Scale) (Adult,Obstetrics,Pediatric)  Goal: Skin Integrity  Outcome: Ongoing (interventions implemented as appropriate)

## 2017-02-12 NOTE — PLAN OF CARE
Problem: Patient Care Overview (Adult)  Goal: Discharge Needs Assessment  Outcome: Ongoing (interventions implemented as appropriate)    Problem: Gastrointestinal Bleeding (Adult)  Goal: Signs and Symptoms of Listed Potential Problems Will be Absent or Manageable (Gastrointestinal Bleeding)  Outcome: Ongoing (interventions implemented as appropriate)    Problem: Colostomy (Adult)  Goal: Signs and Symptoms of Listed Potential Problems Will be Absent or Manageable (Colostomy)  Outcome: Ongoing (interventions implemented as appropriate)    Problem: Pressure Ulcer Risk (Chaim Scale) (Adult,Obstetrics,Pediatric)  Goal: Skin Integrity  Outcome: Ongoing (interventions implemented as appropriate)

## 2017-02-12 NOTE — PROGRESS NOTES
"   LOS: 6 days   Patient Care Team:  Nancy Downing as Care Coordinator ([facility])    Chief Complaint: acute GI hemorrhage via ostomy    Subjective      Pt stable.  Reports \"6/10\" pain level.  Denies nausea or vomiting.  Having bowel movements through the ostomy, bleeding has resolved.  Voiding well.  Does not want to ambulate.  Tolerating clear liquid diet without any issues.    History of Present Illness    Subjective:  Symptoms:  Improved.  She reports shortness of breath and cough.  No malaise, chest pain, weakness, headache, chest pressure, anorexia, diarrhea or anxiety.    Diet:  Adequate intake.  No nausea or vomiting.    Pain:  She complains of pain that is moderate (Reports \"6/10\").  She reports pain is improving.  Pain is well controlled.        History taken from: patient    Objective     Vital Signs  Temp:  [97.9 °F (36.6 °C)-98.6 °F (37 °C)] 98.6 °F (37 °C)  Heart Rate:  [64-92] 86  Resp:  [18-19] 18  BP: (114-165)/(70-88) 135/73    Objective     Gen: in and out of sleep  CV: RRR  Lungs: +rales, snoring  Abd: +bowel sounds, soft, nontender, nondistended; ostomy in LLQ with good output  Ext: no edema, no erythema, nontender    Results Review:     I reviewed the patient's new clinical results.    Medication Review: performed    Assessment/Plan     Principal Problem:    Acute respiratory failure with hypoxia  Active Problems:    Essential hypertension    Colostomy present    Acute GI hemorrhage    Pneumonia    Atelectasis      Assessment:    Condition: In stable condition.  Improving.       Plan:   (1. GI hemorrhage has resolved.  Continue with plans for colonoscopy Monday to identify source of bleeding per Dr. Osei.  2. Fevers have resolved- afebrile x 24 hours.  3. Encourage ambulation.  4. Continue clear liquid diet.  5. No operation at this time.  ).       Clarisse Akins, Medical Student  02/12/17  7:37 AM    Time: 5 min      "

## 2017-02-12 NOTE — PROGRESS NOTES
[facility] Medicine Services  INPATIENT PROGRESS NOTE    Length of Stay: 6  Date of Admission: 2/6/2017  Primary Care Physician: No primary care provider on file.    Subjective   Chief Complaint: Dyspnea  HPI: Patient is a 57 y.o. female complaining of bleeding in her colostomy bag. She has had this problem on and off but never so severe.  She was evaluated by Dr. Osei and Dr. Cary.  She was recommended colonoscopy, but overnight developed fevers and respiratory failure with hypoxia.  CXR shows trace pleural effusions and minimal basilar atelectasis.     Patient was started on IV rocephin and azithromycin yesterday for suspected pneumonia.  She has improved with this.  Fever has resolved. Stoma output is present today. No further bleeding. GI plans colonoscopy Monday with mag citrate prep.    Review of Systems   Constitutional: Negative for chills and fever (resolved).   Respiratory: Positive for cough and shortness of breath. Negative for wheezing.    Cardiovascular: Negative for chest pain.   Gastrointestinal: Negative for abdominal distention, abdominal pain, blood in stool (stool output present today), diarrhea, nausea and vomiting.      All pertinent negatives and positives are as above. All other systems have been reviewed and are negative unless otherwise stated.     Objective    Temp:  [97.2 °F (36.2 °C)-98.6 °F (37 °C)] 97.2 °F (36.2 °C)  Heart Rate:  [64-92] 67  Resp:  [18-19] 18  BP: (114-165)/(70-88) 139/78  Physical Exam   Constitutional: She is oriented to person, place, and time. She appears well-developed and well-nourished.   HENT:   Head: Normocephalic and atraumatic.   Eyes: Conjunctivae and EOM are normal.   Neck: Normal range of motion. Neck supple.   Cardiovascular: Normal rate, regular rhythm, normal heart sounds and intact distal pulses.  Exam reveals no gallop and no friction rub.    No murmur heard.  Pulmonary/Chest: Effort normal. No  respiratory distress. She has wheezes. She has no rhonchi. She has rales.   Abdominal: Soft. Bowel sounds are normal. She exhibits no distension. There is no tenderness.   Colostomy output present, no obvious blood, no melena   Musculoskeletal: Normal range of motion. She exhibits no edema.   Neurological: She is alert and oriented to person, place, and time.   Skin: Skin is warm and dry. No erythema.   Psychiatric: She has a normal mood and affect. Her behavior is normal. Judgment and thought content normal.     Results Review:  I have reviewed the labs, radiology results, and diagnostic studies.    Laboratory Data:   Recent Results (from the past 24 hour(s))   POC Glucose Fingerstick    Collection Time: 02/11/17  5:10 PM   Result Value Ref Range    Glucose 82 70 - 130 mg/dL   POC Glucose Fingerstick    Collection Time: 02/11/17  7:18 PM   Result Value Ref Range    Glucose 85 70 - 130 mg/dL   POC Glucose Fingerstick    Collection Time: 02/12/17  4:19 AM   Result Value Ref Range    Glucose 77 70 - 130 mg/dL   Comprehensive Metabolic Panel    Collection Time: 02/12/17  6:30 AM   Result Value Ref Range    Glucose 69 60 - 100 mg/dL    BUN 13 7 - 21 mg/dL    Creatinine 0.71 0.50 - 1.00 mg/dL    Sodium 141 137 - 145 mmol/L    Potassium 4.0 3.5 - 5.1 mmol/L    Chloride 107 95 - 110 mmol/L    CO2 31.0 22.0 - 31.0 mmol/L    Calcium 8.2 (L) 8.4 - 10.2 mg/dL    Total Protein 5.2 (L) 6.3 - 8.6 g/dL    Albumin 2.50 (L) 3.40 - 4.80 g/dL    ALT (SGPT) 59 (H) 9 - 52 U/L    AST (SGOT) 48 (H) 14 - 36 U/L    Alkaline Phosphatase 63 38 - 126 U/L    Total Bilirubin 0.4 0.2 - 1.3 mg/dL    eGFR Non African Amer 85 >60 mL/min/1.73    Globulin 2.7 2.3 - 3.5 gm/dL    A/G Ratio 0.9 (L) 1.1 - 1.8 g/dL    BUN/Creatinine Ratio 18.3 7.0 - 25.0    Anion Gap 3.0 (L) 5.0 - 15.0 mmol/L   POC Glucose Fingerstick    Collection Time: 02/12/17  7:24 AM   Result Value Ref Range    Glucose 74 70 - 130 mg/dL   CBC Auto Differential    Collection Time:  02/12/17  8:50 AM   Result Value Ref Range    WBC 3.01 (L) 3.20 - 9.80 10*3/mm3    RBC 3.25 (L) 3.77 - 5.16 10*6/mm3    Hemoglobin 10.1 (L) 12.0 - 15.5 g/dL    Hematocrit 30.3 (L) 35.0 - 45.0 %    MCV 93.2 80.0 - 98.0 fL    MCH 31.1 26.5 - 34.0 pg    MCHC 33.3 31.4 - 36.0 g/dL    RDW 13.2 11.5 - 14.5 %    RDW-SD 45.4 36.4 - 46.3 fl    MPV 9.4 8.0 - 12.0 fL    Platelets 105 (L) 150 - 450 10*3/mm3    Neutrophil % 40.6 37.0 - 80.0 %    Lymphocyte % 41.5 10.0 - 50.0 %    Monocyte % 12.3 (H) 0.0 - 12.0 %    Eosinophil % 5.0 0.0 - 7.0 %    Basophil % 0.3 0.0 - 2.0 %    Immature Grans % 0.3 0.0 - 0.5 %    Neutrophils, Absolute 1.22 (L) 2.00 - 8.60 10*3/mm3    Lymphocytes, Absolute 1.25 0.60 - 4.20 10*3/mm3    Monocytes, Absolute 0.37 0.00 - 0.90 10*3/mm3    Eosinophils, Absolute 0.15 0.00 - 0.70 10*3/mm3    Basophils, Absolute 0.01 0.00 - 0.20 10*3/mm3    Immature Grans, Absolute 0.01 0.00 - 0.02 10*3/mm3       Culture Data:   No results found for: BLOODCX  No results found for: URINECX  No results found for: RESPCX  No results found for: WOUNDCX  No results found for: STOOLCX  No components found for: BODYFLD    Radiology Data:   Imaging Results (last 24 hours)     Procedure Component Value Units Date/Time    CT Abdomen Pelvis With Contrast [[id number]] Collected:  02/08/17 1649     Updated:  02/08/17 1659    Narrative:       PROCEDURE: CT abdomen and pelvis with contrast    COMPARISON: CT abdomen pelvis without contrast February 6, 2017    HISTORY: Gastrointestinal hemorrhage, unspecified. Pale ostomy  stoma and no output    TECHNIQUE: Multiple contiguous axial images are obtained of the  abdomen and pelvis axial the intravenous administration of 94 mL  Isovue-300. Coronal and sagittal multiplanar reformatted images  are also reconstructed and reviewed. The dose length product is  822    FINDINGS: Minimal bibasilar atelectasis with trace bilateral  pleural effusions. Heart size is normal. Borderline enlarged  cardiophrenic  lymph nodes.    Subcentimeter low-attenuation lesion in the left lateral segment  of the liver is nonspecific although statistically represents a  cyst or hemangioma if patient has no underlying liver disease or  history of malignancy. Liver is otherwise unremarkable. There may  be gallbladder wall thickening with questionable perivesicular  haziness or fat stranding which is new compared to the prior  exam. The spleen, pancreas, adrenal glands and kidneys are  unremarkable.    No lymphadenopathy in the abdomen, retroperitoneum or pelvis by  CT size criteria.    Bladder is unremarkable. Uterus and ovaries are grossly normal in  size. Postoperative changes from prior partial colectomy. Left  lower quadrant colostomy. There is probably a small amount of  intraperitoneal fat herniating through the colostomy defect.  Bowel is otherwise unremarkable. No bowel obstruction. No  abnormal bowel dilatation or wall thickening. No free fluid.    No acute osseous abnormality.      Impression:       1. There may be mild gallbladder wall thickening with  questionable pericholecystic stranding or inflammation. Acute  cholecystitis may have a similar appearance in the appropriate  clinical setting.2. Minimal bibasilar atelectasis with trace  bilateral pleural effusions..3. Left lower quadrant colostomy.  There is probably a small amount of fat herniating through the  colostomy defect. Bowel is otherwise unremarkable. No bowel  obstruction, dilatation or wall thickening.4. There are several  borderline enlarged cardiophrenic lymph nodes, nonspecific.  However, these are unchanged compared to a CT chest may 18 2014    Electronically signed by:  Yoni Quinn MD  2/8/2017 4:58  PM CST Workstation: [id number]          I have reviewed the patient current medications.     Assessment/Plan     Principal Problem:    Acute respiratory failure with hypoxia  Active Problems:    Essential hypertension    Colostomy present    COPD with  acute exacerbation    Acute GI hemorrhage    Pneumonia    Atelectasis      Plan:  Rocephin/azithromycin  Add steroids for wheezing  GI/General surgery following  PRN tylenol for fever  Cultures negative  Clear liquid diet  Colonoscopy planned for monday      Discharge Planning: I expect patient to be discharged to home in 2 days.    Harvey Mcintyre, APRN   02/12/17   11:45 AM

## 2017-02-12 NOTE — PROGRESS NOTES
Nader Osei DO,[facility]  Gastroenterology  Hepatology  Endoscopy  Board Certified in Internal Medicine and gastroenterology  [address], [address]  [place]. [address]  T- [phone]   F - [phone]     GASTROENTEROLOGY PROGRESS NOTE   NADER OSEI DO.         SUBJECTIVE:   2/12/2017  Chief Complaint:     Subjective  : No further episodes of any bleeding.  Not having good cleansing with the use of the magnesium citrate.    Patient is 57 y.o. female presents with the bleeding per colostomy.  This has resolved.  The patient is having active movements through the colostomy with the magnesium citrate.  However, has not been able to have cleansing of the stool.    No abdominal pain.  No nausea or vomiting.  Tolerating the magnesium citrate.    Shortness of air continues.  No cough.  No hemoptysis..      CURRENT MEDICATIONS/OBJECTIVE/VS/PE:     Current Medications:     Current Facility-Administered Medications   Medication Dose Route Frequency Provider Last Rate Last Dose   • acetaminophen (TYLENOL) tablet 650 mg  650 mg Oral Q6H PRN Harvey Mcintyre APRN   650 mg at 02/10/17 1132   • AZITHROMYCIN 500 MG/250 ML 0.9% NS IVPB (vial-mate)  500 mg Intravenous Q24H CEDRICK Loyola 0 mL/hr at 02/11/17 1115 500 mg at 02/12/17 1111   • busPIRone (BUSPAR) tablet 15 mg  15 mg Oral Q12H Alexis Ring MD   15 mg at 02/12/17 0930   • cefTRIAXone (ROCEPHIN) 1 g/100 mL 0.9% NS (MBP)  1 g Intravenous Q24H CEDRICK Loyola   Stopped at 02/12/17 1110   • hydrALAZINE (APRESOLINE) injection 10 mg  10 mg Intravenous Once Fco Song MD   10 mg at 02/06/17 2316   • labetalol (NORMODYNE) tablet 100 mg  100 mg Oral BID Alexis Ring MD   100 mg at 02/12/17 0930   • mirtazapine (REMERON) tablet 15 mg  15 mg Oral Daily Alexis Ring MD   15 mg at 02/12/17 0930   • Morphine sulfate (PF) injection 4 mg  4 mg Intravenous Q3H PRN Alexis Ring MD   4 mg at 02/12/17 1112   •  naloxone (NARCAN) injection 0.4 mg  0.4 mg Intravenous Q5 Min PRN Alexis Ring MD       • nystatin (MYCOSTATIN) powder 1 application  1 application Topical Q12H Vivien IslasCEDRICK   1 application at 02/12/17 0930   • pantoprazole (PROTONIX) injection 40 mg  40 mg Intravenous Q AM Alexis Ring MD   40 mg at 02/12/17 0613   • QUEtiapine (SEROquel) tablet 200 mg  200 mg Oral Nightly Alexis Ring MD   200 mg at 02/11/17 2033   • sodium chloride 0.9 % flush 1-10 mL  1-10 mL Intravenous PRN Alexis Ring MD   1 mL at 02/07/17 0755   • sodium chloride 0.9 % flush 10 mL  10 mL Intravenous PRN Scott Jin MD   10 mL at 02/07/17 0752   • spironolactone (ALDACTONE) tablet 25 mg  25 mg Oral Daily Alexis Ring MD   25 mg at 02/12/17 0930   • traZODone (DESYREL) tablet 150 mg  150 mg Oral Nightly Alexis Ring MD   150 mg at 02/11/17 2032       Objective     Physical Exam:   Temp:  [97.2 °F (36.2 °C)-98.6 °F (37 °C)] 97.2 °F (36.2 °C)  Heart Rate:  [64-92] 67  Resp:  [18-19] 18  BP: (114-165)/(70-88) 139/78     Physical Exam:  General Appearance:    Alert, cooperative, in no acute distress   Head:    Normocephalic, without obvious abnormality, atraumatic   Eyes:            Lids and lashes normal, conjunctivae and sclerae normal, no   icterus, no pallor, corneas clear, PERRLA   Ears:    Ears appear intact with no abnormalities noted   Throat:   No oral lesions, no thrush, oral mucosa moist   Neck:   No adenopathy, supple, trachea midline, no thyromegaly, no     carotid bruit, no JVD   Back:     No kyphosis present, no scoliosis present, no skin lesions,       erythema or scars, no tenderness to percussion or                   palpation,   range of motion normal   Lungs:      Rhonchi    Heart:    Regular rhythm and normal rate, normal S1 and S2, no            murmur, no gallop, no rub, no click   Breast Exam:    Deferred   Abdomen:     Normal bowel sounds, no masses, no organomegaly, soft        non-tender,  non-distended, no guarding, no rebound                 Tenderness-----colostomy viable with no active bleeding   Genitalia:    Deferred   Extremities:   Moves all extremities well, no edema, no cyanosis, no              redness   Pulses:   Pulses palpable and equal bilaterally   Skin:   No bleeding, bruising or rash   Lymph nodes:   No palpable adenopathy   Neurologic:   Cranial nerves 2 - 12 grossly intact, sensation intact, DTR        present and equal bilaterally      Results Review:     Lab Results (last 24 hours)     Procedure Component Value Units Date/Time    POC Glucose Fingerstick [[id number]]  (Normal) Collected:  02/11/17 1122    Specimen:  Blood Updated:  02/11/17 1156     Glucose 90 mg/dL       Meter: [id number]: [id number] DONOVAN BAKER       Blood Culture [[id number]]  (Normal) Collected:  02/08/17 1200    Specimen:  Blood from Arm, Left Updated:  02/11/17 1301     Blood Culture No growth at 3 days     Blood Culture [[id number]]  (Normal) Collected:  02/08/17 1200    Specimen:  Blood from Hand, Right Updated:  02/11/17 1301     Blood Culture No growth at 3 days     POC Glucose Fingerstick [[id number]]  (Normal) Collected:  02/11/17 1710    Specimen:  Blood Updated:  02/11/17 1734     Glucose 82 mg/dL       Meter: [id number]: [id number] DONOVAN BAKER       CBC & Differential [[id number]] Collected:  02/11/17 0540    Specimen:  Blood Updated:  02/11/17 1830    Narrative:       The following orders were created for panel order CBC & Differential.  Procedure                               Abnormality         Status                     ---------                               -----------         ------                     Manual Differential[[id number]]                               Final result               CBC Auto Differential[[id number]]         Abnormal            Final result                 Please view results for these tests on the individual orders.    Manual Differential [[id number]]  Collected:  02/11/17 0540    Specimen:  Blood Updated:  02/11/17 1830     Neutrophil % 64.0 %      Lymphocyte % 21.0 %      Monocyte % 11.0 %      Eosinophil % 4.0 %      Neutrophils Absolute 2.40 10*3/mm3      Lymphocytes Absolute 0.79 10*3/mm3      Monocytes Absolute 0.41 10*3/mm3      Eosinophils Absolute 0.15 10*3/mm3      RBC Morphology Normal      WBC Morphology Normal      Platelet Estimate Decreased     POC Glucose Fingerstick [[id number]]  (Normal) Collected:  02/11/17 1918    Specimen:  Blood Updated:  02/11/17 2044     Glucose 85 mg/dL       Meter: [id number]: [id number] [employer]       POC Glucose Fingerstick [[id number]]  (Normal) Collected:  02/12/17 0419    Specimen:  Blood Updated:  02/12/17 0517     Glucose 77 mg/dL       Meter: [id number]: [id number] [place] [employer]       POC Glucose Fingerstick [[id number]]  (Normal) Collected:  02/12/17 0724    Specimen:  Blood Updated:  02/12/17 0736     Glucose 74 mg/dL       Meter: [id number]: [id number] [facility]       Comprehensive Metabolic Panel [[id number]]  (Abnormal) Collected:  02/12/17 0630    Specimen:  Blood Updated:  02/12/17 0757     Glucose 69 mg/dL      BUN 13 mg/dL      Creatinine 0.71 mg/dL      Sodium 141 mmol/L      Potassium 4.0 mmol/L      Chloride 107 mmol/L      CO2 31.0 mmol/L      Calcium 8.2 (L) mg/dL      Total Protein 5.2 (L) g/dL      Albumin 2.50 (L) g/dL      ALT (SGPT) 59 (H) U/L      AST (SGOT) 48 (H) U/L      Alkaline Phosphatase 63 U/L      Total Bilirubin 0.4 mg/dL      eGFR Non African Amer 85 mL/min/1.73      Globulin 2.7 gm/dL      A/G Ratio 0.9 (L) g/dL      BUN/Creatinine Ratio 18.3      Anion Gap 3.0 (L) mmol/L     CBC & Differential [[id number]] Collected:  02/12/17 0850    Specimen:  Blood Updated:  02/12/17 0906    Narrative:       The following orders were created for panel order CBC & Differential.  Procedure                               Abnormality         Status                      ---------                               -----------         ------                     CBC Auto Differential[[id number]]         Abnormal            Final result                 Please view results for these tests on the individual orders.    CBC Auto Differential [[id number]]  (Abnormal) Collected:  02/12/17 0850    Specimen:  Blood Updated:  02/12/17 0906     WBC 3.01 (L) 10*3/mm3      RBC 3.25 (L) 10*6/mm3      Hemoglobin 10.1 (L) g/dL      Hematocrit 30.3 (L) %      MCV 93.2 fL      MCH 31.1 pg      MCHC 33.3 g/dL      RDW 13.2 %      RDW-SD 45.4 fl      MPV 9.4 fL      Platelets 105 (L) 10*3/mm3      Neutrophil % 40.6 %      Lymphocyte % 41.5 %      Monocyte % 12.3 (H) %      Eosinophil % 5.0 %      Basophil % 0.3 %      Immature Grans % 0.3 %      Neutrophils, Absolute 1.22 (L) 10*3/mm3      Lymphocytes, Absolute 1.25 10*3/mm3      Monocytes, Absolute 0.37 10*3/mm3      Eosinophils, Absolute 0.15 10*3/mm3      Basophils, Absolute 0.01 10*3/mm3      Immature Grans, Absolute 0.01 10*3/mm3            I reviewed the patient's new clinical results.  I reviewed the patient's new imaging results and agree with the interpretation.     ASSESSMENT/PLAN:   ASSESSMENT:   1.  Bleeding per colostomy, resolved  2.  Problems with colostomy functioning, appears to be improving  3.  Anemia secondary to blood loss  4.  Hepatitis C, not treated  5.  Pneumonia    PLAN:   1.  Colonoscopy through colostomy tomorrow.  2.  We'll change the prep from magnesium citrate to GoLYTELY because were not going to get a good cleansing and if we are going to need to try to reverse this colostomy this is going to need to be completely clean.  3.  Reviewed with her regarding this.  I spoke with Dr. Cary earlier about this    Risk and benefits associated with the procedure are reviewed with the patient.  She wishes to proceed.       Nader Osei,   02/12/17  11:23 AM

## 2017-02-13 NOTE — PLAN OF CARE
Problem: Patient Care Overview (Adult)  Goal: Discharge Needs Assessment  Outcome: Ongoing (interventions implemented as appropriate)    02/13/17 0141   Discharge Needs Assessment   Concerns To Be Addressed no discharge needs identified   Readmission Within The Last 30 Days no previous admission in last 30 days   Equipment Needed After Discharge none   Current Health   Anticipated Changes Related to Illness none   Living Environment   Transportation Available car         Problem: Gastrointestinal Bleeding (Adult)  Goal: Signs and Symptoms of Listed Potential Problems Will be Absent or Manageable (Gastrointestinal Bleeding)  Outcome: Ongoing (interventions implemented as appropriate)    Problem: Colostomy (Adult)  Goal: Signs and Symptoms of Listed Potential Problems Will be Absent or Manageable (Colostomy)  Outcome: Ongoing (interventions implemented as appropriate)    Problem: Pressure Ulcer Risk (Chaim Scale) (Adult,Obstetrics,Pediatric)  Goal: Skin Integrity  Outcome: Ongoing (interventions implemented as appropriate)

## 2017-02-13 NOTE — PROGRESS NOTES
[facility] Medicine Services  INPATIENT PROGRESS NOTE    Length of Stay: 7  Date of Admission: 2/6/2017  Primary Care Physician: No primary care provider on file.    Subjective   Chief Complaint: Dyspnea  HPI: Patient is a 57 y.o. female complaining of bleeding in her colostomy bag. She has had this problem on and off but never so severe.  She was evaluated by Dr. Osei and Dr. Cary.  She was recommended colonoscopy, but overnight developed fevers and respiratory failure with hypoxia.  CXR shows trace pleural effusions and minimal basilar atelectasis.     Patient was started on IV rocephin and azithromycin yesterday for suspected pneumonia.  She has improved with this.  Fever has resolved. Stoma output is present today. No further bleeding. Colonoscopy today.    Review of Systems   Constitutional: Negative for chills and fever (resolved).   Respiratory: Positive for cough and shortness of breath. Negative for wheezing.    Cardiovascular: Negative for chest pain.   Gastrointestinal: Negative for abdominal distention, abdominal pain, blood in stool (stool output present today), diarrhea, nausea and vomiting.      All pertinent negatives and positives are as above. All other systems have been reviewed and are negative unless otherwise stated.     Objective    Temp:  [97.3 °F (36.3 °C)-98.6 °F (37 °C)] 98.1 °F (36.7 °C)  Heart Rate:  [66-79] 74  Resp:  [18-20] 18  BP: (140-188)/() 173/98  Physical Exam   Constitutional: She is oriented to person, place, and time. She appears well-developed and well-nourished.   HENT:   Head: Normocephalic and atraumatic.   Eyes: Conjunctivae and EOM are normal.   Neck: Normal range of motion. Neck supple.   Cardiovascular: Normal rate, regular rhythm, normal heart sounds and intact distal pulses.  Exam reveals no gallop and no friction rub.    No murmur heard.  Pulmonary/Chest: Effort normal. No respiratory distress. She has no  wheezes. She has no rhonchi. She has no rales.   Abdominal: Soft. Bowel sounds are normal. She exhibits no distension. There is no tenderness.   Colostomy output present, no obvious blood, no melena   Musculoskeletal: Normal range of motion. She exhibits no edema.   Neurological: She is alert and oriented to person, place, and time.   Skin: Skin is warm and dry. No erythema.   Psychiatric: She has a normal mood and affect. Her behavior is normal. Judgment and thought content normal.     Results Review:  I have reviewed the labs, radiology results, and diagnostic studies.    Laboratory Data:   Recent Results (from the past 24 hour(s))   POC Glucose Fingerstick    Collection Time: 02/12/17  5:40 PM   Result Value Ref Range    Glucose 60 (L) 70 - 130 mg/dL   POC Glucose Fingerstick    Collection Time: 02/12/17  9:48 PM   Result Value Ref Range    Glucose 107 70 - 130 mg/dL   Comprehensive Metabolic Panel    Collection Time: 02/13/17  6:33 AM   Result Value Ref Range    Glucose 140 (H) 60 - 100 mg/dL    BUN 19 7 - 21 mg/dL    Creatinine 0.84 0.50 - 1.00 mg/dL    Sodium 141 137 - 145 mmol/L    Potassium 4.2 3.5 - 5.1 mmol/L    Chloride 103 95 - 110 mmol/L    CO2 29.0 22.0 - 31.0 mmol/L    Calcium 8.0 (L) 8.4 - 10.2 mg/dL    Total Protein 6.2 (L) 6.3 - 8.6 g/dL    Albumin 3.10 (L) 3.40 - 4.80 g/dL    ALT (SGPT) 51 9 - 52 U/L    AST (SGOT) 44 (H) 14 - 36 U/L    Alkaline Phosphatase 77 38 - 126 U/L    Total Bilirubin 0.4 0.2 - 1.3 mg/dL    eGFR Non  Amer 70 51 - 120 mL/min/1.73    Globulin 3.1 2.3 - 3.5 gm/dL    A/G Ratio 1.0 (L) 1.1 - 1.8 g/dL    BUN/Creatinine Ratio 22.6 7.0 - 25.0    Anion Gap 9.0 5.0 - 15.0 mmol/L   CBC Auto Differential    Collection Time: 02/13/17  6:33 AM   Result Value Ref Range    WBC 2.39 (L) 3.20 - 9.80 10*3/mm3    RBC 3.59 (L) 3.77 - 5.16 10*6/mm3    Hemoglobin 11.2 (L) 12.0 - 15.5 g/dL    Hematocrit 32.1 (L) 35.0 - 45.0 %    MCV 89.4 80.0 - 98.0 fL    MCH 31.2 26.5 - 34.0 pg    MCHC 34.9  31.4 - 36.0 g/dL    RDW 12.3 11.5 - 14.5 %    RDW-SD 40.1 36.4 - 46.3 fl    MPV 10.2 8.0 - 12.0 fL    Platelets 117 (L) 150 - 450 10*3/mm3    Neutrophil % 71.6 37.0 - 80.0 %    Lymphocyte % 25.5 10.0 - 50.0 %    Monocyte % 2.5 0.0 - 12.0 %    Eosinophil % 0.0 0.0 - 7.0 %    Basophil % 0.0 0.0 - 2.0 %    Immature Grans % 0.4 0.0 - 0.5 %    Neutrophils, Absolute 1.71 (L) 2.00 - 8.60 10*3/mm3    Lymphocytes, Absolute 0.61 0.60 - 4.20 10*3/mm3    Monocytes, Absolute 0.06 0.00 - 0.90 10*3/mm3    Eosinophils, Absolute 0.00 0.00 - 0.70 10*3/mm3    Basophils, Absolute 0.00 0.00 - 0.20 10*3/mm3    Immature Grans, Absolute 0.01 0.00 - 0.02 10*3/mm3   POC Glucose Fingerstick    Collection Time: 02/13/17  7:36 AM   Result Value Ref Range    Glucose 134 (H) 70 - 130 mg/dL   Prepare RBC, 2 Units    Collection Time: 02/13/17  9:11 AM   Result Value Ref Range    Product Code I1775Z75     Unit Number [id number]     UNIT  ABO O     UNIT  RH POS     Dispense Status RE     Blood Type OPOS     Blood Expiration Date 201703142359     Blood Type Barcode 5100     Product Code N8262D91     Unit Number [id number]     UNIT  ABO O     UNIT  RH POS     Dispense Status RE     Blood Type OPOS     Blood Expiration Date 201703142359     Blood Type Barcode 5100    POC Glucose Fingerstick    Collection Time: 02/13/17 11:39 AM   Result Value Ref Range    Glucose 133 (H) 70 - 130 mg/dL       Culture Data:   No results found for: BLOODCX  No results found for: URINECX  No results found for: RESPCX  No results found for: WOUNDCX  No results found for: STOOLCX  No components found for: BODYFLD    Radiology Data:   Imaging Results (last 24 hours)     Procedure Component Value Units Date/Time    CT Abdomen Pelvis With Contrast [[id number]] Collected:  02/08/17 1649     Updated:  02/08/17 1659    Narrative:       PROCEDURE: CT abdomen and pelvis with contrast    COMPARISON: CT abdomen pelvis without contrast February 6, 2017    HISTORY:  Gastrointestinal hemorrhage, unspecified. Pale ostomy  stoma and no output    TECHNIQUE: Multiple contiguous axial images are obtained of the  abdomen and pelvis axial the intravenous administration of 94 mL  Isovue-300. Coronal and sagittal multiplanar reformatted images  are also reconstructed and reviewed. The dose length product is  822    FINDINGS: Minimal bibasilar atelectasis with trace bilateral  pleural effusions. Heart size is normal. Borderline enlarged  cardiophrenic lymph nodes.    Subcentimeter low-attenuation lesion in the left lateral segment  of the liver is nonspecific although statistically represents a  cyst or hemangioma if patient has no underlying liver disease or  history of malignancy. Liver is otherwise unremarkable. There may  be gallbladder wall thickening with questionable perivesicular  haziness or fat stranding which is new compared to the prior  exam. The spleen, pancreas, adrenal glands and kidneys are  unremarkable.    No lymphadenopathy in the abdomen, retroperitoneum or pelvis by  CT size criteria.    Bladder is unremarkable. Uterus and ovaries are grossly normal in  size. Postoperative changes from prior partial colectomy. Left  lower quadrant colostomy. There is probably a small amount of  intraperitoneal fat herniating through the colostomy defect.  Bowel is otherwise unremarkable. No bowel obstruction. No  abnormal bowel dilatation or wall thickening. No free fluid.    No acute osseous abnormality.      Impression:       1. There may be mild gallbladder wall thickening with  questionable pericholecystic stranding or inflammation. Acute  cholecystitis may have a similar appearance in the appropriate  clinical setting.2. Minimal bibasilar atelectasis with trace  bilateral pleural effusions..3. Left lower quadrant colostomy.  There is probably a small amount of fat herniating through the  colostomy defect. Bowel is otherwise unremarkable. No bowel  obstruction, dilatation or wall  thickening.4. There are several  borderline enlarged cardiophrenic lymph nodes, nonspecific.  However, these are unchanged compared to a CT chest may 18 2014    Electronically signed by:  Yoni Quinn MD  2/8/2017 4:58  PM CST Workstation: [email or web address]          I have reviewed the patient current medications.     Assessment/Plan     Principal Problem:    Acute respiratory failure with hypoxia  Active Problems:    Essential hypertension    Colostomy present    COPD with acute exacerbation    Acute GI hemorrhage    Pneumonia    Atelectasis      Plan:  Rocephin/azithromycin  Taper steroids  GI/General surgery following  PRN tylenol for fever  Clear liquid diet  Colonoscopy planned for today    Discharge Planning: I expect patient to be discharged to home in 1-2 days.    Harvey Mcintyre, APRN   02/13/17   1:23 PM

## 2017-02-13 NOTE — PROGRESS NOTES
TWO PATIENT IDENTIFIERS WERE USED. CONSENT WAS SIGNED PER PATIENT EDUCATION MATERIAL WAS GIVEN TO PATIENT AND / OR FAMILY. THE PATIENT WAS DRAPED WITH FULL BODY DRAPE AND PATIENT'SRIGHT   ARM WAS PREPPED WITH CHLORAPREP.  ULTRASOUND WAS USED TO LOCALIZE THERIGHT BASILIC VEIN. SUBCUTANEOUS TISSUE AT THE CATHETER SITE WAS INFILTRATED WITH 2% LIDOCAINE. UNDER ULTRASOUND GUIDANCE, THE VEIN WAS ACCESSED WITH A 21GAUGE MICROPUNCTURE NEEDLE. AN 0.018 WIRE WAS THEN THREADED THROUGH THE NEEDLE INTO THE CENTRAL VENOUS SYSTEM. THE 21GAUGE MICROPUNCTURE NEEDLE WAS REMOVED AND A 6 FRENCHPEEL AWAY SHEATH WAS PLACED OVER THE WIRE. THE PICC LINE CATHETER WAS CUT AT 36 CM. THE PICC LINE CATHETER WAS THEN PLACED OVER THE WIRE INTO THE VEIN, THE SHEATH WAS PEELED AWAY,WIRE WAS REMOVED. CATHETER WAS FLUSHED WITH NORMAL SALINE AND TIPS APPLIED. BIOPATCH PLACED. CATHETER SECURED WITHSTATLOCK  AND TEGADERM. PATIENT TOLERATED PROCEDURE WELL. THIS WAS DONE IN   ANGIOSUITE      IMPRESSION: SUCCESSFUL PLACEMENT OF TRIPLE LUMEN PICC        Saadia Jenkins  2/13/2017  12:01 PM

## 2017-02-13 NOTE — PLAN OF CARE
Problem: Patient Care Overview (Adult)  Goal: Plan of Care Review  Outcome: Ongoing (interventions implemented as appropriate)    02/13/17 1511   Coping/Psychosocial Response Interventions   Plan Of Care Reviewed With patient   Patient Care Overview   Progress no change   Outcome Evaluation   Outcome Summary/Follow up Plan Pt admitted w/GI bleed, awaiting colonscopy. Per pt, she's had only liquids since admit. Will rec supplement as appropriate when po restarted.

## 2017-02-13 NOTE — CONSULTS
"Adult Nutrition  Assessment    Patient Name:  Eva Troncoso  YOB: 1960  MRN: [id number]  Admit Date:  2/6/2017    Assessment Date:  2/13/2017          Reason for Assessment       02/13/17 1502    Reason for Assessment    Reason For Assessment/Visit length of stay    Identified At Risk By Screening Criteria other (see comments)   gi bleed, NPO/liquid diet since admit    Diagnosis Diagnosis    Gastrointestinal --   gi bleed    Factors Affecting Nutrition Factors    Reported GI Symptoms Altered GI Function                    Labs/Tests/Procedures/Meds       02/13/17 1505    Labs/Tests/Procedures/Meds    Labs/Tests Review Reviewed    Medication Review Reviewed, pertinent              Estimated/Assessed Needs       02/13/17 1505    Calculation Measurements    Weight Used For Calculations 60 kg (132 lb 4.4 oz)    Height Used for Calculations 1.651 m (5' 5\")    Estimated/Assessed Energy Needs    Energy Need Method Kcal/kg    kcal/kg 25    25 Kcal/Kg (kcal) 1500    Estimated/Assessed Protein Needs    Weight Used for Protein Calculation 60 kg (132 lb 4.4 oz)    Protein (gm/kg) 1.0    1.0 Gm Protein (gm) 60            Nutrition Prescription Ordered       02/13/17 1506    Nutrition Prescription PO    Current PO Diet NPO              Comments:  Pt on LOS list, admitted w/gi bleed and has had NPO or clear liquid diet since admit. Pt reports eating normally pta. Awaiting colonoscopy (scheduled at 4pm today). RD will monitor and make recs accordingly.         Electronically signed by:  Cristy Faust RD  02/13/17 3:09 PM  "

## 2017-02-13 NOTE — PLAN OF CARE
Problem: GI Endoscopy (Adult)  Goal: Signs and Symptoms of Listed Potential Problems Will be Absent or Manageable (GI Endoscopy)    02/13/17 4234   GI Endoscopy   Problems Assessed (GI Endoscopy) all   Problems Present (GI Endoscopy) none

## 2017-02-13 NOTE — ANESTHESIA PREPROCEDURE EVALUATION
Anesthesia Evaluation     Patient summary reviewed and Nursing notes reviewed   NPO Status: > 4 hours   Airway   Mallampati: II  TM distance: >3 FB  Neck ROM: limited  Dental    (+) poor dentation    Pulmonary     breath sounds clear to auscultation  (+) a smoker Current, COPD, shortness of breath,   Cardiovascular     Rhythm: irregular    (+) hypertension poorly controlled,       Neuro/Psych  GI/Hepatic/Renal/Endo    (+) obesity,  hepatitis, liver disease, diabetes mellitus,     Musculoskeletal     Abdominal  - normal exam   Substance History      OB/GYN          Other                                    Anesthesia Plan    ASA 3     MAC     Anesthetic plan and risks discussed with patient.

## 2017-02-14 NOTE — PROGRESS NOTES
[facility] Medicine Services  INPATIENT PROGRESS NOTE    Length of Stay: 8  Date of Admission: 2/6/2017  Primary Care Physician: No primary care provider on file.    Subjective   Chief Complaint: Dyspnea  HPI: Patient is a 57 y.o. female complaining of bleeding in her colostomy bag. She has had this problem on and off but never so severe.  She was evaluated by Dr. Osei and Dr. Cary.  She was recommended colonoscopy, but overnight developed fevers and respiratory failure with hypoxia.  CXR shows trace pleural effusions and minimal basilar atelectasis.     Patient was started on IV rocephin and azithromycin yesterday for suspected pneumonia.  She has improved with this.  Fever has resolved. Stoma output is present today. No further bleeding.    Plan for re-anastomosis later this week.  Liquid diet.    Review of Systems   Constitutional: Negative for chills and fever (resolved).   Respiratory: Positive for cough. Negative for shortness of breath and wheezing.    Cardiovascular: Negative for chest pain.   Gastrointestinal: Negative for abdominal distention, abdominal pain, blood in stool (stool output present today), diarrhea, nausea and vomiting.      All pertinent negatives and positives are as above. All other systems have been reviewed and are negative unless otherwise stated.     Objective    Temp:  [97.2 °F (36.2 °C)-99.2 °F (37.3 °C)] 98 °F (36.7 °C)  Heart Rate:  [66-92] 66  Resp:  [16-20] 20  BP: (140-194)/(62-98) 152/90  Physical Exam   Constitutional: She is oriented to person, place, and time. She appears well-developed and well-nourished.   HENT:   Head: Normocephalic and atraumatic.   Eyes: Conjunctivae and EOM are normal.   Neck: Normal range of motion. Neck supple.   Cardiovascular: Normal rate, regular rhythm, normal heart sounds and intact distal pulses.  Exam reveals no gallop and no friction rub.    No murmur heard.  Pulmonary/Chest: Effort normal. No  respiratory distress. She has no wheezes. She has no rhonchi. She has no rales.   Abdominal: Soft. Bowel sounds are normal. She exhibits no distension. There is no tenderness.   Colostomy output present, no obvious blood, no melena   Musculoskeletal: Normal range of motion. She exhibits no edema.   Neurological: She is alert and oriented to person, place, and time.   Skin: Skin is warm and dry. No erythema.   Psychiatric: She has a normal mood and affect. Her behavior is normal. Judgment and thought content normal.     Results Review:  I have reviewed the labs, radiology results, and diagnostic studies.    Laboratory Data:   Recent Results (from the past 24 hour(s))   POC Glucose Fingerstick    Collection Time: 02/13/17 11:39 AM   Result Value Ref Range    Glucose 133 (H) 70 - 130 mg/dL   Comprehensive Metabolic Panel    Collection Time: 02/14/17  6:40 AM   Result Value Ref Range    Glucose 139 (H) 60 - 100 mg/dL    BUN 42 (H) 7 - 21 mg/dL    Creatinine 1.06 (H) 0.50 - 1.00 mg/dL    Sodium 143 137 - 145 mmol/L    Potassium 3.9 3.5 - 5.1 mmol/L    Chloride 105 95 - 110 mmol/L    CO2 32.0 (H) 22.0 - 31.0 mmol/L    Calcium 8.6 8.4 - 10.2 mg/dL    Total Protein 6.1 (L) 6.3 - 8.6 g/dL    Albumin 3.00 (L) 3.40 - 4.80 g/dL    ALT (SGPT) 45 9 - 52 U/L    AST (SGOT) 38 (H) 14 - 36 U/L    Alkaline Phosphatase 75 38 - 126 U/L    Total Bilirubin 0.3 0.2 - 1.3 mg/dL    eGFR Non African Amer 53 (L) >60 mL/min/1.73    Globulin 3.1 2.3 - 3.5 gm/dL    A/G Ratio 1.0 (L) 1.1 - 1.8 g/dL    BUN/Creatinine Ratio 39.6 (H) 7.0 - 25.0    Anion Gap 6.0 5.0 - 15.0 mmol/L   CBC Auto Differential    Collection Time: 02/14/17  6:40 AM   Result Value Ref Range    WBC 5.94 3.20 - 9.80 10*3/mm3    RBC 3.37 (L) 3.77 - 5.16 10*6/mm3    Hemoglobin 10.5 (L) 12.0 - 15.5 g/dL    Hematocrit 30.6 (L) 35.0 - 45.0 %    MCV 90.8 80.0 - 98.0 fL    MCH 31.2 26.5 - 34.0 pg    MCHC 34.3 31.4 - 36.0 g/dL    RDW 12.6 11.5 - 14.5 %    RDW-SD 41.7 36.4 - 46.3 fl     MPV 10.4 8.0 - 12.0 fL    Platelets 140 (L) 150 - 450 10*3/mm3    Neutrophil % 71.7 37.0 - 80.0 %    Lymphocyte % 17.7 10.0 - 50.0 %    Monocyte % 10.6 0.0 - 12.0 %    Eosinophil % 0.0 0.0 - 7.0 %    Basophil % 0.0 0.0 - 2.0 %    Immature Grans % 0.0 0.0 - 0.5 %    Neutrophils, Absolute 4.26 2.00 - 8.60 10*3/mm3    Lymphocytes, Absolute 1.05 0.60 - 4.20 10*3/mm3    Monocytes, Absolute 0.63 0.00 - 0.90 10*3/mm3    Eosinophils, Absolute 0.00 0.00 - 0.70 10*3/mm3    Basophils, Absolute 0.00 0.00 - 0.20 10*3/mm3    Immature Grans, Absolute 0.00 0.00 - 0.02 10*3/mm3       Culture Data:   No results found for: BLOODCX  No results found for: URINECX  No results found for: RESPCX  No results found for: WOUNDCX  No results found for: STOOLCX  No components found for: BODYFLD    Radiology Data:   Imaging Results (last 24 hours)     Procedure Component Value Units Date/Time    CT Abdomen Pelvis With Contrast [[id number]] Collected:  02/08/17 1649     Updated:  02/08/17 1659    Narrative:       PROCEDURE: CT abdomen and pelvis with contrast    COMPARISON: CT abdomen pelvis without contrast February 6, 2017    HISTORY: Gastrointestinal hemorrhage, unspecified. Pale ostomy  stoma and no output    TECHNIQUE: Multiple contiguous axial images are obtained of the  abdomen and pelvis axial the intravenous administration of 94 mL  Isovue-300. Coronal and sagittal multiplanar reformatted images  are also reconstructed and reviewed. The dose length product is  822    FINDINGS: Minimal bibasilar atelectasis with trace bilateral  pleural effusions. Heart size is normal. Borderline enlarged  cardiophrenic lymph nodes.    Subcentimeter low-attenuation lesion in the left lateral segment  of the liver is nonspecific although statistically represents a  cyst or hemangioma if patient has no underlying liver disease or  history of malignancy. Liver is otherwise unremarkable. There may  be gallbladder wall thickening with questionable  perivesicular  haziness or fat stranding which is new compared to the prior  exam. The spleen, pancreas, adrenal glands and kidneys are  unremarkable.    No lymphadenopathy in the abdomen, retroperitoneum or pelvis by  CT size criteria.    Bladder is unremarkable. Uterus and ovaries are grossly normal in  size. Postoperative changes from prior partial colectomy. Left  lower quadrant colostomy. There is probably a small amount of  intraperitoneal fat herniating through the colostomy defect.  Bowel is otherwise unremarkable. No bowel obstruction. No  abnormal bowel dilatation or wall thickening. No free fluid.    No acute osseous abnormality.      Impression:       1. There may be mild gallbladder wall thickening with  questionable pericholecystic stranding or inflammation. Acute  cholecystitis may have a similar appearance in the appropriate  clinical setting.2. Minimal bibasilar atelectasis with trace  bilateral pleural effusions..3. Left lower quadrant colostomy.  There is probably a small amount of fat herniating through the  colostomy defect. Bowel is otherwise unremarkable. No bowel  obstruction, dilatation or wall thickening.4. There are several  borderline enlarged cardiophrenic lymph nodes, nonspecific.  However, these are unchanged compared to a CT chest may 18 2014    Electronically signed by:  Yoni Quinn MD  2/8/2017 4:58  PM CST Workstation: [id number]          I have reviewed the patient current medications.     Assessment/Plan     Principal Problem:    Acute respiratory failure with hypoxia  Active Problems:    Essential hypertension    Colostomy present    COPD with acute exacerbation    Acute GI hemorrhage    Pneumonia    Atelectasis      Plan:  Rocephin/azithromycin  D/C steroids  GI/General surgery following  Plan is for re-anastomosis  Clear liquid diet      Discharge Planning: I expect patient to be discharged to home per surgery.    Harvey Mcintyre, APRN   02/14/17   11:15  AM

## 2017-02-14 NOTE — PLAN OF CARE
Problem: Patient Care Overview (Adult)  Goal: Plan of Care Review  Outcome: Ongoing (interventions implemented as appropriate)    02/13/17 5881   Coping/Psychosocial Response Interventions   Plan Of Care Reviewed With patient   Patient Care Overview   Progress no change   Outcome Evaluation   Outcome Summary/Follow up Plan colonoscopy done. still on clear liquids per MD.        Goal: Discharge Needs Assessment  Outcome: Ongoing (interventions implemented as appropriate)    Problem: Gastrointestinal Bleeding (Adult)  Goal: Signs and Symptoms of Listed Potential Problems Will be Absent or Manageable (Gastrointestinal Bleeding)  Outcome: Ongoing (interventions implemented as appropriate)    Problem: Colostomy (Adult)  Goal: Signs and Symptoms of Listed Potential Problems Will be Absent or Manageable (Colostomy)  Outcome: Ongoing (interventions implemented as appropriate)    Problem: Pressure Ulcer Risk (Chaim Scale) (Adult,Obstetrics,Pediatric)  Goal: Skin Integrity  Outcome: Ongoing (interventions implemented as appropriate)

## 2017-02-14 NOTE — PLAN OF CARE
Problem: Patient Care Overview (Adult)  Goal: Plan of Care Review  Outcome: Ongoing (interventions implemented as appropriate)    02/14/17 0649   Coping/Psychosocial Response Interventions   Plan Of Care Reviewed With patient   Patient Care Overview   Progress no change   Outcome Evaluation   Outcome Summary/Follow up Plan pt states she is still in pain but has slept throughout the night; vital signs stable at this time; will continue to monitor         Problem: Gastrointestinal Bleeding (Adult)  Goal: Signs and Symptoms of Listed Potential Problems Will be Absent or Manageable (Gastrointestinal Bleeding)  Outcome: Ongoing (interventions implemented as appropriate)    Problem: Colostomy (Adult)  Goal: Signs and Symptoms of Listed Potential Problems Will be Absent or Manageable (Colostomy)  Outcome: Ongoing (interventions implemented as appropriate)    Problem: Pressure Ulcer Risk (Chaim Scale) (Adult,Obstetrics,Pediatric)  Goal: Skin Integrity  Outcome: Ongoing (interventions implemented as appropriate)

## 2017-02-14 NOTE — PLAN OF CARE
Problem: GI Endoscopy (Adult)  Goal: Signs and Symptoms of Listed Potential Problems Will be Absent or Manageable (GI Endoscopy)  Outcome: Outcome(s) achieved Date Met:  02/13/17 02/13/17 8766   GI Endoscopy   Problems Assessed (GI Endoscopy) all   Problems Present (GI Endoscopy) none

## 2017-02-14 NOTE — ANESTHESIA POSTPROCEDURE EVALUATION
Patient: Eva Troncoso    Procedure Summary     Date Anesthesia Start Anesthesia Stop Room / Location    02/13/17 1748 1805 [facility] ENDOSCOPY 2 / [facility] ENDOSCOPY       Procedure Diagnosis Surgeon Provider    COLONOSCOPY (N/A ) Acute GI hemorrhage  (Acute GI hemorrhage [K92.2]) DO Deejay Leon CRNA          Anesthesia Type: MAC  Last vitals  BP     Temp      Pulse     Resp      SpO2        Post Anesthesia Care and Evaluation    Patient location during evaluation: bedside  Patient participation: complete - patient participated  Level of consciousness: awake and alert  Pain score: 0  Pain management: adequate  Airway patency: patent  Anesthetic complications: No anesthetic complications    Cardiovascular status: acceptable and stable  Respiratory status: acceptable, spontaneous ventilation and nasal cannula  Hydration status: acceptable

## 2017-02-14 NOTE — PLAN OF CARE
Problem: Patient Care Overview (Adult)  Goal: Plan of Care Review  Outcome: Ongoing (interventions implemented as appropriate)    02/14/17 1414   Coping/Psychosocial Response Interventions   Plan Of Care Reviewed With patient   Patient Care Overview   Progress improving   Outcome Evaluation   Outcome Summary/Follow up Plan Pt still on clear liquids; dr voss to see pt about surgery.        Goal: Discharge Needs Assessment  Outcome: Ongoing (interventions implemented as appropriate)    Problem: Gastrointestinal Bleeding (Adult)  Goal: Signs and Symptoms of Listed Potential Problems Will be Absent or Manageable (Gastrointestinal Bleeding)  Outcome: Ongoing (interventions implemented as appropriate)    Problem: Colostomy (Adult)  Goal: Signs and Symptoms of Listed Potential Problems Will be Absent or Manageable (Colostomy)  Outcome: Ongoing (interventions implemented as appropriate)    Problem: Pressure Ulcer Risk (Chaim Scale) (Adult,Obstetrics,Pediatric)  Goal: Skin Integrity  Outcome: Ongoing (interventions implemented as appropriate)

## 2017-02-14 NOTE — PROGRESS NOTES
"  Subjective:  Improved from pulmonary standpoint.  Underwent colonoscopy per Dr. Osei yesterday and reviewed the study with him.  Patient has approximately 35 cm of rectal and sigmoid colon stump.  Proximal colon was unremarkable.  We'll obvious source of the bleeding.  Patient originally underwent laparoscopic diverting colostomy to help heal wounds on her perineum from her coccyx injury from a fall and decubitus-type wounds.  This was back in 2013.  Thereafter patient underwent a revision of her stenotic ostomy at [facility].  Patient had diarrhea originally but workup was unremarkable for the diarrhea according to the records available to us.  Patient is interested in having her colostomy taken down.  I think options be to revise the colostomy since its most likely the source of intermittent bleeding and is also retracted and somewhat stenotic.  Other option would be to take her colostomy down.  It would require most likely an open abdominal procedure.       Visit Vitals   • /90 (BP Location: Right arm, Patient Position: Lying)   • Pulse 66   • Temp 98 °F (36.7 °C) (Oral)   • Resp 20   • Ht 65\" (165.1 cm)   • Wt 222 lb 7 oz (101 kg)   • LMP 03/11/1999 (Within Months)  Comment: age over 54   • SpO2 93%   • BMI 37.02 kg/m2       Lab Results (last 24 hours)     Procedure Component Value Units Date/Time    Comprehensive Metabolic Panel [[id number]]  (Abnormal) Collected:  02/14/17 0640    Specimen:  Blood Updated:  02/14/17 0708     Glucose 139 (H) mg/dL      BUN 42 (H) mg/dL      Creatinine 1.06 (H) mg/dL      Sodium 143 mmol/L      Potassium 3.9 mmol/L      Chloride 105 mmol/L      CO2 32.0 (H) mmol/L      Calcium 8.6 mg/dL      Total Protein 6.1 (L) g/dL      Albumin 3.00 (L) g/dL      ALT (SGPT) 45 U/L      AST (SGOT) 38 (H) U/L      Alkaline Phosphatase 75 U/L      Total Bilirubin 0.3 mg/dL      eGFR Non African Amer 53 (L) mL/min/1.73      Globulin 3.1 gm/dL      A/G Ratio 1.0 (L) g/dL      " BUN/Creatinine Ratio 39.6 (H)      Anion Gap 6.0 mmol/L     CBC & Differential [[id number]] Collected:  02/14/17 0640    Specimen:  Blood Updated:  02/14/17 0718    Narrative:       The following orders were created for panel order CBC & Differential.  Procedure                               Abnormality         Status                     ---------                               -----------         ------                     CBC Auto Differential[[id number]]         Abnormal            Final result                 Please view results for these tests on the individual orders.    CBC Auto Differential [[id number]]  (Abnormal) Collected:  02/14/17 0640    Specimen:  Blood Updated:  02/14/17 0718     WBC 5.94 10*3/mm3      RBC 3.37 (L) 10*6/mm3      Hemoglobin 10.5 (L) g/dL      Hematocrit 30.6 (L) %      MCV 90.8 fL      MCH 31.2 pg      MCHC 34.3 g/dL      RDW 12.6 %      RDW-SD 41.7 fl      MPV 10.4 fL      Platelets 140 (L) 10*3/mm3      Neutrophil % 71.7 %      Lymphocyte % 17.7 %      Monocyte % 10.6 %      Eosinophil % 0.0 %      Basophil % 0.0 %      Immature Grans % 0.0 %      Neutrophils, Absolute 4.26 10*3/mm3      Lymphocytes, Absolute 1.05 10*3/mm3      Monocytes, Absolute 0.63 10*3/mm3      Eosinophils, Absolute 0.00 10*3/mm3      Basophils, Absolute 0.00 10*3/mm3      Immature Grans, Absolute 0.00 10*3/mm3     Tissue Exam [[id number]] Collected:  02/13/17 1800    Specimen:  Tissue from Large Intestine, Sigmoid Colon Updated:  02/14/17 0838            Physical exam: Alert and appropriate perianal wounds are well-healed and appear intact.  Patient's abdomen is soft ostomy is retracted but currently functioning    Assessment : Retracted diverting colostomy with intermittent bleeding likely related to the stenotic colostomy    Plan: We'll obtain a Hypaque enema to address her distal stump.  We'll review any other records can be obtained from [facility].  We'll discuss this further with the patient tomorrow.  Options  would be to go home for a period and set this up as an outpatient procedure since her ostomy appears to be working at this time.  This would allow her to get a well from her recent pneumonia she's been treated for.  Other options would go and do the surgery this hospitalization.

## 2017-02-15 NOTE — PROGRESS NOTES
[facility] Medicine Services  INPATIENT PROGRESS NOTE    Length of Stay: 9  Date of Admission: 2/6/2017  Primary Care Physician: No primary care provider on file.    Subjective   Chief Complaint: Dyspnea  HPI: Patient is a 57 y.o. female complaining of bleeding in her colostomy bag. She has had this problem on and off but never so severe.  She was evaluated by Dr. Osei and Dr. Cary.  She was recommended colonoscopy, but overnight developed fevers and respiratory failure with hypoxia.  CXR shows trace pleural effusions and minimal basilar atelectasis.     Patient was started on IV rocephin and azithromycin yesterday for suspected pneumonia.  She has improved with this.  Fever has resolved. Stoma output is present today. No further bleeding.    Surgery to be deferred due to respiratory status.  When stable for discharge patient will be referred outpatient for surgical follow-up to arrange for re-anastamosis.     Patient ambulated in the [place] and O2 drop to 80% with activity.    Review of Systems   Constitutional: Negative for chills and fever (resolved).   Respiratory: Negative for cough, shortness of breath and wheezing.    Cardiovascular: Negative for chest pain.   Gastrointestinal: Negative for abdominal distention, abdominal pain, blood in stool (stool output present today), diarrhea, nausea and vomiting.      All pertinent negatives and positives are as above. All other systems have been reviewed and are negative unless otherwise stated.     Objective    Temp:  [98.1 °F (36.7 °C)-98.6 °F (37 °C)] 98.1 °F (36.7 °C)  Heart Rate:  [63-72] 70  Resp:  [16-20] 20  BP: (146-158)/(72-77) 146/74  Physical Exam   Constitutional: She is oriented to person, place, and time. She appears well-developed and well-nourished.   HENT:   Head: Normocephalic and atraumatic.   Eyes: Conjunctivae and EOM are normal.   Neck: Normal range of motion. Neck supple.   Cardiovascular: Normal  rate, regular rhythm, normal heart sounds and intact distal pulses.  Exam reveals no gallop and no friction rub.    No murmur heard.  Pulmonary/Chest: Effort normal. No respiratory distress. She has no wheezes. She has no rhonchi. She has no rales.   Abdominal: Soft. Bowel sounds are normal. She exhibits no distension. There is no tenderness.   Colostomy output present, no obvious blood, no melena   Musculoskeletal: Normal range of motion. She exhibits no edema.   Neurological: She is alert and oriented to person, place, and time.   Skin: Skin is warm and dry. No erythema.   Psychiatric: She has a normal mood and affect. Her behavior is normal. Judgment and thought content normal.     Results Review:  I have reviewed the labs, radiology results, and diagnostic studies.    Laboratory Data:   Recent Results (from the past 24 hour(s))   Comprehensive Metabolic Panel    Collection Time: 02/15/17 10:50 AM   Result Value Ref Range    Glucose 92 60 - 100 mg/dL    BUN 30 (H) 7 - 21 mg/dL    Creatinine 0.91 0.50 - 1.00 mg/dL    Sodium 145 137 - 145 mmol/L    Potassium 3.1 (L) 3.5 - 5.1 mmol/L    Chloride 109 95 - 110 mmol/L    CO2 36.0 (H) 22.0 - 31.0 mmol/L    Calcium 8.1 (L) 8.4 - 10.2 mg/dL    Total Protein 5.4 (L) 6.3 - 8.6 g/dL    Albumin 2.70 (L) 3.40 - 4.80 g/dL    ALT (SGPT) 45 9 - 52 U/L    AST (SGOT) 39 (H) 14 - 36 U/L    Alkaline Phosphatase 70 38 - 126 U/L    Total Bilirubin 0.3 0.2 - 1.3 mg/dL    eGFR Non African Amer 63 >60 mL/min/1.73    Globulin 2.7 2.3 - 3.5 gm/dL    A/G Ratio 1.0 (L) 1.1 - 1.8 g/dL    BUN/Creatinine Ratio 33.0 (H) 7.0 - 25.0    Anion Gap 0.0 (L) 5.0 - 15.0 mmol/L   CBC Auto Differential    Collection Time: 02/15/17 10:50 AM   Result Value Ref Range    WBC 5.43 3.20 - 9.80 10*3/mm3    RBC 3.26 (L) 3.77 - 5.16 10*6/mm3    Hemoglobin 10.1 (L) 12.0 - 15.5 g/dL    Hematocrit 30.3 (L) 35.0 - 45.0 %    MCV 92.9 80.0 - 98.0 fL    MCH 31.0 26.5 - 34.0 pg    MCHC 33.3 31.4 - 36.0 g/dL    RDW 12.9  11.5 - 14.5 %    RDW-SD 43.8 36.4 - 46.3 fl    MPV 10.1 8.0 - 12.0 fL    Platelets 108 (L) 150 - 450 10*3/mm3    Neutrophil % 54.1 37.0 - 80.0 %    Lymphocyte % 33.7 10.0 - 50.0 %    Monocyte % 11.6 0.0 - 12.0 %    Eosinophil % 0.2 0.0 - 7.0 %    Basophil % 0.2 0.0 - 2.0 %    Immature Grans % 0.2 0.0 - 0.5 %    Neutrophils, Absolute 2.94 2.00 - 8.60 10*3/mm3    Lymphocytes, Absolute 1.83 0.60 - 4.20 10*3/mm3    Monocytes, Absolute 0.63 0.00 - 0.90 10*3/mm3    Eosinophils, Absolute 0.01 0.00 - 0.70 10*3/mm3    Basophils, Absolute 0.01 0.00 - 0.20 10*3/mm3    Immature Grans, Absolute 0.01 0.00 - 0.02 10*3/mm3       Culture Data:   No results found for: BLOODCX  No results found for: URINECX  No results found for: RESPCX  No results found for: WOUNDCX  No results found for: STOOLCX  No components found for: BODYFLD    Radiology Data:   Imaging Results (last 24 hours)     Procedure Component Value Units Date/Time    CT Abdomen Pelvis With Contrast [[id number]] Collected:  02/08/17 1649     Updated:  02/08/17 1659    Narrative:       PROCEDURE: CT abdomen and pelvis with contrast    COMPARISON: CT abdomen pelvis without contrast February 6, 2017    HISTORY: Gastrointestinal hemorrhage, unspecified. Pale ostomy  stoma and no output    TECHNIQUE: Multiple contiguous axial images are obtained of the  abdomen and pelvis axial the intravenous administration of 94 mL  Isovue-300. Coronal and sagittal multiplanar reformatted images  are also reconstructed and reviewed. The dose length product is  822    FINDINGS: Minimal bibasilar atelectasis with trace bilateral  pleural effusions. Heart size is normal. Borderline enlarged  cardiophrenic lymph nodes.    Subcentimeter low-attenuation lesion in the left lateral segment  of the liver is nonspecific although statistically represents a  cyst or hemangioma if patient has no underlying liver disease or  history of malignancy. Liver is otherwise unremarkable. There may  be gallbladder  wall thickening with questionable perivesicular  haziness or fat stranding which is new compared to the prior  exam. The spleen, pancreas, adrenal glands and kidneys are  unremarkable.    No lymphadenopathy in the abdomen, retroperitoneum or pelvis by  CT size criteria.    Bladder is unremarkable. Uterus and ovaries are grossly normal in  size. Postoperative changes from prior partial colectomy. Left  lower quadrant colostomy. There is probably a small amount of  intraperitoneal fat herniating through the colostomy defect.  Bowel is otherwise unremarkable. No bowel obstruction. No  abnormal bowel dilatation or wall thickening. No free fluid.    No acute osseous abnormality.      Impression:       1. There may be mild gallbladder wall thickening with  questionable pericholecystic stranding or inflammation. Acute  cholecystitis may have a similar appearance in the appropriate  clinical setting.2. Minimal bibasilar atelectasis with trace  bilateral pleural effusions..3. Left lower quadrant colostomy.  There is probably a small amount of fat herniating through the  colostomy defect. Bowel is otherwise unremarkable. No bowel  obstruction, dilatation or wall thickening.4. There are several  borderline enlarged cardiophrenic lymph nodes, nonspecific.  However, these are unchanged compared to a CT chest may 18 2014    Electronically signed by:  Yoni Quinn MD  2/8/2017 4:58  PM CST Workstation: [email or web address]          I have reviewed the patient current medications.     Assessment/Plan     Principal Problem:    Acute respiratory failure with hypoxia  Active Problems:    Essential hypertension    Colostomy present    COPD with acute exacerbation    Acute GI hemorrhage    Pneumonia    Atelectasis      Plan:  Rocephin/azithromycin  Solumedrol  O2, solumedrol  Advance to regular diet  PT/OT  Outpatient surgical follow-up on d/c, either Dr. Cary or her colorectal surgeons in [place]      Discharge Planning: I  expect patient to be discharged to home 1-2 days.    Harvey Mcintyre, APRN   02/15/17   3:06 PM

## 2017-02-15 NOTE — CONSULTS
Nutrition Services    Patient Name:  Eva Troncoso  YOB: 1960  MRN: [id number]  Admit Date:  2/6/2017        Pt reports appetite has improved and denied any GI complaints.  Advanced to regular diet and past 2 intakes 100%.  Pt had colonoscopy/GI consult and may need future revision of colostomy when pneumonia improved.  Pt will schedule future GI surgery post d/c.      Electronically signed by:  Germaine Bills RD  02/15/17 2:06 PM

## 2017-02-15 NOTE — PLAN OF CARE
Problem: Patient Care Overview (Adult)  Goal: Plan of Care Review  Outcome: Ongoing (interventions implemented as appropriate)    02/15/17 1411   Coping/Psychosocial Response Interventions   Plan Of Care Reviewed With patient   Patient Care Overview   Progress improving   Outcome Evaluation   Outcome Summary/Follow up Plan Pt tolerating regular diet w/PO intake of 100%. No need for supplements at this time due to BMI 37, compatible with obesity. RD will make recommendations as needed.

## 2017-02-16 NOTE — PLAN OF CARE
Problem: Patient Care Overview (Adult)  Goal: Plan of Care Review  Outcome: Ongoing (interventions implemented as appropriate)    02/16/17 0945   Coping/Psychosocial Response Interventions   Plan Of Care Reviewed With patient   Outcome Evaluation   Outcome Summary/Follow up Plan OT lorna completed this date. Pt SBA to CGA for t/f, Pt O2 drops into the 80s on RA with functional mobilty and ADL tasks. Pt would benefit from skilled OT services to increase endurance, strength, safety, and education to increase independence and success with ADL to return to PLOF before d/c home with assist.          Problem: Inpatient Occupational Therapy  Goal: Patient Education Goal STG- OT  Outcome: Ongoing (interventions implemented as appropriate)    02/16/17 0945   Patient Education OT STG   Patient Education OT STG, Date Established 02/16/17   Patient Education OT STG, Time to Achieve 1 wk   Patient Education OT STG, Education Type HEP;home safety   Patient Education OT STG, Education Understanding independent;demonstrates adequately;verbalizes understanding       Goal: ADL Goal LTG- OT  Outcome: Ongoing (interventions implemented as appropriate)    02/16/17 0945   ADL OT LTG   ADL OT LTG, Date Established 02/16/17   ADL OT LTG, Time to Achieve by discharge   ADL OT LTG, Activity Type ADL skills   ADL OT LTG, [place] Level modified independent;assistive device  (AE as needed, pt incontinent able to clean self)       Goal: Activity Tolerance Goal STG- OT  Outcome: Ongoing (interventions implemented as appropriate)    02/16/17 0945   Activity Tolerance OT STG   Activity Tolerance Goal OT STG, Date Established 02/16/17   Activity Tolerance Goal OT STG, Time to Achieve 1 wk   Activity Tolerance Goal OT STG, Activity Level 15 min activity;O2 sat >/equal to 90%

## 2017-02-16 NOTE — PROGRESS NOTES
"  Subjective:  Reviewed reports from [facility] from prior surgery.  Overall patient is much improved.  She is having output out of her ostomy and her ostomy appears to be functioning.  Patient is very noncompliant and pulmonary toilet and she's not even getting out of the bed to the bathroom.  From a pulmonary standpoint her pneumonia is much improved and she's been on IV steroids as well.       Visit Vitals   • BP (!) 185/95 (BP Location: Right arm, Patient Position: Lying)   • Pulse 67   • Temp 97.4 °F (36.3 °C) (Oral)   • Resp 18   • Ht 65\" (165.1 cm)   • Wt 222 lb 7 oz (101 kg)   • LMP 03/11/1999 (Within Months)  Comment: age over 54   • SpO2 96%   • BMI 37.02 kg/m2       Lab Results (last 24 hours)     Procedure Component Value Units Date/Time    Comprehensive Metabolic Panel [[id number]]  (Abnormal) Collected:  02/15/17 1050    Specimen:  Blood Updated:  02/15/17 1110     Glucose 92 mg/dL      BUN 30 (H) mg/dL      Creatinine 0.91 mg/dL      Sodium 145 mmol/L      Potassium 3.1 (L) mmol/L      Chloride 109 mmol/L      CO2 36.0 (H) mmol/L      Calcium 8.1 (L) mg/dL      Total Protein 5.4 (L) g/dL      Albumin 2.70 (L) g/dL      ALT (SGPT) 45 U/L      AST (SGOT) 39 (H) U/L      Alkaline Phosphatase 70 U/L      Total Bilirubin 0.3 mg/dL      eGFR Non African Amer 63 mL/min/1.73      Globulin 2.7 gm/dL      A/G Ratio 1.0 (L) g/dL      BUN/Creatinine Ratio 33.0 (H)      Anion Gap 0.0 (L) mmol/L     Tissue Exam [[id number]] Collected:  02/13/17 1800    Specimen:  Tissue from Large Intestine, Sigmoid Colon Updated:  02/15/17 1128     Case Report --      Surgical Pathology Report                         Case: [id number]                                  Authorizing Provider:  Nader Osei DO       Collected:           02/13/2017 06:00 PM          Ordering Location:     [facility]             Received:            02/14/2017 08:38 AM                                 [facility]                   " "                                  Pathologist:           Loc Sepulveda MD                                                          Specimen:    Large Intestine, Sigmoid Colon, bx                                                          Final Diagnosis --      MUCOSA, COLON:       BENIGN LYMPHOID AGGREGATE INVOLVING MUCOSA OF THE COLON.       Comment --      The mucosa of the colon is negative for fibrinopurulent exudate, necrosis, cryptitis, thickening of the subepithelial collagen layer, lymphoepithelial lesions, granulomas and heavy diffuse lymphoplasmacytic infiltrates.         Gross Description --      The container is labeled \"sigmoid colon\" and has a nodular fragment of white soft tissue measuring 0.4 cm in greatest dimension.  It is entirely embedded.       Embedded Images --     Comprehensive Metabolic Panel [[id number]] Collected:  02/16/17 1041    Specimen:  Blood Updated:  02/16/17 1041    CBC & Differential [[id number]] Collected:  02/16/17 1041    Specimen:  Blood Updated:  02/16/17 1047    Narrative:       The following orders were created for panel order CBC & Differential.  Procedure                               Abnormality         Status                     ---------                               -----------         ------                     CBC Auto Differential[[id number]]         Abnormal            Final result                 Please view results for these tests on the individual orders.    CBC Auto Differential [[id number]]  (Abnormal) Collected:  02/16/17 1041    Specimen:  Blood Updated:  02/16/17 1047     WBC 5.39 10*3/mm3      RBC 3.41 (L) 10*6/mm3      Hemoglobin 10.8 (L) g/dL      Hematocrit 31.2 (L) %      MCV 91.5 fL      MCH 31.7 pg      MCHC 34.6 g/dL      RDW 12.6 %      RDW-SD 42.1 fl      MPV 10.3 fL      Platelets 137 (L) 10*3/mm3      Neutrophil % 79.3 %      Lymphocyte % 14.7 %      Monocyte % 5.4 %      Eosinophil % 0.0 %      Basophil % 0.0 %      Immature Grans % 0.6 (H) %      " Neutrophils, Absolute 4.28 10*3/mm3      Lymphocytes, Absolute 0.79 10*3/mm3      Monocytes, Absolute 0.29 10*3/mm3      Eosinophils, Absolute 0.00 10*3/mm3      Basophils, Absolute 0.00 10*3/mm3      Immature Grans, Absolute 0.03 (H) 10*3/mm3             Physical exam: Nontoxic-appearing alert and appropriate  Abdomen soft with ostomy functioning with greenish yellowish liquid stool in bag    Assessment : Improved no evidence of bleeding      Plan: Spoke with the patient and CEDRICK Mcintyre her primary provider.  I do not recommend an open abdominal procedure at this point due to the fact that I think She would be at significant risk from a pulmonary standpoint and overall.  I would like her to prove that she has a near solid stool coming out of her ostomy prior to consider taking her ostomy down.  It was some concern diarrhea and uncontrollable of her bowel movements was part of the reason for the colostomy before on reviewing the records however there is no evidence of any injury to sphincters or incontinence per the reports.  I would also like her to get a few weeks out from her recent pneumonia and get off steroids for reconsider doing any kind of abdominal surgery as well.  Other options we reviewed eyes her ostomy which could be done but if she could be hooked up that would be a better option overall I think.  I would morning glad to see her in the clinic a couple weeks after discharge to further discuss this with her and consider further surgery.  I really encouraged patient to get up out of the bed and move around more and to clearly work on her pulmonary toilet.  I went over all this with the patient yesterday and again today.

## 2017-02-16 NOTE — PLAN OF CARE
Problem: Patient Care Overview (Adult)  Goal: Plan of Care Review  Outcome: Ongoing (interventions implemented as appropriate)    02/16/17 1641   Coping/Psychosocial Response Interventions   Plan Of Care Reviewed With patient   Patient Care Overview   Progress improving   Outcome Evaluation   Outcome Summary/Follow up Plan pt has been resting well and had pain under control with PO analgesic. No SOA at rest, but does report some SOA with exertion       Goal: Discharge Needs Assessment  Outcome: Ongoing (interventions implemented as appropriate)    Problem: Gastrointestinal Bleeding (Adult)  Goal: Signs and Symptoms of Listed Potential Problems Will be Absent or Manageable (Gastrointestinal Bleeding)  Outcome: Ongoing (interventions implemented as appropriate)    Problem: Colostomy (Adult)  Goal: Signs and Symptoms of Listed Potential Problems Will be Absent or Manageable (Colostomy)  Outcome: Ongoing (interventions implemented as appropriate)    Problem: Pressure Ulcer Risk (Chaim Scale) (Adult,Obstetrics,Pediatric)  Goal: Skin Integrity  Outcome: Ongoing (interventions implemented as appropriate)

## 2017-02-16 NOTE — PROGRESS NOTES
Acute Care - Occupational Therapy Initial Evaluation  [facility] [place]     Patient Name: Eva Troncoso  : 1960  MRN: [id number]  Today's Date: 2017  Onset of Illness/Injury or Date of Surgery Date: 17  Date of Referral to OT: 02/15/17  Referring Physician: CEDRICK Lima    Admit Date: 2017       ICD-10-CM ICD-9-CM   1. Acute GI hemorrhage K92.2 578.9   2. PICC (peripherally inserted central catheter) in place Z45.2 V58.81   3. Impaired mobility and ADLs Z74.09 799.89   4. Abnormality of gait and mobility R26.9 781.2     Patient Active Problem List   Diagnosis   • Obesity   • Essential hypertension   • Depressive disorder   • Compression fracture of lumbar spine, non-traumatic   • Colostomy present   • Chronic pain   • COPD with acute exacerbation   • Anxiety state   • Chronic hepatitis C without hepatic coma   • Acute GI hemorrhage   • Acute respiratory failure with hypoxia   • Pneumonia   • Atelectasis     Past Medical History   Diagnosis Date   • Anxiety state    • Chronic depression    • Chronic pain    • Compression fracture of lumbar spine, non-traumatic    • Depressive disorder    • Essential hypertension    • Obesity    • Pericardial effusion      Past Surgical History   Procedure Laterality Date   • Cardiac catheterization  2014     50766 (1)   -  Successful PCI to the proximal LAD with a V stent reducing 99% stenosis to 0%   • Appendectomy  12/10/2012     Laparoscopic (1) - Acute suppurative appendicitis   • Shoulder surgery  2015     (1) - Communited 3 part fractured humerus, proximal left shoulder. Open reduction and internal fixation   • Tracheostomy  2014     (1) - Percutaneous endoscopic gastrostomy tube placement.;            OT ASSESSMENT FLOWSHEET (last 72 hours)      OT Evaluation       17 1034 17 0945 02/15/17 2000          Rehab Evaluation    Document Type evaluation  - evaluation  -       Subjective Information agree to therapy   - agree to therapy;complains of;pain  -       Patient Effort, Rehab Treatment good  -LM good  -       Symptoms Noted During/After Treatment none  - shortness of breath  -       General Information    Patient Profile Review yes  - yes  -       Onset of Illness/Injury or Date of Surgery Date 02/06/17  -LM 02/06/17  -       Referring Physician CEDRICK Lima  - Harvey Mcintyre, CEDRICK  -       General Observations Pt lying in bed with HOB elevated.  Pt pleasant and agreeable to PT eval.  - Pt supine HOB elevated on room air with bed alarm on. Pt alert but tired =.  -       Pertinent History Of Current Problem Pt admitted with bleeding inside colostomy bag.  - Pt came in with difficulty with colostomy.   -       Precautions/Limitations fall precautions;other (see comments)   Vital Signs  - fall precautions   vital signs O2 drops with activity  -       Prior Level of Function independent:;all household mobility;ADL's;cooking;cleaning  - independent:;all household mobility;ADL's;home management;using stairs   pt is incontinent, but able to complete cleaning  -       Equipment Currently Used at Home colostomy/ostomy supplies   Has rollator and SC but does not use  - colostomy/ostomy supplies  -       Plans/Goals Discussed With patient  - patient  -       Risks Reviewed patient:;change in vital signs  -        Benefits Reviewed patient:;improve function;increase independence;increase strength;increase balance  - patient:  -       Barriers to Rehab none identified  - medically complex  -       Living Environment    Lives With spouse;child(jacqueline), adult    and Son  - spouse;child(jacqueline), adult   son  -       Living Arrangements house  - house  -       Home Accessibility stairs to enter home  - stairs to enter home;other (see comments);bed and bath on same level  -       Number of Stairs to Enter Home 3  -LM 3  -       Stair Railings at Home none   - none  -       Transportation Available family or friend will provide     - family or friend will provide     -       Clinical Impression    Date of Referral to OT  02/15/17  -       OT Diagnosis  Impaired mobility and ADL  -       Prognosis  good  -       Functional Level At Time Of Evaluation  Pt O2 dropped with functional mobility and don and doff socks. Pt was SBA to CGA for functional mobility in room and SBA to mod I for bed mobility. Pt set up and supervision with don and doff socks.   -       Impairments Found (describe specific impairments)  aerobic capacity/endurance;gait, locomotion, and balance;motor function;muscle performance  -       Patient/Family Goals Statement  Pt wants to return home.   -       Criteria for Skilled Therapeutic Interventions Met  yes;treatment indicated  -       Rehab Potential  good, to achieve stated therapy goals  -       Therapy Frequency  other (see comments)   3-14x a week  -       Anticipated Discharge Disposition  home with assist  -       Functional Level Prior    Ambulation  0-->independent  -       Transferring  0-->independent  -       Toileting  0-->independent  -       Bathing  0-->independent  -       Dressing  0-->independent  -       Eating  0-->independent  -       Communication  0-->understands/communicates without difficulty  -       Swallowing  0-->swallows foods/liquids without difficulty  -       Vital Signs    Pre Systolic BP Rehab 145  -  -BH       Pre Treatment Diastolic BP 71  -LM 78  -BH       Post Systolic BP Rehab 190  -  -BH       Post Treatment Diastolic BP 88  -LM 76  -BH       Pretreatment Heart Rate (beats/min) 65  -LM 70  -BH       Posttreatment Heart Rate (beats/min) 66  -LM 67  -BH       Pre SpO2 (%) 94  -LM 93  -BH       O2 Delivery Pre Treatment room air  -LM room air  -       Intra SpO2 (%) 88   Post ambulation  -LM 90   dropped into 80s with don/doff socks  -        O2 Delivery Intra Treatment room air  -LM room air  -       Post SpO2 (%) 93  -LM 96  -BH       O2 Delivery Post Treatment room air  -LM room air  -       Pre Patient Position Supine  -LM Supine  -       Intra Patient Position Sitting  -LM Sitting  -BH       Post Patient Position Supine  -LM Supine  -BH       Pain Assessment    Pain Assessment 0-10  -LM 0-10  -BH       Pain Score 5  -LM 6  -BH       Post Pain Score 4  -LM 5  -BH       Pain Location Abdomen  -LM --   belly and back  -       Pain Intervention(s) Repositioned;Ambulation/increased activity  -LM Repositioned   pt declined need for intervention,RN stated she had pain med  -       Vision Assessment/Intervention    Visual Impairment WFL with corrective lenses   Reading glasses  -LM WFL with corrective lenses   reading glasses  -       Cognitive Assessment/Intervention    Current Cognitive/Communication Assessment functional  - functional  -       Orientation Status oriented x 4  -LM oriented x 4  -       Follows Commands/Answers Questions 100% of the time  -LM 75% of the time;100% of the time;able to follow single-step instructions;needs cueing  -       Personal Safety mild impairment  -LM mild impairment  -       Personal Safety Interventions fall prevention program maintained;gait belt;muscle strengthening facilitated;nonskid shoes/slippers when out of bed  - supervised activity;nonskid shoes/slippers when out of bed;muscle strengthening facilitated;gait belt;fall prevention program maintained  -       ROM (Range of Motion)    General ROM no range of motion deficits identified  -        General ROM Detail  BUE WFL  -       MMT (Manual Muscle Testing)    General MMT Assessment no strength deficits identified  - upper extremity strength deficits identified  -       General MMT Assessment Detail   4/5 BUE; BUE grossly 4/5  -       Muscle Tone Assessment    Muscle Tone Assessment   Left-Side Extremities;Right-Side  Extremities  -PT      Left-Side Extremities Muscle Tone Assessment   mildly decreased tone  -PT      Right-Side Extremities Muscle Tone Assessment   mildly decreased tone  -PT      Bed Mobility, Assessment/Treatment    Bed Mob, Supine to Sit, [place] independent   HOB flat and no BR  -LM conditional independence;supervision required  -       Bed Mob, Sit to Supine, [place] independent   HOB flat and no HR  -LM conditional independence;supervision required  -       Transfer Assessment/Treatment    Transfers, Sit-Stand [place] stand by assist  - verbal cues required;contact guard assist;stand by assist  -       Transfers, Stand-Sit [place] stand by assist  -LM supervision required;verbal cues required  -       Transfer, Comment  Pt O2 drops at times, pt poor endurance  -       Functional Mobility    Functional Mobility- Ind. Level  contact guard assist;verbal cues required;supervision required  -       Functional Mobility-Distance (Feet)  3  -       Functional Mobility- Comment  Pt walked beside bed with assist with safety with IV pole and pt fatigued and had to monitor O2  -       Lower Body Dressing Assessment/Training    LB Dressing Assess/Train, Clothing Type  doffing:;donning:;slipper socks  -       LB Dressing Assess/Train, Position  edge of bed;sitting  -       LB Dressing Assess/Train, [place]  verbal cues required;set up required;supervision required  -       LB Dressing Assess/Train, Comment  pt got SOB with O2 dropping down into the 80s but with rest raised into the 90s. Pt fatigued with task but stated no increased pain.   -       Motor Skills/Interventions    Additional Documentation  Fine Motor Coordination Training (Group);Balance Skills Training (Group)  -       Balance Skills Training    Sitting-Level of Assistance  Close supervision  -       Sitting-Balance Support  Feet supported  -       Sitting-Balance Activities  --   ADL and static no LOB  approx 5 minutes  -       Standing-Level of Assistance  Contact guard;Close supervision  -       Static Standing Balance Support  --   none  -       Standing-Balance Activities  --   static no LOB briefly approx 30 seconds  -       Fine Motor Coordination Training    Opposition  Right:;Left:;intact   WFL fair accuracy  -       Sensory Assessment/Intervention    Light Touch LLE;RLE  -LM LUE;RUE  -       LUE Light Touch  WNL  -       RUE Light Touch  WNL  -       LLE Light Touch WNL  -LM        RLE Light Touch WNL  -        General Therapy Interventions    Planned Therapy Interventions  activity intolerance;ADL retraining;adaptive equipment training;balance training;energy conservation;fine motor coordination training;home exercise program;strengthening;transfer training  -       Positioning and Restraints    Pre-Treatment Position in bed  - in bed  -       Post Treatment Position bed  - bed  -       In Bed supine;call light within reach;exit alarm on  - supine;notified nsg;call light within reach;encouraged to call for assist;exit alarm on  -         User Key  (r) = Recorded By, (t) = Taken By, (c) = Cosigned By    Initials Name Effective Dates     Abigail Deutsch, PT 06/15/16 -     PT Gala Wolff RN 07/12/16 -      Sandy Riley, OTR/L 10/17/16 -            Occupational Therapy Education     Title: PT OT SLP Therapies (Active)     Topic: Occupational Therapy (Active)     Point: Precautions (Done)    Description: Instruct learner(s) on prescribed precautions during self-care and functional transfers.    Learning Progress Summary    Learner Readiness Method Response Comment Documented by Status   Patient Acceptance E VU,NR   02/16/17 1526 Done                      User Key     Initials Effective Dates Name Provider Type Discipline     10/17/16 -  Sandy Riley, OTR/L Occupational Therapist OT                  OT Recommendation and Plan  Anticipated Discharge Disposition: home  with assist  Planned Therapy Interventions: activity intolerance, ADL retraining, adaptive equipment training, balance training, energy conservation, fine motor coordination training, home exercise program, strengthening, transfer training  Therapy Frequency: other (see comments) (3-14x a week)  Plan of Care Review  Plan Of Care Reviewed With: patient  Outcome Summary/Follow up Plan: OT lorna completed this date. Pt SBA to CGA for t/f, Pt O2 drops into the 80s on RA with functional mobilty and ADL tasks. Pt would benefit from skilled OT services to increase endurance, strength, safety, and education to increase independence and success with ADL to return to [facility] before d/c home with assist.           OT Goals       02/16/17 0945          Patient Education OT STG    Patient Education OT STG, Date Established 02/16/17  -      Patient Education OT STG, Time to Achieve 1 wk  -      Patient Education OT STG, Education Type HEP;home safety  -      Patient Education OT STG, Education Understanding independent;demonstrates adequately;verbalizes understanding  -      ADL OT LTG    ADL OT LTG, Date Established 02/16/17  -      ADL OT LTG, Time to Achieve by discharge  -      ADL OT LTG, Activity Type ADL skills  -      ADL OT LTG, [place] Level modified independent;assistive device   AE as needed, pt incontinent able to clean self  -      Activity Tolerance OT STG    Activity Tolerance Goal OT STG, Date Established 02/16/17  -      Activity Tolerance Goal OT STG, Time to Achieve 1 wk  -      Activity Tolerance Goal OT STG, Activity Level 15 min activity;O2 sat >/equal to 90%  -        User Key  (r) = Recorded By, (t) = Taken By, (c) = Cosigned By    Initials Name Provider Type     Sandy Riley OTR/L Occupational Therapist                Outcome Measures       02/16/17 1034 02/16/17 0945       How much help from another person do you currently need...    Turning from your back to your side while  in flat bed without using bedrails? 4  -LM      Moving from lying on back to sitting on the side of a flat bed without bedrails? 4  -LM      Moving to and from a bed to a chair (including a wheelchair)? 3  -LM      Standing up from a chair using your arms (e.g., wheelchair, bedside chair)? 3  -LM      Climbing 3-5 steps with a railing? 3  -LM      To walk in hospital room? 3  -LM      AM-PAC 6 Clicks Score 20  -LM      How much help from another is currently needed...    Putting on and taking off regular lower body clothing?  3  -BH     Bathing (including washing, rinsing, and drying)  3  -BH     Toileting (which includes using toilet bed pan or urinal)  3  -BH     Putting on and taking off regular upper body clothing  4  -BH     Taking care of personal grooming (such as brushing teeth)  3  -BH     Eating meals  4  -BH     Score  20  -BH     Functional Assessment    Outcome Measure Options AM-PAC 6 Clicks Basic Mobility (PT)  -LM AM-PAC 6 Clicks Daily Activity (OT)  -       User Key  (r) = Recorded By, (t) = Taken By, (c) = Cosigned By    Initials Name Provider Type    LM Abigail Deutsch, PT Physical Therapist     Sandy Riley OTR/L Occupational Therapist          Time Calculation:   OT Start Time: 0945  OT Stop Time: 1011  OT Time Calculation (min): 26 min    Therapy Charges for Today     Code Description Service Date Service Provider Modifiers Qty    [id number]  OT EVAL MOD COMPLEXITY 2 2/16/2017 Sandy Riley OTR/L GO 1    [id number]  OT SELFCARE CURRENT 2/16/2017 Sandy Riley OTR/L GO, CJ 1    [id number]  OT SELFCARE PROJECTED 2/16/2017 Sandy Riley OTR/L GO, CI 1          OT G-codes  OT Professional Judgement Used?: Yes  OT Functional Scales Options: AM-PAC 6 Clicks Daily Activity (OT)  Score: 20  Functional Limitation: Self care  Self Care Current Status (): At least 20 percent but less than 40 percent impaired, limited or restricted  Self Care Goal Status (): At least 1 percent  but less than 20 percent impaired, limited or restricted    Sandy Riley, OTR/L  2/16/2017

## 2017-02-16 NOTE — PROGRESS NOTES
Acute Care - Physical Therapy Initial Evaluation  [facility]     Patient Name: Eva Troncoso  : 1960  MRN: [id number]  Today's Date: 2017   Onset of Illness/Injury or Date of Surgery Date: 17  Date of Referral to PT: 02/15/17  Referring Physician: CEDRICK Lima      Admit Date: 2017     Visit Dx:    ICD-10-CM ICD-9-CM   1. Acute GI hemorrhage K92.2 578.9   2. PICC (peripherally inserted central catheter) in place Z45.2 V58.81   3. Impaired mobility and ADLs Z74.09 799.89   4. Abnormality of gait and mobility R26.9 781.2     Patient Active Problem List   Diagnosis   • Obesity   • Essential hypertension   • Depressive disorder   • Compression fracture of lumbar spine, non-traumatic   • Colostomy present   • Chronic pain   • COPD with acute exacerbation   • Anxiety state   • Chronic hepatitis C without hepatic coma   • Acute GI hemorrhage   • Acute respiratory failure with hypoxia   • Pneumonia   • Atelectasis     Past Medical History   Diagnosis Date   • Anxiety state    • Chronic depression    • Chronic pain    • Compression fracture of lumbar spine, non-traumatic    • Depressive disorder    • Essential hypertension    • Obesity    • Pericardial effusion      Past Surgical History   Procedure Laterality Date   • Cardiac catheterization  2014     89369 (1)   -  Successful PCI to the proximal LAD with a V stent reducing 99% stenosis to 0%   • Appendectomy  12/10/2012     Laparoscopic (1) - Acute suppurative appendicitis   • Shoulder surgery  2015     (1) - Communited 3 part fractured humerus, proximal left shoulder. Open reduction and internal fixation   • Tracheostomy  2014     (1) - Percutaneous endoscopic gastrostomy tube placement.;            PT ASSESSMENT (last 72 hours)      PT Evaluation       17 1034 17 0945    Rehab Evaluation    Document Type evaluation  -LM evaluation  -    Subjective Information agree to therapy  -LM agree to  therapy;complains of;pain  -    Patient Effort, Rehab Treatment good  -LM good  -BH    Symptoms Noted During/After Treatment none  - shortness of breath  -    General Information    Patient Profile Review yes  -LM yes  -    Onset of Illness/Injury or Date of Surgery Date 02/06/17  -     Referring Physician CEDRICK Lmia  -     General Observations Pt lying in bed with HOB elevated.  Pt pleasant and agreeable to PT eval.  - Pt supine HOB elevated on room air with bed alarm on. Pt alert but tired =.  -    Pertinent History Of Current Problem Pt admitted with bleeding inside colostomy bag.  - Pt came in with difficulty with colostomy.   -    Precautions/Limitations fall precautions;other (see comments)   Vital Signs  - fall precautions   vital signs O2 drops with activity  -    Prior Level of Function independent:;all household mobility;ADL's;cooking;cleaning  - independent:;all household mobility;ADL's;home management;using stairs   pt is incontinent, but able to complete cleaning  -    Equipment Currently Used at Home colostomy/ostomy supplies   Has rollator and SC but does not use  - colostomy/ostomy supplies  -    Plans/Goals Discussed With patient  -LM patient  -    Risks Reviewed patient:;change in vital signs  -     Benefits Reviewed patient:;improve function;increase independence;increase strength;increase balance  - patient:  -    Barriers to Rehab none identified  - medically complex  -    Living Environment    Lives With spouse;child(jacqueline), adult    and Son  - spouse;child(jacqueline), adult   son  -    Living Arrangements house  - house  -    Home Accessibility stairs to enter home  - stairs to enter home;other (see comments);bed and bath on same level  -    Number of Stairs to Enter Home 3  -LM 3  -BH    Stair Railings at Home none  -LM none  -    Transportation Available family or friend will provide     - family or friend will  provide     -    Clinical Impression    Date of Referral to PT 02/15/17  -LM     PT Diagnosis Abnormality of gait and mobility  -     Criteria for Skilled Therapeutic Interventions Met yes;treatment indicated  -LM     Pathology/Pathophysiology Noted (Describe Specifically for Each System) musculoskeletal;pulmonary  -LM     Impairments Found (describe specific impairments) aerobic capacity/endurance;gait, locomotion, and balance;ventilation and respiration/gas exchange  -     Functional Limitations in Following Categories (Describe Specific Limitations) self-care;home management  -LM     Rehab Potential good, to achieve stated therapy goals  -     Predicted Duration of Therapy Intervention (days/wks) Until discharge or all goals met  -     Vital Signs    Pre Systolic BP Rehab 145  -  -BH    Pre Treatment Diastolic BP 71  -LM 78  -BH    Post Systolic BP Rehab 190  -  -BH    Post Treatment Diastolic BP 88  -LM 76  -BH    Pretreatment Heart Rate (beats/min) 65  -LM 70  -BH    Posttreatment Heart Rate (beats/min) 66  -LM 67  -BH    Pre SpO2 (%) 94  -LM 93  -BH    O2 Delivery Pre Treatment room air  -LM room air  -BH    Intra SpO2 (%) 88   Post ambulation  -LM 90   dropped into 80s with don/doff socks  -BH    O2 Delivery Intra Treatment room air  -LM room air  -BH    Post SpO2 (%) 93  -LM 96  -BH    O2 Delivery Post Treatment room air  -LM room air  -BH    Pre Patient Position Supine  -LM Supine  -BH    Intra Patient Position Sitting  -LM Sitting  -BH    Post Patient Position Supine  -LM Supine  -BH    Pain Assessment    Pain Assessment 0-10  -LM 0-10  -BH    Pain Score 5  -LM 6  -BH    Post Pain Score 4  -LM 5  -BH    Pain Location Abdomen  -LM --   belly and back  -BH    Pain Intervention(s) Repositioned;Ambulation/increased activity  -LM Repositioned   pt declined need for intervention,RN stated she had pain med  -    Vision Assessment/Intervention    Visual Impairment WFL with corrective  lenses   Reading glasses  - WFL with corrective lenses   reading glasses  -    Cognitive Assessment/Intervention    Current Cognitive/Communication Assessment functional  - functional  -    Orientation Status oriented x 4  -LM oriented x 4  -    Follows Commands/Answers Questions 100% of the time  -LM 75% of the time;100% of the time;able to follow single-step instructions;needs cueing  -    Personal Safety mild impairment  -LM mild impairment  -    Personal Safety Interventions fall prevention program maintained;gait belt;muscle strengthening facilitated;nonskid shoes/slippers when out of bed  - supervised activity;nonskid shoes/slippers when out of bed;muscle strengthening facilitated;gait belt;fall prevention program maintained  -    ROM (Range of Motion)    General ROM no range of motion deficits identified  -LM     General ROM Detail  BUE WFL  -    MMT (Manual Muscle Testing)    General MMT Assessment no strength deficits identified  - upper extremity strength deficits identified  -    General MMT Assessment Detail   4/5 BUE; BUE grossly 4/5  -    Bed Mobility, Assessment/Treatment    Bed Mob, Supine to Sit, [place] independent   HOB flat and no BR  -LM conditional independence;supervision required  -    Bed Mob, Sit to Supine, [place] independent   HOB flat and no HR  -LM conditional independence;supervision required  -    Transfer Assessment/Treatment    Transfers, Sit-Stand [place] stand by assist  - verbal cues required;contact guard assist;stand by assist  -    Transfers, Stand-Sit [place] stand by assist  - supervision required;verbal cues required  -    Transfer, Comment  Pt O2 drops at times, pt poor endurance  -    Gait Assessment/Treatment    Gait, [place] Level stand by assist  -     Gait, Distance (Feet) 120  -     Gait, Safety Issues step length decreased  -     Gait, Comment Pt cued on completing PLB with ambulation.  -      Motor Skills/Interventions    Additional Documentation  Fine Motor Coordination Training (Group);Balance Skills Training (Group)  -    Balance Skills Training    Sitting-Level of Assistance  Close supervision  -    Sitting-Balance Support  Feet supported  -    Sitting-Balance Activities  --   ADL and static no LOB approx 5 minutes  -    Standing-Level of Assistance  Contact guard;Close supervision  -    Static Standing Balance Support  --   none  -    Standing-Balance Activities  --   static no LOB briefly approx 30 seconds  -    Fine Motor Coordination Training    Opposition  Right:;Left:;intact   WFL fair accuracy  -    Sensory Assessment/Intervention    Light Touch LLE;RLE  - LUE;RUE  -    LUE Light Touch  WNL  -    RUE Light Touch  WNL  -    LLE Light Touch WNL  -     RLE Light Touch WNL  -     Positioning and Restraints    Pre-Treatment Position in bed  - in bed  -    Post Treatment Position bed  - bed  -    In Bed supine;call light within reach;exit alarm on  - supine;notified nsg;call light within reach;encouraged to call for assist;exit alarm on  -      02/15/17 2000       Muscle Tone Assessment    Muscle Tone Assessment Left-Side Extremities;Right-Side Extremities  -PT     Left-Side Extremities Muscle Tone Assessment mildly decreased tone  -PT     Right-Side Extremities Muscle Tone Assessment mildly decreased tone  -PT       User Key  (r) = Recorded By, (t) = Taken By, (c) = Cosigned By    Initials Name Provider Type    SHELLI Deutsch, PT Physical Therapist    PT Gala Wolff, RN Registered Nurse     Sandy Riley, OTR/L Occupational Therapist          Physical Therapy Education     Title: PT OT SLP Therapies (Active)     Topic: Physical Therapy (Active)     Point: Mobility training (Active)    Learning Progress Summary    Learner Readiness Method Response Comment Documented by Status   Patient Acceptance E NR Educated on safety with transfers and completing  PLB with all activity.  02/16/17 1320 Active               Point: Precautions (Active)    Learning Progress Summary    Learner Readiness Method Response Comment Documented by Status   Patient Acceptance E NR Educated on safety with transfers and completing PLB with all activity.  02/16/17 1320 Active                      User Key     Initials Effective Dates Name Provider Type Discipline     06/15/16 -  Abigail Deutsch, PT Physical Therapist PT                PT Recommendation and Plan  Anticipated Discharge Disposition: home  Planned Therapy Interventions: balance training, bed mobility training, gait training, home exercise program, neuromuscular re-education, patient/family education, postural re-education, stair training, strengthening, stretching, transfer training  PT Frequency: other (see comments) (5-14 times/wk)  Plan of Care Review  Plan Of Care Reviewed With: patient  Outcome Summary/Follow up Plan: PT evaluation completed.  Pt independent with t/f'ing in/out of bed, but requires SBA with transfers and gait.  Pt's O2 sat dropped to 88% with 120 feet of ambulation.  Pt will benefit from skilled PT to improve mobility, endurance, and safety prior to d/c.          IP PT Goals       02/16/17 1034          Transfer Training PT LTG    Transfer Training PT LTG, Date Established 02/16/17  -LM      Transfer Training PT LTG, Time to Achieve by discharge  -LM      Transfer Training PT LTG, Activity Type bed to chair /chair to bed  -LM      Transfer Training PT LTG, [place] Level independent;conditional independence   AAD or no AD  -LM      Transfer Training PT LTG, Outcome goal ongoing  -LM      Gait Training PT LTG    Gait Training Goal PT LTG, Date Established 02/16/17  -LM      Gait Training Goal PT LTG, Time to Achieve by discharge  -LM      Gait Training Goal PT LTG, [place] Level independent;conditional independence  -LM      Gait Training Goal PT LTG, Assist Device --   AAD or no AD  -LM       Gait Training Goal PT LTG, Distance to Achieve 300 feet with stable O2 sats  -LM      Gait Training Goal PT LTG, Outcome goal ongoing  -LM      Stair Training PT LTG    Stair Training Goal PT LTG, Date Established 02/16/17  -LM      Stair Training Goal PT LTG, Time to Achieve by discharge  -LM      Stair Training Goal PT LTG, Number of Steps 3  -LM      Stair Training Goal PT LTG, [place] Level conditional independence;independent  -LM      Stair Training Goal PT LTG, Outcome goal ongoing  -LM        User Key  (r) = Recorded By, (t) = Taken By, (c) = Cosigned By    Initials Name Provider Type    SHELLI Deutsch PT Physical Therapist                Outcome Measures       02/16/17 1034          How much help from another person do you currently need...    Turning from your back to your side while in flat bed without using bedrails? 4  -LM      Moving from lying on back to sitting on the side of a flat bed without bedrails? 4  -LM      Moving to and from a bed to a chair (including a wheelchair)? 3  -LM      Standing up from a chair using your arms (e.g., wheelchair, bedside chair)? 3  -LM      Climbing 3-5 steps with a railing? 3  -LM      To walk in hospital room? 3  -LM      AM-PAC 6 Clicks Score 20  -LM      Functional Assessment    Outcome Measure Options AM-PAC 6 Clicks Basic Mobility (PT)  -LM        User Key  (r) = Recorded By, (t) = Taken By, (c) = Cosigned By    Initials Name Provider Type    SHELLI Deutsch PT Physical Therapist           Time Calculation:         PT Charges       02/16/17 1034          Time Calculation    Start Time 1034  -LM      Stop Time 1050  -LM      Time Calculation (min) 16 min  -LM      PT Received On 02/16/17  -LM      PT Goal Re-Cert Due Date 03/01/17  -LM        User Key  (r) = Recorded By, (t) = Taken By, (c) = Cosigned By    Initials Name Provider Type    SHELLI Deutsch PT Physical Therapist          Therapy Charges for Today     Code Description Service Date Service  Provider Modifiers Qty    93577731581 HC PT MOBILITY CURRENT 2/16/2017 Abigail Deutsch, PT GP, CJ 1    69848531981 HC PT MOBILITY PROJECTED 2/16/2017 Abigail Deutsch, PT GP, CI 1    48496551396 HC PT EVAL MOD COMPLEXITY 1 2/16/2017 Abigail Deutsch, PT GP 1          PT G-Codes  PT Professional Judgement Used?: Yes  Outcome Measure Options: AM-PAC 6 Clicks Basic Mobility (PT)  Score: 20  Functional Limitation: Mobility: Walking and moving around  Mobility: Walking and Moving Around Current Status (): At least 20 percent but less than 40 percent impaired, limited or restricted  Mobility: Walking and Moving Around Goal Status (): At least 1 percent but less than 20 percent impaired, limited or restricted      Abigail Deutsch, PT  2/16/2017

## 2017-02-16 NOTE — PLAN OF CARE
Problem: Patient Care Overview (Adult)  Goal: Plan of Care Review  Outcome: Ongoing (interventions implemented as appropriate)    02/16/17 1034   Coping/Psychosocial Response Interventions   Plan Of Care Reviewed With patient   Outcome Evaluation   Outcome Summary/Follow up Plan PT evaluation completed. Pt independent with t/f'ing in/out of bed, but requires SBA with transfers and gait. Pt's O2 sat dropped to 88% with 120 feet of ambulation. Pt will benefit from skilled PT to improve mobility, endurance, and safety prior to d/c.       Goal: Discharge Needs Assessment  Outcome: Ongoing (interventions implemented as appropriate)    02/16/17 1034   Discharge Needs Assessment   Concerns To Be Addressed no discharge needs identified   Living Environment   Transportation Available family or friend will provide  ()   Self-Care   Equipment Currently Used at Home colostomy/ostomy supplies  (Has rollator and SC but does not use)         Problem: Inpatient Physical Therapy  Goal: Transfer Training Goal 1 LTG- PT  Outcome: Ongoing (interventions implemented as appropriate)    02/16/17 1034   Transfer Training PT LTG   Transfer Training PT LTG, Date Established 02/16/17   Transfer Training PT LTG, Time to Achieve by discharge   Transfer Training PT LTG, Activity Type bed to chair /chair to bed   Transfer Training PT LTG, [place] Level independent;conditional independence  (AAD or no AD)   Transfer Training PT LTG, Outcome goal ongoing       Goal: Gait Training Goal LTG- PT  Outcome: Ongoing (interventions implemented as appropriate)    02/16/17 1034   Gait Training PT LTG   Gait Training Goal PT LTG, Date Established 02/16/17   Gait Training Goal PT LTG, Time to Achieve by discharge   Gait Training Goal PT LTG, [place] Level independent;conditional independence   Gait Training Goal PT LTG, Assist Device (AAD or no AD)   Gait Training Goal PT LTG, Distance to Achieve 300 feet with stable O2 sats   Gait Training  Goal PT LTG, Outcome goal ongoing       Goal: Stair Training Goal LTG- PT  Outcome: Ongoing (interventions implemented as appropriate)    02/16/17 1034   Stair Training PT LTG   Stair Training Goal PT LTG, Date Established 02/16/17   Stair Training Goal PT LTG, Time to Achieve by discharge   Stair Training Goal PT LTG, Number of Steps 3   Stair Training Goal PT LTG, [place] Level conditional independence;independent   Stair Training Goal PT LTG, Outcome goal ongoing

## 2017-02-16 NOTE — PROGRESS NOTES
[facility] Medicine Services  INPATIENT PROGRESS NOTE    Length of Stay: 10  Date of Admission: 2/6/2017  Primary Care Physician: No primary care provider on file.    Subjective   Chief Complaint: Dyspnea  HPI: Patient is a 57 y.o. female complaining of bleeding in her colostomy bag. She has had this problem on and off but never so severe.  She was evaluated by Dr. Osei and Dr. Cary and surgical revision of colostomy was originally being considered, however the patient started developing hypoxia and surgical intervention has been postponed at this time.       Review of Systems   Constitutional: Negative for chills, fatigue and fever (resolved).   Respiratory: Negative for cough, chest tightness, shortness of breath and wheezing.    Cardiovascular: Negative for chest pain, palpitations and leg swelling.   Gastrointestinal: Negative for abdominal distention, abdominal pain, blood in stool (stool output present today), diarrhea, nausea and vomiting.   Musculoskeletal: Negative for arthralgias, back pain, gait problem, neck pain and neck stiffness.   Skin: Negative for color change, pallor, rash and wound.   Neurological: Negative for dizziness, speech difficulty, weakness, light-headedness and headaches.   Psychiatric/Behavioral: Negative for confusion.      All pertinent negatives and positives are as above. All other systems have been reviewed and are negative unless otherwise stated.     Objective    Temp:  [97 °F (36.1 °C)-97.6 °F (36.4 °C)] 97.4 °F (36.3 °C)  Heart Rate:  [61-71] 67  Resp:  [18] 18  BP: (140-185)/() 185/95  Physical Exam   Constitutional: She is oriented to person, place, and time. She appears well-developed and well-nourished.   HENT:   Head: Normocephalic and atraumatic.   Eyes: Conjunctivae and EOM are normal.   Neck: Normal range of motion. Neck supple.   Cardiovascular: Normal rate, regular rhythm, normal heart sounds and intact distal  pulses.  Exam reveals no gallop and no friction rub.    No murmur heard.  Pulmonary/Chest: Effort normal. No respiratory distress. She has no wheezes. She has no rhonchi. She has no rales.   Abdominal: Soft. Bowel sounds are normal. She exhibits no distension. There is no tenderness.   Colostomy output present, no obvious blood, no melena   Musculoskeletal: Normal range of motion. She exhibits no edema.   Neurological: She is alert and oriented to person, place, and time.   Skin: Skin is warm and dry. No erythema.   Psychiatric: She has a normal mood and affect. Her behavior is normal. Judgment and thought content normal.     Results Review:  I have reviewed the labs, radiology results, and diagnostic studies.    Laboratory Data:   Recent Results (from the past 24 hour(s))   Comprehensive Metabolic Panel    Collection Time: 02/16/17 10:41 AM   Result Value Ref Range    Glucose 185 (H) 60 - 100 mg/dL    BUN 28 (H) 7 - 21 mg/dL    Creatinine 0.85 0.50 - 1.00 mg/dL    Sodium 143 137 - 145 mmol/L    Potassium 4.4 3.5 - 5.1 mmol/L    Chloride 107 95 - 110 mmol/L    CO2 29.0 22.0 - 31.0 mmol/L    Calcium 7.8 (L) 8.4 - 10.2 mg/dL    Total Protein 5.7 (L) 6.3 - 8.6 g/dL    Albumin 2.80 (L) 3.40 - 4.80 g/dL    ALT (SGPT) 45 9 - 52 U/L    AST (SGOT) 35 14 - 36 U/L    Alkaline Phosphatase 78 38 - 126 U/L    Total Bilirubin 0.3 0.2 - 1.3 mg/dL    eGFR Non  Amer 69 51 - 120 mL/min/1.73    Globulin 2.9 2.3 - 3.5 gm/dL    A/G Ratio 1.0 (L) 1.1 - 1.8 g/dL    BUN/Creatinine Ratio 32.9 (H) 7.0 - 25.0    Anion Gap 7.0 5.0 - 15.0 mmol/L   CBC Auto Differential    Collection Time: 02/16/17 10:41 AM   Result Value Ref Range    WBC 5.39 3.20 - 9.80 10*3/mm3    RBC 3.41 (L) 3.77 - 5.16 10*6/mm3    Hemoglobin 10.8 (L) 12.0 - 15.5 g/dL    Hematocrit 31.2 (L) 35.0 - 45.0 %    MCV 91.5 80.0 - 98.0 fL    MCH 31.7 26.5 - 34.0 pg    MCHC 34.6 31.4 - 36.0 g/dL    RDW 12.6 11.5 - 14.5 %    RDW-SD 42.1 36.4 - 46.3 fl    MPV 10.3 8.0 - 12.0 fL     Platelets 137 (L) 150 - 450 10*3/mm3    Neutrophil % 79.3 37.0 - 80.0 %    Lymphocyte % 14.7 10.0 - 50.0 %    Monocyte % 5.4 0.0 - 12.0 %    Eosinophil % 0.0 0.0 - 7.0 %    Basophil % 0.0 0.0 - 2.0 %    Immature Grans % 0.6 (H) 0.0 - 0.5 %    Neutrophils, Absolute 4.28 2.00 - 8.60 10*3/mm3    Lymphocytes, Absolute 0.79 0.60 - 4.20 10*3/mm3    Monocytes, Absolute 0.29 0.00 - 0.90 10*3/mm3    Eosinophils, Absolute 0.00 0.00 - 0.70 10*3/mm3    Basophils, Absolute 0.00 0.00 - 0.20 10*3/mm3    Immature Grans, Absolute 0.03 (H) 0.00 - 0.02 10*3/mm3       Culture Data:   No results found for: BLOODCX  No results found for: URINECX  No results found for: RESPCX  No results found for: WOUNDCX  No results found for: STOOLCX  No components found for: BODYFLD    Radiology Data:   Imaging Results (last 24 hours)     Procedure Component Value Units Date/Time    CT Abdomen Pelvis With Contrast [[id number]] Collected:  02/08/17 1649     Updated:  02/08/17 1659    Narrative:       PROCEDURE: CT abdomen and pelvis with contrast    COMPARISON: CT abdomen pelvis without contrast February 6, 2017    HISTORY: Gastrointestinal hemorrhage, unspecified. Pale ostomy  stoma and no output    TECHNIQUE: Multiple contiguous axial images are obtained of the  abdomen and pelvis axial the intravenous administration of 94 mL  Isovue-300. Coronal and sagittal multiplanar reformatted images  are also reconstructed and reviewed. The dose length product is  822    FINDINGS: Minimal bibasilar atelectasis with trace bilateral  pleural effusions. Heart size is normal. Borderline enlarged  cardiophrenic lymph nodes.    Subcentimeter low-attenuation lesion in the left lateral segment  of the liver is nonspecific although statistically represents a  cyst or hemangioma if patient has no underlying liver disease or  history of malignancy. Liver is otherwise unremarkable. There may  be gallbladder wall thickening with questionable perivesicular  haziness or fat  stranding which is new compared to the prior  exam. The spleen, pancreas, adrenal glands and kidneys are  unremarkable.    No lymphadenopathy in the abdomen, retroperitoneum or pelvis by  CT size criteria.    Bladder is unremarkable. Uterus and ovaries are grossly normal in  size. Postoperative changes from prior partial colectomy. Left  lower quadrant colostomy. There is probably a small amount of  intraperitoneal fat herniating through the colostomy defect.  Bowel is otherwise unremarkable. No bowel obstruction. No  abnormal bowel dilatation or wall thickening. No free fluid.    No acute osseous abnormality.      Impression:       1. There may be mild gallbladder wall thickening with  questionable pericholecystic stranding or inflammation. Acute  cholecystitis may have a similar appearance in the appropriate  clinical setting.2. Minimal bibasilar atelectasis with trace  bilateral pleural effusions.3. Left lower quadrant colostomy.  There is probably a small amount of fat herniating through the  colostomy defect. Bowel is otherwise unremarkable. No bowel  obstruction, dilatation or wall thickening.4. There are several  borderline enlarged cardiophrenic lymph nodes, nonspecific.  However, these are unchanged compared to a CT chest may 18 2014    Electronically signed by:  Yoni Quinn MD  2/8/2017 4:58  PM CST Workstation: [id number]          I have reviewed the patient current medications.     Assessment/Plan       Assesment/Plan:  1. Acute hypoxic respiratory failure r/t pneumonia: 02, nebs, iv antibiotics.  Patient weaned to oral steroids.  Incentive spirometry  2. COPD exacerbation: 02, nebs, weaned steroids.  3. Acute GI bleed: No bleeding noted at this time.  H/H stable.  4. Generalized weakness/Deconditioning: Patient EXTREMELY NONCOMPLIANT and does not participate well with staff instruction.  Patient will not get out of bed to go to restroom and has poor performance with therapy.  Patient  encouraged to ambulate and participate with PT/OT in order to improve her respiratory status.      No surgical intervention at this time per Dr. Cary's note.  Will arrange for outpatient surgical follow up at discharge.    Discharge Planning: I expect patient to be discharged to home 1-2 days.    CEDRICK Tripp   02/16/17   12:43 PM

## 2017-02-17 PROBLEM — J96.01 ACUTE RESPIRATORY FAILURE WITH HYPOXIA (HCC): Status: RESOLVED | Noted: 2017-01-01 | Resolved: 2017-01-01

## 2017-02-17 PROBLEM — J98.11 ATELECTASIS: Status: RESOLVED | Noted: 2017-01-01 | Resolved: 2017-01-01

## 2017-02-17 PROBLEM — J18.9 PNEUMONIA: Status: RESOLVED | Noted: 2017-01-01 | Resolved: 2017-01-01

## 2017-02-17 NOTE — DISCHARGE SUMMARY
[facility] Medicine Services  DISCHARGE SUMMARY       Date of Admission: 2/6/2017  Date of Discharge:  2/17/2017  Primary Care Physician: No primary care provider on file.    Presenting Problem/History of Present Illness:  Acute GI hemorrhage [K92.2]       Final Discharge Diagnoses:  Hospital Problem List     Essential hypertension    Colostomy present    Acute GI hemorrhage          Consults:   Consults     Date and Time Order Name Status Description    2/8/2017 1823 Inpatient Consult to Gastroenterology Completed     2/8/2017 1105 Inpatient Consult to General Surgery            Procedures Performed: Procedure(s):  COLONOSCOPY                Pertinent Test Results:   Lab Results (last 24 hours)     Procedure Component Value Units Date/Time    CBC & Differential [[id number]] Collected:  02/16/17 1041    Specimen:  Blood Updated:  02/16/17 1047    Narrative:       The following orders were created for panel order CBC & Differential.  Procedure                               Abnormality         Status                     ---------                               -----------         ------                     CBC Auto Differential[[id number]]         Abnormal            Final result                 Please view results for these tests on the individual orders.    CBC Auto Differential [[id number]]  (Abnormal) Collected:  02/16/17 1041    Specimen:  Blood Updated:  02/16/17 1047     WBC 5.39 10*3/mm3      RBC 3.41 (L) 10*6/mm3      Hemoglobin 10.8 (L) g/dL      Hematocrit 31.2 (L) %      MCV 91.5 fL      MCH 31.7 pg      MCHC 34.6 g/dL      RDW 12.6 %      RDW-SD 42.1 fl      MPV 10.3 fL      Platelets 137 (L) 10*3/mm3      Neutrophil % 79.3 %      Lymphocyte % 14.7 %      Monocyte % 5.4 %      Eosinophil % 0.0 %      Basophil % 0.0 %      Immature Grans % 0.6 (H) %      Neutrophils, Absolute 4.28 10*3/mm3      Lymphocytes, Absolute 0.79 10*3/mm3      Monocytes, Absolute 0.29 10*3/mm3       Eosinophils, Absolute 0.00 10*3/mm3      Basophils, Absolute 0.00 10*3/mm3      Immature Grans, Absolute 0.03 (H) 10*3/mm3     Comprehensive Metabolic Panel [[id number]]  (Abnormal) Collected:  02/16/17 1041    Specimen:  Blood Updated:  02/16/17 1108     Glucose 185 (H) mg/dL      BUN 28 (H) mg/dL      Creatinine 0.85 mg/dL      Sodium 143 mmol/L      Potassium 4.4 mmol/L      Chloride 107 mmol/L      CO2 29.0 mmol/L      Calcium 7.8 (L) mg/dL      Total Protein 5.7 (L) g/dL      Albumin 2.80 (L) g/dL      ALT (SGPT) 45 U/L      AST (SGOT) 35 U/L      Alkaline Phosphatase 78 U/L      Total Bilirubin 0.3 mg/dL      eGFR Non African Amer 69 mL/min/1.73      Globulin 2.9 gm/dL      A/G Ratio 1.0 (L) g/dL      BUN/Creatinine Ratio 32.9 (H)      Anion Gap 7.0 mmol/L     Comprehensive Metabolic Panel [[id number]]  (Abnormal) Collected:  02/17/17 0513    Specimen:  Blood Updated:  02/17/17 0732     Glucose 196 (H) mg/dL      BUN 27 (H) mg/dL      Creatinine 0.96 mg/dL      Sodium 143 mmol/L      Potassium 3.6 mmol/L      Chloride 108 mmol/L      CO2 30.0 mmol/L      Calcium 8.0 (L) mg/dL      Total Protein 5.5 (L) g/dL      Albumin 2.60 (L) g/dL      ALT (SGPT) 48 U/L      AST (SGOT) 35 U/L      Alkaline Phosphatase 86 U/L      Total Bilirubin 0.3 mg/dL      eGFR Non African Amer 60 (L) mL/min/1.73      Globulin 2.9 gm/dL      A/G Ratio 0.9 (L) g/dL      BUN/Creatinine Ratio 28.1 (H)      Anion Gap 5.0 mmol/L     CBC Auto Differential [[id number]]  (Abnormal) Collected:  02/17/17 0513    Specimen:  Blood Updated:  02/17/17 1008     WBC 6.18 10*3/mm3      RBC 3.29 (L) 10*6/mm3      Hemoglobin 10.0 (L) g/dL      Hematocrit 30.2 (L) %      MCV 91.8 fL      MCH 30.4 pg      MCHC 33.1 g/dL      RDW 13.2 %      RDW-SD 44.3 fl      MPV 10.3 fL      Platelets 160 10*3/mm3      Neutrophil % 66.2 %      Lymphocyte % 23.1 %      Monocyte % 9.7 %      Eosinophil % 0.0 %      Basophil % 0.0 %      Immature Grans % 1.0 (H) %       Neutrophils, Absolute 4.09 10*3/mm3      Lymphocytes, Absolute 1.43 10*3/mm3      Monocytes, Absolute 0.60 10*3/mm3      Eosinophils, Absolute 0.00 10*3/mm3      Basophils, Absolute 0.00 10*3/mm3      Immature Grans, Absolute 0.06 (H) 10*3/mm3      nRBC 0.0 /100 WBC     Narrative:       Appended report.  These results have been appended to a previously verified report.    CBC & Differential [[id number]] Collected:  02/17/17 0513    Specimen:  Blood Updated:  02/17/17 1008    Narrative:       The following orders were created for panel order CBC & Differential.  Procedure                               Abnormality         Status                     ---------                               -----------         ------                     Scan Slide[[id number]]                                        Final result               CBC Auto Differential[[id number]]         Abnormal            Final result                 Please view results for these tests on the individual orders.    Scan Slide [[id number]] Collected:  02/17/17 0513    Specimen:  Blood Updated:  02/17/17 1008     RBC Morphology Normal      WBC Morphology Normal      Platelet Estimate Adequate         Imaging Results (last 72 hours)     Procedure Component Value Units Date/Time    FL Barium Enema Water Soluble [[id number]] Collected:  02/15/17 1020     Updated:  02/15/17 1026    Narrative:       Hypaque enema.    History: Prior colostomy. Evaluate rectal stump prior to possible re-anastomosis.    500 cc of Gastrografin was used. 13 images were obtained. 1. 27 minutes of fluoroscopy was used.    A small caliber catheter instilled into the rectum. Water subtle contrast was instilled.    The rectum is a normal in appearance.    There are findings suggesting slight mucosal irregularity, edema of the distal sigmoid colon.    The  sigmoid colon terminates approximately  20 cm proximal to the anal verge.      Impression:       Conclusion: Intact distal colon and rectum, rectal  "stump. Questionable mucosal edema in the distal sigmoid colon. No obstruction or extravasation.    Electronically signed by:  Riki Callaway  2/15/2017 10:25 AM CST      US Guided Vascular Access [[id number]] Resulted:  02/16/17 1352     Updated:  02/16/17 1352            Chief Complaint on Day of Discharge: none    Hospital Course:  The patient is a 57 y.o. female who presented to [facility] with bleeding from her colostomy.  The patient was evaluated by Dr. Osei with gastroenterology who performed colonoscopy which was essentially unremarkable other than small amount of inflammation.  She was also evaluated by Dr. Cary with general surgery due to poor output from the colostomy.  The ostomy was noted to be functioning and Dr. Cary has mentioned taking down ostomy once patient's respiratory status improves.      During the hospitalization, the patient developed hypoxia and shortness of breath and was noted to have pneumonia.  She was treated with IV antibiotics, steroids, and nebs and her condition has greatly improved.  Home health has been arranged to continue monitoring the patient's respiratory status within the home.  Her ostomy is currently functioning and she has soft brown stool coming from the colostomy.  She is in stable condition and will be discharged home.      Condition on Discharge:  Stable     Physical Exam on Discharge:  Visit Vitals   • /82 (BP Location: Left arm)   • Pulse 73   • Temp 97.7 °F (36.5 °C) (Axillary)   • Resp 18   • Ht 65\" (165.1 cm)   • Wt 222 lb 7 oz (101 kg)   • LMP 03/11/1999 (Within Months)  Comment: age over 55   • SpO2 92%   • BMI 37.02 kg/m2     Physical Exam   Constitutional: She is oriented to person, place, and time. She appears well-developed and well-nourished. No distress.   HENT:   Head: Normocephalic and atraumatic.   Eyes: EOM are normal. Pupils are equal, round, and reactive to light.   Neck: Normal range of motion. Neck supple. "   Cardiovascular: Normal rate, regular rhythm, normal heart sounds and intact distal pulses.  Exam reveals no gallop and no friction rub.    No murmur heard.  Pulmonary/Chest: Effort normal and breath sounds normal. No respiratory distress. She has no wheezes. She has no rales. She exhibits no tenderness.   Abdominal: Soft. Bowel sounds are normal. She exhibits no distension. There is no tenderness.   Musculoskeletal: Normal range of motion.   Neurological: She is alert and oriented to person, place, and time.   Skin: Skin is warm and dry. She is not diaphoretic.   Psychiatric: She has a normal mood and affect. Her behavior is normal. Judgment and thought content normal.         [place] [facility] [place] Medications:   Eva Troncoso   Home Medication Instructions ARON:[id number]    Printed on:02/17/17 1023   Medication Information                      Aspirin-Salicylamide-Caffeine (BC HEADACHE POWDER PO)  Take  by mouth As Needed (for pain).             azithromycin (ZITHROMAX) 500 MG tablet  Take 1 tablet by mouth Daily.             busPIRone (BUSPAR) 15 MG tablet  Take  by mouth. Take 1 tablet 2 times per day.             cefuroxime (CEFTIN) 500 MG tablet  Take 1 tablet by mouth 2 (Two) Times a Day.             docusate sodium 100 MG capsule  Take 100 mg by mouth 2 (Two) Times a Day.             furosemide (LASIX) 20 MG tablet  Take 20 mg by mouth Daily As Needed (for fluids).             labetalol (NORMODYNE) 100 MG tablet  Take 100 mg by mouth 2 (Two) Times a Day.             MethylPREDNISolone (MEDROL, SANA,) 4 MG tablet  Take as directed on package instructions.             mirtazapine (REMERON) 15 MG tablet  Take 15 mg by mouth Daily.             QUEtiapine (SEROquel) 200 MG tablet  Take 200 mg by mouth Every Night.             spironolactone (ALDACTONE) 25 MG tablet  Take 25 mg by mouth Daily.             traZODone (DESYREL) 150 MG tablet  Take 150 mg by mouth Every  Night.                 Discharge Diet:   Diet Instructions     Diet: Cardiac; Thin Liquids, No Restrictions       Discharge Diet:  Cardiac   Fluid Consistency:  Thin Liquids, No Restrictions   Patient instructed to increase fiber in diet                 Activity at Discharge:   Activity Instructions     Activity as Tolerated                     Discharge Care Plan/Instructions: Establish with a PCP within one week.  See Dr. Cary in 2 weeks.     Follow-up Appointments:   No future appointments.    Test Results Pending at Discharge:     CEDRICK Tripp  02/17/17  10:23 AM

## 2017-02-17 NOTE — PLAN OF CARE
Problem: Inpatient Physical Therapy  Goal: Transfer Training Goal 1 LTG- PT  Outcome: Unable to achieve outcome(s) by discharge Date Met:  02/17/17 02/17/17 1645   Transfer Training PT LTG   Transfer Training PT LTG, Date Goal Reviewed 02/17/17   Transfer Training PT LTG, Outcome goal not met   Transfer Training PT LTG, Reason Goal Not Met discharged from facility       Goal: Gait Training Goal LTG- PT  Outcome: Unable to achieve outcome(s) by discharge Date Met:  02/17/17 02/17/17 1645   Gait Training PT LTG   Gait Training Goal PT LTG, Date Goal Reviewed 02/17/17   Gait Training Goal PT LTG, Outcome goal not met   Gait Training Goal PT LTG, Reason Goal Not Met discharged from facility       Goal: Stair Training Goal LTG- PT  Outcome: Unable to achieve outcome(s) by discharge Date Met:  02/17/17 02/17/17 1645   Stair Training PT LTG   Stair Training Goal PT LTG, Date Goal Reviewed 02/17/17   Stair Training Goal PT LTG, Outcome goal not met   Stair Training Goal PT LTG, Reason Goal Not Met discharged from facility

## 2017-02-17 NOTE — PLAN OF CARE
Problem: Patient Care Overview (Adult)  Goal: Plan of Care Review  Outcome: Ongoing (interventions implemented as appropriate)    02/17/17 0646   Coping/Psychosocial Response Interventions   Plan Of Care Reviewed With patient   Patient Care Overview   Progress improving   Outcome Evaluation   Outcome Summary/Follow up Plan Pt did not require pain medication during sshift, experienced no SOA, rested well. Small amount of stool in ostomy bag noted, no blood observed.         Problem: Gastrointestinal Bleeding (Adult)  Goal: Signs and Symptoms of Listed Potential Problems Will be Absent or Manageable (Gastrointestinal Bleeding)  Outcome: Ongoing (interventions implemented as appropriate)    Problem: Colostomy (Adult)  Goal: Signs and Symptoms of Listed Potential Problems Will be Absent or Manageable (Colostomy)  Outcome: Ongoing (interventions implemented as appropriate)    Problem: Pressure Ulcer Risk (Chaim Scale) (Adult,Obstetrics,Pediatric)  Goal: Skin Integrity  Outcome: Ongoing (interventions implemented as appropriate)

## 2017-02-17 NOTE — PLAN OF CARE
Problem: Patient Care Overview (Adult)  Goal: Discharge Needs Assessment  Outcome: Outcome(s) achieved Date Met:  02/17/17

## 2017-02-17 NOTE — THERAPY DISCHARGE NOTE
Acute Care - Physical Therapy Discharge Summary  [facility]       Patient Name: Eva Troncoso  : 1960  MRN: [id number]    Today's Date: 2017  Onset of Illness/Injury or Date of Surgery Date: 17    Date of Referral to PT: 02/15/17  Referring Physician: CEDRICK Lima      Admit Date: 2017      PT Recommendation and Plan    Visit Dx:    ICD-10-CM ICD-9-CM   1. Acute GI hemorrhage K92.2 578.9   2. PICC (peripherally inserted central catheter) in place Z45.2 V58.81   3. Impaired mobility and ADLs Z74.09 799.89   4. Abnormality of gait and mobility R26.9 781.2             Outcome Measures       17 1034 17 0945       How much help from another person do you currently need...    Turning from your back to your side while in flat bed without using bedrails? 4  -LM      Moving from lying on back to sitting on the side of a flat bed without bedrails? 4  -LM      Moving to and from a bed to a chair (including a wheelchair)? 3  -LM      Standing up from a chair using your arms (e.g., wheelchair, bedside chair)? 3  -LM      Climbing 3-5 steps with a railing? 3  -LM      To walk in hospital room? 3  -LM      AM-PAC 6 Clicks Score 20  -LM      How much help from another is currently needed...    Putting on and taking off regular lower body clothing?  3  -BH     Bathing (including washing, rinsing, and drying)  3  -BH     Toileting (which includes using toilet bed pan or urinal)  3  -BH     Putting on and taking off regular upper body clothing  4  -BH     Taking care of personal grooming (such as brushing teeth)  3  -BH     Eating meals  4  -BH     Score  20  -BH     Functional Assessment    Outcome Measure Options AM-PAC 6 Clicks Basic Mobility (PT)  - AM-PAC 6 Clicks Daily Activity (OT)  -       User Key  (r) = Recorded By, (t) = Taken By, (c) = Cosigned By    Initials Name Provider Type    SHELLI Deutsch, PT Physical Therapist     Sandy Riley, CASANDRAR/L Occupational  Therapist                      IP PT Goals       02/17/17 1645 02/16/17 1034       Transfer Training PT LTG    Transfer Training PT LTG, Date Established  02/16/17  -LM     Transfer Training PT LTG, Time to Achieve  by discharge  -LM     Transfer Training PT LTG, Activity Type  bed to chair /chair to bed  -LM     Transfer Training PT LTG, [place] Level  independent;conditional independence   AAD or no AD  -LM     Transfer Training PT  LTG, Date Goal Reviewed 02/17/17  -MN      Transfer Training PT LTG, Outcome goal not met  -MN goal ongoing  -LM     Transfer Training PT LTG, Reason Goal Not Met discharged from facility  -MN      Gait Training PT LTG    Gait Training Goal PT LTG, Date Established  02/16/17  -LM     Gait Training Goal PT LTG, Time to Achieve  by discharge  -LM     Gait Training Goal PT LTG, [place] Level  independent;conditional independence  -LM     Gait Training Goal PT LTG, Assist Device  --   AAD or no AD  -LM     Gait Training Goal PT LTG, Distance to Achieve  300 feet with stable O2 sats  -LM     Gait Training Goal PT LTG, Date Goal Reviewed 02/17/17  -MN      Gait Training Goal PT LTG, Outcome goal not met  -MN goal ongoing  -LM     Gait Training Goal PT LTG, Reason Goal Not Met discharged from facility  -MN      Stair Training PT LTG    Stair Training Goal PT LTG, Date Established  02/16/17  -LM     Stair Training Goal PT LTG, Time to Achieve  by discharge  -LM     Stair Training Goal PT LTG, Number of Steps  3  -LM     Stair Training Goal PT LTG, [place] Level  conditional independence;independent  -LM     Stair Training Goal PT LTG, Date Goal Reviewed 02/17/17  -MN      Stair Training Goal PT LTG, Outcome goal not met  -MN goal ongoing  -LM     Stair Training Goal PT LTG, Reason Goal Not Met discharged from facility  -MN        User Key  (r) = Recorded By, (t) = Taken By, (c) = Cosigned By    Initials Name Provider Type    SHELLI Deutsch, PT Physical Therapist    WILLIAMS Carrillo  JAMI Diana, PT Physical Therapist              PT Discharge Summary  Anticipated Discharge Disposition: home  Reason for Discharge: Discharge from facility, Per MD order  Outcomes Achieved: Unable to make functional progress toward goals at this time  Discharge Destination: Home with home health      Lorena Diana, PT   2/17/2017

## 2017-02-17 NOTE — THERAPY DISCHARGE NOTE
Acute Care - Occupational Therapy Discharge Summary  [facility]     Patient Name: Eva Troncoso  : 1960  MRN: [id number]    Today's Date: 2017  Onset of Illness/Injury or Date of Surgery Date: 17    Date of Referral to OT: 02/15/17  Referring Physician: CEDRICK Lima      Admit Date: 2017        OT Recommendation and Plan    Visit Dx:    ICD-10-CM ICD-9-CM   1. Acute GI hemorrhage K92.2 578.9   2. PICC (peripherally inserted central catheter) in place Z45.2 V58.81   3. Impaired mobility and ADLs Z74.09 799.89   4. Abnormality of gait and mobility R26.9 781.2                     OT Goals       17 1614 17 0945       Patient Education OT STG    Patient Education OT STG, Date Established  17  -     Patient Education OT STG, Time to Achieve  1 wk  -     Patient Education OT STG, Education Type  HEP;home safety  -     Patient Education OT STG, Education Understanding  independent;demonstrates adequately;verbalizes understanding  -     Patient Education OT STG, Date Goal Reviewed 17  -      Patient Education OT STG Outcome goal not met  -      Patient Education OT STG, Reason Goal Not Met discharged from [facility]  -      ADL OT LTG    ADL OT LTG, Date Established  17  -     ADL OT LTG, Time to Achieve  by discharge  -     ADL OT LTG, Activity Type  ADL skills  -     ADL OT LTG, [place] Level  modified independent;assistive device   AE as needed, pt incontinent able to clean self  -     ADL OT LTG, Date Goal Reviewed 17  -      ADL OT LTG, Outcome goal not met  -      ADL OT LTG, Reason Goal Not Met discharged from [facility]  -      Activity Tolerance OT STG    Activity Tolerance Goal OT STG, Date Established  17  -     Activity Tolerance Goal OT STG, Time to Achieve  1 wk  -     Activity Tolerance Goal OT STG, Activity Level  15 min activity;O2 sat >/equal to 90%  -     Activity Tolerance Goal OT STG,  Date Goal Reviewed 02/17/17  -      Activity Tolerance Goal OT STG, Outcome goal not met  -      Activity Tolerance Goal OT STG, Reason Goal Not Met discharged from facility  -        User Key  (r) = Recorded By, (t) = Taken By, (c) = Cosigned By    Initials Name Provider Type     Sandy Riley OTR/L Occupational Therapist                Outcome Measures       02/16/17 1034 02/16/17 0945       How much help from another person do you currently need...    Turning from your back to your side while in flat bed without using bedrails? 4  -LM      Moving from lying on back to sitting on the side of a flat bed without bedrails? 4  -LM      Moving to and from a bed to a chair (including a wheelchair)? 3  -LM      Standing up from a chair using your arms (e.g., wheelchair, bedside chair)? 3  -LM      Climbing 3-5 steps with a railing? 3  -LM      To walk in hospital room? 3  -LM      AM-PAC 6 Clicks Score 20  -      How much help from another is currently needed...    Putting on and taking off regular lower body clothing?  3  -BH     Bathing (including washing, rinsing, and drying)  3  -BH     Toileting (which includes using toilet bed pan or urinal)  3  -BH     Putting on and taking off regular upper body clothing  4  -BH     Taking care of personal grooming (such as brushing teeth)  3  -BH     Eating meals  4  -     Score  20  -     Functional Assessment    Outcome Measure Options AM-PAC 6 Clicks Basic Mobility (PT)  - AM-PAC 6 Clicks Daily Activity (OT)  -       User Key  (r) = Recorded By, (t) = Taken By, (c) = Cosigned By    Initials Name Provider Type     Abigail Deutsch, PT Physical Therapist     Sandy Riley OTR/L Occupational Therapist          Therapy Charges for Today     Code Description Service Date Service Provider Modifiers Qty    38171805020  OT EVAL MOD COMPLEXITY 2 2/16/2017 JENA Kearney/L GO 1    [id number]  OT SELFCARE CURRENT 2/16/2017 JENA Kearney/L GO, CJ 1     [id number]  OT SELFCARE PROJECTED 2/16/2017 Sandy Riley OTR/L GO, CI 1          OT Discharge Summary  Anticipated Discharge Disposition: home with assist  Reason for Discharge: Per MD order, Discharge from facility  Outcomes Achieved: Refer to plan of care for updates on goals achieved  Discharge Destination: Home with home health      JENA Kearney/JAMI  2/17/2017

## 2017-02-17 NOTE — PLAN OF CARE
Problem: Inpatient Occupational Therapy  Goal: Patient Education Goal STG- OT  Outcome: Unable to achieve outcome(s) by discharge Date Met:  02/17/17 02/16/17 0945 02/17/17 1614   Patient Education OT STG   Patient Education OT STG, Date Established 02/16/17 --    Patient Education OT STG, Time to Achieve 1 wk --    Patient Education OT STG, Education Type HEP;home safety --    Patient Education OT STG, Education Understanding independent;demonstrates adequately;verbalizes understanding --    Patient Education OT STG, Date Goal Reviewed --  02/17/17   Patient Education OT STG Outcome --  goal not met   Patient Education OT STG, Reason Goal Not Met --  discharged from facility       Goal: ADL Goal LTG- OT  Outcome: Unable to achieve outcome(s) by discharge Date Met:  02/17/17 02/16/17 0945 02/17/17 1614   ADL OT LTG   ADL OT LTG, Date Established 02/16/17 --    ADL OT LTG, Time to Achieve by discharge --    ADL OT LTG, Activity Type ADL skills --    ADL OT LTG, [place] Level modified independent;assistive device  (AE as needed, pt incontinent able to clean self) --    ADL OT LTG, Date Goal Reviewed --  02/17/17   ADL OT LTG, Outcome --  goal not met   ADL OT LTG, Reason Goal Not Met --  discharged from facility       Goal: Activity Tolerance Goal STG- OT  Outcome: Unable to achieve outcome(s) by discharge Date Met:  02/17/17 02/16/17 0945 02/17/17 1614   Activity Tolerance OT STG   Activity Tolerance Goal OT STG, Date Established 02/16/17 --    Activity Tolerance Goal OT STG, Time to Achieve 1 wk --    Activity Tolerance Goal OT STG, Activity Level 15 min activity;O2 sat >/equal to 90% --    Activity Tolerance Goal OT STG, Date Goal Reviewed --  02/17/17   Activity Tolerance Goal OT STG, Outcome --  goal not met   Activity Tolerance Goal OT STG, Reason Goal Not Met --  discharged from facility

## 2017-02-17 NOTE — PLAN OF CARE
Problem: Patient Care Overview (Adult)  Goal: Plan of Care Review  Outcome: Outcome(s) achieved Date Met:  02/17/17    Problem: Gastrointestinal Bleeding (Adult)  Goal: Signs and Symptoms of Listed Potential Problems Will be Absent or Manageable (Gastrointestinal Bleeding)  Outcome: Outcome(s) achieved Date Met:  02/17/17    Problem: Colostomy (Adult)  Goal: Signs and Symptoms of Listed Potential Problems Will be Absent or Manageable (Colostomy)  Outcome: Outcome(s) achieved Date Met:  02/17/17    Problem: Pressure Ulcer Risk (Chaim Scale) (Adult,Obstetrics,Pediatric)  Goal: Skin Integrity  Outcome: Outcome(s) achieved Date Met:  02/17/17

## 2017-02-19 NOTE — DISCHARGE INSTRUCTIONS
Complete the potasium tablets.  Take the pain med, norco as needed.  Do not drive while taking the pain med.  Follow up with PCP on Wednesday as scheduled.

## 2017-02-19 NOTE — ED PROVIDER NOTES
Subjective   Patient is a 57 y.o. female presenting with abdominal pain.   History provided by:  Patient  Abdominal Pain   Pain location: pain in colostomy area and chronic back pain.  Pain quality: aching    Pain radiates to:  Does not radiate  Pain severity:  Moderate  Onset quality:  Gradual      Review of Systems   Constitutional: Negative.    HENT: Negative.    Eyes: Negative.    Respiratory: Negative.    Cardiovascular: Negative.    Gastrointestinal: Positive for abdominal pain.   Genitourinary: Negative.    Musculoskeletal: Positive for back pain.   Skin: Negative.    Neurological: Negative.    Psychiatric/Behavioral: Negative.        Past Medical History   Diagnosis Date   • Anxiety state    • Chronic depression    • Chronic pain    • Compression fracture of lumbar spine, non-traumatic    • Depressive disorder    • Essential hypertension    • Obesity    • Pericardial effusion        Allergies   Allergen Reactions   • Other      MRSA   • Stadol [Butorphanol]      Itching   • Toradol [Ketorolac Tromethamine]      CONFUSION/HALLUCINAT       Past Surgical History   Procedure Laterality Date   • Cardiac catheterization  05/13/2014     35970 (1)   -  Successful PCI to the proximal LAD with a V stent reducing 99% stenosis to 0%   • Appendectomy  12/10/2012     Laparoscopic (1) - Acute suppurative appendicitis   • Shoulder surgery  05/14/2015     (1) - Communited 3 part fractured humerus, proximal left shoulder. Open reduction and internal fixation   • Tracheostomy  05/30/2014     (1) - Percutaneous endoscopic gastrostomy tube placement.;     • Colonoscopy N/A 2/13/2017     Procedure: COLONOSCOPY;  Surgeon: Nader Osei DO;  Location: [facility] ENDOSCOPY;  Service:        No family history on file.    Social History     Social History   • Marital status:      Spouse name: N/A   • Number of children: N/A   • Years of education: N/A     Social History Main Topics   • Smoking status: Current Every Day Smoker  "  • Smokeless tobacco: Never Used      Comment: Smokes 1 ppd for 40 years   • Alcohol use No   • Drug use: Not on file   • Sexual activity: Not on file     Other Topics Concern   • Not on file     Social History Narrative           Objective   Physical Exam   Constitutional: She is oriented to person, place, and time. She appears well-developed and well-nourished.   HENT:   Head: Normocephalic.   Eyes: EOM are normal. Pupils are equal, round, and reactive to light.   Neck: Normal range of motion. Neck supple.   Cardiovascular: Normal rate and regular rhythm.    Pulmonary/Chest: Effort normal and breath sounds normal.   Abdominal: Soft. Bowel sounds are normal.   Colostomy content Hem neg.   Musculoskeletal:   No CVA tenderness   Neurological: She is alert and oriented to person, place, and time.   Skin: Skin is warm and dry.   Nursing note and vitals reviewed.    Visit Vitals   • BP (!) 188/78 (BP Location: Right arm, Patient Position: Sitting)   • Pulse 67   • Temp 98.9 °F (37.2 °C) (Oral)   • Resp 19   • Ht 65\" (165.1 cm)   • Wt 220 lb (99.8 kg)   • LMP 03/11/1999 (Within Months)   • SpO2 95%   • BMI 36.61 kg/m2         ECG 12 Lead    Date/Time: 2/19/2017 12:16 PM  Performed by: EDUAR CAUSEY  Authorized by: LC QUIGLEY   Interpreted by physician  Comparison: compared with previous ECG   Rhythm: sinus rhythm  BPM: 90                 ED Course  ED Course      Results for orders placed or performed during the hospital encounter of 02/19/17   Comprehensive Metabolic Panel   Result Value Ref Range    Glucose 88 60 - 100 mg/dL    BUN 9 7 - 21 mg/dL    Creatinine 0.77 0.50 - 1.00 mg/dL    Sodium 139 137 - 145 mmol/L    Potassium 3.2 (L) 3.5 - 5.1 mmol/L    Chloride 105 95 - 110 mmol/L    CO2 29.0 22.0 - 31.0 mmol/L    Calcium 8.0 (L) 8.4 - 10.2 mg/dL    Total Protein 5.6 (L) 6.3 - 8.6 g/dL    Albumin 2.70 (L) 3.40 - 4.80 g/dL    ALT (SGPT) 57 (H) 9 - 52 U/L    AST (SGOT) 47 (H) 14 - 36 U/L    Alkaline Phosphatase 74 " 38 - 126 U/L    Total Bilirubin 0.7 0.2 - 1.3 mg/dL    eGFR Non African Amer 77 >60 mL/min/1.73    Globulin 2.9 2.3 - 3.5 gm/dL    A/G Ratio 0.9 (L) 1.1 - 1.8 g/dL    BUN/Creatinine Ratio 11.7 7.0 - 25.0    Anion Gap 5.0 5.0 - 15.0 mmol/L   Lipase   Result Value Ref Range    Lipase 192 23 - 300 U/L   Troponin   Result Value Ref Range    Troponin I 0.022 <=0.034 ng/mL   CBC Auto Differential   Result Value Ref Range    WBC 5.21 3.20 - 9.80 10*3/mm3    RBC 3.63 (L) 3.77 - 5.16 10*6/mm3    Hemoglobin 11.1 (L) 12.0 - 15.5 g/dL    Hematocrit 31.9 (L) 35.0 - 45.0 %    MCV 87.9 80.0 - 98.0 fL    MCH 30.6 26.5 - 34.0 pg    MCHC 34.8 31.4 - 36.0 g/dL    RDW 12.8 11.5 - 14.5 %    RDW-SD 41.5 36.4 - 46.3 fl    MPV 9.6 8.0 - 12.0 fL    Platelets 85 (L) 150 - 450 10*3/mm3    Neutrophil % 59.3 37.0 - 80.0 %    Lymphocyte % 21.1 10.0 - 50.0 %    Monocyte % 16.5 (H) 0.0 - 12.0 %    Eosinophil % 2.3 0.0 - 7.0 %    Basophil % 0.2 0.0 - 2.0 %    Immature Grans % 0.6 (H) 0.0 - 0.5 %    Neutrophils, Absolute 3.09 2.00 - 8.60 10*3/mm3    Lymphocytes, Absolute 1.10 0.60 - 4.20 10*3/mm3    Monocytes, Absolute 0.86 0.00 - 0.90 10*3/mm3    Eosinophils, Absolute 0.12 0.00 - 0.70 10*3/mm3    Basophils, Absolute 0.01 0.00 - 0.20 10*3/mm3    Immature Grans, Absolute 0.03 (H) 0.00 - 0.02 10*3/mm3    nRBC 0.0 0.0 - 0.0 /100 WBC     XR Chest 2 View   Final Result      1.  No radiographic evidence of acute cardiopulmonary disease.   2.  Severe compression fracture of an upper lumbar vertebral   body.      Electronically signed by:  Delaney Lawson MD  2/19/2017 1:20 PM CST   Workstation: [id number]                    [facility]  Number of Diagnoses or Management Options  Compression fracture of lumbar spine, non-traumatic, initial encounter:   Generalized abdominal pain:   Diagnosis management comments: hgb 11.1 potassium 3.2, stool from colostomy is hem negative. BP is elevated 210/94. Down to 188/78 after labetalol 20 mg IV Will treat pain, STEFANIE  [id number], gave Rx for norco and Potassium for 5 days.. Follow up with her PCP, has  Appointment on Wednesday.      Final diagnoses:   Generalized abdominal pain   Compression fracture of lumbar spine, non-traumatic, initial encounter            Brenda Brambila, APRN  02/19/17 [id number]

## 2017-02-27 PROBLEM — L73.9 FOLLICULITIS: Status: ACTIVE | Noted: 2017-01-01

## 2017-02-27 NOTE — PROGRESS NOTES
I discussed the case with the resident and I agree with their assessment  and plan.  Phillip Camara MD.

## 2017-02-27 NOTE — PROGRESS NOTES
Subjective   Eva Troncoso is a 57 y.o. female.     History of Present Illness  Patient is a 49-year-old  female with a past medical history of chronic kidney disease, hypertension, anxiety, chronic bronchitis, colostomy resident who is presenting to walk-in clinic today because she's been having lower extremity swelling.  Patient was seen earlier in the day by general surgery for which the patient was asking for a colostomy reversal at that visit patient described a lower extremity rash.  Patient was told that she would need to see her primary care physician, patient instructed them that her appointment was to be early next week with family practice.  Patient recently saw a another physician in [place] this past Monday for which the patient had thought was to establish care.  At that visit to physician was unsure if he was begun of being the primary care physician and thus patient was sent away to be seen by another physician.  Patient mentions that after that visit she just started developing severe swelling in her lower extremities, the next day.  Saw a little rash on her ankles as well as blisters that appeared on both feet, that throughout the day had spread to the mid part of her lower leg with underlying blisters present.  Patient denies any fevers, chills, generalized weakness, does mention that she has been taking her medications as prescribed.  The following portions of the patient's history were reviewed and updated as appropriate: allergies, current medications, past family history, past medical history, past social history, past surgical history and problem list.    Review of Systems    Constitutional: no weight loss, fever, chills, weakness  HEENT: no visual loss, blurred vision, double vision, yellow scalarea  Skin: Bilateral lower extremity rash   CVS: no chest pain, palpitations  Chest: no shortness of air, cough, dyspnea  GI: nausea, denies vomiting  : no burning in urination,  "change in odor, no change in frequency  Neuro: no headache, dizziness, syncope, paralysis, ataxia, numbness  Musculoskeletal: lower extremity pain more in the ankles   Lymphatics: no enlarged nodes  Endo: no reports of sweating, cold or heat intolerance, no polyuria      Objective   Physical Exam  Visit Vitals   • /90 (BP Location: Left arm, Patient Position: Sitting, Cuff Size: Adult)   • Pulse 96   • Ht 65\" (165.1 cm)   • Wt 234 lb 14.4 oz (107 kg)   • LMP 03/11/1999 (Within Months)   • SpO2 95%   • BMI 39.09 kg/m2   Physical Exam    CONSTITUTIONAL: The vital signs showed that the patient was afebrile; blood pressure and heart rate were within normal limits. The patient appeared alert.  EYES: The conjunctiva was clear. The pupil was equal and reactive. There was no ptosis. The irides appeared normal.  EARS, NOSE AND THROAT: The ears and the nose appeared normal in appearance. Hearing was grossly intact. The oropharynx showed that the mucosa was moist. There was no lesion that I could see in the palate, tongue. tonsil or posterior pharynx.  NECK: The neck was supple. The thyroid gland was not enlarged by palpation.  RESPIRATORY: The patient’s respiratory effort was normal. Auscultation of the lung showed it to be clear with good air movement.  CARDIOVASCULAR: Auscultation of the heart revealed S1 and S2 with regular rate with no murmur noted. The extremities showed no edema.  SKIN: Petechial rash that extends from her ankles bilaterally all throughout the mid part of her leg, associated blisters present on the underlying aspect of her legs, associated erythema, tenderness to palpation on the anterior aspect of her shin, 1 plus pitting edema in the lower extremities bilaterally         Assessment/Plan   Eva was seen today for leg swelling.    Diagnoses and all orders for this visit:    Folliculitis  -     CBC Auto Differential  -     Sedimentation rate, automated    Cellulitis of lower extremity, " unspecified laterality  -     CBC Auto Differential  -     Sedimentation rate, automated  -     doxycycline (DORYX) 100 MG enteric coated tablet; Take 1 tablet by mouth 2 (Two) Times a Day.    Encounter for medication monitoring  -     Rapid drug screen, urine    Other orders  -     spironolactone (ALDACTONE) 25 MG tablet; Take 1 tablet by mouth Daily.  -     HYDROcodone-acetaminophen (NORCO) 5-325 MG per tablet; Take 1 tablet by mouth Every 8 (Eight) Hours As Needed for severe pain (7-10).     -will treat with doxycyline for presumed folliculitis, will have labs ordered, patient will follow up tomorrow for a recheck of her wound   -patient also did have an elevated blood pressure that the medical assistant took that was 222/102, patient was given 0.3 mg of clonidine, did recheck her blood pressure and it came down to 160/90          This document has been electronically signed by Sarah Yee MD on February 27, 2017 3:37 PM

## 2017-02-27 NOTE — PROGRESS NOTES
57-year-old female here for follow-up after recent admission to the hospital where she had a GI bleed.  She was fully evaluated by Dr. Osei to include a colonoscopy through her colostomy as well as her rectal pouch and no obvious source of bleeding was clearly identified.  Patient has multiple medical issues that she would do well with washers in the hospital and her issues with her colostomy stabilized she got any further bleeding she had normal output from the ostomy.  He required antibiotics and steroids for bilateral pneumonia.  She continues to be on antibiotics and steroids currently.  She saw her primary care physician and Matthew is unclear if he is her primary care physician.  She's also develops swelling in both lower extremities and rash below the knee bilaterally over the past few days.    She is up moving around better than she was was is the hospital  Breath sounds are distant  Abdomen is soft ostomy is pink and viable no obvious hernia appreciated  Extremities she's got a macular papular type rash below the knee anteriorly and posteriorly with some edema in both lower extremities.    Assessment and plan    My opinion she is to have a medical wrist to try to either take down her colostomy or revise her colostomy.  Would recommend she be optimized from a pulmonary standpoint and from her other medical issues.  She's going over to the family medicine clinic today to get a primary care physician can assist her with her medical issues.  She will follow up with us for 6 weeks or sooner she has a further concerns or questions.    Please note obtain the records from [facility] when she had her revision of her ostomy and the surgeon at that time did not recommend takedown due to the fact that she had chronic diarrhea.  I've gone over this with her that I would not recommend any Significant elective abdominal surgery until her medical issues are addressed.

## 2017-02-28 NOTE — PROGRESS NOTES
Subjective   Eva Troncoso is a 57 y.o. female.     History of Present Illness  Patient is a 49-year-old  female with a past medical history of chronic kidney disease, hypertension, anxiety, and chronic bronchitis, who is presenting to walk-in clinic yesterday because she's been having lower extremity swelling.  Patient recently saw a another physician in [place] this past Monday for which the patient had thought was to establish care.  Patient reported that she was seen by general surgery for possible colostomy reversal and was noted to have a cellulitic onset that time was told to be seen by primary care and gone to a primary care physician she is seen previously but was turned away resulting in her presentation yesterday to our walk-in clinic.  She states that after previous visit with another Primary care provider she started developing severe swelling in her lower extremities, the next day.  Saw a little rash on her ankles as well as blisters that appeared on both feet, that throughout the day had spread to the mid part of her lower leg with underlying blisters present.  Patient denies any fevers, chills, generalized weakness, does mention that she has been taking her medications as prescribed.    At previous appointment she was prescribed doxycycline and told to follow-up with today for reevaluation of her legs.  She states that she has not picked up her doxycycline as of yet and therefore has not started taking it because there was some issue at the pharmacy that has since been corrected.  She denies any acute changes to her legs over the past 24 hours.    The following portions of the patient's history were reviewed and updated as appropriate: allergies, current medications, past family history, past medical history, past social history, past surgical history and problem list.    Review of Systems   Constitutional: Negative for activity change, appetite change, fatigue and fever.   HENT:  Negative for ear pain and sore throat.    Eyes: Negative for pain and visual disturbance.   Respiratory: Negative for cough and shortness of breath.    Cardiovascular: Negative for chest pain and palpitations.   Gastrointestinal: Negative for abdominal pain and nausea.   Endocrine: Negative for cold intolerance and heat intolerance.   Genitourinary: Negative for difficulty urinating and dysuria.   Musculoskeletal: Negative for arthralgias and gait problem.   Skin: Positive for color change and rash.   Neurological: Negative for dizziness, weakness and headaches.   Hematological: Negative for adenopathy. Does not bruise/bleed easily.   Psychiatric/Behavioral: Negative for agitation, confusion and sleep disturbance.       Objective   Physical Exam   Constitutional: She is oriented to person, place, and time. She appears well-developed and well-nourished.   HENT:   Head: Normocephalic and atraumatic.   Right Ear: External ear normal.   Left Ear: External ear normal.   Nose: Nose normal.   Mouth/Throat: Oropharynx is clear and moist.   Eyes: Conjunctivae and EOM are normal. No scleral icterus.   Neck: Normal range of motion. Neck supple. No thyromegaly present.   Cardiovascular: Normal rate, regular rhythm and normal heart sounds.  Exam reveals no gallop and no friction rub.    No murmur heard.  Pulmonary/Chest: Effort normal and breath sounds normal. No respiratory distress. She has no wheezes. She has no rales.   Abdominal: Soft. Bowel sounds are normal. She exhibits no distension and no mass. There is no tenderness. There is no guarding.   Musculoskeletal: Normal range of motion. She exhibits edema and tenderness.   Lymphadenopathy:     She has no cervical adenopathy.   Neurological: She is alert and oriented to person, place, and time.   Skin: Skin is warm and dry. Rash noted. There is erythema.   Petechial rash that extends from her ankles bilaterally all throughout the mid part of her leg, associated blisters  present on the underlying aspect of her legs, associated erythema, tenderness to palpation on the anterior aspect of her shin, 1 plus pitting edema in the lower extremities bilaterally    Psychiatric: She has a normal mood and affect. Her speech is normal and behavior is normal. Cognition and memory are normal.   Vitals reviewed.          Assessment/Plan   Eva was seen today for leg pain and foot swelling.    Diagnoses and all orders for this visit:    Cellulitis of lower extremity, unspecified laterality    Essential hypertension  -     lisinopril (PRINIVIL,ZESTRIL) 20 MG tablet; Take 1 tablet by mouth Daily.     -stressed importance of picking up the doxycycline and taking it.  Have called pharmacy and confirmed antibiotic is there and ready for pickup without any issues.  Labs were reviewed and discussed with patient.   -patient has an elevated blood pressure in office today.  Starting lisinopril.          This document has been electronically signed by Kervin Gardner MD on February 28, 2017 4:24 PM

## 2017-03-02 NOTE — PROGRESS NOTES
Subjective:     Eva Troncoso is a 57 y.o. female who presents for follow-up for cellulitis on both of her lower extremities. She mentions that she was recently seen in our clinic few days ago and was given doxycycline. Today, she feels that the cellulitis is getting better but not completely resolved. She is also having elevated blood pressure and currently is not taking the medications that she is suppose to be on. Denies any visual disturbances or headaches at this time.     Preventative:  Over the past 2 weeks, have you felt down, depressed, or hopeless?No   Over the past 2 weeks, have you felt little interest or pleasure in doing things?No  Clinical depression screening refused by patient.Not Indicated     On osteoporosis therapy?Not Indicated     Past Medical Hx:  Past Medical History   Diagnosis Date   • Anxiety state    • Chronic depression    • Chronic pain    • Compression fracture of lumbar spine, non-traumatic    • Depressive disorder    • Essential hypertension    • Obesity    • Pericardial effusion        Past Surgical Hx:  Past Surgical History   Procedure Laterality Date   • Cardiac catheterization  05/13/2014     42902 (1)   -  Successful PCI to the proximal LAD with a V stent reducing 99% stenosis to 0%   • Appendectomy  12/10/2012     Laparoscopic (1) - Acute suppurative appendicitis   • Shoulder surgery  05/14/2015     (1) - Communited 3 part fractured humerus, proximal left shoulder. Open reduction and internal fixation   • Tracheostomy  05/30/2014     (1) - Percutaneous endoscopic gastrostomy tube placement.;     • Colonoscopy N/A 2/13/2017     Procedure: COLONOSCOPY;  Surgeon: Nader Osei DO;  Location: [facility] ENDOSCOPY;  Service:        Health Maintenance:  Health Maintenance   Topic Date Due   • PNEUMOCOCCAL VACCINE (19-64 MEDIUM RISK) (1 of 1 - PPSV23) 01/20/1978   • INFLUENZA VACCINE  08/01/2016   • MEDICARE ANNUAL WELLNESS  11/11/2016   • PAP SMEAR  11/11/2016   • DXA SCAN   07/17/2017   • MAMMOGRAM  07/24/2017   • TDAP/TD VACCINES (2 - Td) 09/04/2024   • COLONOSCOPY  02/13/2027   • HEPATITIS C SCREENING  Completed       Current Meds:    Current Outpatient Prescriptions:   •  doxycycline (MONODOX) 100 MG capsule, , Disp: , Rfl:   •  Aspirin-Salicylamide-Caffeine (BC HEADACHE POWDER PO), Take  by mouth As Needed (for pain)., Disp: , Rfl:   •  azithromycin (ZITHROMAX) 500 MG tablet, Take 1 tablet by mouth Daily., Disp: 2 tablet, Rfl: 0  •  busPIRone (BUSPAR) 15 MG tablet, Take  by mouth. Take 1 tablet 2 times per day., Disp: , Rfl:   •  cefuroxime (CEFTIN) 500 MG tablet, Take 1 tablet by mouth 2 (Two) Times a Day., Disp: 6 tablet, Rfl: 0  •  docusate sodium 100 MG capsule, Take 100 mg by mouth 2 (Two) Times a Day., Disp: 60 capsule, Rfl: 0  •  doxycycline (DORYX) 100 MG enteric coated tablet, Take 1 tablet by mouth 2 (Two) Times a Day., Disp: 42 tablet, Rfl: 0  •  furosemide (LASIX) 20 MG tablet, Take 20 mg by mouth Daily As Needed (for fluids)., Disp: , Rfl:   •  HYDROcodone-acetaminophen (NORCO) 5-325 MG per tablet, Take 1 tablet by mouth Every 8 (Eight) Hours As Needed for severe pain (7-10)., Disp: 20 tablet, Rfl: 0  •  labetalol (NORMODYNE) 200 MG tablet, Take 1 tablet by mouth 2 (Two) Times a Day for 30 days., Disp: 60 tablet, Rfl: 3  •  lisinopril (PRINIVIL,ZESTRIL) 40 MG tablet, Take 1 tablet by mouth Daily., Disp: 30 tablet, Rfl: 3  •  MethylPREDNISolone (MEDROL, SANA,) 4 MG tablet, Take as directed on package instructions., Disp: 21 tablet, Rfl: 0  •  mirtazapine (REMERON) 15 MG tablet, Take 15 mg by mouth Daily., Disp: , Rfl:   •  potassium chloride (KLOR-CON) 20 MEQ packet, Take 20 mEq by mouth 2 (Two) Times a Day., Disp: 10 packet, Rfl: 0  •  QUEtiapine (SEROquel) 200 MG tablet, Take 200 mg by mouth Every Night., Disp: , Rfl:   •  spironolactone (ALDACTONE) 25 MG tablet, Take 1 tablet by mouth Daily., Disp: 30 tablet, Rfl: 1  •  sulfamethoxazole-trimethoprim (BACTRIM DS,SEPTRA  "DS) 800-160 MG per tablet, Take 1 tablet by mouth 2 (Two) Times a Day., Disp: 28 tablet, Rfl: 0  •  traZODone (DESYREL) 150 MG tablet, Take 150 mg by mouth Every Night., Disp: , Rfl:     Allergies:  Other; Stadol [butorphanol]; and Toradol [ketorolac tromethamine]    Family Hx:  No family history on file.     Social History:  Social History     Social History   • Marital status:      Spouse name: N/A   • Number of children: N/A   • Years of education: N/A     Occupational History   • Not on file.     Social History Main Topics   • Smoking status: Current Every Day Smoker   • Smokeless tobacco: Never Used      Comment: Smokes 1 ppd for 40 years   • Alcohol use No   • Drug use: Not on file   • Sexual activity: Not on file     Other Topics Concern   • Not on file     Social History Narrative       Review of Systems   Constitutional: Negative for activity change, appetite change, fatigue and fever.   HENT: Negative for ear pain and sore throat.    Eyes: Negative for pain and visual disturbance.   Respiratory: Negative for cough and shortness of breath.    Cardiovascular: Negative for chest pain and palpitations.   Gastrointestinal: Negative for abdominal pain and nausea.   Endocrine: Negative for cold intolerance and heat intolerance.   Genitourinary: Negative for difficulty urinating and dysuria.   Musculoskeletal: Negative for arthralgias and gait problem.   Skin: Positive for color change and rash.        Positive for bilateral cellulitis in both lower extremities.    Neurological: Negative for dizziness, weakness, light-headedness, numbness and headaches.   Hematological: Negative for adenopathy. Does not bruise/bleed easily.   Psychiatric/Behavioral: Negative for agitation, confusion and sleep disturbance.         Objective:     Visit Vitals   • BP (!) 160/110 (BP Location: Right arm, Patient Position: Sitting, Cuff Size: Adult)   • Pulse 101   • Temp 99.4 °F (37.4 °C) (Tympanic)   • Ht 65\" (165.1 cm)   • Wt " 235 lb 9.6 oz (107 kg)   • LMP 03/11/1999 (Within Months)   • SpO2 97%   • BMI 39.21 kg/m2       Physical Exam   Constitutional: She is oriented to person, place, and time. She appears well-developed and well-nourished.   HENT:   Head: Normocephalic and atraumatic.   Neck: Normal range of motion. Neck supple.   Cardiovascular: Normal rate, regular rhythm and normal heart sounds.    Pulmonary/Chest: Effort normal and breath sounds normal.   Abdominal: Soft. Bowel sounds are normal. She exhibits no distension. There is no tenderness. There is no rebound.   Musculoskeletal: Normal range of motion.        Legs:  Neurological: She is alert and oriented to person, place, and time.   Skin: Skin is warm.   Psychiatric: She has a normal mood and affect. Her behavior is normal. Judgment and thought content normal.   Nursing note and vitals reviewed.           Assessment/Plan:     Eva was seen today for cellulitis and hypertension.    Diagnoses and all orders for this visit:    Cellulitis of both lower extremities    Essential hypertension  -     lisinopril (PRINIVIL,ZESTRIL) 40 MG tablet; Take 1 tablet by mouth Daily.    Other orders  -     labetalol (NORMODYNE) 200 MG tablet; Take 1 tablet by mouth 2 (Two) Times a Day for 30 days.  -     spironolactone (ALDACTONE) 25 MG tablet; Take 1 tablet by mouth Daily.  -     sulfamethoxazole-trimethoprim (BACTRIM DS,SEPTRA DS) 800-160 MG per tablet; Take 1 tablet by mouth 2 (Two) Times a Day.      Bactrim added to doxycycline for cellulitis treatment.  HTN medication regimen changed as noted above. Clonidine given in clinic to bring down the BP from the 200s to 160s  Reassess in 2 weeks    Follow-up:     Return in about 2 weeks (around 3/16/2017) for Recheck.    GOALS:  Control cellulitis         occasional/rare  eat more fruits and vegetables, decrease soda or juice intake, increase water intake, increase physical activity, reduce screen time, reduce portion size, cut out extra  servings, reduce fast food intake, family to eat at dinner table more often, keep TV off during meals, plan meals, eat breakfast and have 3 meals a day    RISK SCORE: 4      This document has been electronically signed by Mary Spangler MD on March 2, 2017 5:23 PM    Mary Spangler MD, GEENA  [address] [address] [phone]

## 2017-03-03 NOTE — PROGRESS NOTES
I have reviewed the notes, assessments, and/or procedures performed. I concur with her/his documentation of Eva Troncoso.     Loc June, DO

## 2017-03-04 PROBLEM — R07.9 CHEST PAIN: Status: ACTIVE | Noted: 2017-01-01

## 2017-03-04 PROBLEM — I16.1 HYPERTENSIVE EMERGENCY: Status: ACTIVE | Noted: 2017-01-01

## 2017-03-04 PROBLEM — R79.89 ELEVATED D-DIMER: Status: ACTIVE | Noted: 2017-01-01

## 2017-03-04 PROBLEM — I50.30 CHF WITH LEFT VENTRICULAR DIASTOLIC DYSFUNCTION, NYHA CLASS 1 (HCC): Status: ACTIVE | Noted: 2017-01-01

## 2017-03-04 NOTE — CONSULTS
Cardiology Consultation Note.        Patient Name: Eva Troncoso  Age/Sex: 57 y.o. female  : 1960  MRN: [id number]    Date of consultation: 3/4/2017  Consulting Physician: Yg Ambrocio MD  Primary care Physician: Mary Spangler MD  Requesting Physician:   Phillip Camara MD  Reason for consultation:  Chest pain shortness of breath with risk factors for coronary artery disease.      Subjective:       Chief Complaint: Chest pain shortness of breath leg discomfort.        History of Present Illness:  Eva Troncoso is a 57 y.o. female     Body mass index is 36.61 kg/(m^2). with a past medical history significant for nuclear Cardiolite stress test done in  which had not revealed any evidence of any stress-induced ischemia, with a large pericardial effusion, status post pericardial window done in 2013, with preserved left ventricular systolic function with an ejection fraction of 60% to 65%, with mild mitral and mild pulmonic insufficiency, with concentric left ventricular hypertrophy, with diastolic dysfunction, arterial hypertension, hypertensive heart disease, tobacco abuse, chronic pain, anxiety, depression, and gastroesophageal reflux disease.  With history of colostomy after a pelvic fracture history of cellulitis being followed by Dr. Spangler.    Patient also has history of chronic kidney disease anemia with evaluation by Dr. Osei with an upper endoscopy.    Patient was evaluated by Dr. Spangler  on Thursday for a routine follow-up for the lower extremity cellulitis.  Patient subsequently on Friday started having symptoms of chest pain and shortness of breath.  Patient's symptoms of shortness of breath was a new finding along with symptoms of chest pain.  Due to the patient's persistent discomfort patient presented to the emergency room.  In the emergency room patient initial evaluation revealed a mild elevation in the troponin.  Patient often note was not tachycardic with a heart rate of 89 bpm  but was found to have an elevated d-dimer of greater than 3000.  On further questioning patient denies any previous episodes of chest pain.  Review of the record indicated patient was last hospitalized in 2016 with symptoms of chest discomfort and shortness of breath.  Patient on further questioning does complain of having symptoms of palpitation.  Patient at denies any symptoms of chest pain prior to the episode on Friday.  Patient has had bilateral lower extremity pain and was being treated for cellulitis.  Patient on further questioning denies any symptoms of lightheaded dizziness or complete loss of consciousness.        Patient 10 point review of system except for what is stated in the history of present illness is negative.          Past Medical History:  1. Chest pain with positive troponin suggestive of a non-Q myocardial infarction.   2. Previous adenosine Cardiolite stress test done in 2007, which had not reveal of any evidence of any stress-induced ischemia.   3. Arterial hypertension.   4. Hypertensive heart disease.   5. Concentric left ventricular hypertrophy with diastolic dysfunction.   6. Mild mitral regurgitation and mild pulmonic insufficiency.   7. Concentric left ventricular hypertrophy with diastolic dysfunction.   8. Obesity with a body mass index of 33 with weight of 90 kg, height of 65 inches.   9. History of large pericardial effusion, status post pericardial window done in 2014.   10. Anxiety.   11. Chronic obstructive lung disease with tobacco abuse.   12. Chronic pain syndrome.   13. Respiratory failure requiring intubation in May 2014 and a PEG tube placement.   14. History of anxiety and depression with major psychosis requiring [facility] hospitalization with prior suicidal attempt.   15. Chronic kidney disease.   16. Hepatitis C.   17.  Anemia.  18.  Chronic kidney disease.  19.  Obstructive sleep apnea.    Past Surgical History:  1. Left ankle open reduction and internal  fixation.   2. Appendectomy.   3. Colostomy with fecal diversion after pelvic fracture.   4. Pericardial window.   5. Tracheostomy   6. PEG tube placement.   7.  Bronchoscopy.    Family History: Significant for atherosclerotic coronary artery disease.   History reviewed. No pertinent family history.    Social History: Significant for one and a half pack per day tobacco abuse. Denies any alcohol intake.  Social History     Social History   • Marital status:      Spouse name: N/A   • Number of children: N/A   • Years of education: N/A     Occupational History   • Not on file.     Social History Main Topics   • Smoking status: Current Every Day Smoker   • Smokeless tobacco: Never Used      Comment: Smokes 1 ppd for 40 years   • Alcohol use No   • Drug use: Not on file   • Sexual activity: Not on file     Other Topics Concern   • Not on file     Social History Narrative        Cardiac Risk factor:   1. Postmenopausal.   2. Tobacco abuse.   3. Arterial hypertension.   4. Obesity with a body mass index of 33.   5. Family history for coronary artery disease.       Allergies:  Allergies   Allergen Reactions   • Other      MRSA   • Stadol [Butorphanol]      Itching   • Toradol [Ketorolac Tromethamine]      CONFUSION/HALLUCINAT       Medication::    (Not in a hospital admission)        Review of Systems:       Constitutional:  Denies recent weight loss, weight gain, fever or chills, no change in exercise tolerance     HENT:  Denies any hearing loss, epistaxis, hoarseness, or difficulty speaking.     Eyes: Wears eyeglasses or contact lenses     Respiratory:  Denies dyspnea with exertion,no cough, wheezing, or hemoptysis.     Cardiovascular: Positive for chest pain shortness of breath.  Negative for palpations,  orthopnea, PND, peripheral edema, syncope, or claudication.     Gastrointestinal:  Denies change in bowel habits, dyspepsia, ulcer disease, hematochezia, or melena.  No nausea, no vomiting, no hematemesis, no  diarrhea or constipation, no melena      Endocrine: Negative for cold intolerance, heat intolerance, polydipsia, polyphagia and polyuria. Denies any history of weight change, heat/cold intolerance, polydipsia, polyuria     Genitourinary: Negative hor hematuria.      Musculoskeletal: Denies any history of arthritic symptoms or back problems .  No joint pain, joint stiffness, joint swelling, muscle pain, muscle weakness and neck pain    Skin:  Denies any change in hair or nails, rashes, or skin lesions.     Allergic/Immunologic: Negative.  Negative for environmental allergies, food allergies and immunocompromised state.     Neurological:  Denies any history of recurrent headaches, strokes, TIA, or seizure disorder.     Hematological: Denies excessive bleeding, easy bruising, fatigue, lymphadenopathy and petechiae or any bleeding disorders, or lymphadenopathy.     Psychiatric/Behavioral: Denies any history of depression, substance abuse, or change in cognitive function. Denies any psychomotor reaction or tangential thought.  No depression, homicidal ideations and suicidal ideations    Endocrine: No frequent urination and nocturia, temperature intolerance, weight gain, unintended and weight loss, unintended            Objective:     Objective:  Vitals:    03/04/17 1129   BP: (!) 184/96   Pulse: 90   Resp: 21   Temp:    SpO2: 98%     .    Body mass index is 36.61 kg/(m^2).           Physical Exam:   General Appearance:    Alert, oriented, cooperative, in no acute distress   Head:    Normocephalic, atraumatic, without obvious abnormality   Eyes:           LINDA  Lids and lashes normal, conjunctivae and sclerae normal, no icterus, no pallor   Ears:    Ears appear intact with no abnormalities noted   Throat:   Mucous membranes pink and moist   Neck:   Supple, trachea midline, no carotid bruit, no organomegaly or JVD   Lungs:     Clear to auscultation and percussion, respirations regular, even and Unlabored. No wheezes,  rales, rhonchi    Heart:    Regular rhythm and normal rate, normal S1 and S2, no            murmur, no gallop, no rub, no click   Abdomen:     Soft, non-tender, non-distended, no guarding, no rebound tenderness, Normal bowel sounds in all four quadrant, no masses, liver and spleen nonpalpable,    Genitalia:    Deferred   Extremities:   Moves all extremities well, , no cyanosis,  no clubbing.  There is evidence of bilateral calf muscle tenderness with erythema and +1 edema.     Pulses:   Pulses palpable and equal bilaterally   Skin:   Moist and warm. No bleeding, bruising or rash   Neurologic/Psychiatric:   Alert and oriented to person, place, and time.  Motor, power and tone in upper and lower extremity is grossly intact.  No focal neurological deficits. Normal cognitive function. No psychomotor reaction or tangential thought. No depression, homicidal ideations and suicidal ideations           Lab Review:       Results from last 7 days  Lab Units 03/04/17  0630   SODIUM mmol/L 140   POTASSIUM mmol/L 3.6   CHLORIDE mmol/L 109   TOTAL CO2 mmol/L 22.0   BUN mg/dL 25*   CREATININE mg/dL 1.47*   CALCIUM mg/dL 8.2*   BILIRUBIN mg/dL 0.6   ALK PHOS U/L 118   ALT (SGPT) U/L 32   AST (SGOT) U/L 30   GLUCOSE mg/dL 118*       Results from last 7 days  Lab Units 03/04/17  1128 03/04/17  0954 03/04/17  0630   CK TOTAL U/L 28* 30 36  35   TROPONIN I ng/mL 0.064* 0.056* 0.058*           Results from last 7 days  Lab Units 03/04/17  0629   WBC 10*3/mm3 6.24   HEMOGLOBIN g/dL 9.7*   HEMATOCRIT % 27.7*   PLATELETS 10*3/mm3 130*       Results from last 7 days  Lab Units 03/04/17  1128   INR  1.22*   APTT seconds 30.5                   Invalid input(s):  T4,  FREET4    EKG:   ECG/EMG Results (last 24 hours)     ** No results found for the last 24 hours. **          Imaging:  Imaging Results (last 24 hours)     Procedure Component Value Units Date/Time    XR Chest 1 View [[id number]] Collected:  03/04/17 0635     Updated:  03/04/17 0637     Narrative:       Exam: AP portable chest    INDICATION: Chest pain    COMPARISON: 2/19/2017    FINDINGS: AP portable chest. Lungs are hypoinflated. Status post  internal fixation of a proximal left humerus fracture. Heart size  upper limits of normal. No obvious infiltrate, pneumothorax or  pleural effusion.      Impression:       No obvious acute cardiopulmonary abnormality.    Electronically signed by:  Blake Salcido MD  3/4/2017 6:36 AM CST  Workstation: [email or web address]          I personally viewed and interpreted the patient's EKG/Telemetry data.    Assessment:   1.  Chest pain shortness of breath.  2.  Elevated troponin suggestive of a non-Q myocardial infarction.  3.  Previous nuclear stress test evaluation which was negative.  4.  Arterial hypertension.  5.  Hypertensive heart disease.  6.  Pericardial effusion status post pericardial window.  7.  Bilateral lower extremity edema with evidence of cellulitis.          Plan:   1.  Chest pain shortness of breath with elevated troponin suggestive of a non-Q myocardial infarction.  Patient had undergone previous nuclear Cardiolite stress tests evaluation in 2017.  Patient last hospitalization was in 2016 with symptoms of chest pain.  Patient does have chronic kidney disease and had been treated medically and not subjected to any invasive evaluation.  Patient now presents with symptoms of chest pain and shortness of breath with mild elevation in the troponin suggestive of a non-Q myocardial infarction.  Due to the patient elevated d-dimer level patient at the present time would be started on intravenous heparin and would undergo ultrasound of the lower extremity and a VQ scan.  Unless the patient has marked elevation in the troponin though electrocardiographic changes suggestive of myocardial infarction and ongoing ischemia patient would not be subjected to any invasive evaluation.  Have discussed the plan with Dr. Garcia and Dr. Camara.  2.  Pericardial effusion  status post pericardial window.  Patient would be subjected to a two-dimensional echocardiogram to evaluate the left ventricular systolic function and to rule out regional segmental wall motion abnormality.  3.  Arterial hypertension.  Patient blood pressure has been labile and elevated.  Patient would be continued on the present dose of the labetalol and would consider adding afterload reduction with hydralazine should the patient blood pressure remained elevated.  4.  Shortness of breath with elevated d-dimer.  Patient oxygen saturation on admission was 98%.  At the present time patient would be subjected to an ultrasound of the lower extremity and a VQ scan due to the patient elevated d-dimer and empirically started on intravenous heparin.  5.  Anemia with a hemoglobin of 9.7 is noted.  Patient has had previous GI evaluation.  6.  Chronic kidney disease.  Patient renal function will be followed closely and patient would benefit from nephrology evaluation.  7.  Risk factor modification.  Patient has been counseled on smoking cessation and the risk associated with smoking and the occurrence and progression of atherosclerotic coronary artery disease and peripheral vascular disease.      Time: time spent in face-to-face evaluation off greater than 55 minutes  and interacting and formulating examining and discussing the plan with the patient with 50% of greater time spent in face-to-face interaction.    Yg Ambrocio MD  03/04/17  12:35 PM  EMR Dragon/Transcription disclaimer:   Some of this note may be an electronic transcription/translation of spoken language to printed text. The electronic translation of spoken language may permit erroneous, or at times, nonsensical words or phrases to be inadvertently transcribed; Although I have reviewed the note for such errors, some may still exist.

## 2017-03-04 NOTE — H&P
<principal problem not specified>  Subjective:   CC: Chest Pain  Eva Troncoso is a 57 y.o. female who presents for chest pain. It started yesterday. It went through to the back.        Concurrent Medical Hx:  Past Medical History   Diagnosis Date   • Anxiety state    • Chronic depression    • Chronic pain    • Compression fracture of lumbar spine, non-traumatic    • Depressive disorder    • Essential hypertension    • Obesity    • Pericardial effusion        Past Surgical Hx:  Past Surgical History   Procedure Laterality Date   • Cardiac catheterization  05/13/2014     85061 (1)   -  Successful PCI to the proximal LAD with a V stent reducing 99% stenosis to 0%   • Appendectomy  12/10/2012     Laparoscopic (1) - Acute suppurative appendicitis   • Shoulder surgery  05/14/2015     (1) - Communited 3 part fractured humerus, proximal left shoulder. Open reduction and internal fixation   • Tracheostomy  05/30/2014     (1) - Percutaneous endoscopic gastrostomy tube placement.;     • Colonoscopy N/A 2/13/2017     Procedure: COLONOSCOPY;  Surgeon: Nader Osei DO;  Location: [facility] ENDOSCOPY;  Service:      Family Hx:  History reviewed. No pertinent family history.   Social History:  Social History     Social History   • Marital status:      Spouse name: N/A   • Number of children: N/A   • Years of education: N/A     Occupational History   • Not on file.     Social History Main Topics   • Smoking status: Current Every Day Smoker   • Smokeless tobacco: Never Used      Comment: Smokes 1 ppd for 40 years   • Alcohol use No   • Drug use: Not on file   • Sexual activity: Not on file     Other Topics Concern   • Not on file     Social History Narrative       Home Medications:  Prior to Admission medications    Medication Sig Start Date End Date Taking? Authorizing Provider   Aspirin-Salicylamide-Caffeine (BC HEADACHE POWDER PO) Take  by mouth As Needed (for pain).    Historical Provider, MD   azithromycin  (ZITHROMAX) 500 MG tablet Take 1 tablet by mouth Daily. 2/17/17   CEDRICK Tripp   busPIRone (BUSPAR) 15 MG tablet Take  by mouth. Take 1 tablet 2 times per day.    Historical Provider, MD   cefuroxime (CEFTIN) 500 MG tablet Take 1 tablet by mouth 2 (Two) Times a Day. 2/17/17   CEDRICK Tripp   docusate sodium 100 MG capsule Take 100 mg by mouth 2 (Two) Times a Day. 2/17/17   CEDRICK Tripp   doxycycline (DORYX) 100 MG enteric coated tablet Take 1 tablet by mouth 2 (Two) Times a Day. 2/27/17   Sarah Yee MD   doxycycline (MONODOX) 100 MG capsule  2/28/17   Historical Provider, MD   furosemide (LASIX) 20 MG tablet Take 20 mg by mouth Daily As Needed (for fluids). 11/2/16   Historical Provider, MD   HYDROcodone-acetaminophen (NORCO) 5-325 MG per tablet Take 1 tablet by mouth Every 8 (Eight) Hours As Needed for severe pain (7-10). 2/27/17   Sarah Yee MD   labetalol (NORMODYNE) 200 MG tablet Take 1 tablet by mouth 2 (Two) Times a Day for 30 days. 3/2/17 4/1/17  Mary Spangler MD   lisinopril (PRINIVIL,ZESTRIL) 40 MG tablet Take 1 tablet by mouth Daily. 3/2/17   Mary Spangler MD   MethylPREDNISolone (MEDROL, SANA,) 4 MG tablet Take as directed on package instructions. 2/17/17   CEDRICK Tripp   mirtazapine (REMERON) 15 MG tablet Take 15 mg by mouth Daily. 11/2/16   Historical Provider, MD   potassium chloride (KLOR-CON) 20 MEQ packet Take 20 mEq by mouth 2 (Two) Times a Day. 2/19/17   CEDRICK Huitron   QUEtiapine (SEROquel) 200 MG tablet Take 200 mg by mouth Every Night.    Historical Provider, MD   spironolactone (ALDACTONE) 25 MG tablet Take 1 tablet by mouth Daily. 3/2/17   Mary Spangler MD   sulfamethoxazole-trimethoprim (BACTRIM DS,SEPTRA DS) 800-160 MG per tablet Take 1 tablet by mouth 2 (Two) Times a Day. 3/2/17   Mary Spangler MD   traZODone (DESYREL) 150 MG tablet Take 150 mg by mouth Every Night.    Historical Provider, MD        Allergies:  Other; Stadol [butorphanol]; and  "Toradol [ketorolac tromethamine]  I reviewed the patient's new clinical results.  Review of Systems  Review of Systems   Constitutional: Negative for diaphoresis, fatigue and fever.   Respiratory: Positive for chest tightness. Negative for cough and stridor.    Cardiovascular: Positive for chest pain. Negative for palpitations and leg swelling.   All other systems reviewed and negative.    Objective:     Visit Vitals   • BP (!) 184/96 (BP Location: Right arm, Patient Position: Lying)   • Pulse 90   • Temp 99.5 °F (37.5 °C) (Oral)   • Resp 21   • Ht 65\" (165.1 cm)   • Wt 220 lb (99.8 kg)   • LMP 03/11/1999 (Within Months)   • SpO2 98%   • BMI 36.61 kg/m2     Physical Exam   Constitutional: She appears well-developed.   HENT:   Head: Normocephalic and atraumatic.   Right Ear: External ear normal.   Left Ear: External ear normal.   Nose: Nose normal.   Mouth/Throat: Oropharynx is clear and moist.   Eyes: EOM are normal. Pupils are equal, round, and reactive to light.   Neck: Normal range of motion.   Cardiovascular: Normal rate, regular rhythm, normal heart sounds and intact distal pulses.  Exam reveals no gallop and no friction rub.    No murmur heard.  Pulmonary/Chest: Effort normal and breath sounds normal. No respiratory distress. She has no wheezes. She has no rales.   Abdominal: Soft. Bowel sounds are normal. She exhibits no distension.   Musculoskeletal: Normal range of motion.   Both lower extremities with some swelling. Erythematous lesions on the legs also.   Neurological: She is alert.   Skin: Skin is warm and dry.   Psychiatric: She has a normal mood and affect. Her behavior is normal.   Vitals reviewed.    I reviewed the patient's new clinical results.  Assessment/Plan:     Active Hospital Problems (** Indicates Principal Problem)    Diagnosis Date Noted   • Chest pain [R07.9] 03/04/2017     -Patient has been admitted to the ICU.   -She has been started on a morphine, supplementary O2, nitroglycerin " infusion, and aspirin.   -Consulted cardiology, Dr. Ambrocio, will proceed with cardiac catheterization.      • Hypertensive emergency [I16.1] 03/04/2017     -Patient has been admitted to the ICU.   -She has been started on a aspirin, nitroglycerin infusion, and atorvastatin.   -We will continue all her home antihypertensive medications: labetalol, lisinopril, spironolactone, and lasix  -PRN hydralazine for SBP > 160.   -We will continue to follow her kidney function with daily CMP.         Resolved Hospital Problems    Diagnosis Date Noted Date Resolved   No resolved problems to display.         I have seen and examined the patient.  I have reviewed the notes, assessments, and/or procedures performed by the resident  , I concur with her/his documentation and assessment and plan for Eva Gordon Karyna.        This document has been electronically signed by Phillip Camara MD on March 4, 2017 11:59 AM

## 2017-03-04 NOTE — H&P
HISTORY AND PHYSICAL  NAME: Eva Troncoso  : 1960  MRN: [id number]    DATE OF ADMISSION: 17    DATE & TIME SEEN: 17 11:50 AM    PCP: Mary Spangler MD    CODE STATUS: Full Code    CHIEF COMPLAINT   Chief Complaint   Patient presents with   • Chest Pain       HPI:  Eva Troncoso is a 57 y.o. female with a concurrent medical history of uncontrolled essential hypertension, LV diastolic dysfunction, and COPD who presents with a two day history of mid sternal chest pain radiating to her back and associated shortness of breath. Patient reports that the pain began at rest while watching TV and she can not identify relieving or exacerbating factors. Patient denies any headache but describes temporary, painless peripheral vision loss. Patient denies any fever or cough. She continued to smoke despite her shortness of breath.     CONCURRENT MEDICAL HISTORY:  Past Medical History   Diagnosis Date   • Anxiety state    • Chronic depression    • Chronic pain    • Compression fracture of lumbar spine, non-traumatic    • Depressive disorder    • Essential hypertension    • Obesity    • Pericardial effusion        PAST SURGICAL HISTORY:  Past Surgical History   Procedure Laterality Date   • Cardiac catheterization  2014     56882 (1)   -  Successful PCI to the proximal LAD with a V stent reducing 99% stenosis to 0%   • Appendectomy  12/10/2012     Laparoscopic (1) - Acute suppurative appendicitis   • Shoulder surgery  2015     (1) - Communited 3 part fractured humerus, proximal left shoulder. Open reduction and internal fixation   • Tracheostomy  2014     (1) - Percutaneous endoscopic gastrostomy tube placement.;     • Colonoscopy N/A 2017     Procedure: COLONOSCOPY;  Surgeon: Nader Osei DO;  Location: [facility] ENDOSCOPY;  Service:        FAMILY HISTORY:  History reviewed. No pertinent family history.     SOCIAL HISTORY:  Social History     Social History   • Marital status:       Spouse name: N/A   • Number of children: N/A   • Years of education: N/A     Occupational History   • Not on file.     Social History Main Topics   • Smoking status: Current Every Day Smoker   • Smokeless tobacco: Never Used      Comment: Smokes 1 ppd for 40 years   • Alcohol use No   • Drug use: Not on file   • Sexual activity: Not on file     Other Topics Concern   • Not on file     Social History Narrative       HOME MEDICATIONS:  1.  Labetalol 200 mg  twice a day    2.  Lisinopril 40 mg daily   3.  Spironolactone 25 mg  daily   4.  Bactrim 800-160 mg twice a day  5.  Doxycycline 100 mg capsule twice a day   6.  Hydrocodone acetaminophen 5-325 mg 3 times a day when necessary   7.  Potassium chloride 20 mEq packet twice a day   8.  Azithromycin 500 mg tablet daily   9.  Cefuroxime 500 mg tablet twice a day   10.  Docusate sodium 100 mg capsule daily   11.  Furosemide 20 mg when necessary for swelling   12.  Mirtazapine 15 mg Daily   13.  Aspirin-salicylamide-caffeine  as needed for pain   14.  Buspirone 15 mg  twice a day  15.  Seroquel 200 mg daily   16.  Trazodone 150 mg daily      ALLERGIES:  Other; Stadol [butorphanol]; and Toradol [ketorolac tromethamine]    REVIEW OF SYSTEMS  Review of Systems   Constitutional: Negative for chills, diaphoresis, fatigue and fever.   HENT: Negative for congestion, rhinorrhea, sinus pressure, sneezing and sore throat.    Eyes: Positive for visual disturbance (periodic peripheral visual loss).   Respiratory: Positive for shortness of breath. Negative for cough and chest tightness.    Cardiovascular: Positive for chest pain ( mid sternal and radiating to her back).   Gastrointestinal: Positive for nausea. Negative for abdominal pain, constipation and diarrhea.        Patient has a colostomy in her LLQ   Genitourinary: Negative for dysuria and hematuria.   Skin: Positive for rash ( cellulitis of her anterior lower legs).   Neurological: Negative for seizures, speech  difficulty, light-headedness, numbness and headaches.   Psychiatric/Behavioral: Negative for agitation, confusion and hallucinations. The patient is nervous/anxious.         PHYSICAL EXAM:  Temp:  [99.5 °F (37.5 °C)] 99.5 °F (37.5 °C)  Heart Rate:  [] 90  Resp:  [18-28] 21  BP: (177-256)/() 184/96  Body mass index is 36.61 kg/(m^2).     Physical Exam   Constitutional: She is oriented to person, place, and time. She appears well-developed and well-nourished.   Disheveled appearing   HENT:   Head: Normocephalic and atraumatic.   Nose: Nose normal.   Mouth/Throat: Oropharynx is clear and moist.   Eyes: Conjunctivae and EOM are normal. Pupils are equal, round, and reactive to light.   Neck: Normal range of motion. Neck supple. No tracheal deviation present.   Cardiovascular: Normal rate, regular rhythm, normal heart sounds and intact distal pulses.    Pulmonary/Chest: Effort normal. She has rales (mild rales appreciated at lung bases).   Decreased breath sounds appreciated bilaterally.    Abdominal: Soft. Bowel sounds are normal.   Obese abdomen. Colostomy bag present in the LLQ of her abdomen. No erythema surrounding ostomy.    Musculoskeletal: Normal range of motion. She exhibits edema ( +1 pitting edema of her lower extremities).   Lymphadenopathy:     She has no cervical adenopathy.   Neurological: She is alert and oriented to person, place, and time.   Skin: Skin is warm and dry. Rash (cellulitis; blanching, erythematous, rash of her anterior lower legs ) noted.   Psychiatric: She has a normal mood and affect. Her behavior is normal. Judgment and thought content normal.   Nursing note and vitals reviewed.         DIAGNOSTIC DATA:   Lab Results (last 24 hours)     Procedure Component Value Units Date/Time    CBC & Differential [[id number]] Collected:  03/04/17 0629    Specimen:  Blood Updated:  03/04/17 0641    Narrative:       The following orders were created for panel order CBC &  Differential.  Procedure                               Abnormality         Status                     ---------                               -----------         ------                     CBC Auto Differential[[id number]]         Abnormal            Final result                 Please view results for these tests on the individual orders.    CBC Auto Differential [[id number]]  (Abnormal) Collected:  03/04/17 0629    Specimen:  Blood Updated:  03/04/17 0641     WBC 6.24 10*3/mm3      RBC 3.20 (L) 10*6/mm3      Hemoglobin 9.7 (L) g/dL      Hematocrit 27.7 (L) %      MCV 86.6 fL      MCH 30.3 pg      MCHC 35.0 g/dL      RDW 12.8 %      RDW-SD 41.0 fl      MPV 9.7 fL      Platelets 130 (L) 10*3/mm3      Neutrophil % 74.5 %      Lymphocyte % 13.0 %      Monocyte % 11.9 %      Eosinophil % 0.2 %      Basophil % 0.2 %      Immature Grans % 0.2 %      Neutrophils, Absolute 4.66 10*3/mm3      Lymphocytes, Absolute 0.81 10*3/mm3      Monocytes, Absolute 0.74 10*3/mm3      Eosinophils, Absolute 0.01 10*3/mm3      Basophils, Absolute 0.01 10*3/mm3      Immature Grans, Absolute 0.01 10*3/mm3     CK [[id number]]  (Normal) Collected:  03/04/17 0630    Specimen:  Blood Updated:  03/04/17 0651     Creatine Kinase 35 U/L     CK [[id number]]  (Normal) Collected:  03/04/17 0630    Specimen:  Blood Updated:  03/04/17 0657     Creatine Kinase 36 U/L     Comprehensive Metabolic Panel [[id number]]  (Abnormal) Collected:  03/04/17 0630    Specimen:  Blood Updated:  03/04/17 0658     Glucose 118 (H) mg/dL      BUN 25 (H) mg/dL      Creatinine 1.47 (H) mg/dL      Sodium 140 mmol/L      Potassium 3.6 mmol/L      Chloride 109 mmol/L      CO2 22.0 mmol/L      Calcium 8.2 (L) mg/dL      Total Protein 5.9 (L) g/dL      Albumin 3.00 (L) g/dL      ALT (SGPT) 32 U/L      AST (SGOT) 30 U/L      Alkaline Phosphatase 118 U/L      Total Bilirubin 0.6 mg/dL      eGFR Non African Amer 37 (L) mL/min/1.73      Globulin 2.9 gm/dL      A/G Ratio 1.0 (L) g/dL       BUN/Creatinine Ratio 17.0      Anion Gap 9.0 mmol/L     BNP [[id number]]  (Abnormal) Collected:  03/04/17 0630    Specimen:  Blood Updated:  03/04/17 0702     proBNP 94010.0 (H) pg/mL     CK-MB [[id number]]  (Normal) Collected:  03/04/17 0630    Specimen:  Blood Updated:  03/04/17 0702     CKMB 0.76 ng/mL     Troponin [[id number]]  (Abnormal) Collected:  03/04/17 0630    Specimen:  Blood Updated:  03/04/17 0707     Troponin I 0.058 (H) ng/mL     Troponin [[id number]]  (Abnormal) Collected:  03/04/17 0954    Specimen:  Blood Updated:  03/04/17 1024     Troponin I 0.056 (H) ng/mL     CK [[id number]]  (Normal) Collected:  03/04/17 0954    Specimen:  Blood Updated:  03/04/17 1050     Creatine Kinase 30 U/L     CK-MB [[id number]]  (Normal) Collected:  03/04/17 0954    Specimen:  Blood Updated:  03/04/17 1100     CKMB 0.53 ng/mL     [place] Draw [[id number]] Collected:  03/04/17 0629    Specimen:  Blood Updated:  03/04/17 1101    Narrative:       The following orders were created for panel order [place] Draw.  Procedure                               Abnormality         Status                     ---------                               -----------         ------                     Light Blue Top[[id number]]                                    Final result               Green Top (Gel)[[id number]]                                   Final result               Lavender Top[[id number]]                                      Final result               Gold Top - SST[[id number]]                                    Final result                 Please view results for these tests on the individual orders.    Light Blue Top [[id number]] Collected:  03/04/17 0629    Specimen:  Blood Updated:  03/04/17 1101     Extra Tube hold for add-on       Auto resulted       Green Top (Gel) [[id number]] Collected:  03/04/17 0630    Specimen:  Blood Updated:  03/04/17 1101     Extra Tube Hold for add-ons.       Auto resulted.       Lavender Top [[id number]] Collected:  03/04/17  0629    Specimen:  Blood Updated:  03/04/17 1101     Extra Tube hold for add-on       Auto resulted       Gold Top - SST [[id number]] Collected:  03/04/17 0630    Specimen:  Blood Updated:  03/04/17 1101     Extra Tube Hold for add-ons.       Auto resulted.       CK [[id number]]  (Abnormal) Collected:  03/04/17 1128    Specimen:  Blood Updated:  03/04/17 1144     Creatine Kinase 28 (L) U/L     Protime-INR [[id number]]  (Abnormal) Collected:  03/04/17 1128    Specimen:  Blood Updated:  03/04/17 1148     Protime 15.3 Seconds      INR 1.22 (H)     Narrative:       Therapeutic range for most indications is 2.0-3.0 INR,  or 2.5-3.5 for mechanical heart valves.    aPTT [[id number]]  (Normal) Collected:  03/04/17 1128    Specimen:  Blood Updated:  03/04/17 1148     PTT 30.5 seconds     Narrative:       The recommended Heparin therapeutic range is 68-97 seconds.    D-dimer, Quantitative [[id number]]  (Abnormal) Collected:  03/04/17 1128    Specimen:  Blood Updated:  03/04/17 1148     D-Dimer, Quantitative 3896 (H) ng/mL (FEU)     Narrative:       Dimer values <500 ng/ml FEU are FDA approved as aid in diagnosis of deep venous thrombosis and pulmonary embolism.  This test should not be used in an exclusion strategy with pretest probability alone.    A recent guideline regarding diagnosis for pulmonary thomboembolism recommends an adjusted exclusion criterion of age x 10 ng/ml FEU for patients >50 years of age ([employer] Intern Med 2015; 163: 701-711).    Troponin [[id number]]  (Abnormal) Collected:  03/04/17 1128    Specimen:  Blood Updated:  03/04/17 1158     Troponin I 0.064 (H) ng/mL     CK-MB [[id number]]  (Normal) Collected:  03/04/17 1128    Specimen:  Blood Updated:  03/04/17 1158     CKMB 0.49 ng/mL            Imaging Results (last 24 hours)     Procedure Component Value Units Date/Time    XR Chest 1 View [[id number]] Collected:  03/04/17 0635     Updated:  03/04/17 0637    Narrative:       Exam: AP portable chest    INDICATION: Chest  pain    COMPARISON: 2/19/2017    FINDINGS: AP portable chest. Lungs are hypoinflated. Status post  internal fixation of a proximal left humerus fracture. Heart size  upper limits of normal. No obvious infiltrate, pneumothorax or  pleural effusion.      Impression:       No obvious acute cardiopulmonary abnormality.    Electronically signed by:  Blake Salcido MD  3/4/2017 6:36 AM CST  Workstation: [id number]          I reviewed the patient's new clinical results.  I reviewed the patient's other test results and agree with the interpretation    ASSESSMENT AND PLAN: This is a 57 y.o. female with:    Active Hospital Problems (** Indicates Principal Problem)    Diagnosis Date Noted   • Chest pain [R07.9] 03/04/2017     -Patient has been admitted to the ICU.   -She has been started on a morphine, supplementary O2, nitroglycerin infusion, and aspirin.   -Consulted cardiology, Dr. Ambrocio, regarding elevated troponin.      • Hypertensive emergency [I16.1] 03/04/2017     -Patient has been admitted to the ICU.   -She has been started on a aspirin, nitroglycerin infusion, and atorvastatin.   -We will continue all her home antihypertensive medications: labetalol, lisinopril, spironolactone, and lasix  -PRN hydralazine for SBP > 160.   -We will continue to follow her kidney function with daily CMP.      • CHF with left ventricular diastolic dysfunction, NYHA class 1 [I50.30] 03/04/2017     -BNP at the time of admission: 24958   -Monitor patient with strict I/Os and daily weight.  -Continue patient's home diuretic and electrolyte replacement.      • Elevated d-dimer [R79.1] 03/04/2017     -Ddimer: 3896  -Will order a V/Q scan and ultrasound of lower extremities.  -Started the patient on heparin.         Resolved Hospital Problems    Diagnosis Date Noted Date Resolved   No resolved problems to display.     Dr. Ambrocio has been consulted on the case.     DVT prophylaxis: Heparin & SCDs/TEDs    Code status is FULL CODE  STEFANIE  # [id number], reviewed and consistent with patient reported medications.      I discussed the patients findings and my recommendations with patient, primary care team and consulting provider.     Dr. Phillip Camara is the attending on record at time of admission, he is aware of the patient's status and agrees with the above history and physical.          This document has been electronically signed by Maci Sullivan MD on March 4, 2017 12:09 PM

## 2017-03-04 NOTE — ED PROVIDER NOTES
Subjective   Patient is a 57 y.o. female presenting with chest pain.   Chest Pain   Pain location:  Substernal area  Pain quality: sharp    Pain radiates to:  Upper back  Pain severity:  Moderate  Onset quality:  Sudden  Duration:  2 days  Timing:  Constant  Progression:  Unchanged  Chronicity:  New  Context: breathing    Relieved by:  Nothing  Worsened by:  Nothing  Ineffective treatments:  None tried  Associated symptoms: nausea and vomiting    Associated symptoms: no abdominal pain, no cough and no fever        Review of Systems   Constitutional: Negative for fever.   Respiratory: Negative for cough and chest tightness.    Cardiovascular: Positive for chest pain.   Gastrointestinal: Positive for nausea and vomiting. Negative for abdominal pain.   Genitourinary: Negative for dysuria.   All other systems reviewed and are negative.      Past Medical History   Diagnosis Date   • Anxiety state    • Chronic depression    • Chronic pain    • Compression fracture of lumbar spine, non-traumatic    • Depressive disorder    • Essential hypertension    • Obesity    • Pericardial effusion        Allergies   Allergen Reactions   • Other      MRSA   • Stadol [Butorphanol]      Itching   • Toradol [Ketorolac Tromethamine]      CONFUSION/HALLUCINAT       Past Surgical History   Procedure Laterality Date   • Cardiac catheterization  05/13/2014     85827 (1)   -  Successful PCI to the proximal LAD with a V stent reducing 99% stenosis to 0%   • Appendectomy  12/10/2012     Laparoscopic (1) - Acute suppurative appendicitis   • Shoulder surgery  05/14/2015     (1) - Communited 3 part fractured humerus, proximal left shoulder. Open reduction and internal fixation   • Tracheostomy  05/30/2014     (1) - Percutaneous endoscopic gastrostomy tube placement.;     • Colonoscopy N/A 2/13/2017     Procedure: COLONOSCOPY;  Surgeon: Nader Osei DO;  Location: [facility] ENDOSCOPY;  Service:        History reviewed. No pertinent family  history.    Social History     Social History   • Marital status:      Spouse name: N/A   • Number of children: N/A   • Years of education: N/A     Social History Main Topics   • Smoking status: Current Every Day Smoker   • Smokeless tobacco: Never Used      Comment: Smokes 1 ppd for 40 years   • Alcohol use No   • Drug use: None   • Sexual activity: Not Asked     Other Topics Concern   • None     Social History Narrative           Objective   Physical Exam   Constitutional: She is oriented to person, place, and time. She appears well-developed and well-nourished.   HENT:   Head: Normocephalic.   Nose: Nose normal.   Mouth/Throat: Oropharynx is clear and moist.   Eyes: Pupils are equal, round, and reactive to light.   Neck: Neck supple. No JVD present. No tracheal deviation present.   Cardiovascular: Normal rate, regular rhythm, normal heart sounds and intact distal pulses.  Exam reveals no gallop and no friction rub.    No murmur heard.  Pulmonary/Chest: No accessory muscle usage. Tachypnea noted. No respiratory distress. She has decreased breath sounds in the right middle field, the right lower field, the left middle field and the left lower field.   Abdominal: Soft. Bowel sounds are normal. There is no tenderness. There is no rebound and no guarding.   Lymphadenopathy:     She has no cervical adenopathy.   Neurological: She is alert and oriented to person, place, and time.   Skin: Skin is warm and dry.   Nursing note and vitals reviewed.      Procedures         ED Course  ED Course      Labs Reviewed   TROPONIN (IN-HOUSE) - Abnormal; Notable for the following:        Result Value    Troponin I 0.058 (*)     All other components within normal limits   TROPONIN (IN-HOUSE) - Abnormal; Notable for the following:     Troponin I 0.056 (*)     All other components within normal limits   COMPREHENSIVE METABOLIC PANEL - Abnormal; Notable for the following:     Glucose 118 (*)     BUN 25 (*)     Creatinine 1.47 (*)      Calcium 8.2 (*)     Total Protein 5.9 (*)     Albumin 3.00 (*)     eGFR Non  Amer 37 (*)     A/G Ratio 1.0 (*)     All other components within normal limits   BNP (IN-HOUSE) - Abnormal; Notable for the following:     proBNP 91518.0 (*)     All other components within normal limits   CBC WITH AUTO DIFFERENTIAL - Abnormal; Notable for the following:     RBC 3.20 (*)     Hemoglobin 9.7 (*)     Hematocrit 27.7 (*)     Platelets 130 (*)     All other components within normal limits   CK - Normal   CK MB - Normal   CK - Normal   RAINBOW DRAW    Narrative:     The following orders were created for panel order Waddy Draw.  Procedure                               Abnormality         Status                     ---------                               -----------         ------                     Light Blue Top[[id number]]                                    In process                 Green Top (Gel)[[id number]]                                   In process                 Lavender Top[[id number]]                                      In process                 Gold Top - SST[[id number]]                                    In process                   Please view results for these tests on the individual orders.   TROPONIN (IN-HOUSE)   CK   CK MB   CK MB   CK   PROTIME-INR   APTT   TROPONIN (IN-HOUSE)   TROPONIN (IN-HOUSE)   CK   CK MB   D-DIMER, QUANTITATIVE   CBC AND DIFFERENTIAL    Narrative:     The following orders were created for panel order CBC & Differential.  Procedure                               Abnormality         Status                     ---------                               -----------         ------                     CBC Auto Differential[[id number]]         Abnormal            Final result                 Please view results for these tests on the individual orders.   LIGHT BLUE TOP   GREEN TOP   LAVENDER TOP   GOLD TOP - SST   Ct Abdomen Pelvis Without Contrast    Result Date: 2/6/2017  Narrative:  PROCEDURE: CT abdomen and pelvis without contrast COMPARISON: CT abdomen and pelvis without contrast August 26, 2016 HISTORY: Generalized abdominal pain TECHNIQUE: Multiple contiguous noncontrast axial images are obtained of the abdomen and pelvis. Coronal and sagittal multiplanar reformatted images are also reconstructed and reviewed. The dose length product is 809 FINDINGS: Small nodular opacity in the inferior aspect of the lingula is probably stable. The lung bases are otherwise clear. Heart size normal. There are mildly enlarged bilateral cardiophrenic lymph nodes, stable. Gallbladder is distended, measuring 4.6 cm. No gallbladder wall thickening or pericholecystic inflammation to suggest acute cholecystitis. Tiny low-attenuation lesion in the liver statistically represents a cyst or hemangioma if the patient has no underlying liver disease or history of malignancy. This is grossly stable. Liver is otherwise unremarkable. Spleen, pancreas, adrenal glands and kidneys are grossly unremarkable for noncontrast exam. Stomach is collapsed. No lymphadenopathy by CT size criteria. Bladder is unremarkable. Uterus is grossly normal in size. Prior partial colonic resection with a blind-ending rectal/sigmoid callus in the left lower quadrant colostomy. Bowel is otherwise unremarkable. Mild skin thickening at the colostomy site. No fluid collection associated with a colostomy. No free fluid. Stable chronic compression deformity of L1. No acute osseous abnormality.     Impression: 1. No acute findings in the abdomen or pelvis. 2. Gallbladder is mildly distended. However, this is most likely related to prolonged fasting. Acute cholecystitis may also present with abnormal bowel distention although there is no wall thickening or pericholecystic inflammation to suggest acute cholecystitis. Recommend correlation Electronically signed by:  Yoni Quinn MD  2/6/2017 6:18 PM CST Workstation: [employer] Chest 2  View    Result Date: 2/19/2017  Narrative: Patient Name:  TAINA HENSON Patient ID:  [id number] Ordering:  LC QUIGLEY Attending:  LC QUIGLEY Referring:  LC QUIGLEY ------------------------------------------------ PROCEDURE: XR CHEST 2 VIEWS INDICATION FOR PROCEDURE:  57 years -old patient presents for evaluation of upper abdominal and triage protocol. COMPARISON:  None FINDINGS: XR CHEST 2 views  reveals the lungs are underexpanded. This crowds the bronchovascular markings and obscuration of the lung bases. Cardiac monitoring leads are present. There is no radiographic evidence for airspace consolidation, pleural effusion or pneumothorax. Cardiac silhouette is at the upper limits of normal. Mediastinal silhouette is within normal limits. There is increased thoracic kyphosis. There is multilevel thoracic spondylosis. Appears to be severe compression fracture of an upper lumbar vertebral body. Patient has had open reduction internal fixation of a proximal left humerus fracture.     Impression: 1.  No radiographic evidence of acute cardiopulmonary disease. 2.  Severe compression fracture of an upper lumbar vertebral body. Electronically signed by:  Delaney Lawson MD  2/19/2017 1:20 PM CST Workstation: [employer]    Ct Abdomen Pelvis With Contrast    Result Date: 2/8/2017  Narrative: PROCEDURE: CT abdomen and pelvis with contrast COMPARISON: CT abdomen pelvis without contrast February 6, 2017 HISTORY: Gastrointestinal hemorrhage, unspecified. Pale ostomy stoma and no output TECHNIQUE: Multiple contiguous axial images are obtained of the abdomen and pelvis axial the intravenous administration of 94 mL Isovue-300. Coronal and sagittal multiplanar reformatted images are also reconstructed and reviewed. The dose length product is 822 FINDINGS: Minimal bibasilar atelectasis with trace bilateral pleural effusions. Heart size is normal. Borderline enlarged cardiophrenic lymph nodes. Subcentimeter low-attenuation lesion  in the left lateral segment of the liver is nonspecific although statistically represents a cyst or hemangioma if patient has no underlying liver disease or history of malignancy. Liver is otherwise unremarkable. There may be gallbladder wall thickening with questionable perivesicular haziness or fat stranding which is new compared to the prior exam. The spleen, pancreas, adrenal glands and kidneys are unremarkable. No lymphadenopathy in the abdomen, retroperitoneum or pelvis by CT size criteria. Bladder is unremarkable. Uterus and ovaries are grossly normal in size. Postoperative changes from prior partial colectomy. Left lower quadrant colostomy. There is probably a small amount of intraperitoneal fat herniating through the colostomy defect. Bowel is otherwise unremarkable. No bowel obstruction. No abnormal bowel dilatation or wall thickening. No free fluid. No acute osseous abnormality.     Impression: 1. There may be mild gallbladder wall thickening with questionable pericholecystic stranding or inflammation. Acute cholecystitis may have a similar appearance in the appropriate clinical setting.2. Minimal bibasilar atelectasis with trace bilateral pleural effusions..3. Left lower quadrant colostomy. There is probably a small amount of fat herniating through the colostomy defect. Bowel is otherwise unremarkable. No bowel obstruction, dilatation or wall thickening.4. There are several borderline enlarged cardiophrenic lymph nodes, nonspecific. However, these are unchanged compared to a CT chest may 18 2014 Electronically signed by:  Yoni Quinn MD  2/8/2017 4:58 PM CST Workstation: [facility] Barium Enema Water Soluble    Result Date: 2/15/2017  Narrative: Hypaque enema. History: Prior colostomy. Evaluate rectal stump prior to possible re-anastomosis. 500 cc of Gastrografin was used. 13 images were obtained. 1. 27 minutes of fluoroscopy was used. A small caliber catheter instilled into the rectum.  Water subtle contrast was instilled. The rectum is a normal in appearance. There are findings suggesting slight mucosal irregularity, edema of the distal sigmoid colon. The  sigmoid colon terminates approximately  20 cm proximal to the anal verge.     Impression: Conclusion: Intact distal colon and rectum, rectal stump. Questionable mucosal edema in the distal sigmoid colon. No obstruction or extravasation. Electronically signed by:  Riki Callaway  2/15/2017 10:25 AM CST     Xr Chest 1 View    Result Date: 3/4/2017  Narrative: Exam: AP portable chest INDICATION: Chest pain COMPARISON: 2/19/2017 FINDINGS: AP portable chest. Lungs are hypoinflated. Status post internal fixation of a proximal left humerus fracture. Heart size upper limits of normal. No obvious infiltrate, pneumothorax or pleural effusion.     Impression: No obvious acute cardiopulmonary abnormality. Electronically signed by:  Blake Salcido MD  3/4/2017 6:36 AM CST Workstation: [employer]    Xr Chest 1 View    Result Date: 2/6/2017  Narrative: PROCEDURE: One view chest COMPARISON: Acute abdominal series August 25, 2016. HISTORY: Gastrointestinal bleeding FINDINGS: Single portable view of the chest is obtained. The heart and mediastinal contours are within normal limits. The lungs are clear and well-expanded bilaterally.     Impression: 1. No acute cardiopulmonary disease. Electronically signed by:  Yoni Quinn MD  2/6/2017 4:12 PM CST Workstation: [employer]    Result Date: 2/13/2017  Narrative: This procedure was auto-finalized with no dictation required.    Xr Abdomen Kub    Result Date: 2/10/2017  Narrative: Procedure: Supine abdomen two views Reason for exam: Stomal stenosis with bleeding. Findings: Bony structures of the abdomen are normal. Left pelvis colostomy site. Oral contrast from recent CT is seen involving the colon. There is no colon dilatation or evidence to suggest obstruction. Hollow viscera appears normal.  Soft tissue structures normal. No pathologic calcifications.     Impression: Impression: 1.  Oral contrast seen involving the colon diffusely with no evidence of colonic obstruction. Left pelvis colostomy. 2.  Otherwise negative abdomen. Electronically signed by:  Kalen Aldridge  2/10/2017 10:45 AM CST     Xr Chest Ap    Result Date: 2/14/2017  Narrative: Chest single view CLINICAL INDICATION: PICC line placement. COMPARISON: Chest February 9, 2017. FINDINGS: Cardiac silhouette is normal in size. Pulmonary vascularity is unremarkable. No focal infiltrate or consolidation.  No pleural effusion.  No pneumothorax.     Impression: CONCLUSION: Right arm PICC line catheter. Catheter tip in superior vena cava, in satisfactory position. Electronically signed by:  Riki Callaway  2/14/2017 9:58 AM CST     Xr Chest Pa & Lateral    Result Date: 2/10/2017  Narrative: [facility] Patient Name: TAINA HENSON ORDERING: JUN MORELOS ATTENDING: JONATAN MERIDA REFERRING: JUN MORELOS----------------------- PROCEDURE: Chest two view on 2/9/2017 CLINICAL INDICATION:  Fever, hypoxia, shortness of breath COMPARISON: 2/6/2017 FINDINGS: There are very small bilateral pleural effusions. There is minimal basilar atelectasis. There is mild elevation of the right hemidiaphragm. Lungs are otherwise clear. Cardiac, hilar and mediastinal contours are within normal limits. Pulmonary vascularity is within normal limits. Plate and screw fixation is noted in the left proximal humerus.     Impression: CONCLUSION: Trace pleural effusions with minimal basilar atelectasis. Electronically signed by:  Aroldo Sandoval  2/10/2017 12:55 AM CST Workstation: [id number]              [facility]    Final diagnoses:   Chest pain, unspecified type   Acute systolic congestive heart failure   Hypertensive emergency            Scott Jni MD  03/04/17 0014

## 2017-03-05 PROBLEM — N17.9 AKI (ACUTE KIDNEY INJURY) (HCC): Status: ACTIVE | Noted: 2017-01-01

## 2017-03-05 PROBLEM — N18.30 CKD (CHRONIC KIDNEY DISEASE) STAGE 3, GFR 30-59 ML/MIN (HCC): Status: ACTIVE | Noted: 2017-01-01

## 2017-03-05 PROBLEM — D64.9 ANEMIA: Status: ACTIVE | Noted: 2017-01-01

## 2017-03-05 NOTE — CONSULTS
Adult Nutrition  Assessment    Patient Name:  Eva Troncoso  YOB: 1960  MRN: [id number]  Admit Date:  3/4/2017    Assessment Date:  3/5/2017          Reason for Assessment       03/05/17 1212    Reason for Assessment    Reason For Assessment/Visit education                            Comments:  Pt with dx CHF. Mild Sodium Diet Restriction info used to provide instruction regarding Low Sodium diet and diet copy given.        Electronically signed by:  Gala Satnillan RD  03/05/17 12:13 PM

## 2017-03-05 NOTE — PROGRESS NOTES
TWO PATIENT IDENTIFIERS WERE USED. THE PATIENT WAS DRAPED WITH A FULL BODY DRAPE AND THE PATIENT'S LEFT   ARM WAS PREPPED WITH CHLORA PREP. ULTRASOUND WAS USED TO LOCALIZE THELEFT BASILIC VEIN. SUBCUTANEOUS TISSUE AT THE CATHETER SITE WAS INFILTRATED WITH 2% LIDOCAINE. UNDER ULTRASOUND GUIDANCE, THE VEIN WAS ACCESSED WITH A 21 GAUGE  NEEDLE. AN 0.018 WIRE WAS THEN THREADED THROUGH THE NEEDLE. THE 21 GAUGE NEEDLE WAS REMOVED AND A 4 Greenlandic SHEATH WAS PLACED OVER THE WIRE INTO THE VEIN.THE MIDLINE CATHETER WAS TRIMMED TO 15CM.THE MIDLINE CATHETER WAS THEN PLACED OVER THE WIRE INTO THE VEIN, THE SHEATH WAS PEELED AWAY, WIRE WAS REMOVED. CATHETER WAS FLUSHED WITH NORMAL SALINE AND CATHETER TIP APPLIED. BIOPATCH PLACED. CATHETER SECURED WITH STAT LOCK AND TEGADERM. PATIENT TOLERATED PROCEDURE WELL. THIS WAS DONE IN BEDSIDE      IMPRESSION:SUCCESSFUL PLACEMENT OF MIDLINE          Saadia Arias RN  3/5/2017  9:46 AM

## 2017-03-05 NOTE — PLAN OF CARE
Problem: Patient Care Overview (Adult)  Goal: Plan of Care Review  Outcome: Ongoing (interventions implemented as appropriate)    03/05/17 1642   Coping/Psychosocial Response Interventions   Plan Of Care Reviewed With patient   Patient Care Overview   Progress no change   Denies chest pain. Nitro gtt weaned off overnight; Heparin gtt discontinued d/t drop in hemoglobin and neg VQ scan, H & H ordered every 4 hrs, stool hemoccult positive; creatinine elevated, urine specimen collected per in & out cath.     Problem: Pressure Ulcer Risk (Chaim Scale) (Adult,Obstetrics,Pediatric)  Goal: Identify Related Risk Factors and Signs and Symptoms  Outcome: Ongoing (interventions implemented as appropriate)  Goal: Skin Integrity  Outcome: Ongoing (interventions implemented as appropriate)    Problem: Respiratory Insufficiency (Adult)  Goal: Identify Related Risk Factors and Signs and Symptoms  Outcome: Ongoing (interventions implemented as appropriate)  Goal: Acid/Base Balance  Outcome: Ongoing (interventions implemented as appropriate)  Goal: Effective Ventilation  Outcome: Ongoing (interventions implemented as appropriate)    Problem: Gastrointestinal Bleeding (Adult)  Goal: Signs and Symptoms of Listed Potential Problems Will be Absent or Manageable (Gastrointestinal Bleeding)  Outcome: Ongoing (interventions implemented as appropriate)    Problem: Renal Failure/Kidney Injury, Acute (Adult)  Goal: Signs and Symptoms of Listed Potential Problems Will be Absent or Manageable (Renal Failure/Kidney Injury, Acute)  Outcome: Ongoing (interventions implemented as appropriate)

## 2017-03-05 NOTE — PLAN OF CARE
Problem: Patient Care Overview (Adult)  Goal: Adult Individualization and Mutuality  Outcome: Ongoing (interventions implemented as appropriate)    Problem: Pressure Ulcer Risk (Chaim Scale) (Adult,Obstetrics,Pediatric)  Goal: Identify Related Risk Factors and Signs and Symptoms  Outcome: Ongoing (interventions implemented as appropriate)  Goal: Skin Integrity  Outcome: Ongoing (interventions implemented as appropriate)    Problem: Respiratory Insufficiency (Adult)  Goal: Identify Related Risk Factors and Signs and Symptoms  Outcome: Ongoing (interventions implemented as appropriate)  Goal: Acid/Base Balance  Outcome: Ongoing (interventions implemented as appropriate)  Goal: Effective Ventilation  Outcome: Ongoing (interventions implemented as appropriate)

## 2017-03-05 NOTE — CONSULTS
[facility]  [address] [place] [address]. [id number]   - [phone]    [phone]     Consultation         PATIENT  DEMOGRAPHICS   PATIENT NAME: Eva Troncoso                      PHYSICIAN: Brittanie Bell MD  : 1960  MRN: [id number]    Subjective   SUBJECTIVE   Referring Provider: Chela Garcia / Dr Sullivan  Reason for Consultation: CKD 3 / JAKE  History of present illness:      Ms Troncoso is a 57-year-old  female who is well known to me for recurrent acute kidney injury with underlying chronic kidney disease stage III.  In the past she has a recurrent acute kidney injury with ATN and has fluctuating creatinine with peaked up to 4.0.  I've seen her recently back in December and her creatinine improved significantly and it came down to less than 1.  Her lower extremity edema is much more stable and she is taking Lasix when necessary at home.  She is also on Aldactone 25 mg daily.    Patient had a recent lower extremity cellulitis more so on the right than left.  I believe she is initially on cefuroxime and recently put on Bactrim.  It is slightly getting better.  For the last 1 week she has noticed increasing blood pressure.  In the last visit we have increased her labetalol as well.  She came in with substernal chest pain going to her back.  She also has associated shortness of air.  She denies any marked lower extremity edema.  On Arrival here her bp was more than 200.  Patient initially received nitroglycerin and lizeth therapy and transferred to the ICU.  Currently chest pain-free.  She had a VQ scan that showed low probability and heparin has been discontinued this morning because of drop in hemoglobin.  Her creatinine on arrival was 1.4 and today it increased to 2.1.  Blood pressure is much better and she is off of nitro drip but did drop systolic <100 last night.      Past Medical History   Diagnosis Date   • Anxiety state    • Chronic depression    • Chronic pain     • Compression fracture of lumbar spine, non-traumatic    • Depressive disorder    • Essential hypertension    • Obesity    • Pericardial effusion      Recurrent acute kidney injury and ATN with peak creatinine in the past up to 4.0.  Baseline creatinine close to 1    Past Surgical History   Procedure Laterality Date   • Cardiac catheterization  05/13/2014     03459 (1)   -  Successful PCI to the proximal LAD with a V stent reducing 99% stenosis to 0%   • Appendectomy  12/10/2012     Laparoscopic (1) - Acute suppurative appendicitis   • Shoulder surgery  05/14/2015     (1) - Communited 3 part fractured humerus, proximal left shoulder. Open reduction and internal fixation   • Tracheostomy  05/30/2014     (1) - Percutaneous endoscopic gastrostomy tube placement.;     • Colonoscopy N/A 2/13/2017     Procedure: COLONOSCOPY;  Surgeon: Nader Osei DO;  Location: [facility] ENDOSCOPY;  Service:      History reviewed. No pertinent family history.  Social History   Substance Use Topics   • Smoking status: Current Every Day Smoker   • Smokeless tobacco: Never Used      Comment: Smokes 1 ppd for 40 years   • Alcohol use No     Prescriptions Prior to Admission   Medication Sig Dispense Refill Last Dose   • Aspirin-Salicylamide-Caffeine (BC HEADACHE POWDER PO) Take  by mouth As Needed (for pain).   Taking   • azithromycin (ZITHROMAX) 500 MG tablet Take 1 tablet by mouth Daily. 2 tablet 0 Taking   • busPIRone (BUSPAR) 15 MG tablet Take  by mouth. Take 1 tablet 2 times per day.   Taking   • cefuroxime (CEFTIN) 500 MG tablet Take 1 tablet by mouth 2 (Two) Times a Day. 6 tablet 0 Taking   • docusate sodium 100 MG capsule Take 100 mg by mouth 2 (Two) Times a Day. 60 capsule 0 Taking   • doxycycline (DORYX) 100 MG enteric coated tablet Take 1 tablet by mouth 2 (Two) Times a Day. 42 tablet 0 Taking   • doxycycline (MONODOX) 100 MG capsule       • furosemide (LASIX) 20 MG tablet Take 20 mg by mouth Daily As Needed (for fluids).    "Taking   • HYDROcodone-acetaminophen (NORCO) 5-325 MG per tablet Take 1 tablet by mouth Every 8 (Eight) Hours As Needed for severe pain (7-10). 20 tablet 0 Taking   • labetalol (NORMODYNE) 200 MG tablet Take 1 tablet by mouth 2 (Two) Times a Day for 30 days. 60 tablet 3    • lisinopril (PRINIVIL,ZESTRIL) 40 MG tablet Take 1 tablet by mouth Daily. 30 tablet 3    • MethylPREDNISolone (MEDROL, SANA,) 4 MG tablet Take as directed on package instructions. 21 tablet 0 Taking   • mirtazapine (REMERON) 15 MG tablet Take 15 mg by mouth Daily.   Taking   • potassium chloride (KLOR-CON) 20 MEQ packet Take 20 mEq by mouth 2 (Two) Times a Day. 10 packet 0 Taking   • QUEtiapine (SEROquel) 200 MG tablet Take 200 mg by mouth Every Night.   Taking   • spironolactone (ALDACTONE) 25 MG tablet Take 1 tablet by mouth Daily. 30 tablet 1    • sulfamethoxazole-trimethoprim (BACTRIM DS,SEPTRA DS) 800-160 MG per tablet Take 1 tablet by mouth 2 (Two) Times a Day. 28 tablet 0    • traZODone (DESYREL) 150 MG tablet Take 150 mg by mouth Every Night.   Taking     Allergies:  Other; Stadol [butorphanol]; and Toradol [ketorolac tromethamine]     REVIEW OF SYSTEMS    Review of Systems   Constitutional: Negative for chills and fever.   Respiratory: Positive for shortness of breath. Negative for chest tightness.    Cardiovascular: Positive for chest pain. Negative for leg swelling.   Gastrointestinal: Negative for abdominal pain, diarrhea and nausea.   Genitourinary: Negative for dysuria, flank pain and hematuria.   Neurological: Negative for dizziness, syncope and weakness.       Objective   OBJECTIVE   Vital Signs  Temp:  [98.4 °F (36.9 °C)-98.8 °F (37.1 °C)] 98.8 °F (37.1 °C)  Heart Rate:  [65-92] 67  Resp:  [16-21] 18  BP: ()/() 133/66    Flowsheet Rows         First Filed Value    Admission Height  65\" (165.1 cm) Documented at 03/04/2017 0440    Admission Weight  220 lb (99.8 kg) Documented at 03/04/2017 0440           I/O last 3 " completed shifts:  In: 650 [P.O.:650]  Out: -     PHYSICAL EXAM    Physical Exam   Constitutional: She is oriented to person, place, and time. She appears well-developed.   HENT:   Head: Normocephalic.   Eyes: Pupils are equal, round, and reactive to light.   Cardiovascular: Normal rate, regular rhythm and normal heart sounds.    Pulmonary/Chest: Effort normal and breath sounds normal.   Abdominal: Soft. Bowel sounds are normal.   Neurological: She is alert and oriented to person, place, and time.       RESULTS     Lab Results (last 24 hours)     Procedure Component Value Units Date/Time    Troponin [[id number]]  (Abnormal) Collected:  03/04/17 0954    Specimen:  Blood Updated:  03/04/17 1024     Troponin I 0.056 (H) ng/mL     CK [[id number]]  (Normal) Collected:  03/04/17 0954    Specimen:  Blood Updated:  03/04/17 1050     Creatine Kinase 30 U/L     CK-MB [[id number]]  (Normal) Collected:  03/04/17 0954    Specimen:  Blood Updated:  03/04/17 1100     CKMB 0.53 ng/mL     [place] Draw [[id number]] Collected:  03/04/17 0629    Specimen:  Blood Updated:  03/04/17 1101    Narrative:       The following orders were created for panel order [place] Draw.  Procedure                               Abnormality         Status                     ---------                               -----------         ------                     Light Blue Top[[id number]]                                    Final result               Green Top (Gel)[[id number]]                                   Final result               Lavender Top[[id number]]                                      Final result               Gold Top - SST[[id number]]                                    Final result                 Please view results for these tests on the individual orders.    Light Blue Top [[id number]] Collected:  03/04/17 0629    Specimen:  Blood Updated:  03/04/17 1101     Extra Tube hold for add-on       Auto resulted       Green Top (Gel) [[id number]] Collected:  03/04/17  0630    Specimen:  Blood Updated:  03/04/17 1101     Extra Tube Hold for add-ons.       Auto resulted.       Lavender Top [[id number]] Collected:  03/04/17 0629    Specimen:  Blood Updated:  03/04/17 1101     Extra Tube hold for add-on       Auto resulted       Gold Top - SST [[id number]] Collected:  03/04/17 0630    Specimen:  Blood Updated:  03/04/17 1101     Extra Tube Hold for add-ons.       Auto resulted.       CK [[id number]]  (Abnormal) Collected:  03/04/17 1128    Specimen:  Blood Updated:  03/04/17 1144     Creatine Kinase 28 (L) U/L     Protime-INR [[id number]]  (Abnormal) Collected:  03/04/17 1128    Specimen:  Blood Updated:  03/04/17 1148     Protime 15.3 Seconds      INR 1.22 (H)     Narrative:       Therapeutic range for most indications is 2.0-3.0 INR,  or 2.5-3.5 for mechanical heart valves.    aPTT [[id number]]  (Normal) Collected:  03/04/17 1128    Specimen:  Blood Updated:  03/04/17 1148     PTT 30.5 seconds     Narrative:       The recommended Heparin therapeutic range is 68-97 seconds.    D-dimer, Quantitative [[id number]]  (Abnormal) Collected:  03/04/17 1128    Specimen:  Blood Updated:  03/04/17 1148     D-Dimer, Quantitative 3896 (H) ng/mL (FEU)     Narrative:       Dimer values <500 ng/ml FEU are FDA approved as aid in diagnosis of deep venous thrombosis and pulmonary embolism.  This test should not be used in an exclusion strategy with pretest probability alone.    A recent guideline regarding diagnosis for pulmonary thomboembolism recommends an adjusted exclusion criterion of age x 10 ng/ml FEU for patients >50 years of age ([employer] Intern Med 2015; 163: 701-711).    Troponin [[id number]]  (Abnormal) Collected:  03/04/17 1128    Specimen:  Blood Updated:  03/04/17 1158     Troponin I 0.064 (H) ng/mL     CK-MB [[id number]]  (Normal) Collected:  03/04/17 1128    Specimen:  Blood Updated:  03/04/17 1158     CKMB 0.49 ng/mL     Protime-INR [[id number]]  (Abnormal) Collected:  03/04/17 1433    Specimen:  Blood  Updated:  03/04/17 1456     Protime 15.8 (H) Seconds      INR 1.27 (H)     Narrative:       Therapeutic range for most indications is 2.0-3.0 INR,  or 2.5-3.5 for mechanical heart valves.    aPTT [[id number]]  (Normal) Collected:  03/04/17 1433    Specimen:  Blood Updated:  03/04/17 1456     PTT 30.4 seconds     Narrative:       The recommended Heparin therapeutic range is 68-97 seconds.    Troponin [[id number]]  (Abnormal) Collected:  03/04/17 1433    Specimen:  Blood Updated:  03/04/17 1502     Troponin I 0.068 (H) ng/mL     Troponin [[id number]]  (Abnormal) Collected:  03/04/17 1722    Specimen:  Blood Updated:  03/04/17 1758     Troponin I 0.071 (H) ng/mL     Blood Gas, Arterial [[id number]]  (Abnormal) Collected:  03/04/17 1839    Specimen:  Arterial Blood Updated:  03/04/17 1841     Site --       Not performed at this site.        Gera's Test --       Not performed at this site.        pH, Arterial 7.364 pH units      pCO2, Arterial 42.8 mm Hg      pO2, Arterial 91.8 mm Hg      HCO3, Arterial 23.9 mmol/L      Base Excess, Arterial -1.5 mmol/L      O2 Saturation, Arterial 96.9 %      Hemoglobin, Blood Gas 9.6 (L) g/dL      Hematocrit, Blood Gas 28.0 (L) %      CO2 Content 25.2      Sodium, Arterial 133.6 (L) mmol/L      Potassium, Arterial 3.40 (L) mmol/L      Glucose, Arterial 99 mmol/L      Barometric Pressure for Blood Gas -- mmHg       Not performed at this site.        Modality --       Not performed at this site.        Ionized Calcium 4.5 mg/dL     Troponin [[id number]]  (Abnormal) Collected:  03/04/17 2004    Specimen:  Blood Updated:  03/04/17 2055     Troponin I 0.075 (H) ng/mL     Troponin [[id number]]  (Abnormal) Collected:  03/04/17 2139    Specimen:  Blood Updated:  03/04/17 2212     Troponin I 0.069 (H) ng/mL     aPTT [[id number]]  (Normal) Collected:  03/04/17 2139    Specimen:  Blood Updated:  03/04/17 2300     PTT 21.2 seconds     Narrative:       The recommended Heparin therapeutic range is 68-97 seconds.     CBC Auto Differential [[id number]]  (Abnormal) Collected:  03/05/17 0240    Specimen:  Blood Updated:  03/05/17 0314     WBC 5.01 10*3/mm3      RBC 2.54 (L) 10*6/mm3      Hemoglobin 7.9 (L) g/dL      Hematocrit 22.5 (L) %      MCV 88.6 fL      MCH 31.1 pg      MCHC 35.1 g/dL      RDW 13.2 %      RDW-SD 43.0 fl      MPV 10.1 fL      Platelets 113 (L) 10*3/mm3      Neutrophil % 87.6 (H) %      Lymphocyte % 7.4 (L) %      Monocyte % 4.6 %      Eosinophil % 0.0 %      Basophil % 0.0 %      Immature Grans % 0.4 %      Neutrophils, Absolute 4.39 10*3/mm3      Lymphocytes, Absolute 0.37 (L) 10*3/mm3      Monocytes, Absolute 0.23 10*3/mm3      Eosinophils, Absolute 0.00 10*3/mm3      Basophils, Absolute 0.00 10*3/mm3      Immature Grans, Absolute 0.02 10*3/mm3     aPTT [[id number]]  (Normal) Collected:  03/05/17 0240    Specimen:  Blood Updated:  03/05/17 0321     PTT 29.7 seconds     Narrative:       The recommended Heparin therapeutic range is 68-97 seconds.    Magnesium [[id number]]  (Normal) Collected:  03/05/17 0240    Specimen:  Blood Updated:  03/05/17 0330     Magnesium 1.8 mg/dL     Lipid Panel [[id number]]  (Abnormal) Collected:  03/05/17 0240    Specimen:  Blood Updated:  03/05/17 0341     Total Cholesterol 92 mg/dL      Triglycerides 64 mg/dL      HDL Cholesterol 22 (L) mg/dL      LDL Cholesterol  50 mg/dL      LDL/HDL Ratio 2.60     T4, Free [[id number]]  (Normal) Collected:  03/05/17 0240    Specimen:  Blood Updated:  03/05/17 0346     Free T4 1.05 ng/dL     TSH [[id number]]  (Normal) Collected:  03/05/17 0240    Specimen:  Blood Updated:  03/05/17 0401     TSH 1.720 mIU/mL     Hemoglobin A1c [[id number]]  (Normal) Collected:  03/05/17 0239    Specimen:  Blood Updated:  03/05/17 0405     Hemoglobin A1C 4.72 %     Troponin [[id number]]  (Abnormal) Collected:  03/05/17 0240    Specimen:  Blood Updated:  03/05/17 0410     Troponin I 0.047 (H) ng/mL     Comprehensive Metabolic Panel [[id number]]  (Abnormal) Collected:  03/05/17  0240    Specimen:  Blood Updated:  03/05/17 0411     Glucose 206 (H) mg/dL      BUN 29 (H) mg/dL      Creatinine 2.15 (H) mg/dL      Sodium 134 (L) mmol/L      Potassium 4.0 mmol/L      Chloride 107 mmol/L      CO2 23.0 mmol/L      Calcium 7.8 (L) mg/dL      Total Protein 5.4 (L) g/dL      Albumin 2.50 (L) g/dL      ALT (SGPT) 24 U/L      AST (SGOT) 21 U/L      Alkaline Phosphatase 100 U/L      Total Bilirubin 0.4 mg/dL      eGFR Non African Amer 24 (L) mL/min/1.73      Globulin 2.9 gm/dL      A/G Ratio 0.9 (L) g/dL      BUN/Creatinine Ratio 13.5      Anion Gap 4.0 (L) mmol/L            MEDICATIONS      acetylcysteine 600 mg Oral Q12H   aspirin 81 mg Oral Daily   atorvastatin 80 mg Oral Nightly   budesonide-formoterol 2 puff Inhalation BID - RT   busPIRone 15 mg Oral Q12H   CloNIDine 0.2 mg Oral Q12H   docusate sodium 100 mg Oral BID   doxycycline 100 mg Oral Q12H   ipratropium-albuterol 3 mL Nebulization 4x Daily - RT   labetalol 200 mg Oral BID   lisinopril 20 mg Oral Daily   methylPREDNISolone sodium succinate 60 mg Intravenous Daily   mirtazapine 15 mg Oral Daily   nicotine 1 patch Transdermal Q24H   pantoprazole 40 mg Intravenous Q AM   potassium chloride 20 mEq Oral BID With Meals   QUEtiapine 200 mg Oral Nightly   spironolactone 25 mg Oral Daily   sulfamethoxazole-trimethoprim 1 tablet Oral Q12H   traZODone 150 mg Oral Nightly       nitroglycerin 5-200 mcg/min Last Rate: Stopped (03/04/17 2100)     Assessment/Plan   ASSESSMENT / PLAN    Active Problems:    Chest pain    Hypertensive emergency    CHF with left ventricular diastolic dysfunction, NYHA class 1    Elevated d-dimer    CKD (chronic kidney disease) stage 3, GFR 30-59 ml/min    Anemia    1.acute kidney injury.  Patient has a history of recurrent acute kidney injury in the past.  Though she recovered well and her creatinine now is less than 1 as a baseline.  She ha now rising creatinine up to 2.1.  It could be related to drug-induced allergic  interstitial nephritis plus minus recent Bactrim use.  She doesn't have many any marked lower extremity edema.  Lytes are stable.  Also she has marked drop in her blood pressure and may have some hypoperfusion injury.    At the present moment she is not a good candidate for contrast exposure due to rising creatinine.  I will hold the Mucomyst and start her on gentle IV fluid at 75 cc/h.  I will stop the Bactrim along with Aldactone and lisinopril for now until renal function is more stabilized.  Continue with doxycycline.  i will add zyvox for mrsa coverage. Check Jose Luis stain and urine sodium     2.hypertension poorly controlled and also labile.  It is much improved now but did drop significantly.  Her blood pressure is much stable now keep clonidine and labetalol for now.  Also has when necessary hydralazine ordered.    3.chest pain with associated shortness of air.  She has elevated d-dimer and VQ scan showed low probability.  Patient is off of heparin drip due to drop in hemoglobin.    4.anemia of chronic disease    5.chronic pain         I discussed the patients findings and my recommendations with patient    Brittanie Bell MD  03/05/17  9:33 AM

## 2017-03-05 NOTE — PROGRESS NOTES
FAMILY MEDICINE DAILY PROGRESS NOTE  NAME: Eva Troncoso  : 1960  MRN: [id number]     LOS: 1 day     PROVIDER OF SERVICE: Maci Sullivan MD    Chief Complaint:   Chief Complaint   Patient presents with   • Chest Pain       Subjective:     Interval History:  History taken from: patient    Patient Complaints: mid sternal chest pain   Patient Denies: radiating pain, shortness of breath    Patient continues to complain of mid sternal chest pain. Pain is no longer radiating to back. Patient reports improvement of her respiratory status; saturating appropriately on 2L supplementary O2. VQ scan indicated low probability of PE and venous doppler showed no evidence of lower extremity DVT.     Patient's blood pressure has improved, currently normotensive. Cardiac enzymes are trending downward. ECHO is currently pending. Cardiology has been consulted and is following the patient.     Review of Systems:   Review of Systems   Constitutional: Negative.  Negative for chills, diaphoresis, fatigue and fever.   HENT: Negative.  Negative for congestion, ear pain, rhinorrhea, sinus pressure, sneezing and sore throat.    Eyes: Negative.  Negative for pain and visual disturbance.   Respiratory: Negative.  Negative for cough and shortness of breath.    Cardiovascular: Positive for chest pain ( mid sternal; non radiating). Negative for palpitations and leg swelling.   Gastrointestinal: Negative for abdominal pain, diarrhea, nausea and vomiting.        Colostomy bag in place in Q.   Endocrine: Negative.    Genitourinary: Negative for difficulty urinating, dysuria and hematuria.   Musculoskeletal: Negative for arthralgias.   Skin: Positive for rash ( cellulitis over anterior lower legs).   Allergic/Immunologic: Negative.    Neurological: Negative for dizziness, weakness and headaches.   Psychiatric/Behavioral: Negative for confusion, hallucinations and sleep disturbance.       Objective:     Vital Signs  Temp:  [98.4 °F  (36.9 °C)-98.8 °F (37.1 °C)] 98.8 °F (37.1 °C)  Heart Rate:  [] 65  Resp:  [16-21] 18  BP: ()/() 111/68       Intake/Output Summary (Last 24 hours) at 17 0725  Last data filed at 17 1700   Gross per 24 hour   Intake    650 ml   Output      0 ml   Net    650 ml       Physical Exam  Physical Exam   Constitutional: She is oriented to person, place, and time. She appears well-developed and well-nourished. No distress.   HENT:   Head: Normocephalic and atraumatic.   Nose: Nose normal.   Mouth/Throat: Oropharynx is clear and moist.   Eyes: Conjunctivae and EOM are normal. Pupils are equal, round, and reactive to light.   Neck: Normal range of motion. Neck supple. No tracheal deviation present.   Cardiovascular: Normal rate, regular rhythm, normal heart sounds and intact distal pulses.    Pulmonary/Chest: Effort normal. She has rales ( mild rales appreciated at lung bases bilaterally).   Abdominal: Soft. Bowel sounds are normal. There is no tenderness.   Musculoskeletal: Normal range of motion. She exhibits edema ( +1 pitting edema of the lower extremities).   Lymphadenopathy:     She has no cervical adenopathy.   Neurological: She is alert and oriented to person, place, and time.   Skin: Skin is warm and dry. Rash ( cellulitis of anterior lower extremities) noted. She is not diaphoretic.   Psychiatric: She has a normal mood and affect. Her behavior is normal. Judgment and thought content normal.   Nursing note and vitals reviewed.    F: cardiac diet  A: morphine PRN  S: none  T: heparin infusion  H: 30 degrees  U: protonix       Medication Review    Current Facility-Administered Medications:   •  acetylcysteine (MUCOMYST) 10 % solution 600 mg, 600 mg, Oral, Q12H, Evan Garcia MD, 600 mg at 17 2040  •  albuterol (PROVENTIL) nebulizer solution 0.083% 2.5 mg/3mL, 2.5 mg, Nebulization, Q6H PRN, Evan Garcia MD  •  [DISCONTINUED] aspirin chewable tablet 324 mg, 324 mg, Oral, Once  **AND** aspirin EC tablet 81 mg, 81 mg, Oral, Daily, Maci Sullivan MD  •  atorvastatin (LIPITOR) tablet 80 mg, 80 mg, Oral, Nightly, Maci Sullivan MD, 80 mg at 03/04/17 2033  •  budesonide-formoterol (SYMBICORT) 160-4.5 MCG/ACT inhaler 2 puff, 2 puff, Inhalation, BID - RT, Evan Garcia MD, 2 puff at 03/04/17 2035  •  busPIRone (BUSPAR) tablet 15 mg, 15 mg, Oral, Q12H, Maci Sullivan MD, 15 mg at 03/04/17 2032  •  CloNIDine (CATAPRES) tablet 0.2 mg, 0.2 mg, Oral, Q12H, Evan Garcia MD, 0.2 mg at 03/04/17 2032  •  docusate sodium (COLACE) capsule 100 mg, 100 mg, Oral, BID, Maci Sullivan MD, 100 mg at 03/04/17 1918  •  doxycycline (VIBRAMYICN) tablet 100 mg, 100 mg, Oral, Q12H, Maci Sullivan MD, 100 mg at 03/04/17 2033  •  furosemide (LASIX) injection 20 mg, 20 mg, Intravenous, Once, Maci Sullivan MD  •  furosemide (LASIX) tablet 20 mg, 20 mg, Oral, Daily PRN, Maci Sullivan MD  •  HYDROcodone-acetaminophen (NORCO) 5-325 MG per tablet 1 tablet, 1 tablet, Oral, Q8H PRN, Maci Sullivan MD, 1 tablet at 03/04/17 2317  •  ipratropium-albuterol (DUO-NEB) nebulizer solution 3 mL, 3 mL, Nebulization, 4x Daily - RT, Evan Garcia MD, 3 mL at 03/04/17 2041  •  labetalol (NORMODYNE) tablet 200 mg, 200 mg, Oral, BID, Maci Sullivan MD, 200 mg at 03/04/17 1922  •  lisinopril (PRINIVIL,ZESTRIL) tablet 20 mg, 20 mg, Oral, Daily, Maci Sullivan MD  •  methylPREDNISolone sodium succinate (SOLU-Medrol) injection 60 mg, 60 mg, Intravenous, Daily, Evan Gacria MD, 60 mg at 03/04/17 1921  •  mirtazapine (REMERON) tablet 15 mg, 15 mg, Oral, Daily, Maci Sullivan MD, 15 mg at 03/04/17 1854  •  Morphine sulfate (PF) injection 1 mg, 1 mg, Intravenous, Q4H PRN, Maci Sullivan MD, 1 mg at 03/05/17 0615  •  nicotine (NICODERM CQ) 21 MG/24HR patch 1 patch, 1 patch, Transdermal, Q24H, Maci Sullivan MD, 1 patch at 03/04/17 1922  •  nitroglycerin infusion 200  mcg/mL, 5-200 mcg/min, Intravenous, Titrated, Scott Jin MD, Stopped at 03/04/17 2100  •  ondansetron (ZOFRAN) injection 4 mg, 4 mg, Intravenous, Q6H PRN, Maci Sullivan MD  •  pantoprazole (PROTONIX) injection 40 mg, 40 mg, Intravenous, Q AM, Maci Sullivan MD  •  potassium chloride (K-DUR,KLOR-CON) CR tablet 20 mEq, 20 mEq, Oral, BID With Meals, Maci Sullivan MD, 20 mEq at 03/04/17 1922  •  QUEtiapine (SEROquel) tablet 200 mg, 200 mg, Oral, Nightly, Maci Sullivan MD, 200 mg at 03/04/17 2032  •  sodium chloride 0.9 % flush 1-10 mL, 1-10 mL, Intravenous, PRN, Maci Sullivan MD  •  sodium chloride 0.9 % flush 1-10 mL, 1-10 mL, Intravenous, PRN, Maci Sullivan MD, 10 mL at 03/04/17 1750  •  sodium chloride 0.9 % flush 10 mL, 10 mL, Intravenous, PRN, Scott Jin MD  •  spironolactone (ALDACTONE) tablet 25 mg, 25 mg, Oral, Daily, Maci Sullivan MD  •  sulfamethoxazole-trimethoprim (BACTRIM DS,SEPTRA DS) 800-160 MG per tablet 160 mg, 1 tablet, Oral, Q12H, Maci Sullivan MD, 160 mg at 03/04/17 2033  •  traZODone (DESYREL) tablet 150 mg, 150 mg, Oral, Nightly, Maci Slulivan MD, 150 mg at 03/04/17 2032     Diagnostic Data    Lab Results (last 24 hours)     Procedure Component Value Units Date/Time    Troponin [[id number]]  (Abnormal) Collected:  03/04/17 0954    Specimen:  Blood Updated:  03/04/17 1024     Troponin I 0.056 (H) ng/mL     CK [[id number]]  (Normal) Collected:  03/04/17 0954    Specimen:  Blood Updated:  03/04/17 1050     Creatine Kinase 30 U/L     CK-MB [[id number]]  (Normal) Collected:  03/04/17 0954    Specimen:  Blood Updated:  03/04/17 1100     CKMB 0.53 ng/mL     Kenneth Draw [[id number]] Collected:  03/04/17 0629    Specimen:  Blood Updated:  03/04/17 1101    Narrative:       The following orders were created for panel order Kenneth Draw.  Procedure                               Abnormality         Status                     ---------                                -----------         ------                     Light Blue Top[[id number]]                                    Final result               Green Top (Gel)[[id number]]                                   Final result               Lavender Top[[id number]]                                      Final result               Gold Top - SST[[id number]]                                    Final result                 Please view results for these tests on the individual orders.    Light Blue Top [[id number]] Collected:  03/04/17 0629    Specimen:  Blood Updated:  03/04/17 1101     Extra Tube hold for add-on       Auto resulted       Green Top (Gel) [[id number]] Collected:  03/04/17 0630    Specimen:  Blood Updated:  03/04/17 1101     Extra Tube Hold for add-ons.       Auto resulted.       Lavender Top [[id number]] Collected:  03/04/17 0629    Specimen:  Blood Updated:  03/04/17 1101     Extra Tube hold for add-on       Auto resulted       Gold Top - SST [[id number]] Collected:  03/04/17 0630    Specimen:  Blood Updated:  03/04/17 1101     Extra Tube Hold for add-ons.       Auto resulted.       CK [[id number]]  (Abnormal) Collected:  03/04/17 1128    Specimen:  Blood Updated:  03/04/17 1144     Creatine Kinase 28 (L) U/L     Protime-INR [[id number]]  (Abnormal) Collected:  03/04/17 1128    Specimen:  Blood Updated:  03/04/17 1148     Protime 15.3 Seconds      INR 1.22 (H)     Narrative:       Therapeutic range for most indications is 2.0-3.0 INR,  or 2.5-3.5 for mechanical heart valves.    aPTT [[id number]]  (Normal) Collected:  03/04/17 1128    Specimen:  Blood Updated:  03/04/17 1148     PTT 30.5 seconds     Narrative:       The recommended Heparin therapeutic range is 68-97 seconds.    D-dimer, Quantitative [[id number]]  (Abnormal) Collected:  03/04/17 1128    Specimen:  Blood Updated:  03/04/17 1148     D-Dimer, Quantitative 3896 (H) ng/mL (FEU)     Narrative:       Dimer values <500 ng/ml FEU are FDA approved as aid in diagnosis of deep  venous thrombosis and pulmonary embolism.  This test should not be used in an exclusion strategy with pretest probability alone.    A recent guideline regarding diagnosis for pulmonary thomboembolism recommends an adjusted exclusion criterion of age x 10 ng/ml FEU for patients >50 years of age ([employer] 2015; 163: 701-711).    Troponin [[id number]]  (Abnormal) Collected:  03/04/17 1128    Specimen:  Blood Updated:  03/04/17 1158     Troponin I 0.064 (H) ng/mL     CK-MB [[id number]]  (Normal) Collected:  03/04/17 1128    Specimen:  Blood Updated:  03/04/17 1158     CKMB 0.49 ng/mL     Protime-INR [[id number]]  (Abnormal) Collected:  03/04/17 1433    Specimen:  Blood Updated:  03/04/17 1456     Protime 15.8 (H) Seconds      INR 1.27 (H)     Narrative:       Therapeutic range for most indications is 2.0-3.0 INR,  or 2.5-3.5 for mechanical heart valves.    aPTT [[id number]]  (Normal) Collected:  03/04/17 1433    Specimen:  Blood Updated:  03/04/17 1456     PTT 30.4 seconds     Narrative:       The recommended Heparin therapeutic range is 68-97 seconds.    Troponin [[id number]]  (Abnormal) Collected:  03/04/17 1433    Specimen:  Blood Updated:  03/04/17 1502     Troponin I 0.068 (H) ng/mL     Troponin [[id number]]  (Abnormal) Collected:  03/04/17 1722    Specimen:  Blood Updated:  03/04/17 1758     Troponin I 0.071 (H) ng/mL     Blood Gas, Arterial [[id number]]  (Abnormal) Collected:  03/04/17 1839    Specimen:  Arterial Blood Updated:  03/04/17 1841     Site --       Not performed at this site.        Gera's Test --       Not performed at this site.        pH, Arterial 7.364 pH units      pCO2, Arterial 42.8 mm Hg      pO2, Arterial 91.8 mm Hg      HCO3, Arterial 23.9 mmol/L      Base Excess, Arterial -1.5 mmol/L      O2 Saturation, Arterial 96.9 %      Hemoglobin, Blood Gas 9.6 (L) g/dL      Hematocrit, Blood Gas 28.0 (L) %      CO2 Content 25.2      Sodium, Arterial 133.6 (L) mmol/L      Potassium, Arterial 3.40 (L) mmol/L       Glucose, Arterial 99 mmol/L      Barometric Pressure for Blood Gas -- mmHg       Not performed at this site.        Modality --       Not performed at this site.        Ionized Calcium 4.5 mg/dL     Troponin [[id number]]  (Abnormal) Collected:  03/04/17 2004    Specimen:  Blood Updated:  03/04/17 2055     Troponin I 0.075 (H) ng/mL     Troponin [[id number]]  (Abnormal) Collected:  03/04/17 2139    Specimen:  Blood Updated:  03/04/17 2212     Troponin I 0.069 (H) ng/mL     aPTT [[id number]]  (Normal) Collected:  03/04/17 2139    Specimen:  Blood Updated:  03/04/17 2300     PTT 21.2 seconds     Narrative:       The recommended Heparin therapeutic range is 68-97 seconds.    CBC Auto Differential [[id number]]  (Abnormal) Collected:  03/05/17 0240    Specimen:  Blood Updated:  03/05/17 0314     WBC 5.01 10*3/mm3      RBC 2.54 (L) 10*6/mm3      Hemoglobin 7.9 (L) g/dL      Hematocrit 22.5 (L) %      MCV 88.6 fL      MCH 31.1 pg      MCHC 35.1 g/dL      RDW 13.2 %      RDW-SD 43.0 fl      MPV 10.1 fL      Platelets 113 (L) 10*3/mm3      Neutrophil % 87.6 (H) %      Lymphocyte % 7.4 (L) %      Monocyte % 4.6 %      Eosinophil % 0.0 %      Basophil % 0.0 %      Immature Grans % 0.4 %      Neutrophils, Absolute 4.39 10*3/mm3      Lymphocytes, Absolute 0.37 (L) 10*3/mm3      Monocytes, Absolute 0.23 10*3/mm3      Eosinophils, Absolute 0.00 10*3/mm3      Basophils, Absolute 0.00 10*3/mm3      Immature Grans, Absolute 0.02 10*3/mm3     aPTT [[id number]]  (Normal) Collected:  03/05/17 0240    Specimen:  Blood Updated:  03/05/17 0321     PTT 29.7 seconds     Narrative:       The recommended Heparin therapeutic range is 68-97 seconds.    Magnesium [[id number]]  (Normal) Collected:  03/05/17 0240    Specimen:  Blood Updated:  03/05/17 0330     Magnesium 1.8 mg/dL     Lipid Panel [[id number]]  (Abnormal) Collected:  03/05/17 0240    Specimen:  Blood Updated:  03/05/17 0341     Total Cholesterol 92 mg/dL      Triglycerides 64 mg/dL      HDL  Cholesterol 22 (L) mg/dL      LDL Cholesterol  50 mg/dL      LDL/HDL Ratio 2.60     T4, Free [[id number]]  (Normal) Collected:  03/05/17 0240    Specimen:  Blood Updated:  03/05/17 0346     Free T4 1.05 ng/dL     TSH [[id number]]  (Normal) Collected:  03/05/17 0240    Specimen:  Blood Updated:  03/05/17 0401     TSH 1.720 mIU/mL     Hemoglobin A1c [[id number]]  (Normal) Collected:  03/05/17 0239    Specimen:  Blood Updated:  03/05/17 0405     Hemoglobin A1C 4.72 %     Troponin [[id number]]  (Abnormal) Collected:  03/05/17 0240    Specimen:  Blood Updated:  03/05/17 0410     Troponin I 0.047 (H) ng/mL     Comprehensive Metabolic Panel [[id number]]  (Abnormal) Collected:  03/05/17 0240    Specimen:  Blood Updated:  03/05/17 0411     Glucose 206 (H) mg/dL      BUN 29 (H) mg/dL      Creatinine 2.15 (H) mg/dL      Sodium 134 (L) mmol/L      Potassium 4.0 mmol/L      Chloride 107 mmol/L      CO2 23.0 mmol/L      Calcium 7.8 (L) mg/dL      Total Protein 5.4 (L) g/dL      Albumin 2.50 (L) g/dL      ALT (SGPT) 24 U/L      AST (SGOT) 21 U/L      Alkaline Phosphatase 100 U/L      Total Bilirubin 0.4 mg/dL      eGFR Non African Amer 24 (L) mL/min/1.73      Globulin 2.9 gm/dL      A/G Ratio 0.9 (L) g/dL      BUN/Creatinine Ratio 13.5      Anion Gap 4.0 (L) mmol/L             I reviewed the patient's new clinical results.  I reviewed the patient's other test results and agree with the interpretation    Assessment/Plan:     Active Hospital Problems (** Indicates Principal Problem)    Diagnosis Date Noted   • CKD (chronic kidney disease) stage 3, GFR 30-59 ml/min [N18.3] 03/05/2017     -Creatinine elevation from 1.47 at admission to 2.15.  -Reduced lisinopril from 40mg to 20mg.  -Nephrology consulted, Dr. Bell.  -Follow with daily CMP.      • Anemia [D64.9] 03/05/2017     -Follow H&H; Hb 7.9  -Hemoccult ordered  -Heparin infusion held  -Risks and benefits of transfusion discussed with patient  -Type, cross, & 2 units PRBCs ordered     •  Chest pain [R07.9] 03/04/2017     -Patient has been admitted to the ICU.  -Trending cardiac enzymes.   -She has been started on a morphine, supplementary O2, nitroglycerin infusion, and aspirin.   -Consulted cardiology, Dr. Ambrocio, regarding elevated troponin.   -ECHO pending.      • Hypertensive emergency [I16.1] 03/04/2017     -Normotensive  -Aspirin, nitroglycerin infusion, and atorvastatin ordered in ICU  -We will continue all her home antihypertensive medications: labetalol, lisinopril, spironolactone, and lasix  -PRN hydralazine for SBP > 160.   -We will continue to follow her kidney function with daily CMP.   -Lipid panel WNL; moderate intensity statin therapy based on ASCVD risk      • CHF with left ventricular diastolic dysfunction, NYHA class 1 [I50.30] 03/04/2017     -BNP at the time of admission: 66610   -Monitor patient with strict I/Os and daily weight.  -Continue patient's home diuretic and electrolyte replacement.      • Elevated d-dimer [R79.1] 03/04/2017     -Ddimer: 3896  -V/Q scan: low probability of PE  -Venous doppler: no evidence of lower extremity DVT  -Therapeutic heparin infusion held due to anemia; following aPTT  -Encourage incentive spirometry        Resolved Hospital Problems    Diagnosis Date Noted Date Resolved   No resolved problems to display.     Will educated the patient on smoking cessation. Will continue to monitor patient's course and adjust our care accordingly.     DVT prophylaxis: SCDs/TEDs  Code status is Full Code    Plan for disposition:Where: home and home health and When:  clinically stable      Time: 30 minutes           This document has been electronically signed by Maci Sullivan MD on March 4, 2017 9:34 PM

## 2017-03-05 NOTE — PROGRESS NOTES
[address] [address]  T - [id number]         PROGRESS NOTE         SUBJECTIVE:   Patient Care Team:  Mary Spangler MD as PCP - General (Family Medicine)    Chief Complaint:     Chief Complaint   Patient presents with   • Chest Pain       Subjective     Patient is 57 y.o. female presents with chest pain . She is doing better..      ROS/HISTORY/ CURRENT MEDICATIONS/OBJECTIVE/VS/PE:   Review of Systems:   Review of Systems    History:     Past Medical History   Diagnosis Date   • Anxiety state    • Chronic depression    • Chronic pain    • Compression fracture of lumbar spine, non-traumatic    • Depressive disorder    • Essential hypertension    • Obesity    • Pericardial effusion      Past Surgical History   Procedure Laterality Date   • Cardiac catheterization  05/13/2014     [address] (1)   -  Successful PCI to the proximal LAD with a V stent reducing 99% stenosis to 0%   • Appendectomy  12/10/2012     Laparoscopic (1) - Acute suppurative appendicitis   • Shoulder surgery  05/14/2015     (1) - Communited 3 part fractured humerus, proximal left shoulder. Open reduction and internal fixation   • Tracheostomy  05/30/2014     (1) - Percutaneous endoscopic gastrostomy tube placement.;     • Colonoscopy N/A 2/13/2017     Procedure: COLONOSCOPY;  Surgeon: Nader Osei DO;  Location: [facility] ENDOSCOPY;  Service:      History reviewed. No pertinent family history.  Social History   Substance Use Topics   • Smoking status: Current Every Day Smoker   • Smokeless tobacco: Never Used      Comment: Smokes 1 ppd for 40 years   • Alcohol use No     Prescriptions Prior to Admission   Medication Sig Dispense Refill Last Dose   • Aspirin-Salicylamide-Caffeine (BC HEADACHE POWDER PO) Take  by mouth As Needed (for pain).   Taking   • azithromycin (ZITHROMAX) 500 MG tablet Take 1 tablet by mouth Daily. 2 tablet 0 Taking   • busPIRone (BUSPAR) 15 MG tablet Take  by mouth. Take 1 tablet 2  times per day.   Taking   • cefuroxime (CEFTIN) 500 MG tablet Take 1 tablet by mouth 2 (Two) Times a Day. 6 tablet 0 Taking   • docusate sodium 100 MG capsule Take 100 mg by mouth 2 (Two) Times a Day. 60 capsule 0 Taking   • doxycycline (DORYX) 100 MG enteric coated tablet Take 1 tablet by mouth 2 (Two) Times a Day. 42 tablet 0 Taking   • doxycycline (MONODOX) 100 MG capsule       • furosemide (LASIX) 20 MG tablet Take 20 mg by mouth Daily As Needed (for fluids).   Taking   • HYDROcodone-acetaminophen (NORCO) 5-325 MG per tablet Take 1 tablet by mouth Every 8 (Eight) Hours As Needed for severe pain (7-10). 20 tablet 0 Taking   • labetalol (NORMODYNE) 200 MG tablet Take 1 tablet by mouth 2 (Two) Times a Day for 30 days. 60 tablet 3    • lisinopril (PRINIVIL,ZESTRIL) 40 MG tablet Take 1 tablet by mouth Daily. 30 tablet 3    • MethylPREDNISolone (MEDROL, SANA,) 4 MG tablet Take as directed on package instructions. 21 tablet 0 Taking   • mirtazapine (REMERON) 15 MG tablet Take 15 mg by mouth Daily.   Taking   • potassium chloride (KLOR-CON) 20 MEQ packet Take 20 mEq by mouth 2 (Two) Times a Day. 10 packet 0 Taking   • QUEtiapine (SEROquel) 200 MG tablet Take 200 mg by mouth Every Night.   Taking   • spironolactone (ALDACTONE) 25 MG tablet Take 1 tablet by mouth Daily. 30 tablet 1    • sulfamethoxazole-trimethoprim (BACTRIM DS,SEPTRA DS) 800-160 MG per tablet Take 1 tablet by mouth 2 (Two) Times a Day. 28 tablet 0    • traZODone (DESYREL) 150 MG tablet Take 150 mg by mouth Every Night.   Taking     Allergies:  Other; Stadol [butorphanol]; and Toradol [ketorolac tromethamine]    Current Medications:     Current Facility-Administered Medications   Medication Dose Route Frequency Provider Last Rate Last Dose   • albuterol (PROVENTIL) nebulizer solution 0.083% 2.5 mg/3mL  2.5 mg Nebulization Q6H PRN Evan Garcia MD       • aspirin EC tablet 81 mg  81 mg Oral Daily Maci Sullivan MD   81 mg at 03/05/17 0923   •  atorvastatin (LIPITOR) tablet 80 mg  80 mg Oral Nightly Maci Sullivan MD   80 mg at 03/04/17 2033   • budesonide-formoterol (SYMBICORT) 160-4.5 MCG/ACT inhaler 2 puff  2 puff Inhalation BID - RT Evan Garcia MD   2 puff at 03/05/17 0845   • busPIRone (BUSPAR) tablet 15 mg  15 mg Oral Q12H Maci Sullivan MD   15 mg at 03/05/17 0843   • CloNIDine (CATAPRES) tablet 0.2 mg  0.2 mg Oral Q12H Evan Garcia MD   0.2 mg at 03/05/17 0844   • docusate sodium (COLACE) capsule 100 mg  100 mg Oral BID Maci Sullivan MD   100 mg at 03/05/17 0843   • doxycycline (VIBRAMYICN) tablet 100 mg  100 mg Oral Q12H Maci Sullivan MD   100 mg at 03/05/17 0844   • furosemide (LASIX) tablet 20 mg  20 mg Oral Daily PRN Maci Sullivan MD       • hydrALAZINE (APRESOLINE) injection 10 mg  10 mg Intravenous Q6H PRN Brittanie Bell MD       • HYDROcodone-acetaminophen (NORCO) 5-325 MG per tablet 1 tablet  1 tablet Oral Q8H PRN Maci Sullivan MD   1 tablet at 03/04/17 2317   • ipratropium-albuterol (DUO-NEB) nebulizer solution 3 mL  3 mL Nebulization 4x Daily - RT Evan Garcia MD   3 mL at 03/04/17 2041   • labetalol (NORMODYNE) tablet 200 mg  200 mg Oral BID Maci Sullivan MD   200 mg at 03/05/17 0843   • methylPREDNISolone sodium succinate (SOLU-Medrol) injection 60 mg  60 mg Intravenous Daily Evan Garcia MD   60 mg at 03/05/17 0842   • mirtazapine (REMERON) tablet 15 mg  15 mg Oral Daily Maci Sullivan MD   15 mg at 03/04/17 1854   • Morphine sulfate (PF) injection 1 mg  1 mg Intravenous Q4H PRN Maci Sullivan MD   1 mg at 03/05/17 0615   • nicotine (NICODERM CQ) 21 MG/24HR patch 1 patch  1 patch Transdermal Q24H Maci Sullivan MD   1 patch at 03/04/17 1922   • nitroglycerin infusion 200 mcg/mL  5-200 mcg/min Intravenous Titrated Scott Jin MD   Stopped at 03/04/17 2100   • ondansetron (ZOFRAN) injection 4 mg  4 mg Intravenous Q6H PRN Maci Sullivan MD       • pantoprazole  (PROTONIX) injection 40 mg  40 mg Intravenous Q AM Maci Sullivan MD       • QUEtiapine (SEROquel) tablet 200 mg  200 mg Oral Nightly Maci Sullivan MD   200 mg at 03/04/17 2032   • sodium chloride 0.9 % flush 1-10 mL  1-10 mL Intravenous PRN Maci Sullivan MD       • sodium chloride 0.9 % flush 1-10 mL  1-10 mL Intravenous PRN Maci Sullivan MD   10 mL at 03/04/17 1750   • sodium chloride 0.9 % flush 10 mL  10 mL Intravenous PRN Scott Jin MD       • sodium chloride 0.9 % infusion  75 mL/hr Intravenous Continuous Brittanie Bell MD       • traZODone (DESYREL) tablet 150 mg  150 mg Oral Nightly Maci Sullivan MD   150 mg at 03/04/17 2032       Objective     Physical Exam:   Temp:  [98.4 °F (36.9 °C)-98.8 °F (37.1 °C)] 98.8 °F (37.1 °C)  Heart Rate:  [65-90] 67  Resp:  [16-21] 18  BP: ()/() 133/66    Physical Exam   Constitutional: She appears well-developed and well-nourished.   HENT:   Head: Normocephalic and atraumatic.   Cardiovascular: Normal rate, regular rhythm and normal heart sounds.  Exam reveals no gallop and no friction rub.    No murmur heard.  Pulmonary/Chest: Effort normal and breath sounds normal. No respiratory distress. She has no wheezes. She has no rales.   Abdominal: Soft. Bowel sounds are normal. She exhibits no distension. There is no tenderness.   Skin: Skin is warm and dry.   Vitals reviewed.           Results Review:   Lab Results   Component Value Date    GLUCOSE 206 (H) 03/05/2017    BUN 29 (H) 03/05/2017    CREATININE 2.15 (H) 03/05/2017    EGFRIFNONA 24 (L) 03/05/2017    BCR 13.5 03/05/2017    CO2 23.0 03/05/2017    CALCIUM 7.8 (L) 03/05/2017    ALBUMIN 2.50 (L) 03/05/2017    LABIL2 0.9 (L) 03/05/2017    AST 21 03/05/2017    ALT 24 03/05/2017         WBC WBC   Date Value Ref Range Status   03/05/2017 5.01 3.20 - 9.80 10*3/mm3 Final   03/04/2017 6.24 3.20 - 9.80 10*3/mm3 Final      HGB HEMOGLOBIN   Date Value Ref Range Status   03/05/2017 7.9  (L) 12.0 - 15.5 g/dL Final   03/04/2017 9.7 (L) 12.0 - 15.5 g/dL Final      HCT HEMATOCRIT   Date Value Ref Range Status   03/05/2017 22.5 (L) 35.0 - 45.0 % Final   03/04/2017 27.7 (L) 35.0 - 45.0 % Final      Platlets PLATELETS   Date Value Ref Range Status   03/05/2017 113 (L) 150 - 450 10*3/mm3 Final   03/04/2017 130 (L) 150 - 450 10*3/mm3 Final          Imaging Results (last 24 hours)     Procedure Component Value Units Date/Time    US Venous Doppler Lower Extremity Bilateral (duplex) [[id number]] Collected:  03/04/17 1634     Updated:  03/04/17 1639    Narrative:       Patient Name:  TAINA HENSON  Patient ID:  [id number]   Ordering:  KACI RAMIREZ  Attending:  KACI RAMIREZ  Referring:  KACI RAMIREZ  ------------------------------------------------  DATE OF PROCEDURE:  3/4/2017 4:10 PM CST    DUPLEX VENOUS ULTRASOUND OF THE LOWER EXTREMITIES    INDICATION FOR PROCEDURE:   57 years-year-old patient presents  for evaluation of bilateral lower extremity edema and pain.    COMPARISON:  None.    FINDINGS: Multiple static grayscale images reveal normal  compressibility of bilateral common femoral, superficial femoral  and popliteal veins. Color Doppler reveals no evidence for  intraluminal thrombus. Doppler evaluation reveals normal  variation in venous waveforms with respiration and augmentation.    Color Doppler and Doppler document patency of the profunda  femoris and greater saphenous veins; and the proximal calf veins.      Impression:       CONCLUSION:  No sonographic evidence for acute deep venous  thrombosis of bilateral common femoral, superficial femoral or  popliteal veins.      Electronically signed by:  Delaney Lawson MD  3/4/2017 4:38 PM CST  Workstation: [facility] Lung Ventilation Perfusion [[id number]] Collected:  03/04/17 1649     Updated:  03/04/17 1655    Narrative:       Patient Name:  TAINA HENSON  Patient ID:  [id number]   Ordering:  KACI RAMIREZ  Attending:  KACI RAMIREZ  Referring:   KACI RAMIREZ  ------------------------------------------------    DATE OF EXAM: 3/4/2017 3:24 PM CST    PROCEDURE: NM LUNG VENTILATION PERFUSION     INDICATION FOR PROCEDURE: 57 years old patient presents for  evaluation of chest pain and acute systolic heart failure.  ICD  10 codes:  R07.9 and I16.1.    TECHNIQUE:  Six images of the chest are obtained during  ventilation of 29.8 millicuries of technetium 99M DTPA aerosol.   Six images of the chest are obtained following intravenous  administration of 6.1 mCi of technetium 99m MAA.    COMPARISON:  Chest radiograph dated March 4, 2017.    FINDINGS:  The ventilation images suggest minimal ventilation of  the lungs with very little radiopharmaceutical distribution  within the peripheral airways. There is clumping of  radiopharmaceutical within the central airways.     There is relatively normal radiopharmaceutical uptake within the  lungs on the perfusion images. There are patchy areas of  photopenia within bilateral lungs.      Impression:         1.  Limited ventilation of both lungs suggesting sequela of acute  exacerbation of reactive airway disease or COPD.   2.  Low probability for pulmonary embolus.    Electronically signed by:  Delaney Lawson MD  3/4/2017 4:54 PM CST  Workstation: [email or web address]     Guided Vascular Access [[id number]]      Updated:  03/05/17 0936    IR Insert Midline Without Port Pump 5 Plus [[id number]]      Updated:  03/05/17 0948           I reviewed the patient's new clinical results.  I reviewed the patient's new imaging results and agree with the interpretation.     ASSESSMENT/PLAN:   Assessment/Plan   Active Problems:    Chest pain    Hypertensive emergency    CHF with left ventricular diastolic dysfunction, NYHA class 1    Elevated d-dimer    CKD (chronic kidney disease) stage 3, GFR 30-59 ml/min    Anemia      Continue with current care.  Will monitor renal function.  I have reviewed the notes, assessments, and/or procedures performed by the  resident  , I concur with her/his documentation of Eva Troncoso.    I discussed the patients findings and my recommendations with patient.      Phillip Camara MD  03/05/17  10:15 AM

## 2017-03-05 NOTE — PLAN OF CARE
Problem: Patient Care Overview (Adult)  Goal: Plan of Care Review  Outcome: Ongoing (interventions implemented as appropriate)  Mild Sodium Diet Restriction info used to provide instruction regarding Low Sodium diet and diet copy given.    03/05/17 1216   Coping/Psychosocial Response Interventions   Plan Of Care Reviewed With patient   Patient Care Overview   Progress no change   Outcome Evaluation   Outcome Summary/Follow up Plan Low Sodium diet ed

## 2017-03-06 NOTE — PLAN OF CARE
Problem: Patient Care Overview (Adult)  Goal: Plan of Care Review  Outcome: Ongoing (interventions implemented as appropriate)  Goal: Adult Individualization and Mutuality  Outcome: Ongoing (interventions implemented as appropriate)    Problem: Pressure Ulcer Risk (Chaim Scale) (Adult,Obstetrics,Pediatric)  Goal: Identify Related Risk Factors and Signs and Symptoms  Outcome: Ongoing (interventions implemented as appropriate)  Goal: Skin Integrity  Outcome: Ongoing (interventions implemented as appropriate)    Problem: Respiratory Insufficiency (Adult)  Goal: Identify Related Risk Factors and Signs and Symptoms  Outcome: Ongoing (interventions implemented as appropriate)  Goal: Acid/Base Balance  Outcome: Ongoing (interventions implemented as appropriate)  Goal: Effective Ventilation  Outcome: Ongoing (interventions implemented as appropriate)    Problem: Gastrointestinal Bleeding (Adult)  Goal: Signs and Symptoms of Listed Potential Problems Will be Absent or Manageable (Gastrointestinal Bleeding)  Outcome: Ongoing (interventions implemented as appropriate)    Problem: Renal Failure/Kidney Injury, Acute (Adult)  Goal: Signs and Symptoms of Listed Potential Problems Will be Absent or Manageable (Renal Failure/Kidney Injury, Acute)  Outcome: Ongoing (interventions implemented as appropriate)

## 2017-03-06 NOTE — PLAN OF CARE
Problem: Patient Care Overview (Adult)  Goal: Adult Individualization and Mutuality  Outcome: Ongoing (interventions implemented as appropriate)    03/06/17 0546   Individualization   Patient Specific Preferences requires pain medication every 4 hours as ordered   Patient Specific Goals to return home and feel better       Goal: Discharge Needs Assessment  Outcome: Ongoing (interventions implemented as appropriate)    03/06/17 0546   Discharge Needs Assessment   Equipment Needed After Discharge colostomy/ostomy supplies   Self-Care   Equipment Currently Used at Home colostomy/ostomy supplies   Living Environment   Transportation Available family or friend will provide;car         Problem: Pressure Ulcer Risk (Chaim Scale) (Adult,Obstetrics,Pediatric)  Goal: Identify Related Risk Factors and Signs and Symptoms  Outcome: Ongoing (interventions implemented as appropriate)    03/06/17 0546   Pressure Ulcer Risk (Chaim Scale)   Related Risk Factors (Pressure Ulcer Risk (Chaim Scale)) body weight extremes;critical care admission;mobility impaired       Goal: Skin Integrity  Outcome: Ongoing (interventions implemented as appropriate)    03/06/17 0546   Pressure Ulcer Risk (Chaim Scale) (Adult,Obstetrics,Pediatric)   Skin Integrity making progress toward outcome         Problem: Respiratory Insufficiency (Adult)  Goal: Identify Related Risk Factors and Signs and Symptoms  Outcome: Ongoing (interventions implemented as appropriate)    03/06/17 0546   Respiratory Insufficiency   Related Risk Factors (Respiratory Insufficiency) activity intolerance;motivation lacking;obesity;smoking;physiological factors   Signs and Symptoms (Respiratory Insufficiency) abnormal breath sounds;blood pressure/heart rate changes;dyspnea;cough (productive/ineffective)       Goal: Acid/Base Balance  Outcome: Ongoing (interventions implemented as appropriate)    03/06/17 0546   Respiratory Insufficiency (Adult)   Acid/Base Balance achieves  outcome       Goal: Effective Ventilation  Outcome: Ongoing (interventions implemented as appropriate)    03/06/17 0546   Respiratory Insufficiency (Adult)   Effective Ventilation making progress toward outcome         Problem: Gastrointestinal Bleeding (Adult)  Goal: Signs and Symptoms of Listed Potential Problems Will be Absent or Manageable (Gastrointestinal Bleeding)  Outcome: Ongoing (interventions implemented as appropriate)    03/06/17 0546   Gastrointestinal Bleeding   Problems Assessed (GI Bleeding) all   Problems Present (GI Bleeding) other (see comments)  (gi bleeding)         Problem: Renal Failure/Kidney Injury, Acute (Adult)  Goal: Signs and Symptoms of Listed Potential Problems Will be Absent or Manageable (Renal Failure/Kidney Injury, Acute)  Outcome: Ongoing (interventions implemented as appropriate)    03/06/17 0546   Renal Failure/Kidney Injury, Acute   Problems Assessed (Acute Renal Failure/Kidney Injury) all   Problems Present (Acute Renal Failure/Kidney Injury) fluid imbalance;hypertension;infection;hematologic complications;electrolyte imbalance

## 2017-03-06 NOTE — PROGRESS NOTES
GENERAL SURGERY    Asked to see patient for apparent blood per ostomy.  She is known to Dr. Cary and I have informed him of her current clinical situation.  He will consult on her and assume surgical care as needed.          This document has been electronically signed by Mark Anthony Eid MD on March 6, 2017 7:28 AM

## 2017-03-06 NOTE — PROGRESS NOTES
.  FAMILY MEDICINE DAILY PROGRESS NOTE  NAME: Eva Troncoso  : 1960  MRN: [id number]     LOS: 2 days     PROVIDER OF SERVICE: Mary Spangler MD    Chief Complaint:   Chief Complaint   Patient presents with   • Chest Pain       Subjective:     Interval History:  History taken from: patient    Patient Complaints: mid sternal chest pain   Patient Denies: radiating pain, shortness of breath    Patient continues to complain of mid sternal chest pain that is constant and stabbing. Patient reports improvement of her respiratory status; saturating appropriately on 2L supplementary O2. VQ scan indicated low probability of PE and venous doppler showed no evidence of lower extremity DVT.     Patient's blood pressure has improved, currently normotensive. Cardiac enzymes are trending downward. ECHO is currently pending. Cardiology has been consulted and is following the patient.     Review of Systems:   Review of Systems   Constitutional: Negative.  Negative for chills, diaphoresis, fatigue and fever.   HENT: Negative.  Negative for congestion, ear pain, rhinorrhea, sinus pressure, sneezing and sore throat.    Eyes: Negative.  Negative for pain and visual disturbance.   Respiratory: Negative.  Negative for cough and shortness of breath.    Cardiovascular: Positive for chest pain ( mid sternal; non radiating). Negative for palpitations and leg swelling.   Gastrointestinal: Negative for abdominal pain, diarrhea, nausea and vomiting.        Colostomy bag in place in Q.   Endocrine: Negative.    Genitourinary: Negative for difficulty urinating, dysuria and hematuria.   Musculoskeletal: Negative for arthralgias.   Skin: Positive for rash ( cellulitis over anterior lower legs).   Allergic/Immunologic: Negative.    Neurological: Negative for dizziness, weakness and headaches.   Psychiatric/Behavioral: Negative for confusion, hallucinations and sleep disturbance.       Objective:     Vital Signs  Temp:  [97.7 °F (36.5 °C)-98  °F (36.7 °C)] 98 °F (36.7 °C)  Heart Rate:  [63-77] 66  Resp:  [14-22] 16  BP: ()/(54-75) 108/58       Intake/Output Summary (Last 24 hours) at 17 0822  Last data filed at 17 0559   Gross per 24 hour   Intake   1514 ml   Output     75 ml   Net   1439 ml       Physical Exam  Physical Exam   Constitutional: She is oriented to person, place, and time. She appears well-developed and well-nourished. No distress.   HENT:   Head: Normocephalic and atraumatic.   Nose: Nose normal.   Mouth/Throat: Oropharynx is clear and moist.   Eyes: Conjunctivae and EOM are normal. Pupils are equal, round, and reactive to light.   Neck: Normal range of motion. Neck supple. No tracheal deviation present.   Cardiovascular: Normal rate, regular rhythm, normal heart sounds and intact distal pulses.    Pulmonary/Chest: Effort normal. She has rales ( mild rales appreciated at lung bases bilaterally) in the right lower field and the left lower field.   Abdominal: Soft. Bowel sounds are normal. There is no tenderness.   Musculoskeletal: Normal range of motion. She exhibits edema ( +1 pitting edema of the lower extremities).   Lymphadenopathy:     She has no cervical adenopathy.   Neurological: She is alert and oriented to person, place, and time.   Skin: Skin is warm and dry. Rash ( cellulitis of anterior lower extremities) noted. She is not diaphoretic.   Getting better on B/L extremities     Psychiatric: She has a normal mood and affect. Her behavior is normal. Judgment and thought content normal.   Nursing note and vitals reviewed.    F: cardiac diet  A: morphine PRN  S: none  T: heparin infusion  H: 30 degrees  U: protonix       Medication Review    Current Facility-Administered Medications:   •  albuterol (PROVENTIL) nebulizer solution 0.083% 2.5 mg/3mL, 2.5 mg, Nebulization, Q6H PRN, Evan Garcia MD  •  [DISCONTINUED] aspirin chewable tablet 324 mg, 324 mg, Oral, Once **AND** aspirin EC tablet 81 mg, 81 mg, Oral,  Daily, Maci Sullivan MD, 81 mg at 03/05/17 0923  •  atorvastatin (LIPITOR) tablet 80 mg, 80 mg, Oral, Nightly, Maci Sullivan MD, 80 mg at 03/05/17 2035  •  budesonide-formoterol (SYMBICORT) 160-4.5 MCG/ACT inhaler 2 puff, 2 puff, Inhalation, BID - RT, Evan Garcia MD, 2 puff at 03/05/17 1916  •  busPIRone (BUSPAR) tablet 15 mg, 15 mg, Oral, Q12H, Maci Sullivan MD, 15 mg at 03/05/17 2036  •  CloNIDine (CATAPRES) tablet 0.2 mg, 0.2 mg, Oral, Q12H, Evan Garcia MD, 0.2 mg at 03/05/17 2035  •  docusate sodium (COLACE) capsule 100 mg, 100 mg, Oral, BID, Maci Sullivan MD, 100 mg at 03/05/17 0843  •  doxycycline (VIBRAMYICN) tablet 100 mg, 100 mg, Oral, Q12H, Maci Sullivan MD, 100 mg at 03/05/17 2035  •  furosemide (LASIX) tablet 20 mg, 20 mg, Oral, Daily PRN, Maci Sullivan MD  •  hydrALAZINE (APRESOLINE) injection 10 mg, 10 mg, Intravenous, Q6H PRN, Brittanie Bell MD  •  HYDROcodone-acetaminophen (NORCO) 5-325 MG per tablet 1 tablet, 1 tablet, Oral, Q8H PRN, Maci Sullivan MD, 1 tablet at 03/05/17 1020  •  ipratropium-albuterol (DUO-NEB) nebulizer solution 3 mL, 3 mL, Nebulization, 4x Daily - RT, Evan Garcia MD, 3 mL at 03/05/17 1916  •  labetalol (NORMODYNE) tablet 200 mg, 200 mg, Oral, BID, Maci Sullivan MD, 200 mg at 03/05/17 1720  •  methylPREDNISolone sodium succinate (SOLU-Medrol) injection 60 mg, 60 mg, Intravenous, Daily, Evan Garcia MD, 60 mg at 03/05/17 0842  •  mirtazapine (REMERON) tablet 15 mg, 15 mg, Oral, Daily, Maci Sullivan MD, 15 mg at 03/04/17 1854  •  Morphine sulfate (PF) injection 1 mg, 1 mg, Intravenous, Q4H PRN, Maci Sullivan MD, 1 mg at 03/06/17 0537  •  nicotine (NICODERM CQ) 21 MG/24HR patch 1 patch, 1 patch, Transdermal, Q24H, Maci Sullivan MD, 1 patch at 03/05/17 1518  •  nitroglycerin infusion 200 mcg/mL, 5-200 mcg/min, Intravenous, Titrated, Scott Jin MD, Stopped at 03/04/17 2100  •  ondansetron (ZOFRAN)  injection 4 mg, 4 mg, Intravenous, Q6H PRN, Maci Sullivan MD  •  pantoprazole (PROTONIX) injection 40 mg, 40 mg, Intravenous, Q AM, Maci Sullivan MD, 40 mg at 03/06/17 0539  •  QUEtiapine (SEROquel) tablet 200 mg, 200 mg, Oral, Nightly, Maci Sullivan MD, 200 mg at 03/05/17 2035  •  sodium chloride 0.9 % flush 1-10 mL, 1-10 mL, Intravenous, PRN, Maci Sullivan MD, 1 mL at 03/06/17 0510  •  sodium chloride 0.9 % flush 1-10 mL, 1-10 mL, Intravenous, PRN, Maci Sullivan MD, 10 mL at 03/04/17 1750  •  sodium chloride 0.9 % flush 10 mL, 10 mL, Intravenous, PRN, Scott Jin MD  •  sodium chloride 0.9 % infusion, 75 mL/hr, Intravenous, Continuous, Brittanie Bell MD, Last Rate: 75 mL/hr at 03/06/17 0029, 75 mL/hr at 03/06/17 0029  •  traZODone (DESYREL) tablet 150 mg, 150 mg, Oral, Nightly, Maci Sullivan MD, 150 mg at 03/05/17 2036     Diagnostic Data    Lab Results (last 24 hours)     Procedure Component Value Units Date/Time    CBC (No Diff) [[id number]]  (Abnormal) Collected:  03/05/17 1145    Specimen:  Blood Updated:  03/05/17 1158     WBC 5.35 10*3/mm3      RBC 2.57 (L) 10*6/mm3      Hemoglobin 7.8 (L) g/dL      Hematocrit 22.7 (L) %      MCV 88.3 fL      MCH 30.4 pg      MCHC 34.4 g/dL      RDW 13.7 %      RDW-SD 44.9 fl      MPV 10.8 fL      Platelets 123 (L) 10*3/mm3     aPTT [[id number]]  (Normal) Collected:  03/05/17 1145    Specimen:  Blood Updated:  03/05/17 1203     PTT 36.4 seconds     Narrative:       The recommended Heparin therapeutic range is 68-97 seconds.    Sodium, Urine, Random [[id number]]  (Abnormal) Collected:  03/05/17 1401    Specimen:  Urine from Urine, Catheter Updated:  03/05/17 1419     Sodium, Urine 12 (L) mmol/L     Occult Blood X 1, Stool [[id number]]  (Abnormal) Collected:  03/05/17 1342    Specimen:  Stool Updated:  03/05/17 1506     Fecal Occult Blood Positive (A)     Hemoglobin & Hematocrit, Blood [[id number]]  (Abnormal) Collected:  03/05/17 1532     Specimen:  Blood Updated:  03/05/17 1540     Hemoglobin 7.9 (L) g/dL      Hematocrit 23.2 (L) %     Extra Tubes [[id number]] Collected:  03/05/17 1145    Specimen:  Blood from Blood, Venous Line Updated:  03/05/17 1601    Narrative:       The following orders were created for panel order Extra Tubes.  Procedure                               Abnormality         Status                     ---------                               -----------         ------                     Green Top (Gel)[[id number]]                                   Final result                 Please view results for these tests on the individual orders.    Green Top (Gel) [[id number]] Collected:  03/05/17 1145    Specimen:  Blood Updated:  03/05/17 1601     Extra Tube Hold for add-ons.       Auto resulted.       Hemoglobin & Hematocrit, Blood [[id number]]  (Abnormal) Collected:  03/05/17 1935    Specimen:  Blood Updated:  03/05/17 1956     Hemoglobin 8.1 (L) g/dL      Hematocrit 24.1 (L) %     Hemoglobin & Hematocrit, Blood [[id number]]  (Abnormal) Collected:  03/05/17 2353    Specimen:  Blood Updated:  03/06/17 0056     Hemoglobin 7.8 (L) g/dL      Hematocrit 22.9 (L) %     CBC & Differential [[id number]] Collected:  03/06/17 0409    Specimen:  Blood Updated:  03/06/17 0456    Narrative:       The following orders were created for panel order CBC & Differential.  Procedure                               Abnormality         Status                     ---------                               -----------         ------                     CBC Auto Differential[[id number]]         Abnormal            Final result                 Please view results for these tests on the individual orders.    CBC Auto Differential [[id number]]  (Abnormal) Collected:  03/06/17 0409    Specimen:  Blood Updated:  03/06/17 0456     WBC 8.28 10*3/mm3      RBC 2.53 (L) 10*6/mm3      Hemoglobin 7.7 (L) g/dL      Hematocrit 22.8 (L) %      MCV 90.1 fL      MCH 30.4 pg      MCHC 33.8 g/dL       RDW 13.5 %      RDW-SD 44.4 fl      MPV 10.5 fL      Platelets 153 10*3/mm3      Neutrophil % 78.9 %      Lymphocyte % 12.0 %      Monocyte % 8.6 %      Eosinophil % 0.0 %      Basophil % 0.0 %      Immature Grans % 0.5 %      Neutrophils, Absolute 6.54 10*3/mm3      Lymphocytes, Absolute 0.99 10*3/mm3      Monocytes, Absolute 0.71 10*3/mm3      Eosinophils, Absolute 0.00 10*3/mm3      Basophils, Absolute 0.00 10*3/mm3      Immature Grans, Absolute 0.04 (H) 10*3/mm3     Comprehensive Metabolic Panel [[id number]]  (Abnormal) Collected:  03/06/17 0409    Specimen:  Blood Updated:  03/06/17 0518     Glucose 160 (H) mg/dL      BUN 40 (H) mg/dL      Creatinine 2.97 (H) mg/dL      Sodium 135 (L) mmol/L      Potassium 5.0 mmol/L      Chloride 109 mmol/L      CO2 20.0 (L) mmol/L      Calcium 7.7 (L) mg/dL      Total Protein 5.4 (L) g/dL      Albumin 2.50 (L) g/dL      ALT (SGPT) 25 U/L      AST (SGOT) 14 U/L      Alkaline Phosphatase 106 U/L      Total Bilirubin 0.2 mg/dL      eGFR Non African Amer 16 (L) mL/min/1.73      Globulin 2.9 gm/dL      A/G Ratio 0.9 (L) g/dL      BUN/Creatinine Ratio 13.5      Anion Gap 6.0 mmol/L     Urine Eosinophils, Jose Luis's Stain [[id number]]  (Normal) Collected:  03/05/17 1401    Specimen:  Urine from Urine, Catheter Updated:  03/06/17 0644     Jose Luis Stain Negative       BY JIH & BGS               I reviewed the patient's new clinical results.  I reviewed the patient's other test results and agree with the interpretation    Assessment/Plan:     Active Hospital Problems (** Indicates Principal Problem)    Diagnosis Date Noted   • CKD (chronic kidney disease) stage 3, GFR 30-59 ml/min [N18.3] 03/05/2017     -Creatinine elevation from 1.47 at admission to 2.9.  -Reduced lisinopril from 40mg to 20mg.  -Nephrology consulted, Dr. Bell.  -Follow with daily CMP.      • Anemia [D64.9] 03/05/2017     -Follow H&H; Hb 7.9  -Hemoccult ordered  -Heparin infusion held  -Risks and benefits of transfusion  discussed with patient  -Type, cross, & 2 units PRBCs ordered     • Chest pain [R07.9] 03/04/2017     -Patient has been admitted to the ICU, will transfer to regular floor today  -Trending cardiac enzymes.   -She has been started on a morphine, supplementary O2, nitroglycerin infusion, and aspirin.   -Consulted cardiology, Dr. Ambrocio, regarding elevated troponin.   -ECHO pending.      • Hypertensive emergency [I16.1] 03/04/2017     -Normotensive  -Aspirin, clonidine, and atorvastatin ordered in ICU  -We will continue all her home antihypertensive medications: labetalol, lisinopril, spironolactone, and lasix  -PRN hydralazine for SBP > 160.   -We will continue to follow her kidney function with daily CMP.   -Lipid panel WNL; moderate intensity statin therapy based on ASCVD risk      • CHF with left ventricular diastolic dysfunction, NYHA class 1 [I50.30] 03/04/2017     -BNP at the time of admission: 11787   -Monitor patient with strict I/Os and daily weight.  -Continue patient's home diuretic and electrolyte replacement.      • Elevated d-dimer [R79.1] 03/04/2017     -Ddimer: 3896  -V/Q scan: low probability of PE  -Venous doppler: no evidence of lower extremity DVT  -Therapeutic heparin infusion held due to anemia; following aPTT  -Encourage incentive spirometry        Resolved Hospital Problems    Diagnosis Date Noted Date Resolved   No resolved problems to display.       Transfer the patient to regular floor today.   Will educated the patient on smoking cessation. Will continue to monitor patient's course and adjust our care accordingly.     DVT prophylaxis: SCDs/TEDs  Code status is Full Code    Plan for disposition:Where: home and home health and When:  clinically stable      Time: 30 minutes           This document has been electronically signed by Mary Spangler MD on March 6, 2017 8:22 AM

## 2017-03-06 NOTE — PROGRESS NOTES
[address] [address]  T - [id number]         PROGRESS NOTE         SUBJECTIVE:   Patient Care Team:  Mary Spangler MD as PCP - General (Family Medicine)    Chief Complaint:     Chief Complaint   Patient presents with   • Chest Pain       Subjective     Patient is 57 y.o. female presents with chest pain. She feels better.      ROS/HISTORY/ CURRENT MEDICATIONS/OBJECTIVE/VS/PE:   Review of Systems:   Review of Systems    History:     Past Medical History   Diagnosis Date   • Anxiety state    • Chronic depression    • Chronic pain    • Compression fracture of lumbar spine, non-traumatic    • Depressive disorder    • Essential hypertension    • Obesity    • Pericardial effusion      Past Surgical History   Procedure Laterality Date   • Cardiac catheterization  05/13/2014     14416 (1)   -  Successful PCI to the proximal LAD with a V stent reducing 99% stenosis to 0%   • Appendectomy  12/10/2012     Laparoscopic (1) - Acute suppurative appendicitis   • Shoulder surgery  05/14/2015     (1) - Communited 3 part fractured humerus, proximal left shoulder. Open reduction and internal fixation   • Tracheostomy  05/30/2014     (1) - Percutaneous endoscopic gastrostomy tube placement.;     • Colonoscopy N/A 2/13/2017     Procedure: COLONOSCOPY;  Surgeon: Nader Osei DO;  Location: [facility] ENDOSCOPY;  Service:      History reviewed. No pertinent family history.  Social History   Substance Use Topics   • Smoking status: Current Every Day Smoker   • Smokeless tobacco: Never Used      Comment: Smokes 1 ppd for 40 years   • Alcohol use No     Prescriptions Prior to Admission   Medication Sig Dispense Refill Last Dose   • Aspirin-Salicylamide-Caffeine (BC HEADACHE POWDER PO) Take  by mouth As Needed (for pain).   Taking   • azithromycin (ZITHROMAX) 500 MG tablet Take 1 tablet by mouth Daily. 2 tablet 0 Taking   • busPIRone (BUSPAR) 15 MG tablet Take  by mouth. Take 1 tablet 2  times per day.   Taking   • cefuroxime (CEFTIN) 500 MG tablet Take 1 tablet by mouth 2 (Two) Times a Day. 6 tablet 0 Taking   • docusate sodium 100 MG capsule Take 100 mg by mouth 2 (Two) Times a Day. 60 capsule 0 Taking   • doxycycline (DORYX) 100 MG enteric coated tablet Take 1 tablet by mouth 2 (Two) Times a Day. 42 tablet 0 Taking   • doxycycline (MONODOX) 100 MG capsule       • furosemide (LASIX) 20 MG tablet Take 20 mg by mouth Daily As Needed (for fluids).   Taking   • HYDROcodone-acetaminophen (NORCO) 5-325 MG per tablet Take 1 tablet by mouth Every 8 (Eight) Hours As Needed for severe pain (7-10). 20 tablet 0 Taking   • labetalol (NORMODYNE) 200 MG tablet Take 1 tablet by mouth 2 (Two) Times a Day for 30 days. 60 tablet 3    • lisinopril (PRINIVIL,ZESTRIL) 40 MG tablet Take 1 tablet by mouth Daily. 30 tablet 3    • MethylPREDNISolone (MEDROL, SANA,) 4 MG tablet Take as directed on package instructions. 21 tablet 0 Taking   • mirtazapine (REMERON) 15 MG tablet Take 15 mg by mouth Daily.   Taking   • potassium chloride (KLOR-CON) 20 MEQ packet Take 20 mEq by mouth 2 (Two) Times a Day. 10 packet 0 Taking   • QUEtiapine (SEROquel) 200 MG tablet Take 200 mg by mouth Every Night.   Taking   • spironolactone (ALDACTONE) 25 MG tablet Take 1 tablet by mouth Daily. 30 tablet 1    • sulfamethoxazole-trimethoprim (BACTRIM DS,SEPTRA DS) 800-160 MG per tablet Take 1 tablet by mouth 2 (Two) Times a Day. 28 tablet 0    • traZODone (DESYREL) 150 MG tablet Take 150 mg by mouth Every Night.   Taking     Allergies:  Other; Stadol [butorphanol]; and Toradol [ketorolac tromethamine]    Current Medications:     Current Facility-Administered Medications   Medication Dose Route Frequency Provider Last Rate Last Dose   • albuterol (PROVENTIL) nebulizer solution 0.083% 2.5 mg/3mL  2.5 mg Nebulization Q6H PRN Evan Garcia MD       • aspirin EC tablet 81 mg  81 mg Oral Daily Maci Sullivan MD   81 mg at 03/06/17 0930   •  atorvastatin (LIPITOR) tablet 80 mg  80 mg Oral Nightly Maci Sullivan MD   80 mg at 03/05/17 2035   • budesonide-formoterol (SYMBICORT) 160-4.5 MCG/ACT inhaler 2 puff  2 puff Inhalation BID - RT Evan Garcia MD   2 puff at 03/06/17 0830   • busPIRone (BUSPAR) tablet 15 mg  15 mg Oral Q12H Maci Sullivan MD   15 mg at 03/06/17 0930   • docusate sodium (COLACE) capsule 100 mg  100 mg Oral BID Maci Sullivan MD   100 mg at 03/06/17 0932   • doxycycline (VIBRAMYICN) tablet 100 mg  100 mg Oral Q12H Maci Sullivan MD   100 mg at 03/06/17 0930   • furosemide (LASIX) tablet 20 mg  20 mg Oral Daily PRN Maci Sullivan MD       • hydrALAZINE (APRESOLINE) injection 10 mg  10 mg Intravenous Q6H PRN Brittanie Bell MD       • HYDROcodone-acetaminophen (NORCO) 5-325 MG per tablet 1 tablet  1 tablet Oral Q8H PRN Maci Sullivan MD   1 tablet at 03/06/17 1120   • ipratropium-albuterol (DUO-NEB) nebulizer solution 3 mL  3 mL Nebulization 4x Daily - RT Evan Garcia MD   3 mL at 03/06/17 1432   • labetalol (NORMODYNE) tablet 200 mg  200 mg Oral BID Maci Sullivan MD   200 mg at 03/05/17 1720   • linezolid (ZYVOX) tablet 600 mg  600 mg Oral Q12H Brittanie Bell MD   600 mg at 03/06/17 1456   • methylPREDNISolone sodium succinate (SOLU-Medrol) injection 60 mg  60 mg Intravenous Daily Evan Garcia MD   60 mg at 03/06/17 0930   • mirtazapine (REMERON) tablet 15 mg  15 mg Oral Daily Maci Sullivan MD   15 mg at 03/04/17 1854   • Morphine sulfate (PF) injection 1 mg  1 mg Intravenous Q4H PRN Maci Sullivan MD   1 mg at 03/06/17 1357   • nicotine (NICODERM CQ) 21 MG/24HR patch 1 patch  1 patch Transdermal Q24H Maci Sullivan MD   1 patch at 03/05/17 1518   • nitroglycerin infusion 200 mcg/mL  5-200 mcg/min Intravenous Titrated Scott Jin MD   Stopped at 03/04/17 2100   • ondansetron (ZOFRAN) injection 4 mg  4 mg Intravenous Q6H PRN Maci Sullivan MD       • pantoprazole  (PROTONIX) injection 40 mg  40 mg Intravenous Q AM Maci Sullivan MD   40 mg at 03/06/17 0539   • QUEtiapine (SEROquel) tablet 200 mg  200 mg Oral Nightly Maci Sullivan MD   200 mg at 03/05/17 2035   • sodium chloride 0.9 % flush 1-10 mL  1-10 mL Intravenous PRN Maci Sullivan MD   10 mL at 03/04/17 1750   • sodium chloride 0.9 % infusion  75 mL/hr Intravenous Continuous Brittanie Bell MD 75 mL/hr at 03/06/17 0029 75 mL/hr at 03/06/17 0029   • traZODone (DESYREL) tablet 150 mg  150 mg Oral Nightly Maci Sullivan MD   150 mg at 03/05/17 2036       Objective     Physical Exam:   Temp:  [96.5 °F (35.8 °C)-98 °F (36.7 °C)] 96.5 °F (35.8 °C)  Heart Rate:  [59-74] 68  Resp:  [14-22] 16  BP: (108-160)/(56-75) 111/59    Physical Exam   Constitutional: She appears well-developed and well-nourished.   HENT:   Head: Normocephalic and atraumatic.   Cardiovascular: Normal rate, regular rhythm and normal heart sounds.  Exam reveals no gallop and no friction rub.    No murmur heard.  Pulmonary/Chest: Effort normal and breath sounds normal. No respiratory distress. She has no wheezes. She has no rales.   Abdominal: Soft. She exhibits no distension.   Skin: Skin is warm.   Vitals reviewed.           Results Review:   Lab Results   Component Value Date    GLUCOSE 160 (H) 03/06/2017    BUN 40 (H) 03/06/2017    CREATININE 2.97 (H) 03/06/2017    EGFRIFNONA 16 (L) 03/06/2017    BCR 13.5 03/06/2017    CO2 20.0 (L) 03/06/2017    CALCIUM 7.7 (L) 03/06/2017    ALBUMIN 2.50 (L) 03/06/2017    LABIL2 0.9 (L) 03/06/2017    AST 14 03/06/2017    ALT 25 03/06/2017         WBC WBC   Date Value Ref Range Status   03/06/2017 8.28 3.20 - 9.80 10*3/mm3 Final   03/05/2017 5.35 3.20 - 9.80 10*3/mm3 Final   03/05/2017 5.01 3.20 - 9.80 10*3/mm3 Final   03/04/2017 6.24 3.20 - 9.80 10*3/mm3 Final      HGB HEMOGLOBIN   Date Value Ref Range Status   03/06/2017 7.7 (L) 12.0 - 15.5 g/dL Final   03/05/2017 7.8 (L) 12.0 - 15.5 g/dL Final    03/05/2017 8.1 (L) 12.0 - 15.5 g/dL Final   03/05/2017 7.9 (L) 12.0 - 15.5 g/dL Final   03/05/2017 7.8 (L) 12.0 - 15.5 g/dL Final   03/05/2017 7.9 (L) 12.0 - 15.5 g/dL Final   03/04/2017 9.7 (L) 12.0 - 15.5 g/dL Final      HCT HEMATOCRIT   Date Value Ref Range Status   03/06/2017 22.8 (L) 35.0 - 45.0 % Final   03/05/2017 22.9 (L) 35.0 - 45.0 % Final   03/05/2017 24.1 (L) 35.0 - 45.0 % Final   03/05/2017 23.2 (L) 35.0 - 45.0 % Final   03/05/2017 22.7 (L) 35.0 - 45.0 % Final   03/05/2017 22.5 (L) 35.0 - 45.0 % Final   03/04/2017 27.7 (L) 35.0 - 45.0 % Final      Platlets PLATELETS   Date Value Ref Range Status   03/06/2017 153 150 - 450 10*3/mm3 Final   03/05/2017 123 (L) 150 - 450 10*3/mm3 Final   03/05/2017 113 (L) 150 - 450 10*3/mm3 Final   03/04/2017 130 (L) 150 - 450 10*3/mm3 Final          Imaging Results (last 24 hours)     Procedure Component Value Units Date/Time    US Guided Vascular Access [[id number]] Resulted:  03/06/17 0902     Updated:  03/06/17 0902    IR Insert Midline Without Port Pump 5 Plus [[id number]] Resulted:  03/06/17 0906     Updated:  03/06/17 0906           I reviewed the patient's new clinical results.  I reviewed the patient's new imaging results and agree with the interpretation.     ASSESSMENT/PLAN:   Assessment/Plan   Active Problems:    Chest pain    Hypertensive emergency    CHF with left ventricular diastolic dysfunction, NYHA class 1    Elevated d-dimer    CKD (chronic kidney disease) stage 3, GFR 30-59 ml/min    Anemia      Continue with current care.  I have reviewed the notes, assessments, and/or procedures performed by .[email or web address]  , I concur with her/his documentation of Eva Troncoso.    I discussed the patients findings and my recommendations with patient.      Phillip Camara MD  03/06/17  3:05 PM

## 2017-03-06 NOTE — PROGRESS NOTES
Cardiology Progress Note:     LOS: 1 day   Patient Care Team:  Mary Spangler MD as PCP - General (Family Medicine)      Subjective:   Chart reviewed , patient seen and examined.. Patient has continued to have his symptoms of chest pain on and off.  Though on questioning patient admits to chest pain but appears to be comfortable.  Patient with VQ scan was low probability.  Patient was anemic and subsequently the heparin was stopped.  Patient still continues to have symptoms of chest pain and shortness of breath.  Patient Hemoccult was positive with bleeding around the colostomy bag and has been evaluated by Dr. Eid.  Due to the patient history of renal insufficiency patient has had evaluation by Dr. Bell.              Objective:     Objective:  Vitals:    03/05/17 2000   BP: 137/72   Pulse: 71   Resp: 22   Temp: 97.9 °F (36.6 °C)   SpO2: 98%       Intake/Output Summary (Last 24 hours) at 03/05/17 2139  Last data filed at 03/05/17 1730   Gross per 24 hour   Intake    536 ml   Output     50 ml   Net    486 ml             Physical Exam:   General Appearance:    Alert, oriented, cooperative, in no acute distress   Head:    Normocephalic, atraumatic, without obvious abnormality   Eyes:           LINDA  Lids and lashes normal, conjunctivae and sclerae normal, no icterus, no pallor   Ears:    Ears appear intact with no abnormalities noted   Throat:   Mucous membranes pink and moist   Neck:   Supple, trachea midline, no carotid bruit, no organomegaly or JVD   Lungs:     Clear to auscultation and percussion, respirations regular, even and Unlabored. No wheezes, rales, rhonchi    Heart:    Regular rhythm and normal rate, normal S1 and S2, no            murmur, no gallop, no rub, no click   Abdomen:     Soft, non-tender, non-distended, no guarding, no rebound tenderness, Normal bowel sounds in all four quadrant, no masses, liver and spleen nonpalpable,    Genitalia:    Deferred   Extremities:   Moves all extremities  well, no edema, no cyanosis, no              Redness, no clubbing   Pulses:   Pulses palpable and equal bilaterally   Skin:   Moist and warm. No bleeding, bruising or rash   Neurologic/Psychiatric:   Alert and oriented to person, place, and time.  Motor, power and tone in upper and lower extremity is grossly intact.  No focal neurological deficits. Normal cognitive function. No psychomotor reaction or tangential thought. No depression, homicidal ideations and suicidal ideations            Results Review:    Lab Results (last 24 hours)     Procedure Component Value Units Date/Time    Troponin [[id number]]  (Abnormal) Collected:  03/04/17 2139    Specimen:  Blood Updated:  03/04/17 2212     Troponin I 0.069 (H) ng/mL     aPTT [[id number]]  (Normal) Collected:  03/04/17 2139    Specimen:  Blood Updated:  03/04/17 2300     PTT 21.2 seconds     Narrative:       The recommended Heparin therapeutic range is 68-97 seconds.    CBC Auto Differential [[id number]]  (Abnormal) Collected:  03/05/17 0240    Specimen:  Blood Updated:  03/05/17 0314     WBC 5.01 10*3/mm3      RBC 2.54 (L) 10*6/mm3      Hemoglobin 7.9 (L) g/dL      Hematocrit 22.5 (L) %      MCV 88.6 fL      MCH 31.1 pg      MCHC 35.1 g/dL      RDW 13.2 %      RDW-SD 43.0 fl      MPV 10.1 fL      Platelets 113 (L) 10*3/mm3      Neutrophil % 87.6 (H) %      Lymphocyte % 7.4 (L) %      Monocyte % 4.6 %      Eosinophil % 0.0 %      Basophil % 0.0 %      Immature Grans % 0.4 %      Neutrophils, Absolute 4.39 10*3/mm3      Lymphocytes, Absolute 0.37 (L) 10*3/mm3      Monocytes, Absolute 0.23 10*3/mm3      Eosinophils, Absolute 0.00 10*3/mm3      Basophils, Absolute 0.00 10*3/mm3      Immature Grans, Absolute 0.02 10*3/mm3     aPTT [[id number]]  (Normal) Collected:  03/05/17 0240    Specimen:  Blood Updated:  03/05/17 0321     PTT 29.7 seconds     Narrative:       The recommended Heparin therapeutic range is 68-97 seconds.    Magnesium [[id number]]  (Normal) Collected:  03/05/17  0240    Specimen:  Blood Updated:  03/05/17 0330     Magnesium 1.8 mg/dL     Lipid Panel [[id number]]  (Abnormal) Collected:  03/05/17 0240    Specimen:  Blood Updated:  03/05/17 0341     Total Cholesterol 92 mg/dL      Triglycerides 64 mg/dL      HDL Cholesterol 22 (L) mg/dL      LDL Cholesterol  50 mg/dL      LDL/HDL Ratio 2.60     T4, Free [[id number]]  (Normal) Collected:  03/05/17 0240    Specimen:  Blood Updated:  03/05/17 0346     Free T4 1.05 ng/dL     TSH [[id number]]  (Normal) Collected:  03/05/17 0240    Specimen:  Blood Updated:  03/05/17 0401     TSH 1.720 mIU/mL     Hemoglobin A1c [[id number]]  (Normal) Collected:  03/05/17 0239    Specimen:  Blood Updated:  03/05/17 0405     Hemoglobin A1C 4.72 %     Troponin [[id number]]  (Abnormal) Collected:  03/05/17 0240    Specimen:  Blood Updated:  03/05/17 0410     Troponin I 0.047 (H) ng/mL     Comprehensive Metabolic Panel [[id number]]  (Abnormal) Collected:  03/05/17 0240    Specimen:  Blood Updated:  03/05/17 0411     Glucose 206 (H) mg/dL      BUN 29 (H) mg/dL      Creatinine 2.15 (H) mg/dL      Sodium 134 (L) mmol/L      Potassium 4.0 mmol/L      Chloride 107 mmol/L      CO2 23.0 mmol/L      Calcium 7.8 (L) mg/dL      Total Protein 5.4 (L) g/dL      Albumin 2.50 (L) g/dL      ALT (SGPT) 24 U/L      AST (SGOT) 21 U/L      Alkaline Phosphatase 100 U/L      Total Bilirubin 0.4 mg/dL      eGFR Non African Amer 24 (L) mL/min/1.73      Globulin 2.9 gm/dL      A/G Ratio 0.9 (L) g/dL      BUN/Creatinine Ratio 13.5      Anion Gap 4.0 (L) mmol/L     CBC (No Diff) [[id number]]  (Abnormal) Collected:  03/05/17 1145    Specimen:  Blood Updated:  03/05/17 1158     WBC 5.35 10*3/mm3      RBC 2.57 (L) 10*6/mm3      Hemoglobin 7.8 (L) g/dL      Hematocrit 22.7 (L) %      MCV 88.3 fL      MCH 30.4 pg      MCHC 34.4 g/dL      RDW 13.7 %      RDW-SD 44.9 fl      MPV 10.8 fL      Platelets 123 (L) 10*3/mm3     aPTT [[id number]]  (Normal) Collected:  03/05/17 1145    Specimen:  Blood  Updated:  03/05/17 1203     PTT 36.4 seconds     Narrative:       The recommended Heparin therapeutic range is 68-97 seconds.    Urine Eosinophils, Jose Luis's Stain [[id number]] Collected:  03/05/17 1401    Specimen:  Urine from Urine, Catheter Updated:  03/05/17 1404    Sodium, Urine, Random [[id number]]  (Abnormal) Collected:  03/05/17 1401    Specimen:  Urine from Urine, Catheter Updated:  03/05/17 1419     Sodium, Urine 12 (L) mmol/L     Occult Blood X 1, Stool [[id number]]  (Abnormal) Collected:  03/05/17 1342    Specimen:  Stool Updated:  03/05/17 1506     Fecal Occult Blood Positive (A)     Hemoglobin & Hematocrit, Blood [[id number]]  (Abnormal) Collected:  03/05/17 1532    Specimen:  Blood Updated:  03/05/17 1540     Hemoglobin 7.9 (L) g/dL      Hematocrit 23.2 (L) %     Extra Tubes [[id number]] Collected:  03/05/17 1145    Specimen:  Blood from Blood, Venous Line Updated:  03/05/17 1601    Narrative:       The following orders were created for panel order Extra Tubes.  Procedure                               Abnormality         Status                     ---------                               -----------         ------                     Green Top (Gel)[[id number]]                                   Final result                 Please view results for these tests on the individual orders.    Green Top (Gel) [[id number]] Collected:  03/05/17 1145    Specimen:  Blood Updated:  03/05/17 1601     Extra Tube Hold for add-ons.       Auto resulted.       Hemoglobin & Hematocrit, Blood [[id number]]  (Abnormal) Collected:  03/05/17 1935    Specimen:  Blood Updated:  03/05/17 1956     Hemoglobin 8.1 (L) g/dL      Hematocrit 24.1 (L) %            Medication Review:   Current Facility-Administered Medications   Medication Dose Route Frequency Provider Last Rate Last Dose   • albuterol (PROVENTIL) nebulizer solution 0.083% 2.5 mg/3mL  2.5 mg Nebulization Q6H PRN Evan Garcia MD       • aspirin EC tablet 81 mg  81 mg Oral Daily Maci  Brenna Sullivan MD   81 mg at 03/05/17 0923   • atorvastatin (LIPITOR) tablet 80 mg  80 mg Oral Nightly Maci Sullivan MD   80 mg at 03/05/17 2035   • budesonide-formoterol (SYMBICORT) 160-4.5 MCG/ACT inhaler 2 puff  2 puff Inhalation BID - RT Evan Garcia MD   2 puff at 03/05/17 1916   • busPIRone (BUSPAR) tablet 15 mg  15 mg Oral Q12H Maci Sullivan MD   15 mg at 03/05/17 2036   • CloNIDine (CATAPRES) tablet 0.2 mg  0.2 mg Oral Q12H Evan Garcia MD   0.2 mg at 03/05/17 2035   • docusate sodium (COLACE) capsule 100 mg  100 mg Oral BID Maci Sullivan MD   100 mg at 03/05/17 0843   • doxycycline (VIBRAMYICN) tablet 100 mg  100 mg Oral Q12H Maci Sullivan MD   100 mg at 03/05/17 2035   • furosemide (LASIX) tablet 20 mg  20 mg Oral Daily PRN Maci Sullivan MD       • hydrALAZINE (APRESOLINE) injection 10 mg  10 mg Intravenous Q6H PRN Brittanie Bell MD       • HYDROcodone-acetaminophen (NORCO) 5-325 MG per tablet 1 tablet  1 tablet Oral Q8H PRN Maci Sullivan MD   1 tablet at 03/05/17 1020   • ipratropium-albuterol (DUO-NEB) nebulizer solution 3 mL  3 mL Nebulization 4x Daily - RT Evan Garcia MD   3 mL at 03/05/17 1916   • labetalol (NORMODYNE) tablet 200 mg  200 mg Oral BID Maci Sullivan MD   200 mg at 03/05/17 1720   • methylPREDNISolone sodium succinate (SOLU-Medrol) injection 60 mg  60 mg Intravenous Daily Evan Garcia MD   60 mg at 03/05/17 0842   • mirtazapine (REMERON) tablet 15 mg  15 mg Oral Daily Maci Sullivan MD   15 mg at 03/04/17 1854   • Morphine sulfate (PF) injection 1 mg  1 mg Intravenous Q4H PRN Maci Sullivan MD   1 mg at 03/05/17 2124   • nicotine (NICODERM CQ) 21 MG/24HR patch 1 patch  1 patch Transdermal Q24H Maci Sullivan MD   1 patch at 03/05/17 1518   • nitroglycerin infusion 200 mcg/mL  5-200 mcg/min Intravenous Titrated Scott Jin MD   Stopped at 03/04/17 2100   • ondansetron (ZOFRAN) injection 4 mg  4 mg Intravenous Q6H PRN  Maci Sullivan MD       • pantoprazole (PROTONIX) injection 40 mg  40 mg Intravenous Q AM Maci Sullivan MD   40 mg at 03/05/17 1020   • QUEtiapine (SEROquel) tablet 200 mg  200 mg Oral Nightly Maci Sullivan MD   200 mg at 03/05/17 2035   • sodium chloride 0.9 % flush 1-10 mL  1-10 mL Intravenous PRN Maci Sullivan MD       • sodium chloride 0.9 % flush 1-10 mL  1-10 mL Intravenous PRN Maci Sullivan MD   10 mL at 03/04/17 1750   • sodium chloride 0.9 % flush 10 mL  10 mL Intravenous PRN Scott Jin MD       • sodium chloride 0.9 % infusion  75 mL/hr Intravenous Continuous Brittanie Bell MD 75 mL/hr at 03/05/17 1020 75 mL/hr at 03/05/17 1020   • traZODone (DESYREL) tablet 150 mg  150 mg Oral Nightly Maci Sullivan MD   150 mg at 03/05/17 2036       Assessment and Plan:    Active Problems:    Chest pain    Hypertensive emergency    CHF with left ventricular diastolic dysfunction, NYHA class 1    Elevated d-dimer    CKD (chronic kidney disease) stage 3, GFR 30-59 ml/min    Anemia   1.  Chest discomfort.  Patient had mild elevation in the troponin with elevated d-dimer level.  At the present time patient would not be subjected to any invasive evaluation from the cardiac standpoint due to the patient history of chronic kidney disease.  Patient hemoglobin had dropped.  Patient has Hemoccult-positive.  Patient became scan was low probability for pulmonary embolism.  Patient appears to be comfortable at the present time not complaining of having symptoms of active chest discomfort.  At the present time would consider medical management for the indeterminate troponin.  2.  Arterial hypertension.  Patient blood pressure 118/73 with a heart rate of 72 bpm.  Patient currently would be continued on the present dose of the antihypertensive medication.    3.  Chronic kidney disease.  Patient has been evaluated by nephrology.  Patient has been started on IV fluids secondary to the low  sodium.  4.  Anemia with a hemoglobin of 7.9 with Hemoccult-positive.  With bleeding around the colostomy bag.  Patient is being followed by Dr. Eid with serial H&H.            Yg Ambrocio MD  03/05/17  9:39 PM      Time: Time spent on face-to-face interaction 35 minutes.      EMR Dragon/Transcription disclaimer:   Some of this note may be an electronic transcription/translation of spoken language to printed text. The electronic translation of spoken language may permit erroneous, or at times, nonsensical words or phrases to be inadvertently transcribed; Although I have reviewed the note for such errors, some may still exist.

## 2017-03-06 NOTE — CONSULTS
Referring Provider:Dr Spangler family medicine        Patient Care Team:  Mary Spangler MD as PCP - General (Family Medicine)    Chief complaint Chest pain    Subjective .     History of present illness:  Pt admitted over weekend with htn crisis and chest.  Known to me from a few weeks ago when she was admitted with lower gi bleed.  Considerred descending colostomy revision vs colostomy takedown then.  Had cscope per Dr Osei and no obvious colonic source of gi bleeding and she stablilized and went home.  Multiple medical issues and she is not a candidate for elective abd procedure currently.    Review of Systems   Constitutional: Negative for appetite change, chills, fever and unexpected weight change.   HENT: Negative for nosebleeds and voice change.    Eyes: Negative for visual disturbance.   Respiratory: Positive for chest tightness and shortness of breath. Negative for apnea, cough, choking, wheezing and stridor.    Cardiovascular: Positive for chest pain. Negative for palpitations and leg swelling.   Gastrointestinal: Positive for blood in stool. Negative for abdominal distention, abdominal pain, constipation, diarrhea, nausea and vomiting. Anal bleeding: Blood noted out ostomy at presentation, none now.   Endocrine: Negative for cold intolerance, heat intolerance, polydipsia, polyphagia and polyuria.   Genitourinary: Negative for difficulty urinating, dysuria, frequency, hematuria and urgency.   Musculoskeletal: Negative for arthralgias, back pain, myalgias and neck pain.   Skin: Negative for color change, pallor and rash.   Allergic/Immunologic: Negative for immunocompromised state.   Neurological: Negative for dizziness, seizures, syncope, light-headedness, numbness and headaches.   Hematological: Negative for adenopathy.   Psychiatric/Behavioral: Negative for suicidal ideas. The patient is not nervous/anxious.          History  Past Medical History   Diagnosis Date   • Anxiety state    • Chronic depression     • Chronic pain    • Compression fracture of lumbar spine, non-traumatic    • Depressive disorder    • Essential hypertension    • Obesity    • Pericardial effusion    , Past Surgical History   Procedure Laterality Date   • Cardiac catheterization  05/13/2014     64533 (1)   -  Successful PCI to the proximal LAD with a V stent reducing 99% stenosis to 0%   • Appendectomy  12/10/2012     Laparoscopic (1) - Acute suppurative appendicitis   • Shoulder surgery  05/14/2015     (1) - Communited 3 part fractured humerus, proximal left shoulder. Open reduction and internal fixation   • Tracheostomy  05/30/2014     (1) - Percutaneous endoscopic gastrostomy tube placement.;     • Colonoscopy N/A 2/13/2017     Procedure: COLONOSCOPY;  Surgeon: Nader Osei DO;  Location: [facility] ENDOSCOPY;  Service:    , History reviewed. No pertinent family history., Social History   Substance Use Topics   • Smoking status: Current Every Day Smoker   • Smokeless tobacco: Never Used      Comment: Smokes 1 ppd for 40 years   • Alcohol use No   , Prescriptions Prior to Admission   Medication Sig Dispense Refill Last Dose   • Aspirin-Salicylamide-Caffeine (BC HEADACHE POWDER PO) Take  by mouth As Needed (for pain).   Taking   • azithromycin (ZITHROMAX) 500 MG tablet Take 1 tablet by mouth Daily. 2 tablet 0 Taking   • busPIRone (BUSPAR) 15 MG tablet Take  by mouth. Take 1 tablet 2 times per day.   Taking   • cefuroxime (CEFTIN) 500 MG tablet Take 1 tablet by mouth 2 (Two) Times a Day. 6 tablet 0 Taking   • docusate sodium 100 MG capsule Take 100 mg by mouth 2 (Two) Times a Day. 60 capsule 0 Taking   • doxycycline (DORYX) 100 MG enteric coated tablet Take 1 tablet by mouth 2 (Two) Times a Day. 42 tablet 0 Taking   • doxycycline (MONODOX) 100 MG capsule       • furosemide (LASIX) 20 MG tablet Take 20 mg by mouth Daily As Needed (for fluids).   Taking   • HYDROcodone-acetaminophen (NORCO) 5-325 MG per tablet Take 1 tablet by mouth Every 8  (Eight) Hours As Needed for severe pain (7-10). 20 tablet 0 Taking   • labetalol (NORMODYNE) 200 MG tablet Take 1 tablet by mouth 2 (Two) Times a Day for 30 days. 60 tablet 3    • lisinopril (PRINIVIL,ZESTRIL) 40 MG tablet Take 1 tablet by mouth Daily. 30 tablet 3    • MethylPREDNISolone (MEDROL, SANA,) 4 MG tablet Take as directed on package instructions. 21 tablet 0 Taking   • mirtazapine (REMERON) 15 MG tablet Take 15 mg by mouth Daily.   Taking   • potassium chloride (KLOR-CON) 20 MEQ packet Take 20 mEq by mouth 2 (Two) Times a Day. 10 packet 0 Taking   • QUEtiapine (SEROquel) 200 MG tablet Take 200 mg by mouth Every Night.   Taking   • spironolactone (ALDACTONE) 25 MG tablet Take 1 tablet by mouth Daily. 30 tablet 1    • sulfamethoxazole-trimethoprim (BACTRIM DS,SEPTRA DS) 800-160 MG per tablet Take 1 tablet by mouth 2 (Two) Times a Day. 28 tablet 0    • traZODone (DESYREL) 150 MG tablet Take 150 mg by mouth Every Night.   Taking   , Scheduled Meds:    aspirin 81 mg Oral Daily   atorvastatin 80 mg Oral Nightly   budesonide-formoterol 2 puff Inhalation BID - RT   busPIRone 15 mg Oral Q12H   docusate sodium 100 mg Oral BID   doxycycline 100 mg Oral Q12H   ipratropium-albuterol 3 mL Nebulization 4x Daily - RT   labetalol 200 mg Oral BID   linezolid 600 mg Oral Q12H   methylPREDNISolone sodium succinate 60 mg Intravenous Daily   mirtazapine 15 mg Oral Daily   nicotine 1 patch Transdermal Q24H   pantoprazole 40 mg Intravenous Q AM   QUEtiapine 200 mg Oral Nightly   traZODone 150 mg Oral Nightly   , Continuous Infusions:    nitroglycerin 5-200 mcg/min Last Rate: Stopped (03/04/17 2100)   sodium chloride 75 mL/hr Last Rate: 75 mL/hr (03/06/17 0029)   , PRN Meds:  •  albuterol  •  furosemide  •  hydrALAZINE  •  HYDROcodone-acetaminophen  •  Morphine  •  ondansetron  •  sodium chloride  •  sodium chloride  •  sodium chloride and Allergies:  Other; Stadol [butorphanol]; and Toradol [ketorolac tromethamine]    Objective      Vital Signs   Temp:  [97.7 °F (36.5 °C)-98 °F (36.7 °C)] 98 °F (36.7 °C)  Heart Rate:  [59-77] 61  Resp:  [14-22] 15  BP: ()/(54-75) 120/63    Physical Exam:     General Appearance:    Alert, cooperative, in no acute distress   Head:    Normocephalic, without obvious abnormality, atraumatic   Eyes:            Lids and lashes normal, conjunctivae and sclerae normal, no   icterus,          Throat:   No oral lesions, no thrush, oral mucosa moist   Neck:   No adenopathy, supple, trachea midline, no thyromegaly,   no JVD   Lungs:     Clear to auscultation,respirations regular, even and                  unlabored    Heart:    Regular rhythm and normal rate, normal S1 and S2, no            murmur, no gallop, no rub, no click  Sinus in 60s        Abdomen:     Normal bowel sounds, no masses, no organomegaly, soft        non-tender, non-distended, no guarding, no rebound                Tenderness, ostomy pink and viable with greenish stool in bag, no gross blood   Extremities:   Moves all extremities well, bilateral lower ext edema, no cyanosis, no   redness                    Results Review:   I reviewed the patient's new clinical results.      Assessment/Plan     Active Problems:    Chest pain    Hypertensive emergency    CHF with left ventricular diastolic dysfunction, NYHA class 1    Elevated d-dimer    CKD (chronic kidney disease) stage 3, GFR 30-59 ml/min    Anemia       I would transfuse prn currently.  Consider further gi work up.  Not clear on source of gi bleeding currently and at significant risk for any surgical procedure/general anesthesia at this point.        I discussed the patients findings and my recommendations with patient    Alfred Cary MD  03/06/17  10:24 AM

## 2017-03-06 NOTE — MEDICAL STUDENT
"[facility]  [address] [place] [address]. [address]   - [phone]  F - [phone]     Progress Note          PATIENT  DEMOGRAPHICS   PATIENT NAME: Eva Troncoso                      PHYSICIAN: Khari Paz, Medical Student  : 1960  MRN: [id number]   LOS: 2 days    Patient Care Team:  Mary Spangler MD as PCP - General (Family Medicine)  Subjective   SUBJECTIVE   Patient states she may be feeling a little better but still has  has a stabbing pain that is substernal and does not radiate. She does not feel dizzy or light headed. She has had wet diapers. She has Chronic copd but her trouble breathing is different than normal with more wheezing which gets better when she is at rest. No abdominal pain. She reports having \"flashing black whorels like  lights\" on the periphery of her vision that is intermittent. NO hematuria.     Reviewing her history she reports intermittent bouts of heavy bright red blood in her ostemy bag over the past year and most recently two weeks ago there was about a \"cup\" worth in her bag. She has this bag due to a sacral injury 3 years ago. She is also incontinent and reports this occurring around the time she broke her lower back 5 years ago and at that time surgery was recommended but she could not affford this.      REVIEW OF SYSTEMS    Review of Systems   Constitutional: Positive for fatigue. Negative for diaphoresis and fever.   HENT: Negative for facial swelling, sore throat and trouble swallowing.    Eyes: Positive for visual disturbance. Negative for discharge and itching.   Respiratory: Positive for shortness of breath and wheezing. Negative for chest tightness.    Cardiovascular: Positive for chest pain.   Gastrointestinal: Positive for blood in stool and nausea. Negative for abdominal pain.   Genitourinary: Positive for enuresis and hematuria.   Musculoskeletal: Positive for back pain.   Skin: Positive for rash and wound.   Neurological: " "Negative for dizziness, syncope and headaches.   Psychiatric/Behavioral: Negative for agitation, behavioral problems, confusion and hallucinations.       Objective   OBJECTIVE   Vital Signs  Temp:  [97.7 °F (36.5 °C)-98 °F (36.7 °C)] 98 °F (36.7 °C)  Heart Rate:  [63-77] 69  Resp:  [14-22] 16  BP: ()/(54-75) 113/56    Flowsheet Rows         First Filed Value    Admission Height  65\" (165.1 cm) Documented at 03/04/2017 0440    Admission Weight  220 lb (99.8 kg) Documented at 03/04/2017 0440           I/O last 3 completed shifts:  In: 1514 [I.V.:1514]  Out: 75 [Stool:75]    PHYSICAL EXAM    Physical Exam   Constitutional: She is oriented to person, place, and time. No distress.   3/6 systolic ejection murmur. Only heard with the bell at the R. Sternal border no heart sounds auscultated with the diaphragm    R. Carotid bruit heard.     Distal pulses distant but present (dorsalis pedis)     HENT:   Head: Normocephalic and atraumatic.   Eyes: EOM are normal. Pupils are equal, round, and reactive to light. No scleral icterus.   Conjunctiva pale    Slight exopthalmos of right eye   Cardiovascular: Normal rate and regular rhythm.  Exam reveals friction rub. Exam reveals no gallop.    Murmur heard.  Pulmonary/Chest:   Decreased effort. Possible lowly audible crackles in left lung base.   Abdominal: Soft. She exhibits no distension. There is tenderness (LUQ tenderness to deep palpation).   Dark possible blood in ostemy  At around 10mL   Musculoskeletal: She exhibits edema. Tenderness: 2+ pitting edema in bilateral lower legs.   Neurological: She is alert and oriented to person, place, and time. A cranial nerve deficit (decreased hearing on the right ear) is present.   Skin: Skin is warm. Rash noted. She is not diaphoretic.   Psychiatric: She has a normal mood and affect. Judgment and thought content normal.       RESULTS   Results Review:      Results from last 7 days  Lab Units 03/06/17  0409 03/05/17  0240 " 03/04/17  1839 03/04/17  0630   SODIUM mmol/L 135* 134*  --  140   SODIUM, ARTERIAL mmol/L  --   --  133.6*  --    POTASSIUM mmol/L 5.0 4.0  --  3.6   CHLORIDE mmol/L 109 107  --  109   TOTAL CO2 mmol/L 20.0* 23.0  --  22.0   BUN mg/dL 40* 29*  --  25*   CREATININE mg/dL 2.97* 2.15*  --  1.47*   CALCIUM mg/dL 7.7* 7.8*  --  8.2*   BILIRUBIN mg/dL 0.2 0.4  --  0.6   ALK PHOS U/L 106 100  --  118   ALT (SGPT) U/L 25 24  --  32   AST (SGOT) U/L 14 21  --  30   GLUCOSE mg/dL 160* 206*  --  118*   GLUCOSE, ARTERIAL mmol/L  --   --  99  --        Estimated Creatinine Clearance: 25.1 mL/min (by C-G formula based on Cr of 2.97).      Results from last 7 days  Lab Units 03/05/17  0240   MAGNESIUM mg/dL 1.8               Results from last 7 days  Lab Units 03/06/17  0409 03/05/17  2353 03/05/17  1935 03/05/17  1532 03/05/17  1145 03/05/17  0240 03/04/17  0629 02/27/17  1549   WBC 10*3/mm3 8.28  --   --   --  5.35 5.01 6.24 7.82   HEMOGLOBIN g/dL 7.7* 7.8* 8.1* 7.9* 7.8* 7.9* 9.7* 9.1*   PLATELETS 10*3/mm3 153  --   --   --  123* 113* 130* 142*         Results from last 7 days  Lab Units 03/04/17  1433 03/04/17  1128   INR  1.27* 1.22*         Imaging Results (last 24 hours)     Procedure Component Value Units Date/Time    US Guided Vascular Access [[id number]]      Updated:  03/05/17 0936    IR Insert Midline Without Port Pump 5 Plus [[id number]]      Updated:  03/05/17 0948           MEDICATIONS      Current Facility-Administered Medications:   •  albuterol (PROVENTIL) nebulizer solution 0.083% 2.5 mg/3mL, 2.5 mg, Nebulization, Q6H PRN, Evan Garcia MD  •  [DISCONTINUED] aspirin chewable tablet 324 mg, 324 mg, Oral, Once **AND** aspirin EC tablet 81 mg, 81 mg, Oral, Daily, Maci Sullivan MD, 81 mg at 03/05/17 0923  •  atorvastatin (LIPITOR) tablet 80 mg, 80 mg, Oral, Nightly, Maci Sullivan MD, 80 mg at 03/05/17 2035  •  budesonide-formoterol (SYMBICORT) 160-4.5 MCG/ACT inhaler 2 puff, 2 puff, Inhalation, BID - RT,  Evan Garcia MD, 2 puff at 03/05/17 1916  •  busPIRone (BUSPAR) tablet 15 mg, 15 mg, Oral, Q12H, Maci Sullivan MD, 15 mg at 03/05/17 2036  •  CloNIDine (CATAPRES) tablet 0.2 mg, 0.2 mg, Oral, Q12H, Evan Garcia MD, 0.2 mg at 03/05/17 2035  •  docusate sodium (COLACE) capsule 100 mg, 100 mg, Oral, BID, Maci Sullivan MD, 100 mg at 03/05/17 0843  •  doxycycline (VIBRAMYICN) tablet 100 mg, 100 mg, Oral, Q12H, Maci Sullivan MD, 100 mg at 03/05/17 2035  •  furosemide (LASIX) tablet 20 mg, 20 mg, Oral, Daily PRN, Maci Sullivan MD  •  hydrALAZINE (APRESOLINE) injection 10 mg, 10 mg, Intravenous, Q6H PRN, Brittanie Bell MD  •  HYDROcodone-acetaminophen (NORCO) 5-325 MG per tablet 1 tablet, 1 tablet, Oral, Q8H PRN, Maci Sullivan MD, 1 tablet at 03/05/17 1020  •  ipratropium-albuterol (DUO-NEB) nebulizer solution 3 mL, 3 mL, Nebulization, 4x Daily - RT, Evan Garcia MD, 3 mL at 03/05/17 1916  •  labetalol (NORMODYNE) tablet 200 mg, 200 mg, Oral, BID, Maci Sullivan MD, 200 mg at 03/05/17 1720  •  methylPREDNISolone sodium succinate (SOLU-Medrol) injection 60 mg, 60 mg, Intravenous, Daily, Evan Garcia MD, 60 mg at 03/05/17 0842  •  mirtazapine (REMERON) tablet 15 mg, 15 mg, Oral, Daily, Maci Sullivan MD, 15 mg at 03/04/17 1854  •  Morphine sulfate (PF) injection 1 mg, 1 mg, Intravenous, Q4H PRN, Maci Sullivan MD, 1 mg at 03/06/17 0537  •  nicotine (NICODERM CQ) 21 MG/24HR patch 1 patch, 1 patch, Transdermal, Q24H, Maci Sullivan MD, 1 patch at 03/05/17 1518  •  nitroglycerin infusion 200 mcg/mL, 5-200 mcg/min, Intravenous, Titrated, Scott Jin MD, Stopped at 03/04/17 2100  •  ondansetron (ZOFRAN) injection 4 mg, 4 mg, Intravenous, Q6H PRN, Maci Sullivan MD  •  pantoprazole (PROTONIX) injection 40 mg, 40 mg, Intravenous, Q AM, Maci Sullivan MD, 40 mg at 03/06/17 0539  •  QUEtiapine (SEROquel) tablet 200 mg, 200 mg, Oral, Nightly, Maci Ceja  MD Laurie, 200 mg at 03/05/17 2035  •  sodium chloride 0.9 % flush 1-10 mL, 1-10 mL, Intravenous, PRN, Maci Sullivan MD, 1 mL at 03/06/17 0510  •  sodium chloride 0.9 % flush 1-10 mL, 1-10 mL, Intravenous, PRN, Maci Sullivan MD, 10 mL at 03/04/17 1750  •  sodium chloride 0.9 % flush 10 mL, 10 mL, Intravenous, PRN, Scott Jin MD  •  sodium chloride 0.9 % infusion, 75 mL/hr, Intravenous, Continuous, Brittanie Bell MD, Last Rate: 75 mL/hr at 03/06/17 0029, 75 mL/hr at 03/06/17 0029  •  traZODone (DESYREL) tablet 150 mg, 150 mg, Oral, Nightly, Maci Sullivan MD, 150 mg at 03/05/17 2036    Assessment/Plan   ASSESSMENT / PLAN    Active Problems:    Chest pain    Hypertensive emergency    CHF with left ventricular diastolic dysfunction, NYHA class 1    Elevated d-dimer    CKD (chronic kidney disease) stage 3, GFR 30-59 ml/min    Anemia    1.acute kidney injury. Patient has a history of recurrent acute kidney injury in the past. Though she recovered well and her creatinine now is less than 1 as a baseline. She has now rising creatinine up to 2.97 BUN up to 40 now.. It could be related to drug-induced allergic interstitial nephritis plus minus recent Bactrim use. She has 2+ pitting edema of the lower extremity bilaterally. Lytes are stable. Also she has had marked drop in her blood pressure and may have some hypoperfusion injury. Recent story of chronic intermittent bleeding into ostomy with recent cellulitis      At the present moment she is not a good candidate for contrast exposure due to rising creatinine. Ct gentle IV fluid at 75 cc/h. I will stop the Bactrim along with Aldactone and lisinopril for now until renal function is more stabilized. Continue with doxycycline. i will add zyvox for mrsa coverage. Jose Luis stain was negative. Urine sodium 12. Will place hidalgo cath and have better i&o monitoring     2.hypertension poorly controlled and also labile. It is much improved now but did drop  significantly. Her blood pressure is much stable and i will stop clonidine.      3.chest pain with associated shortness of air. She has elevated d-dimer and VQ scan showed low probability. Patient is off of heparin drip due to drop in hemoglobin.     4.anemia of chronic disease with superimposed GI loss. 2 units typed and crossed and ready to be infused however Hgb holding at 7.7 with no dizziness, lightheadedness or tachycardia.      5.chronic pain           Khari Paz, Medical Student  03/06/17  7:15 AM

## 2017-03-07 PROBLEM — I16.1 HYPERTENSIVE EMERGENCY: Status: RESOLVED | Noted: 2017-01-01 | Resolved: 2017-01-01

## 2017-03-07 PROBLEM — D50.0 ANEMIA DUE TO GASTROINTESTINAL BLOOD LOSS: Status: ACTIVE | Noted: 2017-01-01

## 2017-03-07 NOTE — PLAN OF CARE
Problem: Patient Care Overview (Adult)  Goal: Plan of Care Review  Outcome: Ongoing (interventions implemented as appropriate)    03/07/17 1732   Coping/Psychosocial Response Interventions   Plan Of Care Reviewed With patient   Patient Care Overview   Progress no change   Outcome Evaluation   Outcome Summary/Follow up Plan Patient desatted to 84% while walking with PT, but recovered with rest. Patient requests pain medication every couple of hours.       Goal: Adult Individualization and Mutuality  Outcome: Ongoing (interventions implemented as appropriate)  Goal: Discharge Needs Assessment  Outcome: Ongoing (interventions implemented as appropriate)    Problem: Pressure Ulcer Risk (Chaim Scale) (Adult,Obstetrics,Pediatric)  Goal: Identify Related Risk Factors and Signs and Symptoms  Outcome: Outcome(s) achieved Date Met:  03/07/17  Goal: Skin Integrity  Outcome: Ongoing (interventions implemented as appropriate)    Problem: Respiratory Insufficiency (Adult)  Goal: Identify Related Risk Factors and Signs and Symptoms  Outcome: Outcome(s) achieved Date Met:  03/07/17  Goal: Acid/Base Balance  Outcome: Ongoing (interventions implemented as appropriate)  Goal: Effective Ventilation  Outcome: Ongoing (interventions implemented as appropriate)    Problem: Gastrointestinal Bleeding (Adult)  Goal: Signs and Symptoms of Listed Potential Problems Will be Absent or Manageable (Gastrointestinal Bleeding)  Outcome: Ongoing (interventions implemented as appropriate)    Problem: Renal Failure/Kidney Injury, Acute (Adult)  Goal: Signs and Symptoms of Listed Potential Problems Will be Absent or Manageable (Renal Failure/Kidney Injury, Acute)  Outcome: Ongoing (interventions implemented as appropriate)

## 2017-03-07 NOTE — PROGRESS NOTES
.  FAMILY MEDICINE DAILY PROGRESS NOTE  NAME: Eva Troncoso  : 1960  MRN: [id number]     LOS: 3 days     PROVIDER OF SERVICE: Mary Spangler MD    Chief Complaint:   Chief Complaint   Patient presents with   • Chest Pain       Subjective:     Interval History:  History taken from: patient    Patient Complaints: mid sternal chest pain   Patient Denies: radiating pain, shortness of breath    Patient continues to complain of mid sternal chest pain that is constant and stabbing. Pain upon palpation of the chest wall.     Review of Systems:   Review of Systems   Constitutional: Negative.  Negative for chills, diaphoresis, fatigue and fever.   HENT: Negative.  Negative for congestion, ear pain, rhinorrhea, sinus pressure, sneezing and sore throat.    Eyes: Negative.  Negative for pain and visual disturbance.   Respiratory: Negative.  Negative for cough and shortness of breath.    Cardiovascular: Positive for chest pain ( mid sternal; non radiating). Negative for palpitations and leg swelling.   Gastrointestinal: Negative for abdominal pain, diarrhea, nausea and vomiting.        Colostomy bag in [facility] in [facility].   Endocrine: Negative.    Genitourinary: Negative for difficulty urinating, dysuria and hematuria.   Musculoskeletal: Negative for arthralgias.   Skin: Positive for rash ( cellulitis over anterior lower legs).   Allergic/Immunologic: Negative.    Neurological: Negative for dizziness, weakness and headaches.   Psychiatric/Behavioral: Negative for confusion, hallucinations and sleep disturbance.       Objective:     Vital Signs  Temp:  [96.5 °F (35.8 °C)-97.4 °F (36.3 °C)] 96.9 °F (36.1 °C)  Heart Rate:  [59-70] 67  Resp:  [15-20] 20  BP: (106-135)/(53-75) 113/57       Intake/Output Summary (Last 24 hours) at 17 0812  Last data filed at 17 0636   Gross per 24 hour   Intake   1650 ml   Output    700 ml   Net    950 ml       Physical Exam  Physical Exam   Constitutional: She is oriented to person,  place, and time. She appears well-developed and well-nourished. No distress.   HENT:   Head: Normocephalic and atraumatic.   Nose: Nose normal.   Mouth/Throat: Oropharynx is clear and moist.   Neck: Normal range of motion. Neck supple. No tracheal deviation present.   Cardiovascular: Normal rate, regular rhythm, normal heart sounds and intact distal pulses.    Pulmonary/Chest: Effort normal. She has no rales ( mild rales appreciated at lung bases bilaterally).   Abdominal: Soft. Bowel sounds are normal. There is no tenderness.   Musculoskeletal: Normal range of motion. She exhibits edema ( +1 pitting edema of the lower extremities).   Lymphadenopathy:     She has no cervical adenopathy.   Neurological: She is alert and oriented to person, place, and time.   Skin: Skin is warm and dry. Rash ( cellulitis of anterior lower extremities) noted. She is not diaphoretic.   Getting better on B/L extremities     Psychiatric: She has a normal mood and affect. Her behavior is normal. Judgment and thought content normal.   Nursing note and vitals reviewed.    F: cardiac diet  A: morphine PRN  S: none  T: heparin infusion  H: 30 degrees  U: protonix       Medication Review    Current Facility-Administered Medications:   •  albuterol (PROVENTIL) nebulizer solution 0.083% 2.5 mg/3mL, 2.5 mg, Nebulization, Q6H PRN, Evan Garcia MD  •  [DISCONTINUED] aspirin chewable tablet 324 mg, 324 mg, Oral, Once **AND** aspirin EC tablet 81 mg, 81 mg, Oral, Daily, Maci Sullivan MD, 81 mg at 17 0930  •  atorvastatin (LIPITOR) tablet 80 mg, 80 mg, Oral, Nightly, Maci Sullivan MD, 80 mg at 17  •  budesonide-formoterol (SYMBICORT) 160-4.5 MCG/ACT inhaler 2 puff, 2 puff, Inhalation, BID - RT, Evan Garcia MD, 2 puff at 17 1950  •  busPIRone (BUSPAR) tablet 15 mg, 15 mg, Oral, Q12H, Maci Sullivan MD, 15 mg at 17  •  docusate sodium (COLACE) capsule 100 mg, 100 mg, Oral, BID, [facility]  MD Laurie, 100 mg at 03/06/17 0932  •  doxycycline (VIBRAMYICN) tablet 100 mg, 100 mg, Oral, Q12H, Maci Sullivan MD, 100 mg at 03/06/17 2130  •  furosemide (LASIX) tablet 20 mg, 20 mg, Oral, Daily PRN, Maci Sullivan MD  •  hydrALAZINE (APRESOLINE) injection 10 mg, 10 mg, Intravenous, Q6H PRN, Brittanie Bell MD  •  HYDROcodone-acetaminophen (NORCO) 5-325 MG per tablet 1 tablet, 1 tablet, Oral, Q8H PRN, Maci Sullivan MD, 1 tablet at 03/06/17 2130  •  ipratropium-albuterol (DUO-NEB) nebulizer solution 3 mL, 3 mL, Nebulization, 4x Daily - RT, Evan Garcia MD, 3 mL at 03/06/17 1943  •  labetalol (NORMODYNE) tablet 200 mg, 200 mg, Oral, BID, Maci Sullivan MD, 200 mg at 03/06/17 2130  •  linezolid (ZYVOX) tablet 600 mg, 600 mg, Oral, Q12H, Brittanie Bell MD, 600 mg at 03/06/17 2131  •  methylPREDNISolone sodium succinate (SOLU-Medrol) injection 60 mg, 60 mg, Intravenous, Daily, Evan Garcia MD, 60 mg at 03/06/17 0930  •  mirtazapine (REMERON) tablet 15 mg, 15 mg, Oral, Daily, Maci Sullivan MD, 15 mg at 03/06/17 2131  •  Morphine sulfate (PF) injection 1 mg, 1 mg, Intravenous, Q4H PRN, Maci Sullivan MD, 1 mg at 03/07/17 0530  •  nicotine (NICODERM CQ) 21 MG/24HR patch 1 patch, 1 patch, Transdermal, Q24H, Maci Sullivan MD, 1 patch at 03/05/17 1518  •  ondansetron (ZOFRAN) injection 4 mg, 4 mg, Intravenous, Q6H PRN, Maci Sullivan MD  •  pantoprazole (PROTONIX) injection 40 mg, 40 mg, Intravenous, Q AM, Maci Sullivan MD, 40 mg at 03/07/17 0530  •  QUEtiapine (SEROquel) tablet 200 mg, 200 mg, Oral, Nightly, Maci Sullivan MD, 200 mg at 03/06/17 2131  •  sodium chloride 0.9 % flush 1-10 mL, 1-10 mL, Intravenous, PRN, Maci Sullivan MD, 10 mL at 03/04/17 1750  •  sodium chloride 0.9 % infusion, 75 mL/hr, Intravenous, Continuous, Brittanie Bell MD, Last Rate: 75 mL/hr at 03/07/17 0631, 75 mL/hr at 03/07/17 0631  •  traZODone (DESYREL) tablet 150 mg, 150 mg,  Oral, Nightly, Maci Brenna Sullivan MD, 150 mg at 03/06/17 2131     Diagnostic Data    Lab Results (last 24 hours)     Procedure Component Value Units Date/Time    CBC & Differential [[id number]] Collected:  03/07/17 0514    Specimen:  Blood Updated:  03/07/17 0605    Narrative:       The following orders were created for panel order CBC & Differential.  Procedure                               Abnormality         Status                     ---------                               -----------         ------                     CBC Auto Differential[[id number]]         Abnormal            Final result                 Please view results for these tests on the individual orders.    CBC Auto Differential [[id number]]  (Abnormal) Collected:  03/07/17 0514    Specimen:  Blood Updated:  03/07/17 0605     WBC 7.48 10*3/mm3      RBC 2.71 (L) 10*6/mm3      Hemoglobin 8.1 (L) g/dL      Hematocrit 25.4 (L) %      MCV 93.7 fL      MCH 29.9 pg      MCHC 31.9 g/dL      RDW 14.5 %      RDW-SD 49.4 (H) fl      MPV 10.2 fL      Platelets 220 10*3/mm3      Neutrophil % 72.4 %      Lymphocyte % 18.4 %      Monocyte % 8.4 %      Eosinophil % 0.0 %      Basophil % 0.0 %      Immature Grans % 0.8 (H) %      Neutrophils, Absolute 5.41 10*3/mm3      Lymphocytes, Absolute 1.38 10*3/mm3      Monocytes, Absolute 0.63 10*3/mm3      Eosinophils, Absolute 0.00 10*3/mm3      Basophils, Absolute 0.00 10*3/mm3      Immature Grans, Absolute 0.06 (H) 10*3/mm3     Comprehensive Metabolic Panel [[id number]]  (Abnormal) Collected:  03/07/17 0514    Specimen:  Blood Updated:  03/07/17 0610     Glucose 98 mg/dL      BUN 52 (H) mg/dL      Creatinine 2.85 (H) mg/dL      Sodium 137 mmol/L      Potassium 5.4 (H) mmol/L      Chloride 112 (H) mmol/L      CO2 20.0 (L) mmol/L      Calcium 7.9 (L) mg/dL      Total Protein 5.5 (L) g/dL      Albumin 2.60 (L) g/dL      ALT (SGPT) 23 U/L      AST (SGOT) 16 U/L      Alkaline Phosphatase 96 U/L      Total Bilirubin 0.2 mg/dL       eGFR Non African Amer 17 (L) mL/min/1.73      Globulin 2.9 gm/dL      A/G Ratio 0.9 (L) g/dL      BUN/Creatinine Ratio 18.2      Anion Gap 5.0 mmol/L             I reviewed the patient's new clinical results.  I reviewed the patient's other test results and agree with the interpretation    Assessment/Plan:     Active Hospital Problems (** Indicates Principal Problem)    Diagnosis Date Noted   • CKD (chronic kidney disease) stage 3, GFR 30-59 ml/min [N18.3] 03/05/2017     -Creatinine elevation from 1.47 at admission to 2.8.  -Nephrology consulted, will follow Dr. Bell's recommendations  -Follow with daily CMP. Advised to refrain from sodas.     • Anemia due to gastrointestinal blood loss [D50.0] 03/05/2017     -Follow H&H; Hb 8.1  Dr. Cary consulted for GI bleed, will follow his recommendations  -Risks and benefits of transfusion discussed with patient  -Type, cross, & 2 units PRBCs ordered     • Chest pain [R07.9] 03/04/2017     -possibility of costochondritis, will give robaxin.  -Consulted cardiology, Dr. Ambrocio on board. Recommended medical and symptomatic management for now.         • CHF with left ventricular diastolic dysfunction, NYHA class 1 [I50.30] 03/04/2017     -BNP at the time of admission: 82843   -Monitor patient with strict I/Os and daily weight.       • Elevated d-dimer [R79.1] 03/04/2017     -Ddimer: 3896  -V/Q scan: low probability of PE  -Venous doppler: no evidence of lower extremity DVT  -Encourage incentive spirometry        Resolved Hospital Problems    Diagnosis Date Noted Date Resolved   • Hypertensive emergency [I16.1] 03/04/2017 03/07/2017     -Normotensive  -Aspirin, clonidine, and atorvastatin ordered in ICU  -We will continue all her home antihypertensive medications: labetalol, lisinopril, spironolactone, and lasix  -PRN hydralazine for SBP > 160.   -We will continue to follow her kidney function with daily CMP.   -Lipid panel WNL; moderate intensity statin therapy based on  ASCVD risk          Transfer the patient to regular floor today.   Will educated the patient on smoking cessation. Will continue to monitor patient's course and adjust our care accordingly.     DVT prophylaxis: SCDs/TEDs  Code status is Full Code    Plan for disposition:Where: home and home health and When:  clinically stable      Time: 30 minutes           This document has been electronically signed by Mary Spangler MD on March 7, 2017 8:12 AM

## 2017-03-07 NOTE — MEDICAL STUDENT
"[facility]  [address] [place] [address]. [address]  T - [phone]  F - [phone]     Progress Note          PATIENT  DEMOGRAPHICS   PATIENT NAME: Eva Troncoso                      PHYSICIAN: Khari Paz, Medical Student  : 1960  MRN: [id number]   LOS: 3 days    Patient Care Team:  Mary Spangler MD as PCP - General (Family Medicine)  Subjective   SUBJECTIVE   Patient is uncomfortable. Her chest pain is better but breathing is worse with more wheezing. Pain in her legs that is not worse than when she came in.     REVIEW OF SYSTEMS    Review of Systems   Respiratory: Positive for shortness of breath and wheezing.    Cardiovascular: Positive for chest pain and leg swelling. Negative for palpitations.        Chest pain present but better     Gastrointestinal: Positive for abdominal pain.       Objective   OBJECTIVE   Vital Signs  Temp:  [96.5 °F (35.8 °C)-97.4 °F (36.3 °C)] 96.6 °F (35.9 °C)  Heart Rate:  [59-70] 66  Resp:  [15-18] 18  BP: (106-135)/(53-75) 106/53    Flowsheet Rows         First Filed Value    Admission Height  65\" (165.1 cm) Documented at 2017 0440    Admission Weight  220 lb (99.8 kg) Documented at 2017 0440           I/O last 3 completed shifts:  In: 2628 [P.O.:750; I.V.:1878]  Out: 725 [Urine:600; Stool:125]    PHYSICAL EXAM    Physical Exam   Constitutional: She is oriented to person, place, and time.   Harsh 4/6  holosystolic murmur heard at the left and right sternal border with the diaphragm.     Swelling in the distal legs bilaterally with right worse than left and pitting edema     HENT:   Head: Normocephalic and atraumatic.   Eyes: Conjunctivae and EOM are normal. Pupils are equal, round, and reactive to light.   Cardiovascular: Normal rate and regular rhythm.    Murmur heard.  Pulmonary/Chest: She has wheezes.   Decreased effort bilaterally with coarse breath sounds and come crackles in the upper fields.   Abdominal: Soft. "   Neurological: She is alert and oriented to person, place, and time.   Psychiatric: She has a normal mood and affect. Her behavior is normal. Judgment normal.   Nursing note and vitals reviewed.      RESULTS   Results Review:      Results from last 7 days  Lab Units 03/07/17  0514 03/06/17  0409 03/05/17  0240   SODIUM mmol/L 137 135* 134*   POTASSIUM mmol/L 5.4* 5.0 4.0   CHLORIDE mmol/L 112* 109 107   TOTAL CO2 mmol/L 20.0* 20.0* 23.0   BUN mg/dL 52* 40* 29*   CREATININE mg/dL 2.85* 2.97* 2.15*   CALCIUM mg/dL 7.9* 7.7* 7.8*   BILIRUBIN mg/dL 0.2 0.2 0.4   ALK PHOS U/L 96 106 100   ALT (SGPT) U/L 23 25 24   AST (SGOT) U/L 16 14 21   GLUCOSE mg/dL 98 160* 206*       Estimated Creatinine Clearance: 26.8 mL/min (by C-G formula based on Cr of 2.85).      Results from last 7 days  Lab Units 03/05/17  0240   MAGNESIUM mg/dL 1.8               Results from last 7 days  Lab Units 03/07/17  0514 03/06/17  0409 03/05/17  2353 03/05/17  1935 03/05/17  1532 03/05/17  1145 03/05/17  0240 03/04/17  0629   WBC 10*3/mm3 7.48 8.28  --   --   --  5.35 5.01 6.24   HEMOGLOBIN g/dL 8.1* 7.7* 7.8* 8.1* 7.9* 7.8* 7.9* 9.7*   PLATELETS 10*3/mm3 220 153  --   --   --  123* 113* 130*         Results from last 7 days  Lab Units 03/04/17  1433 03/04/17  1128   INR  1.27* 1.22*         Imaging Results (last 24 hours)     Procedure Component Value Units Date/Time    US Guided Vascular Access [[id number]] Resulted:  03/06/17 0902     Updated:  03/06/17 0902    IR Insert Midline Without Port Pump 5 Plus [[id number]] Resulted:  03/06/17 0906     Updated:  03/06/17 0906           MEDICATIONS      Current Facility-Administered Medications:   •  albuterol (PROVENTIL) nebulizer solution 0.083% 2.5 mg/3mL, 2.5 mg, Nebulization, Q6H PRN, Evan Garcia MD  •  [DISCONTINUED] aspirin chewable tablet 324 mg, 324 mg, Oral, Once **AND** aspirin EC tablet 81 mg, 81 mg, Oral, Daily, Maci Brenna Sullivan MD, 81 mg at 03/06/17 0930  •  atorvastatin (LIPITOR) tablet  80 mg, 80 mg, Oral, Nightly, Maci Sullivan MD, 80 mg at 03/06/17 2131  •  budesonide-formoterol (SYMBICORT) 160-4.5 MCG/ACT inhaler 2 puff, 2 puff, Inhalation, BID - RT, Evan Garcia MD, 2 puff at 03/06/17 1950  •  busPIRone (BUSPAR) tablet 15 mg, 15 mg, Oral, Q12H, Maci Sullivan MD, 15 mg at 03/06/17 2131  •  docusate sodium (COLACE) capsule 100 mg, 100 mg, Oral, BID, Maci Sullivan MD, 100 mg at 03/06/17 0932  •  doxycycline (VIBRAMYICN) tablet 100 mg, 100 mg, Oral, Q12H, Maci Sullivan MD, 100 mg at 03/06/17 2130  •  furosemide (LASIX) tablet 20 mg, 20 mg, Oral, Daily PRN, Maci Sullivan MD  •  hydrALAZINE (APRESOLINE) injection 10 mg, 10 mg, Intravenous, Q6H PRN, Brittanie Bell MD  •  HYDROcodone-acetaminophen (NORCO) 5-325 MG per tablet 1 tablet, 1 tablet, Oral, Q8H PRN, Maci Sullivan MD, 1 tablet at 03/06/17 2130  •  ipratropium-albuterol (DUO-NEB) nebulizer solution 3 mL, 3 mL, Nebulization, 4x Daily - RT, Evan Garcia MD, 3 mL at 03/06/17 1943  •  labetalol (NORMODYNE) tablet 200 mg, 200 mg, Oral, BID, Maci Sullivan MD, 200 mg at 03/06/17 2130  •  linezolid (ZYVOX) tablet 600 mg, 600 mg, Oral, Q12H, Brittanie Bell MD, 600 mg at 03/06/17 2131  •  methylPREDNISolone sodium succinate (SOLU-Medrol) injection 60 mg, 60 mg, Intravenous, Daily, Evan Garcia MD, 60 mg at 03/06/17 0930  •  mirtazapine (REMERON) tablet 15 mg, 15 mg, Oral, Daily, Maci Sullivan MD, 15 mg at 03/06/17 2131  •  Morphine sulfate (PF) injection 1 mg, 1 mg, Intravenous, Q4H PRN, Maci Sullivan MD, 1 mg at 03/07/17 0530  •  nicotine (NICODERM CQ) 21 MG/24HR patch 1 patch, 1 patch, Transdermal, Q24H, Maci Sullivan MD, 1 patch at 03/05/17 1518  •  ondansetron (ZOFRAN) injection 4 mg, 4 mg, Intravenous, Q6H PRN, Maci Sullivan MD  •  pantoprazole (PROTONIX) injection 40 mg, 40 mg, Intravenous, Q AM, Maci Sullivan MD, 40 mg at 03/07/17 0530  •  QUEtiapine (SEROquel)  tablet 200 mg, 200 mg, Oral, Nightly, Mcai Sullivan MD, 200 mg at 03/06/17 2131  •  sodium chloride 0.9 % flush 1-10 mL, 1-10 mL, Intravenous, PRN, Maci Sullivan MD, 10 mL at 03/04/17 1750  •  sodium chloride 0.9 % infusion, 75 mL/hr, Intravenous, Continuous, Brittanie Bell MD, Last Rate: 75 mL/hr at 03/07/17 0631, 75 mL/hr at 03/07/17 0631  •  traZODone (DESYREL) tablet 150 mg, 150 mg, Oral, Nightly, Maci Sullivan MD, 150 mg at 03/06/17 2131    Assessment/Plan   ASSESSMENT / PLAN    Active Problems:    Chest pain    Hypertensive emergency    CHF with left ventricular diastolic dysfunction, NYHA class 1    Elevated d-dimer    CKD (chronic kidney disease) stage 3, GFR 30-59 ml/min    Anemia  1.acute kidney injury. Patient has a history of recurrent acute kidney injury in the past. Though she recovered well and her creatinine now is less than 1 as a baseline.Her Cr has dropped to 2.85 from 2.97 but her BUN continues to rise to 52 up from 40 yesterday. It could be related to drug-induced allergic interstitial nephritis plus minus recent Bactrim use. She has 2+ pitting edema of the lower extremity bilaterally. She has elevated k and metabolic acidosis and will try bicarb amp x 1 and switch to bicarbonate drip. Recent story of chronic intermittent bleeding into ostomy with recent cellulitis      At the present moment she is not a good candidate for contrast exposure to do recentlty rising Cr..She seems to be more volume overloaded. May need dialysis if increasing cr or bun. Continue with doxycycline.zyvox added yesterday for mrsa coverage. Jose Luis stain was negative. Urine sodium 12. Albumin x 1.      2.hypertension poorly controlled and also labile. It is much improved now but did drop significantly. Her blood pressure is stable on labetalol only     3.chest pain with associated shortness of air. She has elevated d-dimer and VQ scan showed low probability. Patient is off of heparin drip due to drop in  hemoglobin.      4.anemia of chronic disease with superimposed GI loss. 2 units typed and crossed and ready to be infused however Hgb hat 8.1 no dizziness, lightheadedness or tachycardia.       5.chronic pain         Khari Paz, Medical Student  03/07/17  7:31 AM

## 2017-03-07 NOTE — PROGRESS NOTES
[address] [address]  T - [phone]         PROGRESS NOTE         SUBJECTIVE:   Patient Care Team:  Mary Spangler MD as PCP - General (Family Medicine)    Chief Complaint:     Chief Complaint   Patient presents with   • Chest Pain       Subjective     Patient is 57 y.o. female presents with chest pain.She is doing better..      ROS/HISTORY/ CURRENT MEDICATIONS/OBJECTIVE/VS/PE:   Review of Systems:   Review of Systems    History:     Past Medical History   Diagnosis Date   • Anxiety state    • Chronic depression    • Chronic pain    • Compression fracture of lumbar spine, non-traumatic    • Depressive disorder    • Essential hypertension    • Obesity    • Pericardial effusion      Past Surgical History   Procedure Laterality Date   • Cardiac catheterization  05/13/2014     09957 (1)   -  Successful PCI to the proximal LAD with a V stent reducing 99% stenosis to 0%   • Appendectomy  12/10/2012     Laparoscopic (1) - Acute suppurative appendicitis   • Shoulder surgery  05/14/2015     (1) - Communited 3 part fractured humerus, proximal left shoulder. Open reduction and internal fixation   • Tracheostomy  05/30/2014     (1) - Percutaneous endoscopic gastrostomy tube placement.;     • Colonoscopy N/A 2/13/2017     Procedure: COLONOSCOPY;  Surgeon: Nader Osei DO;  Location: [facility] ENDOSCOPY;  Service:      History reviewed. No pertinent family history.  Social History   Substance Use Topics   • Smoking status: Current Every Day Smoker   • Smokeless tobacco: Never Used      Comment: Smokes 1 ppd for 40 years   • Alcohol use No     Prescriptions Prior to Admission   Medication Sig Dispense Refill Last Dose   • Aspirin-Salicylamide-Caffeine (BC HEADACHE POWDER PO) Take  by mouth As Needed (for pain).   Taking   • azithromycin (ZITHROMAX) 500 MG tablet Take 1 tablet by mouth Daily. 2 tablet 0 Taking   • busPIRone (BUSPAR) 15 MG tablet Take  by mouth. Take 1 tablet 2  times per day.   Taking   • cefuroxime (CEFTIN) 500 MG tablet Take 1 tablet by mouth 2 (Two) Times a Day. 6 tablet 0 Taking   • docusate sodium 100 MG capsule Take 100 mg by mouth 2 (Two) Times a Day. 60 capsule 0 Taking   • doxycycline (DORYX) 100 MG enteric coated tablet Take 1 tablet by mouth 2 (Two) Times a Day. 42 tablet 0 Taking   • doxycycline (MONODOX) 100 MG capsule       • furosemide (LASIX) 20 MG tablet Take 20 mg by mouth Daily As Needed (for fluids).   Taking   • HYDROcodone-acetaminophen (NORCO) 5-325 MG per tablet Take 1 tablet by mouth Every 8 (Eight) Hours As Needed for severe pain (7-10). 20 tablet 0 Taking   • labetalol (NORMODYNE) 200 MG tablet Take 1 tablet by mouth 2 (Two) Times a Day for 30 days. 60 tablet 3    • lisinopril (PRINIVIL,ZESTRIL) 40 MG tablet Take 1 tablet by mouth Daily. 30 tablet 3    • MethylPREDNISolone (MEDROL, SANA,) 4 MG tablet Take as directed on package instructions. 21 tablet 0 Taking   • mirtazapine (REMERON) 15 MG tablet Take 15 mg by mouth Daily.   Taking   • potassium chloride (KLOR-CON) 20 MEQ packet Take 20 mEq by mouth 2 (Two) Times a Day. 10 packet 0 Taking   • QUEtiapine (SEROquel) 200 MG tablet Take 200 mg by mouth Every Night.   Taking   • spironolactone (ALDACTONE) 25 MG tablet Take 1 tablet by mouth Daily. 30 tablet 1    • sulfamethoxazole-trimethoprim (BACTRIM DS,SEPTRA DS) 800-160 MG per tablet Take 1 tablet by mouth 2 (Two) Times a Day. 28 tablet 0    • traZODone (DESYREL) 150 MG tablet Take 150 mg by mouth Every Night.   Taking     Allergies:  Other; Stadol [butorphanol]; and Toradol [ketorolac tromethamine]    Current Medications:     Current Facility-Administered Medications   Medication Dose Route Frequency Provider Last Rate Last Dose   • albuterol (PROVENTIL) nebulizer solution 0.083% 2.5 mg/3mL  2.5 mg Nebulization Q6H PRN Evan Garcia MD       • albuterol (PROVENTIL) nebulizer solution 0.083% 2.5 mg/3mL  10 mg Nebulization Once Mary Spangler MD        • aspirin EC tablet 81 mg  81 mg Oral Daily Maci Sullivan MD   81 mg at 03/07/17 0906   • atorvastatin (LIPITOR) tablet 80 mg  80 mg Oral Nightly Maci Sullivan MD   80 mg at 03/06/17 2131   • budesonide-formoterol (SYMBICORT) 160-4.5 MCG/ACT inhaler 2 puff  2 puff Inhalation BID - RT Evan Garcia MD   2 puff at 03/07/17 0829   • busPIRone (BUSPAR) tablet 15 mg  15 mg Oral Q12H Maci Sullivan MD   15 mg at 03/07/17 0906   • docusate sodium (COLACE) capsule 100 mg  100 mg Oral BID Maci Sullivan MD   100 mg at 03/07/17 0906   • doxycycline (VIBRAMYICN) tablet 100 mg  100 mg Oral Q12H Maci Sullivan MD   100 mg at 03/07/17 0906   • furosemide (LASIX) tablet 20 mg  20 mg Oral Daily PRN Maci Sullivan MD       • hydrALAZINE (APRESOLINE) injection 10 mg  10 mg Intravenous Q6H PRN Brittanie Bell MD       • HYDROcodone-acetaminophen (NORCO) 5-325 MG per tablet 1 tablet  1 tablet Oral Q8H PRN Maci Sullivan MD   1 tablet at 03/07/17 0930   • ipratropium-albuterol (DUO-NEB) nebulizer solution 3 mL  3 mL Nebulization 4x Daily - RT Evan Garcia MD   3 mL at 03/07/17 0829   • labetalol (NORMODYNE) tablet 200 mg  200 mg Oral BID Maci Sullivan MD   200 mg at 03/07/17 0906   • methocarbamol (ROBAXIN) tablet 500 mg  500 mg Oral Q12H PRN Mary Spangler MD       • methylPREDNISolone sodium succinate (SOLU-Medrol) injection 60 mg  60 mg Intravenous Daily Evan Garcia MD   60 mg at 03/07/17 0906   • mirtazapine (REMERON) tablet 15 mg  15 mg Oral Daily Maci Sullivan MD   15 mg at 03/06/17 2131   • Morphine sulfate (PF) injection 1 mg  1 mg Intravenous Q4H PRN Maci Sullivan MD   1 mg at 03/07/17 1127   • nicotine (NICODERM CQ) 21 MG/24HR patch 1 patch  1 patch Transdermal Q24H Maci Sullivan MD   1 patch at 03/05/17 1518   • ondansetron (ZOFRAN) injection 4 mg  4 mg Intravenous Q6H PRN Maci Sullivan MD       • pantoprazole (PROTONIX) injection 40 mg  40 mg  Intravenous Q AM Maci Sullivan MD   40 mg at 03/07/17 0530   • QUEtiapine (SEROquel) tablet 200 mg  200 mg Oral Nightly Maci Sullivan MD   200 mg at 03/06/17 2131   • sodium chloride 0.45 % infusion  75 mL/hr Intravenous Continuous Brittanie Bell MD 75 mL/hr at 03/07/17 1151 75 mL/hr at 03/07/17 1151   • sodium chloride 0.9 % flush 1-10 mL  1-10 mL Intravenous PRN Maci Sullivan MD   10 mL at 03/04/17 1750   • traZODone (DESYREL) tablet 150 mg  150 mg Oral Nightly Maci Sullivan MD   150 mg at 03/06/17 2131       Objective     Physical Exam:   Temp:  [96.6 °F (35.9 °C)-97.4 °F (36.3 °C)] 96.9 °F (36.1 °C)  Heart Rate:  [65-70] 65  Resp:  [16-20] 16  BP: (106-135)/(53-75) 113/57    Physical Exam   Constitutional: She appears well-developed and well-nourished.   HENT:   Head: Normocephalic and atraumatic.   Cardiovascular: Normal rate, regular rhythm and normal heart sounds.  Exam reveals no gallop and no friction rub.    No murmur heard.  Pulmonary/Chest: Effort normal and breath sounds normal. No respiratory distress. She has no wheezes. She has no rales.   Abdominal: Soft. Bowel sounds are normal.   Skin: Skin is warm and dry.   Vitals reviewed.           Results Review:   Lab Results   Component Value Date    GLUCOSE 98 03/07/2017    BUN 52 (H) 03/07/2017    CREATININE 2.85 (H) 03/07/2017    EGFRIFNONA 17 (L) 03/07/2017    BCR 18.2 03/07/2017    CO2 20.0 (L) 03/07/2017    CALCIUM 7.9 (L) 03/07/2017    ALBUMIN 2.60 (L) 03/07/2017    LABIL2 0.9 (L) 03/07/2017    AST 16 03/07/2017    ALT 23 03/07/2017         WBC WBC   Date Value Ref Range Status   03/07/2017 7.48 3.20 - 9.80 10*3/mm3 Final   03/06/2017 8.28 3.20 - 9.80 10*3/mm3 Final   03/05/2017 5.35 3.20 - 9.80 10*3/mm3 Final   03/05/2017 5.01 3.20 - 9.80 10*3/mm3 Final      HGB HEMOGLOBIN   Date Value Ref Range Status   03/07/2017 8.1 (L) 12.0 - 15.5 g/dL Final   03/06/2017 7.7 (L) 12.0 - 15.5 g/dL Final   03/05/2017 7.8 (L) 12.0 - 15.5  g/dL Final   03/05/2017 8.1 (L) 12.0 - 15.5 g/dL Final   03/05/2017 7.9 (L) 12.0 - 15.5 g/dL Final   03/05/2017 7.8 (L) 12.0 - 15.5 g/dL Final   03/05/2017 7.9 (L) 12.0 - 15.5 g/dL Final      HCT HEMATOCRIT   Date Value Ref Range Status   03/07/2017 25.4 (L) 35.0 - 45.0 % Final   03/06/2017 22.8 (L) 35.0 - 45.0 % Final   03/05/2017 22.9 (L) 35.0 - 45.0 % Final   03/05/2017 24.1 (L) 35.0 - 45.0 % Final   03/05/2017 23.2 (L) 35.0 - 45.0 % Final   03/05/2017 22.7 (L) 35.0 - 45.0 % Final   03/05/2017 22.5 (L) 35.0 - 45.0 % Final      Platlets PLATELETS   Date Value Ref Range Status   03/07/2017 220 150 - 450 10*3/mm3 Final   03/06/2017 153 150 - 450 10*3/mm3 Final   03/05/2017 123 (L) 150 - 450 10*3/mm3 Final   03/05/2017 113 (L) 150 - 450 10*3/mm3 Final          Imaging Results (last 24 hours)     ** No results found for the last 24 hours. **           I reviewed the patient's new clinical results.  I reviewed the patient's new imaging results and agree with the interpretation.     ASSESSMENT/PLAN:   Assessment/Plan   Active Problems:    Chest pain    CHF with left ventricular diastolic dysfunction, NYHA class 1    Elevated d-dimer    CKD (chronic kidney disease) stage 3, GFR 30-59 ml/min    Anemia due to gastrointestinal blood loss      Will continue with current treatment.  Will follow labs.  I have reviewed the notes, assessments, and/or procedures performed by the resident  , I concur with her/his documentation of Eva Troncoso.    I discussed the patients findings and my recommendations with patient.      Phillip Camara MD  03/07/17  11:57 AM

## 2017-03-07 NOTE — CONSULTS
[facility] Heart Failure Program   LOS: 3 days   Patient Care Team:  Mary Spangler MD as PCP - General (Family Medicine)    Referring Provider: Dr. Spangler, Dr. Ambrocio  Amanda Esquivel entered    Date of Consult: 03/07/17    Chief Complaint:    Chief Complaint   Patient presents with   • Chest Pain      Subjective     HPI:  57 year old female patient of Dr. Ambrocio with a history of HFpEF presented to the ER with chest pain on 3/4/2017. Symptoms included orthopnea, weight gain, BLE edema with cellulitis, DURBIN, and chest pain. Her history of renal failure complicates her HFpEF diagnosis. Since admission, her CKD has progressed to stage IV. Dr. Bell is following.   She is not dietary compliant or fluid restriction compliant. On interview, she had a little ceasers pizza on her bedside table. She is medication complaint about 75% of the time. She can be forgetful.     Interval History:   Patient Denies: orthopnea and dizziness  Patient Complaints: chest pain and edema  History taken from: patient    History  Past Medical History   Diagnosis Date   • Anxiety state    • Chronic depression    • Chronic pain    • Compression fracture of lumbar spine, non-traumatic    • Depressive disorder    • Essential hypertension    • Obesity    • Pericardial effusion    ,   Past Surgical History   Procedure Laterality Date   • Cardiac catheterization  05/13/2014     77376 (1)   -  Successful PCI to the proximal LAD with a V stent reducing 99% stenosis to 0%   • Appendectomy  12/10/2012     Laparoscopic (1) - Acute suppurative appendicitis   • Shoulder surgery  05/14/2015     (1) - Communited 3 part fractured humerus, proximal left shoulder. Open reduction and internal fixation   • Tracheostomy  05/30/2014     (1) - Percutaneous endoscopic gastrostomy tube placement.;     • Colonoscopy N/A 2/13/2017     Procedure: COLONOSCOPY;  Surgeon: Nader Osei DO;  Location: [facility] ENDOSCOPY;  Service:    , History reviewed. No pertinent family  "history.,   Social History   Substance Use Topics   • Smoking status: Current Every Day Smoker   • Smokeless tobacco: Never Used      Comment: Smokes 1 ppd for 40 years   • Alcohol use No   , Other; Stadol [butorphanol]; and Toradol [ketorolac tromethamine]    Review of Systems:   Review of Systems    Objective     Vital Sign Min/Max for last 24 hours  Temp  Min: 96.6 °F (35.9 °C)  Max: 97.4 °F (36.3 °C)   BP  Min: 106/53  Max: 135/75   Pulse  Min: 65  Max: 70   Resp  Min: 16  Max: 20   SpO2  Min: 94 %  Max: 99 %   Flow (L/min)  Min: 2  Max: 2   Weight  Min: 247 lb 9.6 oz (112 kg)  Max: 247 lb 9.6 oz (112 kg)     Flowsheet Rows         First Filed Value    Admission Height  65\" (165.1 cm) Documented at 03/04/2017 0440    Admission Weight  220 lb (99.8 kg) Documented at 03/04/2017 0440        Last 3 weights    03/04/17  0440 03/04/17  1406 03/07/17  0500   Weight: 220 lb (99.8 kg) 234 lb 12.6 oz (107 kg) 247 lb 9.6 oz (112 kg)       Physical Exam:  Physical Exam     Results Review:   Lab Results (last 24 hours)     Procedure Component Value Units Date/Time    CBC & Differential [[id number]] Collected:  03/07/17 0514    Specimen:  Blood Updated:  03/07/17 0605    Narrative:       The following orders were created for panel order CBC & Differential.  Procedure                               Abnormality         Status                     ---------                               -----------         ------                     CBC Auto Differential[[id number]]         Abnormal            Final result                 Please view results for these tests on the individual orders.    CBC Auto Differential [[id number]]  (Abnormal) Collected:  03/07/17 0514    Specimen:  Blood Updated:  03/07/17 0605     WBC 7.48 10*3/mm3      RBC 2.71 (L) 10*6/mm3      Hemoglobin 8.1 (L) g/dL      Hematocrit 25.4 (L) %      MCV 93.7 fL      MCH 29.9 pg      MCHC 31.9 g/dL      RDW 14.5 %      RDW-SD 49.4 (H) fl      MPV 10.2 fL      Platelets 220 " 10*3/mm3      Neutrophil % 72.4 %      Lymphocyte % 18.4 %      Monocyte % 8.4 %      Eosinophil % 0.0 %      Basophil % 0.0 %      Immature Grans % 0.8 (H) %      Neutrophils, Absolute 5.41 10*3/mm3      Lymphocytes, Absolute 1.38 10*3/mm3      Monocytes, Absolute 0.63 10*3/mm3      Eosinophils, Absolute 0.00 10*3/mm3      Basophils, Absolute 0.00 10*3/mm3      Immature Grans, Absolute 0.06 (H) 10*3/mm3     Comprehensive Metabolic Panel [[id number]]  (Abnormal) Collected:  03/07/17 0514    Specimen:  Blood Updated:  03/07/17 0610     Glucose 98 mg/dL      BUN 52 (H) mg/dL      Creatinine 2.85 (H) mg/dL      Sodium 137 mmol/L      Potassium 5.4 (H) mmol/L      Chloride 112 (H) mmol/L      CO2 20.0 (L) mmol/L      Calcium 7.9 (L) mg/dL      Total Protein 5.5 (L) g/dL      Albumin 2.60 (L) g/dL      ALT (SGPT) 23 U/L      AST (SGOT) 16 U/L      Alkaline Phosphatase 96 U/L      Total Bilirubin 0.2 mg/dL      eGFR Non African Amer 17 (L) mL/min/1.73      Globulin 2.9 gm/dL      A/G Ratio 0.9 (L) g/dL      BUN/Creatinine Ratio 18.2      Anion Gap 5.0 mmol/L     Potassium [[id number]]  (Abnormal) Collected:  03/07/17 1353    Specimen:  Blood Updated:  03/07/17 1410     Potassium 5.2 (H) mmol/L         Cardiographics    ECG/EMG Results (last 24 hours)     Procedure Component Value Units Date/Time    ECG 12 Lead [[id number]] Collected:  03/04/17 0439     Updated:  03/07/17 0837    Narrative:       Test Reason : CPBlood Pressure : **/** mmHGVent. Rate : 086 BPM     Atrial Rate : 086 BPM   P-R Int : 144 ms          QRS Dur : 088 ms    QT Int : 400 ms       P-R-T Axes : 028 -15 008 degrees   QTc Int : 478 msNormal sinus rhythm with sinus   arrhythmiaInferior infarct , age undeterminedPossible Anterior infarct , age undeterminedAbnormal ECGConfirmed by MYRTLE TALBERT MD ([id number]),  DILIA ANNA ([id number]) on3/7/2017 8:36:47 AMReferred By:  DR. CELAYA           Confirmed By:MYRTLE TALBERT MD      ECHO 7/15/2016   FINDINGS:  1.  There is mild left atrial enlargement with mild to moderate  concentric left ventricular hypertrophy with normal aortic root  size.  2. Left ventricular systolic function is overall well preserved with  an estimated ejection fraction of 55% to 60%.  3. Mitral valve appears to be structurally intact with adequate  opening. Aortic valve appears to be structurally intact with  adequate opening. The right atrium and the right ventricle appear  to be top normal.  4. There is concentric left ventricular hypertrophy noted with E to A  reversal suggestive of diastolic dysfunction.  5. Color flow Doppler revealed mild mitral and mild tricuspid  regurgitation. Pericardium appears to be normal without any  evidence of effusion and there is no intracardiac mask or smoke  echo contrast noted suggestive of intracardiac thrombus.  MYRTLE TALBERT MD    Medication Review:   Prescriptions Prior to Admission   Medication Sig Dispense Refill Last Dose   • Aspirin-Salicylamide-Caffeine (BC HEADACHE POWDER PO) Take  by mouth As Needed (for pain).   Taking   • azithromycin (ZITHROMAX) 500 MG tablet Take 1 tablet by mouth Daily. 2 tablet 0 Taking   • busPIRone (BUSPAR) 15 MG tablet Take  by mouth. Take 1 tablet 2 times per day.   Taking   • cefuroxime (CEFTIN) 500 MG tablet Take 1 tablet by mouth 2 (Two) Times a Day. 6 tablet 0 Taking   • docusate sodium 100 MG capsule Take 100 mg by mouth 2 (Two) Times a Day. 60 capsule 0 Taking   • doxycycline (DORYX) 100 MG enteric coated tablet Take 1 tablet by mouth 2 (Two) Times a Day. 42 tablet 0 Taking   • doxycycline (MONODOX) 100 MG capsule       • furosemide (LASIX) 20 MG tablet Take 20 mg by mouth Daily As Needed (for fluids).   Taking   • HYDROcodone-acetaminophen (NORCO) 5-325 MG per tablet Take 1 tablet by mouth Every 8 (Eight) Hours As Needed for severe pain (7-10). 20 tablet 0 Taking   • labetalol (NORMODYNE) 200 MG tablet Take 1 tablet by mouth 2 (Two) Times a Day for 30 days. 60  tablet 3    • lisinopril (PRINIVIL,ZESTRIL) 40 MG tablet Take 1 tablet by mouth Daily. 30 tablet 3    • MethylPREDNISolone (MEDROL, SANA,) 4 MG tablet Take as directed on package instructions. 21 tablet 0 Taking   • mirtazapine (REMERON) 15 MG tablet Take 15 mg by mouth Daily.   Taking   • potassium chloride (KLOR-CON) 20 MEQ packet Take 20 mEq by mouth 2 (Two) Times a Day. 10 packet 0 Taking   • QUEtiapine (SEROquel) 200 MG tablet Take 200 mg by mouth Every Night.   Taking   • spironolactone (ALDACTONE) 25 MG tablet Take 1 tablet by mouth Daily. 30 tablet 1    • sulfamethoxazole-trimethoprim (BACTRIM DS,SEPTRA DS) 800-160 MG per tablet Take 1 tablet by mouth 2 (Two) Times a Day. 28 tablet 0    • traZODone (DESYREL) 150 MG tablet Take 150 mg by mouth Every Night.   Taking       Current Facility-Administered Medications:   •  albuterol (PROVENTIL) nebulizer solution 0.083% 2.5 mg/3mL, 2.5 mg, Nebulization, Q6H PRN, Evan Garcia MD  •  [DISCONTINUED] aspirin chewable tablet 324 mg, 324 mg, Oral, Once **AND** aspirin EC tablet 81 mg, 81 mg, Oral, Daily, Maci Sullivan MD, 81 mg at 03/07/17 0906  •  atorvastatin (LIPITOR) tablet 80 mg, 80 mg, Oral, Nightly, Maci Sullivan MD, 80 mg at 03/06/17 2131  •  budesonide-formoterol (SYMBICORT) 160-4.5 MCG/ACT inhaler 2 puff, 2 puff, Inhalation, BID - RT, Evan Garcia MD, 2 puff at 03/07/17 0829  •  busPIRone (BUSPAR) tablet 15 mg, 15 mg, Oral, Q12H, Maci Sullivan MD, 15 mg at 03/07/17 0906  •  docusate sodium (COLACE) capsule 100 mg, 100 mg, Oral, BID, Maci Sullivan MD, 100 mg at 03/07/17 0906  •  doxycycline (VIBRAMYICN) tablet 100 mg, 100 mg, Oral, Q12H, Maci Sullivan MD, 100 mg at 03/07/17 0906  •  furosemide (LASIX) tablet 20 mg, 20 mg, Oral, Daily PRN, Maci Sullivan MD  •  hydrALAZINE (APRESOLINE) injection 10 mg, 10 mg, Intravenous, Q6H PRN, Brittanie Bell MD  •  HYDROcodone-acetaminophen (NORCO) 5-325 MG per tablet 1 tablet, 1  tablet, Oral, Q8H PRN, Maci Sullivan MD, 1 tablet at 03/07/17 0930  •  ipratropium-albuterol (DUO-NEB) nebulizer solution 3 mL, 3 mL, Nebulization, 4x Daily - RT, Evan Garcia MD, 3 mL at 03/07/17 0829  •  labetalol (NORMODYNE) tablet 200 mg, 200 mg, Oral, BID, Maci Sullivan MD, 200 mg at 03/07/17 0906  •  methocarbamol (ROBAXIN) tablet 500 mg, 500 mg, Oral, Q12H PRN, Mary Spangler MD  •  methylPREDNISolone sodium succinate (SOLU-Medrol) injection 60 mg, 60 mg, Intravenous, Daily, Evan Garcia MD, 60 mg at 03/07/17 0906  •  mirtazapine (REMERON) tablet 15 mg, 15 mg, Oral, Daily, Maci Sullivan MD, 15 mg at 03/06/17 2131  •  Morphine sulfate (PF) injection 1 mg, 1 mg, Intravenous, Q4H PRN, Maci Sullivan MD, 1 mg at 03/07/17 1127  •  nicotine (NICODERM CQ) 21 MG/24HR patch 1 patch, 1 patch, Transdermal, Q24H, Maci Sullivan MD, 1 patch at 03/05/17 1518  •  ondansetron (ZOFRAN) injection 4 mg, 4 mg, Intravenous, Q6H PRN, Maci Sullivan MD  •  pantoprazole (PROTONIX) injection 40 mg, 40 mg, Intravenous, Q AM, Maci Sullivan MD, 40 mg at 03/07/17 0530  •  QUEtiapine (SEROquel) tablet 200 mg, 200 mg, Oral, Nightly, Maci Sullivan MD, 200 mg at 03/06/17 2131  •  sodium bicarbonate 75 mEq in sodium chloride 0.45 % 1,000 mL IVPB, 75 mL/hr, Intravenous, Continuous, Brittanie Bell MD  •  sodium chloride 0.9 % flush 1-10 mL, 1-10 mL, Intravenous, PRN, Maci Sullivan MD, 10 mL at 03/04/17 1750  •  traZODone (DESYREL) tablet 150 mg, 150 mg, Oral, Nightly, Maci Brenna Sullivan MD, 150 mg at 03/06/17 5014    Assessment/Plan     Active Problems:   HFpEF  (EF:55-60%%). NYHA class II.  Patient is currently volume overloaded. and in a well perfused physiologic state. HDs are acceptable  BETA-BLOCKER: Labetalol (she does not have reduced EF)  ACE/ARB: Lisnopril (decreased to 20mg d/t worsening Cr)  ENTRESTO: n/a- CKD  DIURETIC: Lasix  ALDOSTERONT ANTAGONIST: Not currently  indicated.  IMDUR/HYDRALAZINE: n/a  DIGOXIN: N/A  Fluid restriction: 2 L  Sodium restriction:2 grams  6MWT: no will do in office  CPeX: not indicated  ICD: not indicated   ADHF: no  CardioMEMS:not indicated  LVAD: not indicated    Presented an overview of heart failure, expected course, considerations, risk factors and exacerbation prevention.  Recommended daily weight monitoring. Information provided.  Recommended restricted dietary sodium intake of 2g/day  Fluid restrictions of 2L/day.  Discussed patient action plan for heart failure;  Recommended avoiding NSAIDs use.  Discussed warning signs requiring additional medical attention for heart failure.  Do not hesitate to contact this office for further symptoms or concerns. Contact information provided to the patient and understanding verbalized.     Plan for disposition:Where: HF Clinic within 3-5 days of hospital discharge with Rafaela PHILIP or Vivien PHILIP per Attending.         This document has been electronically signed by CEDRICK Clemens on March 7, 2017 2:51 PM

## 2017-03-07 NOTE — PLAN OF CARE
Problem: Patient Care Overview (Adult)  Goal: Plan of Care Review  Outcome: Ongoing (interventions implemented as appropriate)    03/05/17 1216 03/05/17 1642 03/06/17 2037   Coping/Psychosocial Response Interventions   Plan Of Care Reviewed With --  --  patient   Patient Care Overview   Progress --  no change --    Outcome Evaluation   Outcome Summary/Follow up Plan Low Sodium diet ed --  --        Goal: Adult Individualization and Mutuality  Outcome: Ongoing (interventions implemented as appropriate)  Goal: Discharge Needs Assessment  Outcome: Ongoing (interventions implemented as appropriate)    Problem: Pressure Ulcer Risk (Chaim Scale) (Adult,Obstetrics,Pediatric)  Goal: Identify Related Risk Factors and Signs and Symptoms  Outcome: Ongoing (interventions implemented as appropriate)  Goal: Skin Integrity  Outcome: Ongoing (interventions implemented as appropriate)    Problem: Respiratory Insufficiency (Adult)  Goal: Identify Related Risk Factors and Signs and Symptoms  Outcome: Ongoing (interventions implemented as appropriate)  Goal: Acid/Base Balance  Outcome: Ongoing (interventions implemented as appropriate)  Goal: Effective Ventilation  Outcome: Ongoing (interventions implemented as appropriate)    Problem: Gastrointestinal Bleeding (Adult)  Goal: Signs and Symptoms of Listed Potential Problems Will be Absent or Manageable (Gastrointestinal Bleeding)  Outcome: Ongoing (interventions implemented as appropriate)    Problem: Renal Failure/Kidney Injury, Acute (Adult)  Goal: Signs and Symptoms of Listed Potential Problems Will be Absent or Manageable (Renal Failure/Kidney Injury, Acute)  Outcome: Ongoing (interventions implemented as appropriate)

## 2017-03-07 NOTE — PROGRESS NOTES
Acute Care - Physical Therapy Initial Evaluation  [facility]     Patient Name: Eva Troncoso  : 1960  MRN: [id number]  Today's Date: 3/7/2017   Onset of Illness/Injury or Date of Surgery Date: 17  Date of Referral to PT: 17  Referring Physician: Dr. Spangler      Admit Date: 3/4/2017     Visit Dx:    ICD-10-CM ICD-9-CM   1. Chest pain, unspecified type R07.9 786.50   2. Acute systolic congestive heart failure I50.21 428.21     428.0   3. Hypertensive emergency I16.1 401.9   4. Impaired physical mobility Z74.09 781.99     Patient Active Problem List   Diagnosis   • Obesity   • Essential hypertension   • Depressive disorder   • Compression fracture of lumbar spine, non-traumatic   • Colostomy present   • Chronic pain   • Anxiety state   • Chronic hepatitis C without hepatic coma   • Acute GI hemorrhage   • Folliculitis   • Chest pain   • CHF with left ventricular diastolic dysfunction, NYHA class 1   • Elevated d-dimer   • CKD (chronic kidney disease) stage 3, GFR 30-59 ml/min   • Anemia due to gastrointestinal blood loss     Past Medical History   Diagnosis Date   • Anxiety state    • Chronic depression    • Chronic pain    • Compression fracture of lumbar spine, non-traumatic    • Depressive disorder    • Essential hypertension    • Obesity    • Pericardial effusion      Past Surgical History   Procedure Laterality Date   • Cardiac catheterization  2014     86952 (1)   -  Successful PCI to the proximal LAD with a V stent reducing 99% stenosis to 0%   • Appendectomy  12/10/2012     Laparoscopic (1) - Acute suppurative appendicitis   • Shoulder surgery  2015     (1) - Communited 3 part fractured humerus, proximal left shoulder. Open reduction and internal fixation   • Tracheostomy  2014     (1) - Percutaneous endoscopic gastrostomy tube placement.;     • Colonoscopy N/A 2017     Procedure: COLONOSCOPY;  Surgeon: Nader Osei DO;  Location: [facility] ENDOSCOPY;   Service:           PT ASSESSMENT (last 72 hours)      PT Evaluation       03/07/17 1355 03/06/17 1426    Rehab Evaluation    Document Type evaluation  -MM     Subjective Information complains of;agree to therapy;pain  -MM     Patient Effort, Rehab Treatment good  -MM     Symptoms Noted During/After Treatment shortness of breath  -MM     General Information    Patient Profile Review yes  -MM     Onset of Illness/Injury or Date of Surgery Date 03/04/17  -MM     Referring Physician Dr. Spangler  -MM     General Observations Pt lying supine with HOB elevated; +colostomy, + hidalgo, + PICC, + telemetry  -MM     Pertinent History Of Current Problem chest pain, GI bleed, HTN  -MM     Precautions/Limitations fall precautions   gait belt placement 2/2 colostomy bag  -MM     Equipment Currently Used at Home none  -MM walker, rolling;cane, straight;colostomy/ostomy supplies  -MR    Plans/Goals Discussed With patient  -MM     Risks Reviewed patient:  -MM     Benefits Reviewed patient:  -MM     Barriers to Rehab medically complex  -MM     Living Environment    Lives With spouse;child(jacqueline), adult  -MM spouse;child(jacqueline), adult  -MR    Living Arrangements house  -MM house  -MR    Home Accessibility stairs to enter home  -MM     Number of Stairs to Enter Home 3  -MM     Transportation Available  car;family or friend will provide  -MR    Clinical Impression    Date of Referral to PT 03/07/17  -MM     PT Diagnosis impaired functional mobility/endurance  -MM     Prognosis fair  -MM     Functional Level At Time Of Evaluation supervision for bed mobility, CGA for transfers and ambulation of 75 ft  -MM     Criteria for Skilled Therapeutic Interventions Met yes;treatment indicated  -MM     Impairments Found (describe specific impairments) gait, locomotion, and balance;posture  -MM     Rehab Potential fair, will monitor progress closely  -MM     Predicted Duration of Therapy Intervention (days/wks) TBD pending pt progress  -MM     Vital Signs     Pre Systolic BP Rehab 115  -MM     Pre Treatment Diastolic BP 59  -MM     Pretreatment Heart Rate (beats/min) 74  -MM     Intratreatment Heart Rate (beats/min) 78  -MM     Posttreatment Heart Rate (beats/min) 77  -MM     Pre SpO2 (%) 96  -MM     O2 Delivery Pre Treatment room air  -MM     Intra SpO2 (%) 84   RN notified  -MM     O2 Delivery Intra Treatment room air  -MM     Post SpO2 (%) 92  -MM     O2 Delivery Post Treatment room air  -MM     Pre Patient Position Supine  -MM     Intra Patient Position Standing  -MM     Post Patient Position Supine  -MM     Rest Breaks  1  -MM     Pain Assessment    Pain Assessment 0-10  -MM     Pain Score 5  -MM     Post Pain Score 5  -MM     Pain Location Generalized  -MM     Pain Intervention(s) Repositioned;Ambulation/increased activity  -MM     Vision Assessment/Intervention    Visual Impairment WFL  -MM     Cognitive Assessment/Intervention    Current Cognitive/Communication Assessment functional  -MM     Orientation Status oriented x 4  -MM     Follows Commands/Answers Questions 100% of the time;able to follow multi-step instructions  -MM     Personal Safety WNL/WFL  -MM     Personal Safety Interventions gait belt;fall prevention program maintained;nonskid shoes/slippers when out of bed;supervised activity  -MM     ROM (Range of Motion)    General ROM no range of motion deficits identified  -MM     MMT (Manual Muscle Testing)    General MMT Assessment no strength deficits identified  -MM     General MMT Assessment Detail --   BUE/BLE grossly 4/5  -MM     Bed Mobility, Assessment/Treatment    Bed Mobility, Roll Left, [place] supervision required  -MM     Bed Mobility, Roll Right, [place] supervision required  -MM     Bed Mob, Supine to Sit, [place] supervision required  -MM     Bed Mob, Sit to Supine, [place] supervision required  -MM     Bed Mobility, Comment Increased time to complete, vcs for pursed lip breathing  -MM     Transfer Assessment/Treatment     Transfers, Sit-Stand [place] contact guard assist  -MM     Transfers, Stand-Sit [place] contact guard assist  -MM     Gait Assessment/Treatment    Gait, [place] Level contact guard assist  -MM     Gait, Distance (Feet) 75  -MM     Gait, Gait Deviations bilateral:;antalgic;ramesh decreased;decreased heel strike;step length decreased  -MM     Gait, Safety Issues balance decreased during turns  -MM     Gait, Impairments strength decreased;impaired balance  -MM     Gait, Comment Pt required max cues for PLB, pacing, improved step length/heel strike  -MM     Sensory Assessment/Intervention    Sensory Impairment --   Pt denies numbness/tingling  -MM     Light Touch --   Grossly intact BUE/BLE  -MM     Positioning and Restraints    Pre-Treatment Position in bed  -MM     Post Treatment Position bed  -MM     In Bed notified nsg;side lying right;call light within reach;encouraged to call for assist;exit alarm on  -MM       03/06/17 0546 03/04/17 2100    General Information    Equipment Currently Used at Home colostomy/ostomy supplies  - colostomy/ostomy supplies  -SUSAN    Living Environment    Transportation Available family or friend will provide;car  -AC       03/04/17 1932       Living Environment    Lives With child(jacqueline), adult;spouse  -SUSAN     Living Arrangements house  -SUSAN     Home Accessibility stairs to enter home  -SUSAN     Number of Stairs to Enter Home 3  -SUSAN     Stair Railings at Home none  -SUSAN     Type of Financial/Environmental Concern none  -SUSAN     Transportation Available family or friend will provide  -SUSAN       User Key  (r) = Recorded By, (t) = Taken By, (c) = Cosigned By    Initials Name Provider Type    SUSAN Carmen Diehl, RN Registered Nurse    ANITA Gay RN Registered Nurse    MR Astrid Flores     MM Sujey Burton, PT Physical Therapist          Physical Therapy Education     Title: PT OT SLP Therapies (Done)     Topic: Physical Therapy (Done)     Point: Mobility  training (Done)    Learning Progress Summary    Learner Readiness Method Response Comment Documented by Status   Patient Acceptance E [facility] 03/07/17 1439 Done               Point: Home exercise program (Done)    Learning Progress Summary    Learner Readiness Method Response Comment Documented by Status   Patient Acceptance E [facility] 03/07/17 1439 Done               Point: Body mechanics (Done)    Learning Progress Summary    Learner Readiness Method Response Comment Documented by Status   Patient Acceptance E [facility] 03/07/17 1439 Done               Point: Precautions (Done)    Learning Progress Summary    Learner Readiness Method Response Comment Documented by Status   Patient Acceptance E [facility] 03/07/17 1439 Done                      User Key     Initials Effective Dates Name Provider Type Discipline     12/28/16 -  Sujey Burton, PT Physical Therapist PT                PT Recommendation and Plan  Anticipated Discharge Disposition: home with assist, home with home health  Planned Therapy Interventions: gait training, home exercise program, balance training, patient/family education, stair training, strengthening, transfer training  PT Frequency: 3-5 times/wk  Plan of Care Review  Plan Of Care Reviewed With: patient  Outcome Summary/Follow up Plan: Pt completed bed mobility with supervision, transfers and ambulation of 75 ft with CGA without AD--desatted to 84% (RN aware), recovered to baseline within ~30 seconds. Pt will continue to benefit from skilled PT services to further progress mobility and maximize functional independence--recommend home with HHPT upon d/c.           IP PT Goals       03/07/17 1440          Transfer Training PT STG    Transfer Training PT STG, Date Established 03/07/17  -MM      Transfer Training PT STG, Time to Achieve 3 days  -MM      Transfer Training PT STG, Activity Type all transfers  -MM      Transfer Training PT STG, [place] Level conditional independence  -MM      Transfer  Training PT STG, Assist Device --   AAD  -MM      Transfer Training PT STG, Date Goal Reviewed 03/07/17  -MM      Transfer Training PT STG, Outcome goal ongoing  -MM      Gait Training PT STG    Gait Training Goal PT STG, Date Established 03/07/17  -MM      Gait Training Goal PT STG, [place] Level conditional independence  -MM      Gait Training Goal PT STG, Assist Device --   AAD  -MM      Gait Training Goal PT STG, Distance to Achieve 150'; O2 sats >92%  -MM      Gait Training Goal PT STG, Date Goal Reviewed 03/07/17  -MM      Gait Training Goal PT STG, Outcome goal ongoing  -MM      Stair Training PT LTG    Stair Training Goal PT LTG, Date Established 03/07/17  -MM      Stair Training Goal PT LTG, Time to Achieve 5 days  -MM      Stair Training Goal PT LTG, [place] Level conditional independence  -MM      Stair Training Goal PT LTG, Assist Device 1 handrail  -MM      Stair Training Goal PT LTG, Distance to Achieve 3 steps  -MM      Stair Training Goal PT LTG, Date Goal Reviewed 03/07/17  -MM      Stair Training Goal PT LTG, Outcome goal ongoing  -MM        User Key  (r) = Recorded By, (t) = Taken By, (c) = Cosigned By    Initials Name Provider Type    TLYER Burton PT Physical Therapist                Outcome Measures       03/07/17 1400          How much help from another person do you currently need...    Turning from your back to your side while in flat bed without using bedrails? 4  -MM      Moving from lying on back to sitting on the side of a flat bed without bedrails? 4  -MM      Moving to and from a bed to a chair (including a wheelchair)? 3  -MM      Standing up from a chair using your arms (e.g., wheelchair, bedside chair)? 3  -MM      Climbing 3-5 steps with a railing? 3  -MM      To walk in hospital room? 3  -MM      AM-PAC 6 Clicks Score 20  -MM      Functional Assessment    Outcome Measure Options AM-PAC 6 Clicks Basic Mobility (PT)  -MM        User Key  (r) = Recorded By, (t) = Taken  By, (c) = Cosigned By    Initials Name Provider Type    TYLER Burton PT Physical Therapist           Time Calculation:         PT Charges       03/07/17 1444          Time Calculation    Start Time 1355  -MM      Stop Time 1423  -MM      Time Calculation (min) 28 min  -MM      PT Received On 03/07/17  -MM      PT Goal Re-Cert Due Date 03/20/17  -MM      Time Calculation- PT    Total Timed Code Minutes- PT 15 minute(s)  -MM        User Key  (r) = Recorded By, (t) = Taken By, (c) = Cosigned By    Initials Name Provider Type    TYLER Burton PT Physical Therapist          Therapy Charges for Today     Code Description Service Date Service Provider Modifiers Qty    84240804618 HC PT MOBILITY CURRENT 3/7/2017 Sujey Burton PT GP, CJ 1    84904036546 HC PT MOBILITY PROJECTED 3/7/2017 Sujey Burton PT GP, CI 1    22680100776 HC PT EVAL MOD COMPLEXITY 1 3/7/2017 Sujey Burton PT GP, KX 1    12415737015 HC PT THER PROC EA 15 MIN 3/7/2017 Sujey Burton PT GP, KX 1          PT G-Codes  PT Professional Judgement Used?: Yes  Outcome Measure Options: AM-PAC 6 Clicks Basic Mobility (PT)  Score:  (20)  Functional Limitation: Mobility: Walking and moving around  Mobility: Walking and Moving Around Current Status (): At least 20 percent but less than 40 percent impaired, limited or restricted  Mobility: Walking and Moving Around Goal Status (): At least 1 percent but less than 20 percent impaired, limited or restricted      Sujey Burton PT  3/7/2017

## 2017-03-07 NOTE — PLAN OF CARE
Problem: Patient Care Overview (Adult)  Goal: Plan of Care Review  Outcome: Ongoing (interventions implemented as appropriate)    03/07/17 1440   Coping/Psychosocial Response Interventions   Plan Of Care Reviewed With patient   Outcome Evaluation   Outcome Summary/Follow up Plan Pt completed bed mobility with supervision, transfers and ambulation of 75 ft with CGA without AD--desatted to 84% (RN aware), recovered to baseline within ~30 seconds. Pt will continue to benefit from skilled PT services to further progress mobility and maximize functional independence--recommend home with HHPT upon d/c.          Problem: Inpatient Physical Therapy  Goal: Transfer Training Goal 1 STG- PT  Outcome: Ongoing (interventions implemented as appropriate)    03/07/17 1440   Transfer Training PT STG   Transfer Training PT STG, Date Established 03/07/17   Transfer Training PT STG, Time to Achieve 3 days   Transfer Training PT STG, Activity Type all transfers   Transfer Training PT STG, [place] Level conditional independence   Transfer Training PT STG, Assist Device (AAD)   Transfer Training PT STG, Date Goal Reviewed 03/07/17   Transfer Training PT STG, Outcome goal ongoing       Goal: Gait Training Goal STG- PT  Outcome: Ongoing (interventions implemented as appropriate)    03/07/17 1440   Gait Training PT STG   Gait Training Goal PT STG, Date Established 03/07/17   Gait Training Goal PT STG, [place] Level conditional independence   Gait Training Goal PT STG, Assist Device (AAD)   Gait Training Goal PT STG, Distance to Achieve 150'; O2 sats >92%   Gait Training Goal PT STG, Date Goal Reviewed 03/07/17   Gait Training Goal PT STG, Outcome goal ongoing       Goal: Stair Training Goal LTG- PT  Outcome: Ongoing (interventions implemented as appropriate)    03/07/17 1440   Stair Training PT LTG   Stair Training Goal PT LTG, Date Established 03/07/17   Stair Training Goal PT LTG, Time to Achieve 5 days   Stair Training Goal PT  LTG, [place] Level conditional independence   Stair Training Goal PT LTG, Assist Device 1 handrail   Stair Training Goal PT LTG, Distance to Achieve 3 steps   Stair Training Goal PT LTG, Date Goal Reviewed 03/07/17   Stair Training Goal PT LTG, Outcome goal ongoing

## 2017-03-08 PROBLEM — I08.1 DISORDERS OF BOTH MITRAL AND TRICUSPID VALVES: Status: ACTIVE | Noted: 2017-01-01

## 2017-03-08 PROBLEM — R07.2 PRECORDIAL CHEST PAIN: Status: ACTIVE | Noted: 2017-01-01

## 2017-03-08 NOTE — PROGRESS NOTES
Cardiology Progress Note:     LOS: 4 days   Patient Care Team:  Mary Spangler MD as PCP - General (Family Medicine)      Subjective:   Chart reviewed , patient seen and examined.  Patient continues to have symptoms of chest pain.  Patient does complain of having symptoms of shortness of breath.  Patient denies any bleeding diastasis.  Patient's hemoglobin has been low.  Due to the patient renal insufficiency and anemia patient is not a candidate for any invasive evaluation from the cardiac standpoint including coronary angiogram.  At the present time patient would be treated medically for chest pain.          Objective:     Objective:  Vitals:    03/08/17 0000   BP:    Pulse: 69   Resp:    Temp:    SpO2:        Intake/Output Summary (Last 24 hours) at 03/08/17 0104  Last data filed at 03/07/17 2100   Gross per 24 hour   Intake   2210 ml   Output    500 ml   Net   1710 ml             Physical Exam:   General Appearance:    Alert, oriented, cooperative, in no acute distress   Head:    Normocephalic, atraumatic, without obvious abnormality   Eyes:           LINDA  Lids and lashes normal, conjunctivae and sclerae normal, no icterus, no pallor   Ears:    Ears appear intact with no abnormalities noted   Throat:   Mucous membranes pink and moist   Neck:   Supple, trachea midline, no carotid bruit, no organomegaly or JVD   Lungs:     Clear to auscultation and percussion, respirations regular, even and Unlabored. No wheezes, rales, rhonchi    Heart:    Regular rhythm and normal rate, normal S1 and S2, no            murmur, no gallop, no rub, no click   Abdomen:     Soft, non-tender, non-distended, no guarding, no rebound tenderness, Normal bowel sounds in all four quadrant, no masses, liver and spleen nonpalpable,    Genitalia:    Deferred   Extremities:   Moves all extremities well, no edema, no cyanosis, no              Redness, no clubbing   Pulses:   Pulses palpable and equal bilaterally   Skin:   Moist and warm. No  bleeding, bruising or rash   Neurologic/Psychiatric:   Alert and oriented to person, place, and time.  Motor, power and tone in upper and lower extremity is grossly intact.  No focal neurological deficits. Normal cognitive function. No psychomotor reaction or tangential thought. No depression, homicidal ideations and suicidal ideations            Results Review:    Lab Results (last 24 hours)     Procedure Component Value Units Date/Time    CBC & Differential [[id number]] Collected:  03/07/17 0514    Specimen:  Blood Updated:  03/07/17 0605    Narrative:       The following orders were created for panel order CBC & Differential.  Procedure                               Abnormality         Status                     ---------                               -----------         ------                     CBC Auto Differential[[id number]]         Abnormal            Final result                 Please view results for these tests on the individual orders.    CBC Auto Differential [[id number]]  (Abnormal) Collected:  03/07/17 0514    Specimen:  Blood Updated:  03/07/17 0605     WBC 7.48 10*3/mm3      RBC 2.71 (L) 10*6/mm3      Hemoglobin 8.1 (L) g/dL      Hematocrit 25.4 (L) %      MCV 93.7 fL      MCH 29.9 pg      MCHC 31.9 g/dL      RDW 14.5 %      RDW-SD 49.4 (H) fl      MPV 10.2 fL      Platelets 220 10*3/mm3      Neutrophil % 72.4 %      Lymphocyte % 18.4 %      Monocyte % 8.4 %      Eosinophil % 0.0 %      Basophil % 0.0 %      Immature Grans % 0.8 (H) %      Neutrophils, Absolute 5.41 10*3/mm3      Lymphocytes, Absolute 1.38 10*3/mm3      Monocytes, Absolute 0.63 10*3/mm3      Eosinophils, Absolute 0.00 10*3/mm3      Basophils, Absolute 0.00 10*3/mm3      Immature Grans, Absolute 0.06 (H) 10*3/mm3     Comprehensive Metabolic Panel [[id number]]  (Abnormal) Collected:  03/07/17 0514    Specimen:  Blood Updated:  03/07/17 0610     Glucose 98 mg/dL      BUN 52 (H) mg/dL      Creatinine 2.85 (H) mg/dL      Sodium 137 mmol/L       Potassium 5.4 (H) mmol/L      Chloride 112 (H) mmol/L      CO2 20.0 (L) mmol/L      Calcium 7.9 (L) mg/dL      Total Protein 5.5 (L) g/dL      Albumin 2.60 (L) g/dL      ALT (SGPT) 23 U/L      AST (SGOT) 16 U/L      Alkaline Phosphatase 96 U/L      Total Bilirubin 0.2 mg/dL      eGFR Non African Amer 17 (L) mL/min/1.73      Globulin 2.9 gm/dL      A/G Ratio 0.9 (L) g/dL      BUN/Creatinine Ratio 18.2      Anion Gap 5.0 mmol/L     Potassium [[id number]]  (Abnormal) Collected:  03/07/17 1353    Specimen:  Blood Updated:  03/07/17 1410     Potassium 5.2 (H) mmol/L            Medication Review:   Current Facility-Administered Medications   Medication Dose Route Frequency Provider Last Rate Last Dose   • albuterol (PROVENTIL) nebulizer solution 0.083% 2.5 mg/3mL  2.5 mg Nebulization Q6H PRN Evan Garcia MD   2.5 mg at 03/07/17 1858   • aspirin EC tablet 81 mg  81 mg Oral Daily Maci Sullivan MD   81 mg at 03/07/17 0906   • atorvastatin (LIPITOR) tablet 80 mg  80 mg Oral Nightly Maci Sullivan MD   80 mg at 03/07/17 2007   • budesonide-formoterol (SYMBICORT) 160-4.5 MCG/ACT inhaler 2 puff  2 puff Inhalation BID - RT Evan Garcia MD   2 puff at 03/07/17 0829   • busPIRone (BUSPAR) tablet 15 mg  15 mg Oral Q12H Maci Sullivan MD   15 mg at 03/07/17 2007   • docusate sodium (COLACE) capsule 100 mg  100 mg Oral BID Maci Sullivan MD   100 mg at 03/07/17 1811   • doxycycline (VIBRAMYICN) tablet 100 mg  100 mg Oral Q12H Maci Sullivan MD   100 mg at 03/07/17 2008   • furosemide (LASIX) tablet 20 mg  20 mg Oral Daily PRN Maci Sullivan MD       • hydrALAZINE (APRESOLINE) injection 10 mg  10 mg Intravenous Q6H PRN Brittanie Bell MD       • HYDROcodone-acetaminophen (NORCO) 5-325 MG per tablet 1 tablet  1 tablet Oral Q8H PRN Maci Sullivan MD   1 tablet at 03/07/17 2007   • ipratropium-albuterol (DUO-NEB) nebulizer solution 3 mL  3 mL Nebulization 4x Daily - RT Evan Garcia MD   3 mL at  03/07/17 1640   • labetalol (NORMODYNE) tablet 200 mg  200 mg Oral BID Maci Sullivan MD   200 mg at 03/07/17 1812   • methocarbamol (ROBAXIN) tablet 500 mg  500 mg Oral Q12H PRN Mary Spangler MD       • methylPREDNISolone sodium succinate (SOLU-Medrol) injection 60 mg  60 mg Intravenous Daily Evan Garcia MD   60 mg at 03/07/17 0906   • mirtazapine (REMERON) tablet 15 mg  15 mg Oral Daily Maci Sullivan MD   15 mg at 03/07/17 2007   • Morphine sulfate (PF) injection 1 mg  1 mg Intravenous Q4H PRN Maci Sullivan MD   1 mg at 03/08/17 0030   • nicotine (NICODERM CQ) 21 MG/24HR patch 1 patch  1 patch Transdermal Q24H Maci Sullivan MD   1 patch at 03/07/17 1454   • ondansetron (ZOFRAN) injection 4 mg  4 mg Intravenous Q6H PRN Maci Sullivan MD       • pantoprazole (PROTONIX) injection 40 mg  40 mg Intravenous Q AM Maci Sullivan MD   40 mg at 03/07/17 0530   • QUEtiapine (SEROquel) tablet 200 mg  200 mg Oral Nightly Maci Sullivan MD   200 mg at 03/07/17 2008   • sodium bicarbonate 75 mEq in sodium chloride 0.45 % 1,000 mL IVPB  75 mL/hr Intravenous Continuous Brittanie Bell MD 75 mL/hr at 03/07/17 1609 75 mL/hr at 03/07/17 1609   • sodium chloride 0.9 % flush 1-10 mL  1-10 mL Intravenous PRN Maci Sullivan MD   10 mL at 03/04/17 1750   • traZODone (DESYREL) tablet 150 mg  150 mg Oral Nightly Maci Sullivan MD   150 mg at 03/07/17 2008       Assessment and Plan:    Active Problems:    Chest pain    CHF with left ventricular diastolic dysfunction, NYHA class 1    Elevated d-dimer    CKD (chronic kidney disease) stage 3, GFR 30-59 ml/min    Anemia due to gastrointestinal blood loss   1.  Chest pain.  Patient echocardiogram had revealed an ejection fraction of 50%.  Patient does have mitral and tricuspid regurgitation which could be contributing to the patient's symptoms of shortness of breath.  Patient at the present time would be treated medically and would not be  subjected to any invasive evaluation for the chest pain.  2.  Anemia.  Patient's hemoglobin is 8.1.  Patient may need upper and lower endoscopy.  Patient does not have any occult bleeding.  At the present time patient would not be started on antiplatelets.  3.  Chronic kidney disease.  Patient has an elevated creatinine and would benefit from nephrology evaluation.  Patient is not a candidate for any invasive evaluation from the cardiac standpoint due to risk of contrast-induced nephropathy.  4.  Obesity.  Patient has been counseled on weight reduction lifestyle modification and dietary restriction.  5.  Risk factor modification.  Patient has been counseled on smoking.          Yg Ambrocio MD  03/08/17  1:04 AM      Time: Time spent face-to-face interaction 30 minutes      EMR Dragon/Transcription disclaimer:   Some of this note may be an electronic transcription/translation of spoken language to printed text. The electronic translation of spoken language may permit erroneous, or at times, nonsensical words or phrases to be inadvertently transcribed; Although I have reviewed the note for such errors, some may still exist.

## 2017-03-08 NOTE — PROGRESS NOTES
Acute Care - Physical Therapy Treatment Note  [facility]     Patient Name: Eva Troncoso  : 1960  MRN: [id number]  Today's Date: 3/8/2017  Onset of Illness/Injury or Date of Surgery Date: 17  Date of Referral to PT: 17  Referring Physician: Dr. Spangler    Admit Date: 3/4/2017    Visit Dx:    ICD-10-CM ICD-9-CM   1. Chest pain, unspecified type R07.9 786.50   2. Acute systolic congestive heart failure I50.21 428.21     428.0   3. Hypertensive emergency I16.1 401.9   4. Impaired physical mobility Z74.09 781.99   5. 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               Adult Rehabilitation Note       17 1340 17 1040       Rehab Assessment/Intervention    Discipline physical therapy assistant  -LN physical therapy assistant  -LN     Document Type therapy note (daily note)  -LN therapy note (daily note)  -LN     Subjective Information agree to therapy;no complaints  -LN agree to therapy;complains of;pain  -LN     Precautions/Limitations fall precautions   gt belt placement high due to colostomy bag  -LN fall precautions   gait belt plaecement high due to colostomy bag  -LN     Recorded by [LN] Abigail Spears, PTA [LN] Abigail Spears, PTA     Vital Signs    Pre Systolic BP Rehab 177  -  -LN     Pre Treatment Diastolic BP 79  -LN 77  -LN     Post Systolic BP Rehab 160  -LN      Post Treatment Diastolic BP 80  -LN       Pretreatment Heart Rate (beats/min) 69  -LN 63  -LN     Intratreatment Heart Rate (beats/min) 82  -LN      Posttreatment Heart Rate (beats/min) 69  -LN      Pre SpO2 (%) 93  -LN 94  -LN     O2 Delivery Pre Treatment room air  -LN room air  -LN     Intra SpO2 (%) 94  -LN      Post SpO2 (%) 94  -LN      Pre Patient Position Supine  -LN Supine  -LN     Intra Patient Position Standing  -LN Sitting  -LN     Post Patient Position Sitting  -LN Supine  -LN     Rest Breaks  1  -LN      Recorded by [LN] Abigail Spears, BANG [LN] Abigail Spears PTA     Pain Assessment    Pain Assessment 0-10  -LN 0-10  -LN     Pain Score 0  -LN 7  -LN     Post Pain Score 0  -LN 7  -LN     Pain Type  Acute pain  -LN     Pain Location  Chest  -LN     Pain Orientation  Right;Left  -LN     Pain Intervention(s)  --   nsg aware  -LN     Recorded by [LN] Abigail Spears, BANG [LN] Abigail Spears PTA     Cognitive Assessment/Intervention    Orientation Status oriented to;person     -LN oriented to;person;place     -LN     Recorded by [LN] Abigail Spears, BANG [LN] Abigail Spears PTA     Bed Mobility, Assessment/Treatment    Bed Mobility, Assistive Device bed rails;head of bed elevated  -LN bed rails;head of bed elevated  -LN     Bed Mobility, Roll Right, [place] conditional independence  -LN conditional independence  -LN     Bed Mobility, Scoot/Bridge, [place]  conditional independence  -LN     Bed Mob, Supine to Sit, [place] conditional independence  -LN supervision required  -LN     Bed Mob, Sit to Supine, [place]  supervision required  -LN     Bed Mobility, Comment  pt colostomy bag busted open once eob of bed-pt laid back down and nsg called-rx ended-will check back in pm  -LN     Recorded by [LN] Abigail Spears, BANG [LN] Abigail Spears PTA     Transfer Assessment/Treatment    Transfers, Bed-Chair [place] contact guard assist  -LN      Transfers, Bed-Chair-Bed, Assist Device --   iv pole  -LN      Transfers, Sit-Stand [place]  supervision required  -LN      Transfers, Stand-Sit [place] supervision required  -LN      Transfers, Sit-Stand-Sit, Assist Device --   none  -LN      Recorded by [LN] Abigail Spears PTA      Gait Assessment/Treatment    Gait, [place] Level contact guard assist  -LN      Gait, Assistive Device --   iv pole  -LN      Gait, Distance (Feet) 144  -LN      Gait, Gait Deviations step length decreased  -LN      Recorded by [LN] Abigail Spears PTA      Therapy Exercises    Bilateral Lower Extremities AROM:;15 reps;hip flexion;LAQ   x 2 sets;25 ankle pumps  -LN      Recorded by [LN] Abigail Spears PTA      Positioning and Restraints    Post Treatment Position chair  -LN bed  -LN     In Bed  notified nsg;supine;call light within reach;encouraged to call for assist;exit alarm on  -LN     In Chair sitting;call light within reach;encouraged to call for assist  -LN      Recorded by [LN] Abigail Spears PTA [LN] Abigail Spears, BANG       User Key  (r) = Recorded By, (t) = Taken By, (c) = Cosigned By    Initials Name Effective Dates    LN Abigail Spears PTA 10/17/16 -                 IP PT Goals       03/08/17 1340 03/08/17 1040 03/07/17 1440    Transfer Training PT STG    Transfer Training PT STG, Date Established   03/07/17  -MM    Transfer Training PT STG, Time to Achieve   3 days  -MM    Transfer Training PT STG, Activity Type   all transfers  -MM    Transfer Training PT STG, [place] Level   conditional independence  -MM    Transfer Training PT STG, Assist Device   --   AAD  -MM    Transfer Training PT STG, Date Goal Reviewed (P)  03/08/17  -LN 03/08/17  -LN 03/07/17  -MM    Transfer Training PT STG, Outcome (P)  goal not met  -LN goal not met  -LN goal ongoing  -MM    Transfer Training PT STG, Reason Goal Not Met (P)  progress slower than expected  -LN progress slower than expected  -LN     Gait Training PT STG    Gait Training Goal PT STG, Date Established   03/07/17  -MM    Gait Training Goal PT STG, [place]  Level   conditional independence  -MM    Gait Training Goal PT STG, Assist Device   --   AAD  -MM    Gait Training Goal PT STG, Distance to Achieve   150'; O2 sats >92%  -MM    Gait Training Goal PT STG, Date Goal Reviewed (P)  03/08/17  -LN 03/08/17  -LN 03/07/17  -MM    Gait Training Goal PT STG, Outcome (P)  goal not met  -LN goal not met  -LN goal ongoing  -MM    Gait Training Goal PT STG, Reason Goal Not Met (P)  progress slower than expected  -LN progress slower than expected  -LN     Stair Training PT LTG    Stair Training Goal PT LTG, Date Established (P)  03/08/17  -LN  03/07/17  -MM    Stair Training Goal PT LTG, Time to Achieve   5 days  -MM    Stair Training Goal PT LTG, [place] Level   conditional independence  -MM    Stair Training Goal PT LTG, Assist Device   1 handrail  -MM    Stair Training Goal PT LTG, Distance to Achieve   3 steps  -MM    Stair Training Goal PT LTG, Date Goal Reviewed (P)  03/08/17  -LN 03/08/17  -LN 03/07/17  -MM    Stair Training Goal PT LTG, Outcome (P)  goal not met  -LN goal not met  -LN goal ongoing  -MM    Stair Training Goal PT LTG, Reason Goal Not Met (P)  progress slower than expected  -LN progress slower than expected  -LN       User Key  (r) = Recorded By, (t) = Taken By, (c) = Cosigned By    Initials Name Provider Type    KRISTA Spears, PTA Physical Therapy Assistant    MM Sujey Burton, PT Physical Therapist          Physical Therapy Education     Title: PT OT SLP Therapies (Active)     Topic: Physical Therapy (Active)     Point: Mobility training (Active)    Learning Progress Summary    Learner Readiness Method Response Comment Documented by Status   Patient Acceptance E NR  LN 03/08/17 1442 Active    Acceptance E NR  LN 03/08/17 1429 Active    Acceptance E VU  MM 03/07/17 1439 Done               Point: Home exercise program (Active)    Learning Progress Summary    Learner Readiness Method Response Comment Documented by Status   Patient Acceptance E NR  LN  03/08/17 1442 Active    Acceptance E VU  MM 03/07/17 1439 Done               Point: Body mechanics (Active)    Learning Progress Summary    Learner Readiness Method Response Comment Documented by Status   Patient Acceptance E NR  LN 03/08/17 1442 Active    Acceptance E NR  LN 03/08/17 1429 Active    Acceptance E VU  MM 03/07/17 1439 Done               Point: Precautions (Active)    Learning Progress Summary    Learner Readiness Method Response Comment Documented by Status   Patient Acceptance E NR  LN 03/08/17 1442 Active    Acceptance E NR  LN 03/08/17 1429 Active    Acceptance E VU  MM 03/07/17 1439 Done                      User Key     Initials Effective Dates Name Provider Type Discipline    LN 10/17/16 -  Abigail Spears, PTA Physical Therapy Assistant PT    MM 12/28/16 -  Sujey Burton PT Physical Therapist PT                    PT Recommendation and Plan  Anticipated Discharge Disposition: home with assist, home with home health  Planned Therapy Interventions: gait training, home exercise program, balance training, patient/family education, stair training, strengthening, transfer training  PT Frequency: 3-5 times/wk  Plan of Care Review  Plan Of Care Reviewed With: (P) patient  Progress: (P) improving  Outcome Summary/Follow up Plan: (P) previous note in am was put in as pm rx-amb 144' with rw and cga of 1 holding to iv pole with sats94%-would benefit from cont therapy hh/op pt when d/c home          Outcome Measures       03/08/17 1340 03/08/17 1040 03/07/17 1400    How much help from another person do you currently need...    Turning from your back to your side while in flat bed without using bedrails? 4  -LN 4  -LN 4  -MM    Moving from lying on back to sitting on the side of a flat bed without bedrails? 4  -LN 4  -LN 4  -MM    Moving to and from a bed to a chair (including a wheelchair)? 3  -LN 3  -LN 3  -MM    Standing up from a chair using your arms (e.g., wheelchair, bedside chair)? 3  -LN 3  -LN 3  -MM     Climbing 3-5 steps with a railing? 3  -LN 3  -LN 3  -MM    To walk in hospital room? 3  -LN 3  -LN 3  -MM    AM-PAC 6 Clicks Score 20  -LN 20  -LN 20  -MM    Functional Assessment    Outcome Measure Options AM-PAC 6 Clicks Basic Mobility (PT)  -LN AM-PAC 6 Clicks Basic Mobility (PT)  -LN AM-PAC 6 Clicks Basic Mobility (PT)  -MM      User Key  (r) = Recorded By, (t) = Taken By, (c) = Cosigned By    Initials Name Provider Type    LN Abigail Spears PTA Physical Therapy Assistant    MM Sujey Burton, PT Physical Therapist           Time Calculation:         PT Charges       03/08/17 1340 03/08/17 1040       Time Calculation    Start Time 1340  -LN 1040  -LN     Stop Time 1410  -LN 1052  -LN     Time Calculation (min) 30 min  -LN 12 min  -LN     PT Received On 03/08/17  -LN 03/08/17  -LN     Time Calculation- PT    Total Timed Code Minutes- PT 30 minute(s)  -LN 12 minute(s)  -LN       User Key  (r) = Recorded By, (t) = Taken By, (c) = Cosigned By    Initials Name Provider Type    LN Abigail Spears PTA Physical Therapy Assistant          Therapy Charges for Today     Code Description Service Date Service Provider Modifiers Qty    79230428377 HC PT THERAPEUTIC ACT EA 15 MIN 3/8/2017 Abigail Spears PTA GP, KX 1    96707358878 HC GAIT TRAINING EA 15 MIN 3/8/2017 Abigail Spears PTA GP, KX 1    21897736576 HC PT THER PROC EA 15 MIN 3/8/2017 Abigail Spears PTA GP, KX 1          PT G-Codes  PT Professional Judgement Used?: Yes  Outcome Measure Options: AM-PAC 6 Clicks Basic Mobility (PT)  Score:  (20)  Functional Limitation: Mobility: Walking and moving around  Mobility: Walking and Moving Around Current Status (): At least 20 percent but less than 40 percent impaired, limited or restricted  Mobility: Walking and Moving Around Goal Status (): At least 1 percent but less than 20 percent impaired, limited or restricted    Abigail Spears PTA  3/8/2017

## 2017-03-08 NOTE — PLAN OF CARE
Problem: Patient Care Overview (Adult)  Goal: Plan of Care Review  Outcome: Ongoing (interventions implemented as appropriate)    03/08/17 1340   Coping/Psychosocial Response Interventions   Plan Of Care Reviewed With patient   Patient Care Overview   Progress improving   Outcome Evaluation   Outcome Summary/Follow up Plan previous note in am was put in as pm rx-amb 144' with rw and cga of 1 holding to iv pole with sats94%-would benefit from cont therapy hh/op pt when d/c home       Goal: Discharge Needs Assessment  Outcome: Ongoing (interventions implemented as appropriate)    03/06/17 1426 03/07/17 1355 03/07/17 1606   Discharge Needs Assessment   Concerns To Be Addressed no discharge needs identified --  --    Readmission Within The Last 30 Days no previous admission in last 30 days --  --    Equipment Needed After Discharge colostomy/ostomy supplies --  --    Discharge Planning Comments --  --  NA bicarb gtt and Albumin infusion today.   Self-Care   Equipment Currently Used at Home --  none --    Living Environment   Transportation Available car;family or friend will provide --  --          Problem: Inpatient Physical Therapy  Goal: Transfer Training Goal 1 STG- PT  Outcome: Ongoing (interventions implemented as appropriate)    03/07/17 1440 03/08/17 1340   Transfer Training PT STG   Transfer Training PT STG, Date Established 03/07/17 --    Transfer Training PT STG, Time to Achieve 3 days --    Transfer Training PT STG, Activity Type all transfers --    Transfer Training PT STG, [place] Level conditional independence --    Transfer Training PT STG, Assist Device (AAD) --    Transfer Training PT STG, Date Goal Reviewed --  03/08/17   Transfer Training PT STG, Outcome --  goal not met   Transfer Training PT STG, Reason Goal Not Met --  progress slower than expected       Goal: Gait Training Goal STG- PT  Outcome: Ongoing (interventions implemented as appropriate)    03/07/17 1440 03/08/17 1340   Gait Training  PT STG   Gait Training Goal PT STG, Date Established 03/07/17 --    Gait Training Goal PT STG, [place] Level conditional independence --    Gait Training Goal PT STG, Assist Device (AAD) --    Gait Training Goal PT STG, Distance to Achieve 150'; O2 sats >92% --    Gait Training Goal PT STG, Date Goal Reviewed --  03/08/17   Gait Training Goal PT STG, Outcome --  goal not met   Gait Training Goal PT STG, Reason Goal Not Met --  progress slower than expected       Goal: Stair Training Goal LTG- PT  Outcome: Ongoing (interventions implemented as appropriate)    03/07/17 1440 03/08/17 1340   Stair Training PT LTG   Stair Training Goal PT LTG, Date Established --  03/08/17   Stair Training Goal PT LTG, Time to Achieve 5 days --    Stair Training Goal PT LTG, [place] Level conditional independence --    Stair Training Goal PT LTG, Assist Device 1 handrail --    Stair Training Goal PT LTG, Distance to Achieve 3 steps --    Stair Training Goal PT LTG, Date Goal Reviewed --  03/08/17   Stair Training Goal PT LTG, Outcome --  goal not met   Stair Training Goal PT LTG, Reason Goal Not Met --  progress slower than expected

## 2017-03-08 NOTE — PLAN OF CARE
Problem: Patient Care Overview (Adult)  Goal: Plan of Care Review  Outcome: Ongoing (interventions implemented as appropriate)    03/08/17 1040   Coping/Psychosocial Response Interventions   Plan Of Care Reviewed With patient   Patient Care Overview   Progress improving   Outcome Evaluation   Outcome Summary/Follow up Plan pt amb 144' with cga of 1 and holding to iv pole with sats 94% and no chest pain,t/f' sba-may benefit from hh/op pt when d/c home       Goal: Discharge Needs Assessment  Outcome: Ongoing (interventions implemented as appropriate)    03/06/17 1426 03/07/17 1355 03/07/17 1606   Discharge Needs Assessment   Concerns To Be Addressed no discharge needs identified --  --    Readmission Within The Last 30 Days no previous admission in last 30 days --  --    Equipment Needed After Discharge colostomy/ostomy supplies --  --    Discharge Planning Comments --  --  NA bicarb gtt and Albumin infusion today.   Self-Care   Equipment Currently Used at Home --  none --    Living Environment   Transportation Available car;family or friend will provide --  --          Problem: Inpatient Physical Therapy  Goal: Transfer Training Goal 1 STG- PT  Outcome: Ongoing (interventions implemented as appropriate)    03/07/17 1440 03/08/17 1040   Transfer Training PT STG   Transfer Training PT STG, Date Established 03/07/17 --    Transfer Training PT STG, Time to Achieve 3 days --    Transfer Training PT STG, Activity Type all transfers --    Transfer Training PT STG, [place] Level conditional independence --    Transfer Training PT STG, Assist Device (AAD) --    Transfer Training PT STG, Date Goal Reviewed --  03/08/17   Transfer Training PT STG, Outcome --  goal not met   Transfer Training PT STG, Reason Goal Not Met --  progress slower than expected       Goal: Gait Training Goal STG- PT  Outcome: Ongoing (interventions implemented as appropriate)    03/07/17 1440 03/08/17 1040   Gait Training PT STG   Gait Training  Goal PT STG, Date Established 03/07/17 --    Gait Training Goal PT STG, [place] Level conditional independence --    Gait Training Goal PT STG, Assist Device (AAD) --    Gait Training Goal PT STG, Distance to Achieve 150'; O2 sats >92% --    Gait Training Goal PT STG, Date Goal Reviewed --  03/08/17   Gait Training Goal PT STG, Outcome --  goal not met   Gait Training Goal PT STG, Reason Goal Not Met --  progress slower than expected       Goal: Stair Training Goal LTG- PT  Outcome: Ongoing (interventions implemented as appropriate)    03/07/17 1440 03/08/17 1040   Stair Training PT LTG   Stair Training Goal PT LTG, Date Established 03/07/17 --    Stair Training Goal PT LTG, Time to Achieve 5 days --    Stair Training Goal PT LTG, [place] Level conditional independence --    Stair Training Goal PT LTG, Assist Device 1 handrail --    Stair Training Goal PT LTG, Distance to Achieve 3 steps --    Stair Training Goal PT LTG, Date Goal Reviewed --  03/08/17   Stair Training Goal PT LTG, Outcome --  goal not met   Stair Training Goal PT LTG, Reason Goal Not Met --  progress slower than expected

## 2017-03-08 NOTE — SIGNIFICANT NOTE
03/08/17 0853   Rehab Treatment   Discipline physical therapy assistant   Treatment Not Performed patient unavailable for treatment  (eating breakfast)

## 2017-03-08 NOTE — PLAN OF CARE
Problem: Patient Care Overview (Adult)  Goal: Plan of Care Review  Outcome: Ongoing (interventions implemented as appropriate)    03/07/17 1732   Coping/Psychosocial Response Interventions   Plan Of Care Reviewed With patient   Patient Care Overview   Progress no change       Goal: Adult Individualization and Mutuality  Outcome: Ongoing (interventions implemented as appropriate)  Goal: Discharge Needs Assessment  Outcome: Ongoing (interventions implemented as appropriate)    Problem: Pressure Ulcer Risk (Chaim Scale) (Adult,Obstetrics,Pediatric)  Goal: Skin Integrity  Outcome: Ongoing (interventions implemented as appropriate)    Problem: Respiratory Insufficiency (Adult)  Goal: Acid/Base Balance  Outcome: Ongoing (interventions implemented as appropriate)  Goal: Effective Ventilation  Outcome: Ongoing (interventions implemented as appropriate)    Problem: Gastrointestinal Bleeding (Adult)  Goal: Signs and Symptoms of Listed Potential Problems Will be Absent or Manageable (Gastrointestinal Bleeding)  Outcome: Ongoing (interventions implemented as appropriate)    Problem: Renal Failure/Kidney Injury, Acute (Adult)  Goal: Signs and Symptoms of Listed Potential Problems Will be Absent or Manageable (Renal Failure/Kidney Injury, Acute)  Outcome: Ongoing (interventions implemented as appropriate)

## 2017-03-08 NOTE — PROGRESS NOTES
.  FAMILY MEDICINE DAILY PROGRESS NOTE  NAME: Eva Troncoso  : 1960  MRN: [id number]     LOS: 4 days     PROVIDER OF SERVICE: Mary Spangler MD    Chief Complaint:   Chief Complaint   Patient presents with   • Chest Pain       Subjective:     Interval History:  History taken from: patient    Patient Complaints: mid sternal chest pain   Patient Denies: radiating pain, shortness of breath    Patient continues to complain of mid sternal chest pain that is constant. Pain is better today per patient.     Review of Systems:   Review of Systems   Constitutional: Negative.  Negative for chills, diaphoresis, fatigue and fever.   HENT: Negative.  Negative for congestion, ear pain, rhinorrhea, sinus pressure, sneezing and sore throat.    Eyes: Negative.  Negative for pain and visual disturbance.   Respiratory: Negative.  Negative for cough and shortness of breath.    Cardiovascular: Positive for chest pain ( mid sternal; non radiating). Negative for palpitations and leg swelling.   Gastrointestinal: Negative for abdominal pain, diarrhea, nausea and vomiting.        Colostomy bag in [facility] in [facility].   Endocrine: Negative.    Genitourinary: Negative for difficulty urinating, dysuria and hematuria.   Musculoskeletal: Negative for arthralgias.   Skin: Positive for rash ( cellulitis over anterior lower legs).   Allergic/Immunologic: Negative.    Neurological: Negative for dizziness, weakness and headaches.   Psychiatric/Behavioral: Negative for confusion, hallucinations and sleep disturbance.       Objective:     Vital Signs  Temp:  [97.4 °F (36.3 °C)-97.9 °F (36.6 °C)] 97.6 °F (36.4 °C)  Heart Rate:  [64-83] 67  Resp:  [16-18] 18  BP: (134-169)/(64-79) 169/79       Intake/Output Summary (Last 24 hours) at 17 0800  Last data filed at 17 0625   Gross per 24 hour   Intake   1160 ml   Output    900 ml   Net    260 ml       Physical Exam  Physical Exam   Constitutional: She is oriented to person, place, and time. She  appears well-developed and well-nourished. No distress.   HENT:   Head: Normocephalic and atraumatic.   Nose: Nose normal.   Mouth/Throat: Oropharynx is clear and moist.   Neck: Normal range of motion. Neck supple. No tracheal deviation present.   Cardiovascular: Normal rate, regular rhythm, normal heart sounds and intact distal pulses.    Pulmonary/Chest: Effort normal. She has no rales ( mild rales appreciated at lung bases bilaterally).   Abdominal: Soft. Bowel sounds are normal. There is no tenderness.   Musculoskeletal: Normal range of motion. She exhibits edema ( +1 pitting edema of the lower extremities).   Lymphadenopathy:     She has no cervical adenopathy.   Neurological: She is alert and oriented to person, place, and time.   Skin: Skin is warm and dry. Rash ( cellulitis of anterior lower extremities) noted. She is not diaphoretic.   Getting better on B/L extremities     Psychiatric: She has a normal mood and affect. Her behavior is normal. Judgment and thought content normal.   Nursing note and vitals reviewed.      Medication Review    Current Facility-Administered Medications:   •  albuterol (PROVENTIL) nebulizer solution 0.083% 2.5 mg/3mL, 2.5 mg, Nebulization, Q6H PRN, Evan Garcia MD, 2.5 mg at 03/07/17 1858  •  [DISCONTINUED] aspirin chewable tablet 324 mg, 324 mg, Oral, Once **AND** aspirin EC tablet 81 mg, 81 mg, Oral, Daily, Maci Sullivan MD, 81 mg at 03/08/17 0751  •  atorvastatin (LIPITOR) tablet 80 mg, 80 mg, Oral, Nightly, Maci Sullivan MD, 80 mg at 03/07/17 2007  •  budesonide-formoterol (SYMBICORT) 160-4.5 MCG/ACT inhaler 2 puff, 2 puff, Inhalation, BID - RT, Evan Garcia MD, 2 puff at 03/07/17 0829  •  busPIRone (BUSPAR) tablet 15 mg, 15 mg, Oral, Q12H, Maci Sullivan MD, 15 mg at 03/08/17 0751  •  docusate sodium (COLACE) capsule 100 mg, 100 mg, Oral, BID, Maci Sullivan MD, 100 mg at 03/08/17 0750  •  doxycycline (VIBRAMYICN) tablet 100 mg, 100 mg, Oral,  Q12H, Maci Sullivan MD, 100 mg at 03/08/17 0750  •  furosemide (LASIX) tablet 20 mg, 20 mg, Oral, Daily PRN, Maci Sullivan MD  •  hydrALAZINE (APRESOLINE) injection 10 mg, 10 mg, Intravenous, Q6H PRN, Brittanie Bell MD  •  HYDROcodone-acetaminophen (NORCO) 5-325 MG per tablet 1 tablet, 1 tablet, Oral, Q8H PRN, Maci Sullivan MD, 1 tablet at 03/08/17 0747  •  ipratropium-albuterol (DUO-NEB) nebulizer solution 3 mL, 3 mL, Nebulization, 4x Daily - RT, Evan Garcia MD, 3 mL at 03/07/17 1640  •  labetalol (NORMODYNE) tablet 200 mg, 200 mg, Oral, BID, Maci Sullivan MD, 200 mg at 03/08/17 0751  •  methocarbamol (ROBAXIN) tablet 500 mg, 500 mg, Oral, Q12H PRN, Mary Spangler MD  •  methylPREDNISolone sodium succinate (SOLU-Medrol) injection 60 mg, 60 mg, Intravenous, Daily, Evan Garcia MD, 60 mg at 03/08/17 0751  •  mirtazapine (REMERON) tablet 15 mg, 15 mg, Oral, Daily, Maci Sullivan MD, 15 mg at 03/07/17 2007  •  Morphine sulfate (PF) injection 1 mg, 1 mg, Intravenous, Q4H PRN, Maci Sullivan MD, 1 mg at 03/08/17 0030  •  nicotine (NICODERM CQ) 21 MG/24HR patch 1 patch, 1 patch, Transdermal, Q24H, Maci Sullivan MD, 1 patch at 03/07/17 1454  •  ondansetron (ZOFRAN) injection 4 mg, 4 mg, Intravenous, Q6H PRN, Maci Sullivan MD  •  pantoprazole (PROTONIX) injection 40 mg, 40 mg, Intravenous, Q AM, Maci Sullivan MD, 40 mg at 03/08/17 0542  •  QUEtiapine (SEROquel) tablet 200 mg, 200 mg, Oral, Nightly, Maci Sullivan MD, 200 mg at 03/07/17 2008  •  sodium bicarbonate 75 mEq in sodium chloride 0.45 % 1,000 mL IVPB, 75 mL/hr, Intravenous, Continuous, Brittanie Bell MD, Last Rate: 75 mL/hr at 03/08/17 0625, 75 mL/hr at 03/08/17 0625  •  sodium chloride 0.9 % flush 1-10 mL, 1-10 mL, Intravenous, PRN, Maci Sullivan MD, 10 mL at 03/04/17 1750  •  traZODone (DESYREL) tablet 150 mg, 150 mg, Oral, Nightly, Maci Sullivan MD, 150 mg at 03/07/17  2008     Diagnostic Data    Lab Results (last 24 hours)     Procedure Component Value Units Date/Time    Potassium [[id number]]  (Abnormal) Collected:  03/07/17 1353    Specimen:  Blood Updated:  03/07/17 1410     Potassium 5.2 (H) mmol/L     CBC & Differential [[id number]] Collected:  03/08/17 0522    Specimen:  Blood Updated:  03/08/17 0640    Narrative:       The following orders were created for panel order CBC & Differential.  Procedure                               Abnormality         Status                     ---------                               -----------         ------                     CBC Auto Differential[[id number]]         Abnormal            Final result                 Please view results for these tests on the individual orders.    CBC Auto Differential [[id number]]  (Abnormal) Collected:  03/08/17 0522    Specimen:  Blood Updated:  03/08/17 0640     WBC 5.66 10*3/mm3      RBC 2.66 (L) 10*6/mm3      Hemoglobin 8.1 (L) g/dL      Hematocrit 24.8 (L) %      MCV 93.2 fL      MCH 30.5 pg      MCHC 32.7 g/dL      RDW 14.3 %      RDW-SD 48.4 (H) fl      MPV 10.2 fL      Platelets 244 10*3/mm3      Neutrophil % 54.1 %      Lymphocyte % 32.3 %      Monocyte % 12.5 (H) %      Eosinophil % 0.0 %      Basophil % 0.0 %      Immature Grans % 1.1 (H) %      Neutrophils, Absolute 3.06 10*3/mm3      Lymphocytes, Absolute 1.83 10*3/mm3      Monocytes, Absolute 0.71 10*3/mm3      Eosinophils, Absolute 0.00 10*3/mm3      Basophils, Absolute 0.00 10*3/mm3      Immature Grans, Absolute 0.06 (H) 10*3/mm3     Comprehensive Metabolic Panel [[id number]]  (Abnormal) Collected:  03/08/17 0522    Specimen:  Blood Updated:  03/08/17 0646     Glucose 79 mg/dL      BUN 57 (H) mg/dL      Creatinine 2.24 (H) mg/dL      Sodium 145 mmol/L      Potassium 4.7 mmol/L      Chloride 114 (H) mmol/L      CO2 23.0 mmol/L      Calcium 8.0 (L) mg/dL      Total Protein 5.5 (L) g/dL      Albumin 2.70 (L) g/dL      ALT (SGPT) 26 U/L      AST (SGOT)  27 U/L      Alkaline Phosphatase 97 U/L      Total Bilirubin 0.3 mg/dL      eGFR Non African Amer 22 (L) mL/min/1.73      Globulin 2.8 gm/dL      A/G Ratio 1.0 (L) g/dL      BUN/Creatinine Ratio 25.4 (H)      Anion Gap 8.0 mmol/L             I reviewed the patient's new clinical results.  I reviewed the patient's other test results and agree with the interpretation    Assessment/Plan:     Active Hospital Problems (** Indicates Principal Problem)    Diagnosis Date Noted   • Disorders of both mitral and tricuspid valves [I08.1] 03/08/2017     Echo identified EF of 50% with Mitral and tricuspid insufficiency contributing to SOB and chest pain. Currently recommended medical management per cardiology.      • CKD (chronic kidney disease) stage 3, GFR 30-59 ml/min [N18.3] 03/05/2017     -Creatinine elevation from 1.47 at admission to 2.2, better than yesterday with bicarb drip on board.  -Nephrology consulted, will follow Dr. Bell's recommendations  -Follow with daily CMP. Advised to refrain from sodas.     • Anemia due to gastrointestinal blood loss [D50.0] 03/05/2017     -Follow H&H; Hb 8.1  Dr. Cary consulted for GI bleed, will follow his recommendations  -Risks and benefits of transfusion discussed with patient  -Type, cross, & 2 units PRBCs ordered     • Precordial chest pain [R07.2] 03/04/2017     -possibility of costochondritis, on robaxin.  -Consulted cardiology, Dr. Ambrocio on board. Recommended medical and symptomatic management for now.         • CHF with left ventricular diastolic dysfunction, NYHA class 1 [I50.30] 03/04/2017     -BNP at the time of admission: 02127   -Monitor patient with strict I/Os and daily weight.       • Elevated d-dimer [R79.1] 03/04/2017     -Ddimer: 3896  -V/Q scan: low probability of PE  -Venous doppler: no evidence of lower extremity DVT  -Encourage incentive spirometry        Resolved Hospital Problems    Diagnosis Date Noted Date Resolved   • Hypertensive emergency [I16.1]  03/04/2017 03/07/2017     -Normotensive  -Aspirin, clonidine, and atorvastatin ordered in ICU  -We will continue all her home antihypertensive medications: labetalol, lisinopril, spironolactone, and lasix  -PRN hydralazine for SBP > 160.   -We will continue to follow her kidney function with daily CMP.   -Lipid panel WNL; moderate intensity statin therapy based on ASCVD risk          Transfer the patient to regular floor today.   Will educated the patient on smoking cessation. Will continue to monitor patient's course and adjust our care accordingly.     DVT prophylaxis: SCDs/TEDs  Code status is Full Code    Plan for disposition:Where: home and home health and When:  clinically stable      Time: 30 minutes           This document has been electronically signed by Mary Spangler MD on March 8, 2017 8:00 AM

## 2017-03-08 NOTE — PROGRESS NOTES
Acute Care - Physical Therapy Treatment Note  [facility]     Patient Name: Eva Troncoso  : 1960  MRN: [id number]  Today's Date: 3/8/2017  Onset of Illness/Injury or Date of Surgery Date: 17  Date of Referral to PT: 17  Referring Physician: Dr. Spangler    Admit Date: 3/4/2017    Visit Dx:    ICD-10-CM ICD-9-CM   1. Chest pain, unspecified type R07.9 786.50   2. Acute systolic congestive heart failure I50.21 428.21     428.0   3. Hypertensive emergency I16.1 401.9   4. Impaired physical mobility Z74.09 781.99   5. 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               Adult Rehabilitation Note       17 1040          Rehab Assessment/Intervention    Discipline physical therapy assistant  -LN      Document Type therapy note (daily note)  -LN      Subjective Information agree to therapy;complains of;pain  -LN      Precautions/Limitations fall precautions   gait belt plaecement high due to colostomy bag  -LN      Recorded by [LN] Abigail Spears PTA      Vital Signs    Pre Systolic BP Rehab 167  -LN      Pre Treatment Diastolic BP 77  -LN      Pretreatment Heart Rate (beats/min) 63  -LN      Pre SpO2 (%) 94  -LN      O2 Delivery Pre Treatment room air  -LN      Pre Patient Position Supine  -LN      Intra Patient Position Sitting  -LN      Post Patient Position Supine  -LN      Recorded by [LN] Abigail Spears PTA      Pain  Assessment    Pain Assessment 0-10  -LN      Pain Score 7  -LN      Post Pain Score 7  -LN      Pain Type Acute pain  -LN      Pain Location Chest  -LN      Pain Orientation Right;Left  -LN      Pain Intervention(s) --   nsg aware  -LN      Recorded by [LN] Abigail Spears PTA      Cognitive Assessment/Intervention    Orientation Status oriented to;person;place     -LN      Recorded by [LN] Abigail Spears PTA      Bed Mobility, Assessment/Treatment    Bed Mobility, Assistive Device bed rails;head of bed elevated  -LN      Bed Mobility, Roll Right, [place] conditional independence  -LN      Bed Mobility, Scoot/Bridge, [place] conditional independence  -LN      Bed Mob, Supine to Sit, [place] supervision required  -LN      Bed Mob, Sit to Supine, [place] supervision required  -LN      Bed Mobility, Comment pt colostomy bag busted open once eob of bed-pt laid back down and nsg called-rx ended-will check back in pm  -LN      Recorded by [LN] Abigail Spears PTA      Positioning and Restraints    Post Treatment Position bed  -LN      In Bed notified nsg;supine;call light within reach;encouraged to call for assist;exit alarm on  -LN      Recorded by [LN] Abigail Spears PTA        User Key  (r) = Recorded By, (t) = Taken By, (c) = Cosigned By    Initials Name Effective Dates    LN Abigail Spears PTA 10/17/16 -                 IP PT Goals       17 1040 17 1440       Transfer Training PT STG    Transfer Training PT STG, Date Established  17  -MM     Transfer Training PT STG, Time to Achieve  3 days  -MM     Transfer Training PT STG, Activity Type  all transfers  -MM     Transfer Training PT STG, [place] Level  conditional independence  -MM     Transfer Training PT STG, Assist Device  --   AAD  -MM     Transfer Training PT STG, Date Goal Reviewed 17  -LN 17  -MM     Transfer Training PT STG, Outcome goal not met  -LN goal ongoing  -MM     Transfer Training PT STG, Reason Goal  Not Met progress slower than expected  -LN      Gait Training PT STG    Gait Training Goal PT STG, Date Established  03/07/17  -MM     Gait Training Goal PT STG, [place] Level  conditional independence  -MM     Gait Training Goal PT STG, Assist Device  --   AAD  -MM     Gait Training Goal PT STG, Distance to Achieve  150'; O2 sats >92%  -MM     Gait Training Goal PT STG, Date Goal Reviewed 03/08/17  -LN 03/07/17  -MM     Gait Training Goal PT STG, Outcome goal not met  -LN goal ongoing  -MM     Gait Training Goal PT STG, Reason Goal Not Met progress slower than expected  -LN      Stair Training PT LTG    Stair Training Goal PT LTG, Date Established  03/07/17  -MM     Stair Training Goal PT LTG, Time to Achieve  5 days  -MM     Stair Training Goal PT LTG, [place] Level  conditional independence  -MM     Stair Training Goal PT LTG, Assist Device  1 handrail  -MM     Stair Training Goal PT LTG, Distance to Achieve  3 steps  -MM     Stair Training Goal PT LTG, Date Goal Reviewed 03/08/17  -LN 03/07/17  -MM     Stair Training Goal PT LTG, Outcome goal not met  -LN goal ongoing  -MM     Stair Training Goal PT LTG, Reason Goal Not Met progress slower than expected  -LN        User Key  (r) = Recorded By, (t) = Taken By, (c) = Cosigned By    Initials Name Provider Type    KRISTA Spears PTA Physical Therapy Assistant    MM Sujey Burton, RONALDO Physical Therapist          Physical Therapy Education     Title: PT OT SLP Therapies (Active)     Topic: Physical Therapy (Active)     Point: Mobility training (Active)    Learning Progress Summary    Learner Readiness Method Response Comment Documented by Status   Patient Acceptance E NR  LN 03/08/17 1429 Active    Acceptance E VU  MM 03/07/17 1439 Done               Point: Home exercise program (Done)    Learning Progress Summary    Learner Readiness Method Response Comment Documented by Status   Patient Acceptance E VU  MM 03/07/17 1439 Done               Point: Body  mechanics (Active)    Learning Progress Summary    Learner Readiness Method Response Comment Documented by Status   Patient Acceptance E NR  LN 03/08/17 1429 Active    Acceptance E VU  MM 03/07/17 1439 Done               Point: Precautions (Active)    Learning Progress Summary    Learner Readiness Method Response Comment Documented by Status   Patient Acceptance E NR  LN 03/08/17 1429 Active    Acceptance E VU  MM 03/07/17 1439 Done                      User Key     Initials Effective Dates Name Provider Type Discipline    LN 10/17/16 -  Abigail Spears, PTA Physical Therapy Assistant PT     12/28/16 -  Sujey Burton PT Physical Therapist PT                    PT Recommendation and Plan  Anticipated Discharge Disposition: home with assist, home with home health  Planned Therapy Interventions: gait training, home exercise program, balance training, patient/family education, stair training, strengthening, transfer training  PT Frequency: 3-5 times/wk  Plan of Care Review  Plan Of Care Reviewed With: patient  Progress: improving  Outcome Summary/Follow up Plan: pt amb 144' with cga of 1 and holding to iv pole with sats 94% and no chest pain,t/f' sba-may benefit from hh/op pt when d/c home          Outcome Measures       03/08/17 1040 03/07/17 1400       How much help from another person do you currently need...    Turning from your back to your side while in flat bed without using bedrails? 4  -LN 4  -MM     Moving from lying on back to sitting on the side of a flat bed without bedrails? 4  -LN 4  -MM     Moving to and from a bed to a chair (including a wheelchair)? 3  -LN 3  -MM     Standing up from a chair using your arms (e.g., wheelchair, bedside chair)? 3  -LN 3  -MM     Climbing 3-5 steps with a railing? 3  -LN 3  -MM     To walk in hospital room? 3  -LN 3  -MM     AM-PAC 6 Clicks Score 20  -LN 20  -MM     Functional Assessment    Outcome Measure Options AM-PAC 6 Clicks Basic Mobility (PT)  -LN AM-PAC 6 Clicks  Basic Mobility (PT)  -MM       User Key  (r) = Recorded By, (t) = Taken By, (c) = Cosigned By    Initials Name Provider Type    KRISTA Spears PTA Physical Therapy Assistant    TYLER Burton, PT Physical Therapist           Time Calculation:         PT Charges       03/08/17 1040          Time Calculation    Start Time 1040  -LN      Stop Time 1052  -LN      Time Calculation (min) 12 min  -LN      PT Received On 03/08/17  -LN      Time Calculation- PT    Total Timed Code Minutes- PT 12 minute(s)  -LN        User Key  (r) = Recorded By, (t) = Taken By, (c) = Cosigned By    Initials Name Provider Type    LN Abigail Spears PTA Physical Therapy Assistant          Therapy Charges for Today     Code Description Service Date Service Provider Modifiers Qty    43658755719 HC PT THERAPEUTIC ACT EA 15 MIN 3/8/2017 Abigail Spears PTA GP, KX 1          PT G-Codes  PT Professional Judgement Used?: Yes  Outcome Measure Options: AM-PAC 6 Clicks Basic Mobility (PT)  Score:  (20)  Functional Limitation: Mobility: Walking and moving around  Mobility: Walking and Moving Around Current Status (): At least 20 percent but less than 40 percent impaired, limited or restricted  Mobility: Walking and Moving Around Goal Status (): At least 1 percent but less than 20 percent impaired, limited or restricted    Abigail Spears PTA  3/8/2017

## 2017-03-08 NOTE — PROGRESS NOTES
[address] [address]  T - [id number]         PROGRESS NOTE         SUBJECTIVE:   Patient Care Team:  Mary Spangler MD as PCP - General (Family Medicine)    Chief Complaint:     Chief Complaint   Patient presents with   • Chest Pain       Subjective     Patient is 57 y.o. female presents with chest pain. She is feeling better..      ROS/HISTORY/ CURRENT MEDICATIONS/OBJECTIVE/VS/PE:   Review of Systems:   Review of Systems    History:     Past Medical History   Diagnosis Date   • Anxiety state    • Chronic depression    • Chronic pain    • Compression fracture of lumbar spine, non-traumatic    • Depressive disorder    • Essential hypertension    • Obesity    • Pericardial effusion      Past Surgical History   Procedure Laterality Date   • Cardiac catheterization  05/13/2014     55691 (1)   -  Successful PCI to the proximal LAD with a V stent reducing 99% stenosis to 0%   • Appendectomy  12/10/2012     Laparoscopic (1) - Acute suppurative appendicitis   • Shoulder surgery  05/14/2015     (1) - Communited 3 part fractured humerus, proximal left shoulder. Open reduction and internal fixation   • Tracheostomy  05/30/2014     (1) - Percutaneous endoscopic gastrostomy tube placement.;     • Colonoscopy N/A 2/13/2017     Procedure: COLONOSCOPY;  Surgeon: Nader Osei DO;  Location: [facility] ENDOSCOPY;  Service:      History reviewed. No pertinent family history.  Social History   Substance Use Topics   • Smoking status: Current Every Day Smoker   • Smokeless tobacco: Never Used      Comment: Smokes 1 ppd for 40 years   • Alcohol use No     Prescriptions Prior to Admission   Medication Sig Dispense Refill Last Dose   • Aspirin-Salicylamide-Caffeine (BC HEADACHE POWDER PO) Take  by mouth As Needed (for pain).   Taking   • azithromycin (ZITHROMAX) 500 MG tablet Take 1 tablet by mouth Daily. 2 tablet 0 Taking   • busPIRone (BUSPAR) 15 MG tablet Take  by mouth. Take 1 tablet  2 times per day.   Taking   • cefuroxime (CEFTIN) 500 MG tablet Take 1 tablet by mouth 2 (Two) Times a Day. 6 tablet 0 Taking   • docusate sodium 100 MG capsule Take 100 mg by mouth 2 (Two) Times a Day. 60 capsule 0 Taking   • doxycycline (DORYX) 100 MG enteric coated tablet Take 1 tablet by mouth 2 (Two) Times a Day. 42 tablet 0 Taking   • doxycycline (MONODOX) 100 MG capsule       • furosemide (LASIX) 20 MG tablet Take 20 mg by mouth Daily As Needed (for fluids).   Taking   • HYDROcodone-acetaminophen (NORCO) 5-325 MG per tablet Take 1 tablet by mouth Every 8 (Eight) Hours As Needed for severe pain (7-10). 20 tablet 0 Taking   • labetalol (NORMODYNE) 200 MG tablet Take 1 tablet by mouth 2 (Two) Times a Day for 30 days. 60 tablet 3    • lisinopril (PRINIVIL,ZESTRIL) 40 MG tablet Take 1 tablet by mouth Daily. 30 tablet 3    • MethylPREDNISolone (MEDROL, SANA,) 4 MG tablet Take as directed on package instructions. 21 tablet 0 Taking   • mirtazapine (REMERON) 15 MG tablet Take 15 mg by mouth Daily.   Taking   • potassium chloride (KLOR-CON) 20 MEQ packet Take 20 mEq by mouth 2 (Two) Times a Day. 10 packet 0 Taking   • QUEtiapine (SEROquel) 200 MG tablet Take 200 mg by mouth Every Night.   Taking   • spironolactone (ALDACTONE) 25 MG tablet Take 1 tablet by mouth Daily. 30 tablet 1    • sulfamethoxazole-trimethoprim (BACTRIM DS,SEPTRA DS) 800-160 MG per tablet Take 1 tablet by mouth 2 (Two) Times a Day. 28 tablet 0    • traZODone (DESYREL) 150 MG tablet Take 150 mg by mouth Every Night.   Taking     Allergies:  Other; Stadol [butorphanol]; and Toradol [ketorolac tromethamine]    Current Medications:     Current Facility-Administered Medications   Medication Dose Route Frequency Provider Last Rate Last Dose   • albuterol (PROVENTIL) nebulizer solution 0.083% 2.5 mg/3mL  2.5 mg Nebulization Q6H PRN Evan Garcia MD   2.5 mg at 03/07/17 4758   • aspirin EC tablet 81 mg  81 mg Oral Daily Maci Brenna Sullivan MD   81 mg at  03/08/17 0751   • atorvastatin (LIPITOR) tablet 80 mg  80 mg Oral Nightly Maci Sullivan MD   80 mg at 03/07/17 2007   • budesonide-formoterol (SYMBICORT) 160-4.5 MCG/ACT inhaler 2 puff  2 puff Inhalation BID - RT Evan Garcia MD   2 puff at 03/07/17 0829   • busPIRone (BUSPAR) tablet 15 mg  15 mg Oral Q12H Maci Sullivan MD   15 mg at 03/08/17 0751   • docusate sodium (COLACE) capsule 100 mg  100 mg Oral BID Maci Sullivan MD   100 mg at 03/08/17 0750   • doxycycline (VIBRAMYICN) tablet 100 mg  100 mg Oral Q12H Maci Sullivan MD   100 mg at 03/08/17 0750   • furosemide (LASIX) tablet 20 mg  20 mg Oral Daily PRN Maci Sullivan MD       • hydrALAZINE (APRESOLINE) injection 10 mg  10 mg Intravenous Q6H PRN Brittanie Bell MD       • HYDROcodone-acetaminophen (NORCO) 5-325 MG per tablet 1 tablet  1 tablet Oral Q8H PRN Maci Sullivan MD   1 tablet at 03/08/17 0747   • ipratropium-albuterol (DUO-NEB) nebulizer solution 3 mL  3 mL Nebulization 4x Daily - RT Evan Garcia MD   3 mL at 03/08/17 0951   • labetalol (NORMODYNE) tablet 200 mg  200 mg Oral BID Maci Sullivan MD   200 mg at 03/08/17 0751   • Menthol-Zinc Oxide   Topical BID Evan Garcia MD       • methocarbamol (ROBAXIN) tablet 500 mg  500 mg Oral Q12H PRN Mary Spangler MD       • methylPREDNISolone sodium succinate (SOLU-Medrol) injection 60 mg  60 mg Intravenous Daily Evan Garcia MD   60 mg at 03/08/17 0751   • mirtazapine (REMERON) tablet 15 mg  15 mg Oral Daily Maci Sullivan MD   15 mg at 03/07/17 2007   • Morphine sulfate (PF) injection 1 mg  1 mg Intravenous Q4H PRN Maci Sullivan MD   1 mg at 03/08/17 1014   • nicotine (NICODERM CQ) 21 MG/24HR patch 1 patch  1 patch Transdermal Q24H Maci Sullivan MD   1 patch at 03/07/17 1454   • ondansetron (ZOFRAN) injection 4 mg  4 mg Intravenous Q6H PRN Maci Sullivan MD       • pantoprazole (PROTONIX) injection 40 mg  40 mg Intravenous Q AM Maci  Brenna Sullivan MD   40 mg at 03/08/17 0542   • QUEtiapine (SEROquel) tablet 200 mg  200 mg Oral Nightly Maci Sullivan MD   200 mg at 03/07/17 2008   • sodium chloride 0.9 % flush 1-10 mL  1-10 mL Intravenous PRN Maci Sullivan MD   10 mL at 03/04/17 1750   • sodium chloride 0.9 % infusion  75 mL/hr Intravenous Continuous Brittanie Bell MD 75 mL/hr at 03/08/17 1129 75 mL/hr at 03/08/17 1129   • traZODone (DESYREL) tablet 150 mg  150 mg Oral Nightly Maci Sullivan MD   150 mg at 03/07/17 2008       Objective     Physical Exam:   Temp:  [97.4 °F (36.3 °C)-97.9 °F (36.6 °C)] 97.6 °F (36.4 °C)  Heart Rate:  [63-83] 63  Resp:  [18] 18  BP: (134-169)/(64-79) 169/79    Physical Exam   Constitutional: She appears well-developed and well-nourished.   Cardiovascular: Normal rate, regular rhythm and normal heart sounds.  Exam reveals no gallop and no friction rub.    No murmur heard.  Pulmonary/Chest: Effort normal and breath sounds normal. No respiratory distress. She has no wheezes. She has no rales.   Abdominal: Soft. She exhibits no distension. There is no tenderness.   Skin: Skin is warm.   Vitals reviewed.           Results Review:   Lab Results   Component Value Date    GLUCOSE 79 03/08/2017    BUN 57 (H) 03/08/2017    CREATININE 2.24 (H) 03/08/2017    EGFRIFNONA 22 (L) 03/08/2017    BCR 25.4 (H) 03/08/2017    CO2 23.0 03/08/2017    CALCIUM 8.0 (L) 03/08/2017    ALBUMIN 2.70 (L) 03/08/2017    LABIL2 1.0 (L) 03/08/2017    AST 27 03/08/2017    ALT 26 03/08/2017         WBC WBC   Date Value Ref Range Status   03/08/2017 5.66 3.20 - 9.80 10*3/mm3 Final   03/07/2017 7.48 3.20 - 9.80 10*3/mm3 Final   03/06/2017 8.28 3.20 - 9.80 10*3/mm3 Final      HGB HEMOGLOBIN   Date Value Ref Range Status   03/08/2017 8.1 (L) 12.0 - 15.5 g/dL Final   03/07/2017 8.1 (L) 12.0 - 15.5 g/dL Final   03/06/2017 7.7 (L) 12.0 - 15.5 g/dL Final   03/05/2017 7.8 (L) 12.0 - 15.5 g/dL Final   03/05/2017 8.1 (L) 12.0 - 15.5 g/dL Final    03/05/2017 7.9 (L) 12.0 - 15.5 g/dL Final      HCT HEMATOCRIT   Date Value Ref Range Status   03/08/2017 24.8 (L) 35.0 - 45.0 % Final   03/07/2017 25.4 (L) 35.0 - 45.0 % Final   03/06/2017 22.8 (L) 35.0 - 45.0 % Final   03/05/2017 22.9 (L) 35.0 - 45.0 % Final   03/05/2017 24.1 (L) 35.0 - 45.0 % Final   03/05/2017 23.2 (L) 35.0 - 45.0 % Final      Platlets PLATELETS   Date Value Ref Range Status   03/08/2017 244 150 - 450 10*3/mm3 Final   03/07/2017 220 150 - 450 10*3/mm3 Final   03/06/2017 153 150 - 450 10*3/mm3 Final          Imaging Results (last 24 hours)     ** No results found for the last 24 hours. **           I reviewed the patient's new clinical results.  I reviewed the patient's new imaging results and agree with the interpretation.     ASSESSMENT/PLAN:   Assessment/Plan   Active Problems:    Precordial chest pain    CHF with left ventricular diastolic dysfunction, NYHA class 1    Elevated d-dimer    CKD (chronic kidney disease) stage 3, GFR 30-59 ml/min    Anemia due to gastrointestinal blood loss    Disorders of both mitral and tricuspid valves      Continue with current treatment.  I have reviewed the notes, assessments, and/or procedures performed by the resident  , I concur with her/his documentation of Eva Troncoso.    I discussed the patients findings and my recommendations with patient.      Phillip Camara MD  03/08/17  1:50 PM

## 2017-03-08 NOTE — MEDICAL STUDENT
"[facility]  [address] [place] [address]. [id number]  T - [phone]  F - [phone]     Progress Note          PATIENT  DEMOGRAPHICS   PATIENT NAME: Eva Troncoso                      PHYSICIAN: Khari Paz, Medical Student  : 1960  MRN: [id number]   LOS: 4 days    Patient Care Team:  Mary Spangler MD as PCP - General (Family Medicine)  Subjective   SUBJECTIVE   Patients chest pain is better and breathing is better. Her pain is a 6/10 in her chest and belly today. She describes the feeling in her chest as \"just pain\"     REVIEW OF SYSTEMS    Review of Systems   Constitutional:        Still eating and no abdominal pain. No vision changes breathing better and chest pain better no leg pain       Objective   OBJECTIVE   Vital Signs  Temp:  [96.9 °F (36.1 °C)-97.9 °F (36.6 °C)] 97.9 °F (36.6 °C)  Heart Rate:  [64-83] 64  Resp:  [16-20] 18  BP: (113-141)/(57-68) 141/68    Flowsheet Rows         First Filed Value    Admission Height  65\" (165.1 cm) Documented at 2017 0440    Admission Weight  220 lb (99.8 kg) Documented at 2017 0440           I/O last 3 completed shifts:  In: 2560 [P.O.:1660; I.V.:900]  Out: 1600 [Urine:1500; Stool:100]    PHYSICAL EXAM    Physical Exam   Constitutional: She is oriented to person, place, and time.   HENT:   Head: Normocephalic and atraumatic.   Cardiovascular: Normal rate and regular rhythm.    Murmur heard.  3-4/6 systolic ejection murmur at left sternal border  Pedal pulses palpable   Pulmonary/Chest:   Effort decreased and air flow decreased but CTAB. Slightly tachypnic   Abdominal: Soft.   Musculoskeletal:   1+ pitting edema present in the r leg to mid shin. Left leg swelling but not pitting edema.    Neurological: She is alert and oriented to person, place, and time.   Skin: Skin is warm. Rash noted. She is not diaphoretic. No pallor.   Psychiatric: She has a normal mood and affect. Her behavior is normal. Judgment and thought " content normal.   Nursing note and vitals reviewed.      RESULTS   Results Review:      Results from last 7 days  Lab Units 03/08/17  0522 03/07/17  1353 03/07/17  0514 03/06/17  0409   SODIUM mmol/L 145  --  137 135*   POTASSIUM mmol/L 4.7 5.2* 5.4* 5.0   CHLORIDE mmol/L 114*  --  112* 109   TOTAL CO2 mmol/L 23.0  --  20.0* 20.0*   BUN mg/dL 57*  --  52* 40*   CREATININE mg/dL 2.24*  --  2.85* 2.97*   CALCIUM mg/dL 8.0*  --  7.9* 7.7*   BILIRUBIN mg/dL 0.3  --  0.2 0.2   ALK PHOS U/L 97  --  96 106   ALT (SGPT) U/L 26  --  23 25   AST (SGOT) U/L 27  --  16 14   GLUCOSE mg/dL 79  --  98 160*       Estimated Creatinine Clearance: 34.1 mL/min (by C-G formula based on Cr of 2.24).      Results from last 7 days  Lab Units 03/05/17  0240   MAGNESIUM mg/dL 1.8               Results from last 7 days  Lab Units 03/08/17  0522 03/07/17  0514 03/06/17  0409 03/05/17  2353 03/05/17  1935  03/05/17  1145 03/05/17  0240   WBC 10*3/mm3 5.66 7.48 8.28  --   --   --  5.35 5.01   HEMOGLOBIN g/dL 8.1* 8.1* 7.7* 7.8* 8.1*  < > 7.8* 7.9*   PLATELETS 10*3/mm3 244 220 153  --   --   --  123* 113*   < > = values in this interval not displayed.      Results from last 7 days  Lab Units 03/04/17  1433 03/04/17  1128   INR  1.27* 1.22*         Imaging Results (last 24 hours)     ** No results found for the last 24 hours. **           MEDICATIONS      Current Facility-Administered Medications:   •  albuterol (PROVENTIL) nebulizer solution 0.083% 2.5 mg/3mL, 2.5 mg, Nebulization, Q6H PRN, Evan Garcia MD, 2.5 mg at 03/07/17 1858  •  [DISCONTINUED] aspirin chewable tablet 324 mg, 324 mg, Oral, Once **AND** aspirin EC tablet 81 mg, 81 mg, Oral, Daily, Maci Sullivan MD, 81 mg at 03/07/17 0906  •  atorvastatin (LIPITOR) tablet 80 mg, 80 mg, Oral, Nightly, Maci Sullivan MD, 80 mg at 03/07/17 2007  •  budesonide-formoterol (SYMBICORT) 160-4.5 MCG/ACT inhaler 2 puff, 2 puff, Inhalation, BID - RT, Evan Garcia MD, 2 puff at 03/07/17  0829  •  busPIRone (BUSPAR) tablet 15 mg, 15 mg, Oral, Q12H, Maci Sullivan MD, 15 mg at 03/07/17 2007  •  docusate sodium (COLACE) capsule 100 mg, 100 mg, Oral, BID, Maci Sullivan MD, 100 mg at 03/07/17 1811  •  doxycycline (VIBRAMYICN) tablet 100 mg, 100 mg, Oral, Q12H, Maci Sullivan MD, 100 mg at 03/07/17 2008  •  furosemide (LASIX) tablet 20 mg, 20 mg, Oral, Daily PRN, Maci Sullivan MD  •  hydrALAZINE (APRESOLINE) injection 10 mg, 10 mg, Intravenous, Q6H PRN, Brittanie Bell MD  •  HYDROcodone-acetaminophen (NORCO) 5-325 MG per tablet 1 tablet, 1 tablet, Oral, Q8H PRN, Maci Sullivan MD, 1 tablet at 03/07/17 2007  •  ipratropium-albuterol (DUO-NEB) nebulizer solution 3 mL, 3 mL, Nebulization, 4x Daily - RT, Evan Garcia MD, 3 mL at 03/07/17 1640  •  labetalol (NORMODYNE) tablet 200 mg, 200 mg, Oral, BID, Maci Sullivan MD, 200 mg at 03/07/17 1812  •  methocarbamol (ROBAXIN) tablet 500 mg, 500 mg, Oral, Q12H PRN, Mary Spangler MD  •  methylPREDNISolone sodium succinate (SOLU-Medrol) injection 60 mg, 60 mg, Intravenous, Daily, Evan Garcia MD, 60 mg at 03/07/17 0906  •  mirtazapine (REMERON) tablet 15 mg, 15 mg, Oral, Daily, Maci Sullivan MD, 15 mg at 03/07/17 2007  •  Morphine sulfate (PF) injection 1 mg, 1 mg, Intravenous, Q4H PRN, Maci Sullivan MD, 1 mg at 03/08/17 0030  •  nicotine (NICODERM CQ) 21 MG/24HR patch 1 patch, 1 patch, Transdermal, Q24H, Maci Sullivan MD, 1 patch at 03/07/17 1454  •  ondansetron (ZOFRAN) injection 4 mg, 4 mg, Intravenous, Q6H PRN, Maci Sullivan MD  •  pantoprazole (PROTONIX) injection 40 mg, 40 mg, Intravenous, Q AM, Maci Sullivan MD, 40 mg at 03/08/17 0542  •  QUEtiapine (SEROquel) tablet 200 mg, 200 mg, Oral, Nightly, Maci Sullivan MD, 200 mg at 03/07/17 2008  •  sodium bicarbonate 75 mEq in sodium chloride 0.45 % 1,000 mL IVPB, 75 mL/hr, Intravenous, Continuous, Brittanie Bell MD, Last Rate: 75 mL/hr at  03/08/17 0625, 75 mL/hr at 03/08/17 0625  •  sodium chloride 0.9 % flush 1-10 mL, 1-10 mL, Intravenous, PRN, Maci Sullivan MD, 10 mL at 03/04/17 1750  •  traZODone (DESYREL) tablet 150 mg, 150 mg, Oral, Nightly, Maci Sullivan MD, 150 mg at 03/07/17 2008    Assessment/Plan   ASSESSMENT / PLAN    Active Problems:    Chest pain    CHF with left ventricular diastolic dysfunction, NYHA class 1    Elevated d-dimer    CKD (chronic kidney disease) stage 3, GFR 30-59 ml/min    Anemia due to gastrointestinal blood loss    1.acute kidney injury. Patient has a history of recurrent acute kidney injury in the past. Though she recovered well and her creatinine now is less than 1 as a baseline.Her Cr has dropped to 2.24 from 2.85  but her BUN continues to rise to 57 up from 52 yesterday and 40 the previous day. It could be related to drug-induced allergic interstitial nephritis plus minus recent Bactrim use. She has1+  pitting edema of the lower extremity on the right and edema on the left distal leg no pitting. Her potassium is now 4.7 and WNL and her metabolic acidosis has resolved since administration of  bicarb amp x 1 and switch to bicarbonate drip. Discontinue bicarb drip now and resume NS at 75cc/hr Recent story of chronic intermittent bleeding into ostomy with recent cellulitis       At the present moment she is not a good candidate for contrast exposure to do recentlty rising Cr..She seems to be more volume overloaded. Continue with doxycycline.zyvox . Jose Luis stain was negative.There was blood and sediment in her catheter bag this morning and will observe     2.hypertension poorly controlled and also labile. It is much improved now but did drop significantly. Her blood pressure is stable on labetalol only     3.chest pain with associated shortness of air. She has elevated d-dimer and VQ scan showed low probability. Patient is off of heparin drip due to drop in hemoglobin.      4.anemia of chronic disease with  superimposed GI loss. 2 units typed and crossed and ready to be infused however Hgb at 8.1 no dizziness, lightheadedness or tachycardia.       5.chronic pain           Khari Paz, Medical Student  03/08/17  7:31 AM

## 2017-03-09 NOTE — PROGRESS NOTES
.  FAMILY MEDICINE DAILY PROGRESS NOTE  NAME: Eva Troncoso  : 1960  MRN: [id number]     LOS: 5 days     PROVIDER OF SERVICE: Mary Spangler MD    Chief Complaint:   Chief Complaint   Patient presents with   • Chest Pain       Subjective:     Interval History:  History taken from: patient    Patient Complaints: mid sternal chest pain   Patient Denies: radiating pain, shortness of breath    Patient continues to complain of mid sternal chest pain that is constant. Pain is better today per patient.     Review of Systems:   Review of Systems   Constitutional: Negative.  Negative for chills, diaphoresis, fatigue and fever.   HENT: Negative.  Negative for congestion, ear pain, rhinorrhea, sinus pressure, sneezing and sore throat.    Eyes: Negative.  Negative for pain and visual disturbance.   Respiratory: Negative.  Negative for cough and shortness of breath.    Cardiovascular: Positive for chest pain ( mid sternal; non radiating). Negative for palpitations and leg swelling.   Gastrointestinal: Negative for abdominal pain, diarrhea, nausea and vomiting.        Colostomy bag in [facility] in [facility].   Endocrine: Negative.    Genitourinary: Negative for difficulty urinating, dysuria and hematuria.   Musculoskeletal: Negative for arthralgias.   Skin: Positive for rash ( cellulitis over anterior lower legs).   Allergic/Immunologic: Negative.    Neurological: Negative for dizziness, weakness and headaches.   Psychiatric/Behavioral: Negative for confusion, hallucinations and sleep disturbance.       Objective:     Vital Signs  Temp:  [96.5 °F (35.8 °C)-98.5 °F (36.9 °C)] 98.5 °F (36.9 °C)  Heart Rate:  [63-80] 78  Resp:  [16-20] 16  BP: (152-187)/(52-85) 179/78       Intake/Output Summary (Last 24 hours) at 17 0934  Last data filed at 17 0846   Gross per 24 hour   Intake   2765 ml   Output    300 ml   Net   2465 ml       Physical Exam  Physical Exam   Constitutional: She is oriented to person, place, and time. She  appears well-developed and well-nourished. No distress.   HENT:   Head: Normocephalic and atraumatic.   Nose: Nose normal.   Mouth/Throat: Oropharynx is clear and moist.   Neck: Normal range of motion. Neck supple. No tracheal deviation present.   Cardiovascular: Normal rate, regular rhythm, normal heart sounds and intact distal pulses.    Pulmonary/Chest: Effort normal. She has no rales.   Abdominal: Soft. Bowel sounds are normal. There is no tenderness.   Musculoskeletal: Normal range of motion. She exhibits no edema.   Lymphadenopathy:     She has no cervical adenopathy.   Neurological: She is alert and oriented to person, place, and time.   Skin: Skin is warm and dry. Rash ( cellulitis of anterior lower extremities, getting better) noted. She is not diaphoretic.        Psychiatric: She has a normal mood and affect. Her behavior is normal. Judgment and thought content normal.   Nursing note and vitals reviewed.      Medication Review    Current Facility-Administered Medications:   •  albuterol (PROVENTIL) nebulizer solution 0.083% 2.5 mg/3mL, 2.5 mg, Nebulization, Q6H PRN, Evan Garcia MD, 2.5 mg at 03/07/17 1858  •  [DISCONTINUED] aspirin chewable tablet 324 mg, 324 mg, Oral, Once **AND** aspirin EC tablet 81 mg, 81 mg, Oral, Daily, Maci Sullivan MD, 81 mg at 03/09/17 0805  •  atorvastatin (LIPITOR) tablet 80 mg, 80 mg, Oral, Nightly, Maci Sullivan MD, 80 mg at 03/08/17 2015  •  budesonide-formoterol (SYMBICORT) 160-4.5 MCG/ACT inhaler 2 puff, 2 puff, Inhalation, BID - RT, Evan Garcia MD, 2 puff at 03/09/17 0738  •  busPIRone (BUSPAR) tablet 15 mg, 15 mg, Oral, Q12H, Maci Sullivan MD, 15 mg at 03/09/17 0805  •  docusate sodium (COLACE) capsule 100 mg, 100 mg, Oral, BID, Maci Sullivan MD, 100 mg at 03/09/17 0805  •  doxycycline (VIBRAMYICN) tablet 100 mg, 100 mg, Oral, Q12H, Maci Sullivan MD, 100 mg at 03/09/17 0805  •  furosemide (LASIX) tablet 20 mg, 20 mg, Oral, Daily PRN,  Maci Sullivan MD  •  hydrALAZINE (APRESOLINE) injection 10 mg, 10 mg, Intravenous, Q6H PRN, Brittanie Bell MD, 10 mg at 03/09/17 0820  •  HYDROcodone-acetaminophen (NORCO) 5-325 MG per tablet 1 tablet, 1 tablet, Oral, Q8H PRN, Maci Sullivan MD, 1 tablet at 03/09/17 0346  •  ipratropium-albuterol (DUO-NEB) nebulizer solution 3 mL, 3 mL, Nebulization, 4x Daily - RT, Evan Garcia MD, 3 mL at 03/09/17 0737  •  labetalol (NORMODYNE) tablet 200 mg, 200 mg, Oral, BID, Maci Sullivan MD, 200 mg at 03/09/17 0805  •  Menthol-Zinc Oxide 1 application, 1 application, Topical, Daily, Evan Garcia MD, 1 application at 03/09/17 0814  •  methocarbamol (ROBAXIN) tablet 500 mg, 500 mg, Oral, Q12H PRN, Mary Spangler MD  •  methylPREDNISolone sodium succinate (SOLU-Medrol) injection 60 mg, 60 mg, Intravenous, Daily, Evan Garcia MD, 60 mg at 03/09/17 0806  •  mirtazapine (REMERON) tablet 15 mg, 15 mg, Oral, Daily, Maci Sullivan MD, 15 mg at 03/08/17 2015  •  Morphine sulfate (PF) injection 1 mg, 1 mg, Intravenous, Q4H PRN, Maci Sullivan MD, 1 mg at 03/09/17 0548  •  nicotine (NICODERM CQ) 21 MG/24HR patch 1 patch, 1 patch, Transdermal, Q24H, Maci Sullivan MD, 1 patch at 03/08/17 1440  •  ondansetron (ZOFRAN) injection 4 mg, 4 mg, Intravenous, Q6H PRN, Maci Sullivan MD  •  pantoprazole (PROTONIX) injection 40 mg, 40 mg, Intravenous, Q AM, Maci Sullivan MD, 40 mg at 03/09/17 0548  •  QUEtiapine (SEROquel) tablet 200 mg, 200 mg, Oral, Nightly, Maci Sullivan MD, 200 mg at 03/08/17 2015  •  sodium chloride 0.9 % flush 1-10 mL, 1-10 mL, Intravenous, PRN, Maci Sullivan MD, 10 mL at 03/04/17 1750  •  traZODone (DESYREL) tablet 150 mg, 150 mg, Oral, Nightly, Maci Sullivan MD, 150 mg at 03/08/17 2015     Diagnostic Data    Lab Results (last 24 hours)     Procedure Component Value Units Date/Time    Ferritin [[id number]]  (Normal) Collected:  03/05/17 0240    Specimen:   Blood Updated:  03/08/17 1144     Ferritin 234.00 ng/mL     CBC & Differential [[id number]] Collected:  03/09/17 0538    Specimen:  Blood Updated:  03/09/17 0620    Narrative:       The following orders were created for panel order CBC & Differential.  Procedure                               Abnormality         Status                     ---------                               -----------         ------                     CBC Auto Differential[[id number]]         Abnormal            Final result                 Please view results for these tests on the individual orders.    CBC Auto Differential [[id number]]  (Abnormal) Collected:  03/09/17 0538    Specimen:  Blood Updated:  03/09/17 0620     WBC 5.79 10*3/mm3      RBC 2.86 (L) 10*6/mm3      Hemoglobin 8.6 (L) g/dL      Hematocrit 26.2 (L) %      MCV 91.6 fL      MCH 30.1 pg      MCHC 32.8 g/dL      RDW 13.9 %      RDW-SD 46.4 (H) fl      MPV 9.8 fL      Platelets 235 10*3/mm3      Neutrophil % 56.6 %      Lymphocyte % 30.2 %      Monocyte % 11.9 %      Eosinophil % 0.3 %      Basophil % 0.0 %      Immature Grans % 1.0 (H) %      Neutrophils, Absolute 3.27 10*3/mm3      Lymphocytes, Absolute 1.75 10*3/mm3      Monocytes, Absolute 0.69 10*3/mm3      Eosinophils, Absolute 0.02 10*3/mm3      Basophils, Absolute 0.00 10*3/mm3      Immature Grans, Absolute 0.06 (H) 10*3/mm3     Comprehensive Metabolic Panel [[id number]]  (Abnormal) Collected:  03/09/17 0538    Specimen:  Blood Updated:  03/09/17 0629     Glucose 80 mg/dL      BUN 47 (H) mg/dL      Creatinine 1.35 (H) mg/dL      Sodium 146 (H) mmol/L      Potassium 4.1 mmol/L      Chloride 114 (H) mmol/L      CO2 22.0 mmol/L      Calcium 8.3 (L) mg/dL      Total Protein 5.5 (L) g/dL      Albumin 2.70 (L) g/dL      ALT (SGPT) 31 U/L      AST (SGOT) 33 U/L      Alkaline Phosphatase 92 U/L      Total Bilirubin 0.3 mg/dL      eGFR Non African Amer 40 (L) mL/min/1.73      Globulin 2.8 gm/dL      A/G Ratio 1.0 (L) g/dL       BUN/Creatinine Ratio 34.8 (H)      Anion Gap 10.0 mmol/L             I reviewed the patient's new clinical results.  I reviewed the patient's other test results and agree with the interpretation    Assessment/Plan:     Active Hospital Problems (** Indicates Principal Problem)    Diagnosis Date Noted   • **Precordial chest pain [R07.2] 03/04/2017     - Resolved today with medications  -Consulted cardiology, Dr. Ambrocio on board. Recommended medical and symptomatic management for now.         • Disorders of both mitral and tricuspid valves [I08.1] 03/08/2017     Echo identified EF of 50% with Mitral and tricuspid insufficiency contributing to SOB and chest pain. Currently recommended medical management per cardiology.      • CKD (chronic kidney disease) stage 3, GFR 30-59 ml/min [N18.3] 03/05/2017     -Creatinine elevation from 1.47 at admission to 1.35  -Nephrology consulted, will follow Dr. Bell's recommendations  -Follow with daily CMP. Advised to refrain from sodas.     • Anemia due to gastrointestinal blood loss [D50.0] 03/05/2017     -Follow H&H; Hb 8.1  Dr. Cary consulted for GI bleed, will follow his recommendations  -Risks and benefits of transfusion discussed with patient  -Type, cross, & 2 units PRBCs ordered     • (HFpEF) heart failure with preserved ejection fraction [I50.30] 03/04/2017     -BNP at the time of admission: 94112   -Monitor patient with strict I/Os and daily weight.       • Elevated d-dimer [R79.1] 03/04/2017     -Ddimer: 3896  -V/Q scan: low probability of PE  -Venous doppler: no evidence of lower extremity DVT  -Encourage incentive spirometry        Resolved Hospital Problems    Diagnosis Date Noted Date Resolved   • Hypertensive emergency [I16.1] 03/04/2017 03/07/2017     -Normotensive  -Aspirin, clonidine, and atorvastatin ordered in ICU  -We will continue all her home antihypertensive medications: labetalol, lisinopril, spironolactone, and lasix  -PRN hydralazine for SBP > 160.    -We will continue to follow her kidney function with daily CMP.   -Lipid panel WNL; moderate intensity statin therapy based on ASCVD risk          Will educated the patient on smoking cessation. Will continue to monitor patient's course and adjust our care accordingly.     DVT prophylaxis: SCDs/TEDs  Code status is Full Code    Plan for disposition:Where: home and home health and When:  clinically stable, possibly today      Time: 30 minutes           This document has been electronically signed by Mary Spangler MD on March 9, 2017 9:34 AM

## 2017-03-09 NOTE — DISCHARGE SUMMARY
DISCHARGE SUMMARY    NAME: Eva Troncoso   PHYSICIAN: Mary Spangler MD  : 1960  MRN: [id number]    ADMITTED: 3/4/2017   DISCHARGED: 17    ADMISSION DIAGNOSES:  Principal Problem:    Precordial chest pain  Active Problems:    (HFpEF) heart failure with preserved ejection fraction    Elevated d-dimer    CKD (chronic kidney disease) stage 3, GFR 30-59 ml/min    Essential HTN    Cellulitis of bilateral lower extremities, already on doxycycline       DISCHARGE DIAGNOSES:   Principal Problem:    Precordial chest pain, resolved with medical managment. Dr. Ambrocio to follow as outpatient   Active Problems:    (HFpEF) heart failure with preserved ejection fraction, EF 50%    Elevated d-dimer, with negative V/Q scan and Doppler of lower extremities    CKD (chronic kidney disease) stage 3, GFR 30-59 ml/min, currently stable with baseline Cr of 1.3    Anemia due to gastrointestinal blood loss, Hb stable at 8.1     Miitral and tricuspid regurgitation     Cellulitis of bilateral lower extremities, resolved - finished 10 day course of doxycycline    Essential HTN      SERVICE: Family Medicine. Attending Dr. Redding, Resident Mary Spangler MD    CONSULTS:   Inpatient Consult to General Surgery  Once     Specialty:  General Surgery  Provider:  Mark Anthony Eid MD/ Dr Raeann MD      Inpatient Consult to Nephrology  Once     Specialty:  Nephrology  Provider:  Brittanie Bell MD      Inpatient Consult to Cardiology  Once     Specialty:  Cardiology  Provider:  Yg Ambrocio MD        PROCEDURES:   None     DIAGNOSTIC DATA:   Doppler of bilateral lower extremities and VQ scan was negative.  Admission BUN and creatinine was 29 and 2.15 respectively.  Upon discharge BUN and creatinine was 47 and 1.35.  Upon admission patient's hemoglobin and hematocrit was 7.9 and 23.2 upon discharge it was 8.6 and 26.2.  FOBT was positive.     Echo  Estimated EF appears to be in the range of 51 - 55%  · Moderate tricuspid valve  regurgitation is present.  · Left atrial cavity size is borderline dilated.  · The left ventricular cavity is borderline dilated.  · Right ventricular cavity is borderline dilated.  · Mild-to-moderate mitral valve regurgitation is present    HISTORY OF PRESENT ILLNESS:   Eva Troncoso is a 57 y.o. female with a concurrent medical history of uncontrolled essential hypertension, LV diastolic dysfunction, and COPD who presents with a two day history of mid sternal chest pain radiating to her back and associated shortness of breath. Patient reports that the pain began at rest while watching TV and she can not identify relieving or exacerbating factors. Patient denies any headache. Patient denies any fever or cough. She continued to smoke despite her shortness of breath.      HOSPITAL COURSE:  Patient is a 57-year-old female who was admitted to our facility for precordial chest pain.  Dr. Ambrocio was consulted, who initially had an intention to do a cardiac catheter on her.  However her d-dimer was elevated and the her chest pain could've been secondary to possible pulmonary embolism.  So VQ scan and Doppler of the bilateral lower extremities was obtained, which were negative.  Patient did get a dose of heparin in order to address the possible pulmonary embolism during the course of the hospital stay.  Patient did not get a CT scan of the chest secondary to her creatinine which was elevated up to 2.8 during the course of this stay.  It was on a nephrologist was consulted and he recommended bicarbonate drip and IV hydration.  During the subsequent days patient's creatinine returned to the baseline and it was 1.35 on the day of discharge.  Patient was also found to have a positive FOBT and GI bleed from the colostomy site.  General surgery was consulted.  Dr. Cary recommended medical management at this time with PRBC if needed.  However patient did not require any blood transfusion during the course of this hospital  stay.  Upon the day of discharge her hemoglobin was stable at 8.6.  Her chest pain seemed to resolve with the Robaxin and the appropriate heart failure medications on board.  Dr. Ambrocio recommended outpatient follow-up to possibly do a cardiac catheter in the future.  On 3/9/2017 upon being medically stable patient was discharged from our facility.  Dr. Ambrocio and Dr. Bell agrees with this decision.  Patient is to follow up with GI as an outpatient to workup the anemia as she may need non-emergent upper and lower endoscopy.  After discussing the case with the senior in service Dr. Quiros and the attending on service Dr. Redding patient is discharged home on 3/9/2017. This was communicated to the patient at bedside with the nurse in the room and patient voices understanding to go home, and to follow-up with all her appointments.    DISCHARGE CONDITION:   Good      DISPOSITION:  Home    DISCHARGE MEDICATIONS     Your medication list      START taking these medications       Instructions    atorvastatin 80 MG tablet   Commonly known as:  LIPITOR        Take 1 tablet by mouth Every Night.       budesonide-formoterol 160-4.5 MCG/ACT inhaler   Commonly known as:  SYMBICORT        Inhale 2 puffs 2 (Two) Times a Day.       methocarbamol 500 MG tablet   Commonly known as:  ROBAXIN        Take 1 tablet by mouth Every 12 (Twelve) Hours As Needed for muscle spasms.       nicotine 21 MG/24HR patch   Commonly known as:  NICODERM CQ        Place 1 patch on the skin Daily.       pantoprazole 40 MG EC tablet   Commonly known as:  PROTONIX        Take 1 tablet by mouth Daily.         CHANGE how you take these medications       Instructions    lisinopril 20 MG tablet   Commonly known as:  PRINIVILZESTRIL   What changed:    - medication strength  - how much to take        Take 1 tablet by mouth Daily.         CONTINUE taking these medications       Instructions    azithromycin 500 MG tablet   Commonly known as:  ZITHROMAX        Take  1 tablet by mouth Daily.       BC HEADACHE POWDER PO            busPIRone 15 MG tablet   Commonly known as:  BUSPAR            docusate sodium 100 MG capsule        Take 100 mg by mouth 2 (Two) Times a Day.       doxycycline 100 MG capsule   Commonly known as:  MONODOX            doxycycline 100 MG enteric coated tablet   Commonly known as:  DORYX        Take 1 tablet by mouth 2 (Two) Times a Day.       furosemide 20 MG tablet   Commonly known as:  LASIX            HYDROcodone-acetaminophen 5-325 MG per tablet   Commonly known as:  NORCO        Take 1 tablet by mouth Every 8 (Eight) Hours As Needed for severe pain (7-10).       labetalol 200 MG tablet   Commonly known as:  NORMODYNE        Take 1 tablet by mouth 2 (Two) Times a Day for 30 days.       mirtazapine 15 MG tablet   Commonly known as:  REMERON            potassium chloride 20 MEQ packet   Commonly known as:  KLOR-CON        Take 20 mEq by mouth 2 (Two) Times a Day.       QUEtiapine 200 MG tablet   Commonly known as:  SEROquel            spironolactone 25 MG tablet   Commonly known as:  ALDACTONE        Take 1 tablet by mouth Daily.       traZODone 150 MG tablet   Commonly known as:  DESYREL              STOP taking these medications          cefuroxime 500 MG tablet   Commonly known as:  CEFTIN           MethylPREDNISolone 4 MG tablet   Commonly known as:  MEDROL (SANA)           sulfamethoxazole-trimethoprim 800-160 MG per tablet   Commonly known as:  BACTRIM DS,SEPTRA DS                Where to Get Your Medications      [facility] - [phone]  - [phone] [address] [address]     Phone:  [phone]    • atorvastatin 80 MG tablet   • budesonide-formoterol 160-4.5 MCG/ACT inhaler   • lisinopril 20 MG tablet   • methocarbamol 500 MG tablet   • nicotine 21 MG/24HR patch   • pantoprazole 40 MG EC tablet           INSTRUCTIONS:  Activity:   Activity Instructions      Activity as Tolerated           Diet:   Diet Instructions     Diet: Cardiac, Renal; Thin Liquids, No Restrictions       Discharge Diet:   Cardiac  Renal      Fluid Consistency:  Thin Liquids, No Restrictions               Special instructions: Patient instructed to call MD or return to ED with worsening shortness of breath, chest pain, fever greater than 100.4 degrees F or any other medical concerns..    Follow-up Information    Follow up with Mary Spangler MD .   Specialties:  Family Medicine, Emergency Medicine      Follow up with Yg Ambrocio MD. Schedule an appointment as soon as possible for a visit in 2 week(s).   Specialty:  Cardiology      Follow up with Alfred Cary MD. Schedule an appointment as soon as possible for a visit in 3 week(s).   Specialty:  General Surgery      Follow up with Brittanie Bell MD. Schedule an appointment as soon as possible for a visit in 2 week(s).   Specialty:  Nephrology      Follow up with Neftaly Hayes MD Follow up in 1 week(s).   Specialty:  Gastroenterology          Time: Discharge 30 min    Dr. Redding is the attending at time of discharge, she is aware of the patient's status and agrees with the above discharge summary.          This document has been electronically signed by Mary Spangler MD on March 9, 2017 10:28 AM    Mary Spangler MD, GEENA  [address] [address] [phone]

## 2017-03-09 NOTE — PROGRESS NOTES
Cardiology Progress Note:     LOS: 4 days   Patient Care Team:  Mary Spangler MD as PCP - General (Family Medicine)      Subjective:   Chart reviewed , patient seen and examined. Patient denies any chest pain, shortness of breath palpitation.:               Objective:     Objective:  Vitals:    03/08/17 2309   BP: (!) 187/85   Pulse: 73   Resp: 20   Temp: 96.5 °F (35.8 °C)   SpO2: 92%       Intake/Output Summary (Last 24 hours) at 03/08/17 2347  Last data filed at 03/08/17 2128   Gross per 24 hour   Intake   2115 ml   Output    900 ml   Net   1215 ml             Physical Exam:   General Appearance:    Alert, oriented, cooperative, in no acute distress   Head:    Normocephalic, atraumatic, without obvious abnormality   Eyes:           LINDA  Lids and lashes normal, conjunctivae and sclerae normal, no icterus, no pallor   Ears:    Ears appear intact with no abnormalities noted   Throat:   Mucous membranes pink and moist   Neck:   Supple, trachea midline, no carotid bruit, no organomegaly or JVD   Lungs:     Clear to auscultation and percussion, respirations regular, even and Unlabored. No wheezes, rales, rhonchi    Heart:    Regular rhythm and normal rate, normal S1 and S2, no            murmur, no gallop, no rub, no click   Abdomen:     Soft, non-tender, non-distended, no guarding, no rebound tenderness, Normal bowel sounds in all four quadrant, no masses, liver and spleen nonpalpable,    Genitalia:    Deferred   Extremities:   Moves all extremities well, no edema, no cyanosis, no              Redness, no clubbing   Pulses:   Pulses palpable and equal bilaterally   Skin:   Moist and warm. No bleeding, bruising or rash   Neurologic/Psychiatric:   Alert and oriented to person, place, and time.  Motor, power and tone in upper and lower extremity is grossly intact.  No focal neurological deficits. Normal cognitive function. No psychomotor reaction or tangential thought. No depression, homicidal ideations and suicidal  ideations            Results Review:    Lab Results (last 24 hours)     Procedure Component Value Units Date/Time    CBC & Differential [[id number]] Collected:  03/08/17 0522    Specimen:  Blood Updated:  03/08/17 0640    Narrative:       The following orders were created for panel order CBC & Differential.  Procedure                               Abnormality         Status                     ---------                               -----------         ------                     CBC Auto Differential[[id number]]         Abnormal            Final result                 Please view results for these tests on the individual orders.    CBC Auto Differential [[id number]]  (Abnormal) Collected:  03/08/17 0522    Specimen:  Blood Updated:  03/08/17 0640     WBC 5.66 10*3/mm3      RBC 2.66 (L) 10*6/mm3      Hemoglobin 8.1 (L) g/dL      Hematocrit 24.8 (L) %      MCV 93.2 fL      MCH 30.5 pg      MCHC 32.7 g/dL      RDW 14.3 %      RDW-SD 48.4 (H) fl      MPV 10.2 fL      Platelets 244 10*3/mm3      Neutrophil % 54.1 %      Lymphocyte % 32.3 %      Monocyte % 12.5 (H) %      Eosinophil % 0.0 %      Basophil % 0.0 %      Immature Grans % 1.1 (H) %      Neutrophils, Absolute 3.06 10*3/mm3      Lymphocytes, Absolute 1.83 10*3/mm3      Monocytes, Absolute 0.71 10*3/mm3      Eosinophils, Absolute 0.00 10*3/mm3      Basophils, Absolute 0.00 10*3/mm3      Immature Grans, Absolute 0.06 (H) 10*3/mm3     Comprehensive Metabolic Panel [[id number]]  (Abnormal) Collected:  03/08/17 0522    Specimen:  Blood Updated:  03/08/17 0646     Glucose 79 mg/dL      BUN 57 (H) mg/dL      Creatinine 2.24 (H) mg/dL      Sodium 145 mmol/L      Potassium 4.7 mmol/L      Chloride 114 (H) mmol/L      CO2 23.0 mmol/L      Calcium 8.0 (L) mg/dL      Total Protein 5.5 (L) g/dL      Albumin 2.70 (L) g/dL      ALT (SGPT) 26 U/L      AST (SGOT) 27 U/L      Alkaline Phosphatase 97 U/L      Total Bilirubin 0.3 mg/dL      eGFR Non African Amer 22 (L) mL/min/1.73       Globulin 2.8 gm/dL      A/G Ratio 1.0 (L) g/dL      BUN/Creatinine Ratio 25.4 (H)      Anion Gap 8.0 mmol/L     Ferritin [[id number]]  (Normal) Collected:  03/05/17 0240    Specimen:  Blood Updated:  03/08/17 1144     Ferritin 234.00 ng/mL            Medication Review:   Current Facility-Administered Medications   Medication Dose Route Frequency Provider Last Rate Last Dose   • albuterol (PROVENTIL) nebulizer solution 0.083% 2.5 mg/3mL  2.5 mg Nebulization Q6H PRN Evan Garcia MD   2.5 mg at 03/07/17 1858   • aspirin EC tablet 81 mg  81 mg Oral Daily Maci Sullivan MD   81 mg at 03/08/17 0751   • atorvastatin (LIPITOR) tablet 80 mg  80 mg Oral Nightly Maci Sullivan MD   80 mg at 03/08/17 2015   • budesonide-formoterol (SYMBICORT) 160-4.5 MCG/ACT inhaler 2 puff  2 puff Inhalation BID - RT Evan Garcia MD   2 puff at 03/08/17 1950   • busPIRone (BUSPAR) tablet 15 mg  15 mg Oral Q12H Maci Sullivan MD   15 mg at 03/08/17 2153   • docusate sodium (COLACE) capsule 100 mg  100 mg Oral BID Maci Sullivan MD   100 mg at 03/08/17 1708   • doxycycline (VIBRAMYICN) tablet 100 mg  100 mg Oral Q12H Maci Sullivan MD   100 mg at 03/08/17 2015   • furosemide (LASIX) tablet 20 mg  20 mg Oral Daily PRN Maci Sullivan MD       • hydrALAZINE (APRESOLINE) injection 10 mg  10 mg Intravenous Q6H PRN Brittanie Bell MD       • HYDROcodone-acetaminophen (NORCO) 5-325 MG per tablet 1 tablet  1 tablet Oral Q8H PRN Maci Sullivan MD   1 tablet at 03/08/17 1524   • ipratropium-albuterol (DUO-NEB) nebulizer solution 3 mL  3 mL Nebulization 4x Daily - RT Evan Garcia MD   3 mL at 03/08/17 1947   • labetalol (NORMODYNE) tablet 200 mg  200 mg Oral BID Maci Sullivan MD   200 mg at 03/08/17 1708   • [START ON 3/9/2017] Menthol-Zinc Oxide 1 application  1 application Topical Daily Evan Garcia MD       • methocarbamol (ROBAXIN) tablet 500 mg  500 mg Oral Q12H PRN Mary Spangler MD       •  methylPREDNISolone sodium succinate (SOLU-Medrol) injection 60 mg  60 mg Intravenous Daily Evan Garcia MD   60 mg at 03/08/17 0751   • mirtazapine (REMERON) tablet 15 mg  15 mg Oral Daily Maci Sullivan MD   15 mg at 03/08/17 2015   • Morphine sulfate (PF) injection 1 mg  1 mg Intravenous Q4H PRN Maci Sullivan MD   1 mg at 03/08/17 1708   • nicotine (NICODERM CQ) 21 MG/24HR patch 1 patch  1 patch Transdermal Q24H Maci Sullivan MD   1 patch at 03/08/17 1440   • ondansetron (ZOFRAN) injection 4 mg  4 mg Intravenous Q6H PRN Maci Sullivan MD       • pantoprazole (PROTONIX) injection 40 mg  40 mg Intravenous Q AM Maci Sullivan MD   40 mg at 03/08/17 0542   • QUEtiapine (SEROquel) tablet 200 mg  200 mg Oral Nightly Maci Sullivan MD   200 mg at 03/08/17 2015   • sodium chloride 0.9 % flush 1-10 mL  1-10 mL Intravenous PRN Maci Sullivan MD   10 mL at 03/04/17 1750   • sodium chloride 0.9 % infusion  75 mL/hr Intravenous Continuous Brittanie Bell MD 75 mL/hr at 03/08/17 1129 75 mL/hr at 03/08/17 1129   • traZODone (DESYREL) tablet 150 mg  150 mg Oral Nightly Maci Sullivan MD   150 mg at 03/08/17 2015       Assessment and Plan:    Active Problems:    Precordial chest pain    CHF with left ventricular diastolic dysfunction, NYHA class 1    Elevated d-dimer    CKD (chronic kidney disease) stage 3, GFR 30-59 ml/min    Anemia due to gastrointestinal blood loss    Disorders of both mitral and tricuspid valves  1.  Atypical symptoms of chest pain.  Patient at the present time has been recommended medical management for the coronary artery disease.  Patient has been recommended outpatient follow-up for a Lexiscan Cardiolite stress tests.  2.  History of pericardial effusion status post pericardial window is noted.  3.  Arterial hypertension.  Patient blood pressure is currently well-controlled.  4.  Chronic kidney disease is noted.  5.  Hypertensive heart disease with mitral  and tricuspid regurgitation.  Clinically at the present time patient is not in congestive heart failure.            Yg Ambrocio MD  03/08/17  11:47 PM      Time: Time spent on face-to-face interaction 20 minutes    EMR Dragon/Transcription disclaimer:   Some of this note may be an electronic transcription/translation of spoken language to printed text. The electronic translation of spoken language may permit erroneous, or at times, nonsensical words or phrases to be inadvertently transcribed; Although I have reviewed the note for such errors, some may still exist.

## 2017-03-09 NOTE — PLAN OF CARE
Problem: Inpatient Physical Therapy  Goal: Transfer Training Goal 1 STG- PT  Outcome: Unable to achieve outcome(s) by discharge Date Met:  03/09/17 03/09/17 1620   Transfer Training PT STG   Transfer Training PT STG, Date Goal Reviewed 03/09/17   Transfer Training PT STG, Outcome goal not met   Transfer Training PT STG, Reason Goal Not Met discharged from facility       Goal: Gait Training Goal STG- PT  Outcome: Unable to achieve outcome(s) by discharge Date Met:  03/09/17 03/09/17 1620   Gait Training PT STG   Gait Training Goal PT STG, Date Goal Reviewed 03/09/17   Gait Training Goal PT STG, Outcome goal not met   Gait Training Goal PT STG, Reason Goal Not Met discharged from facility       Goal: Stair Training Goal LTG- PT  Outcome: Unable to achieve outcome(s) by discharge Date Met:  03/09/17 03/09/17 1620   Stair Training PT LTG   Stair Training Goal PT LTG, Date Goal Reviewed 03/09/17   Stair Training Goal PT LTG, Outcome goal not met   Stair Training Goal PT LTG, Reason Goal Not Met discharged from facility

## 2017-03-09 NOTE — PROGRESS NOTES
FAMILY MEDICINE PROGRESS NOTE  NAME: Eva Troncoso  : 1960  MRN: [id number]     LOS: 5 days   Full Code  PROVIDER OF SERVICE:     Chief Complaint:  Precordial chest pain    Subjective     Interval History:  History taken from: patient  Subjective: Patient has no complaints currently. She does not want to home until later in the week, but does not know why.    Review of Systems:   Review of Systems   Constitutional: Negative.  Negative for fatigue and fever.   HENT: Negative.  Negative for ear pain and sore throat.    Eyes: Negative.  Negative for pain and visual disturbance.   Respiratory: Negative.  Negative for cough and shortness of breath.    Cardiovascular: Negative.  Negative for chest pain and palpitations.   Gastrointestinal: Negative for abdominal pain and nausea.   Endocrine: Negative.    Genitourinary: Negative for dysuria.   Musculoskeletal: Negative for arthralgias.   Skin: Negative for rash.   Allergic/Immunologic: Negative.    Neurological: Negative for dizziness, weakness and headaches.   Psychiatric/Behavioral: Negative for sleep disturbance.       Objective     Vital Signs  Temp:  [96.5 °F (35.8 °C)-98.5 °F (36.9 °C)] 98.5 °F (36.9 °C)  Heart Rate:  [63-80] 78  Resp:  [16-20] 16  BP: (152-187)/(52-85) 179/78    Physical Exam  Physical Exam   Constitutional: She is oriented to person, place, and time. She appears well-developed and well-nourished.   HENT:   Head: Normocephalic and atraumatic.   Right Ear: External ear normal.   Left Ear: External ear normal.   Nose: Nose normal.   Mouth/Throat: Oropharynx is clear and moist.   Eyes: Conjunctivae and EOM are normal.   Neck: Normal range of motion. Neck supple.   Cardiovascular: Normal rate, regular rhythm and normal heart sounds.    Pulmonary/Chest: Effort normal and breath sounds normal.   Abdominal: Soft. Bowel sounds are normal. She exhibits no distension. There is no tenderness.   Musculoskeletal: Normal range of motion.    Neurological: She is alert and oriented to person, place, and time.   Skin: Skin is warm and dry.   Psychiatric: She has a normal mood and affect. Her speech is normal and behavior is normal. Thought content normal. Cognition and memory are normal.       Medication Review    Current Facility-Administered Medications:   •  albuterol (PROVENTIL) nebulizer solution 0.083% 2.5 mg/3mL, 2.5 mg, Nebulization, Q6H PRN, Evan Garcia MD, 2.5 mg at 03/07/17 1858  •  [DISCONTINUED] aspirin chewable tablet 324 mg, 324 mg, Oral, Once **AND** aspirin EC tablet 81 mg, 81 mg, Oral, Daily, Maci Sullivan MD, 81 mg at 03/09/17 0805  •  atorvastatin (LIPITOR) tablet 80 mg, 80 mg, Oral, Nightly, Maci Sullivan MD, 80 mg at 03/08/17 2015  •  budesonide-formoterol (SYMBICORT) 160-4.5 MCG/ACT inhaler 2 puff, 2 puff, Inhalation, BID - RT, Evan Garcia MD, 2 puff at 03/09/17 0738  •  busPIRone (BUSPAR) tablet 15 mg, 15 mg, Oral, Q12H, Maci Sullivan MD, 15 mg at 03/09/17 0805  •  docusate sodium (COLACE) capsule 100 mg, 100 mg, Oral, BID, Maci Sullivan MD, 100 mg at 03/09/17 0805  •  doxycycline (VIBRAMYICN) tablet 100 mg, 100 mg, Oral, Q12H, Maci Sullivan MD, 100 mg at 03/09/17 0805  •  furosemide (LASIX) injection 40 mg, 40 mg, Intravenous, Once, Brittanie Bell MD  •  furosemide (LASIX) tablet 20 mg, 20 mg, Oral, Daily PRN, Maci Sullivan MD  •  hydrALAZINE (APRESOLINE) injection 10 mg, 10 mg, Intravenous, Q6H PRN, Brittanie Bell MD, 10 mg at 03/09/17 0820  •  HYDROcodone-acetaminophen (NORCO) 5-325 MG per tablet 1 tablet, 1 tablet, Oral, Q8H PRN, Maci Sullivan MD, 1 tablet at 03/09/17 0346  •  ipratropium-albuterol (DUO-NEB) nebulizer solution 3 mL, 3 mL, Nebulization, 4x Daily - RT, Evan Garcia MD, 3 mL at 03/09/17 0737  •  labetalol (NORMODYNE) tablet 200 mg, 200 mg, Oral, BID, Maci Sullivan MD, 200 mg at 03/09/17 0805  •  lisinopril (PRINIVIL,ZESTRIL) tablet 20 mg, 20 mg, Oral,  Q24H, Brittanie Bell MD  •  Menthol-Zinc Oxide 1 application, 1 application, Topical, Daily, Evan Garcia MD, 1 application at 03/09/17 0814  •  methocarbamol (ROBAXIN) tablet 500 mg, 500 mg, Oral, Q12H PRN, Mary Spangler MD  •  methylPREDNISolone sodium succinate (SOLU-Medrol) injection 60 mg, 60 mg, Intravenous, Daily, Evan Garcia MD, 60 mg at 03/09/17 0806  •  mirtazapine (REMERON) tablet 15 mg, 15 mg, Oral, Daily, Maci Sullivan MD, 15 mg at 03/08/17 2015  •  Morphine sulfate (PF) injection 1 mg, 1 mg, Intravenous, Q4H PRN, Maci Sullivan MD, 1 mg at 03/09/17 0548  •  nicotine (NICODERM CQ) 21 MG/24HR patch 1 patch, 1 patch, Transdermal, Q24H, Maci Sullivan MD, 1 patch at 03/08/17 1440  •  ondansetron (ZOFRAN) injection 4 mg, 4 mg, Intravenous, Q6H PRN, Maci Sullivan MD  •  pantoprazole (PROTONIX) injection 40 mg, 40 mg, Intravenous, Q AM, Maci Sullivan MD, 40 mg at 03/09/17 0548  •  QUEtiapine (SEROquel) tablet 200 mg, 200 mg, Oral, Nightly, Maci Sullivan MD, 200 mg at 03/08/17 2015  •  sodium chloride 0.9 % flush 1-10 mL, 1-10 mL, Intravenous, PRN, Maci Sullivan MD, 10 mL at 03/04/17 1750  •  traZODone (DESYREL) tablet 150 mg, 150 mg, Oral, Nightly, Maci Sullivan MD, 150 mg at 03/08/17 2015     Diagnostic Data      I reviewed the patient's new clinical results and imaging.      Assessment/Plan     Active Hospital Problems (** Indicates Principal Problem)    Diagnosis Date Noted   • **Precordial chest pain [R07.2] 03/04/2017     - Resolved today with medications  -Consulted cardiology, Dr. Ambrocio on board. Recommended medical and symptomatic management for now.         • Disorders of both mitral and tricuspid valves [I08.1] 03/08/2017     Echo identified EF of 50% with Mitral and tricuspid insufficiency contributing to SOB and chest pain. Currently recommended medical management per cardiology.      • CKD (chronic kidney disease) stage 3, GFR 30-59 ml/min  [N18.3] 03/05/2017     -Creatinine elevation from 1.47 at admission to 1.35  -Nephrology consulted, will follow Dr. Bell's recommendations  -Follow with daily CMP. Advised to refrain from sodas.     • Anemia due to gastrointestinal blood loss [D50.0] 03/05/2017     -Follow H&H; Hb 8.1  Dr. Cary consulted for GI bleed, will follow his recommendations  -Risks and benefits of transfusion discussed with patient  -Type, cross, & 2 units PRBCs ordered     • (HFpEF) heart failure with preserved ejection fraction [I50.30] 03/04/2017     -BNP at the time of admission: 98528   -Monitor patient with strict I/Os and daily weight.       • Elevated d-dimer [R79.1] 03/04/2017     -Ddimer: 3896  -V/Q scan: low probability of PE  -Venous doppler: no evidence of lower extremity DVT  -Encourage incentive spirometry        Resolved Hospital Problems    Diagnosis Date Noted Date Resolved   • Hypertensive emergency [I16.1] 03/04/2017 03/07/2017     -Normotensive  -Aspirin, clonidine, and atorvastatin ordered in ICU  -We will continue all her home antihypertensive medications: labetalol, lisinopril, spironolactone, and lasix  -PRN hydralazine for SBP > 160.   -We will continue to follow her kidney function with daily CMP.   -Lipid panel WNL; moderate intensity statin therapy based on ASCVD risk            DVT prophylaxis: SCDs/TEDs      Disposition:Home     I have seen the patient.  I have reviewed the notes, assessments, and/or procedures performed by Mary Spangler MD , I concur with her/his documentation and assessment and plan for Eva Gordon Karyna.          This document has been electronically signed by Allie Redding MD on March 9, 2017 9:50 AM

## 2017-03-09 NOTE — PROGRESS NOTES
FAMILY MEDICINE PROGRESS NOTE  NAME: Eva Troncoso  : 1960  MRN: [id number]     LOS: 5 days   Full Code  PROVIDER OF SERVICE:     Chief Complaint:  Precordial chest pain    Subjective     Interval History:  History taken from: patient  Subjective: Patient states she is doing fine today. She does not want to go home today.    Review of Systems:   Review of Systems   Constitutional: Negative for activity change, appetite change, fatigue and fever.   HENT: Negative for ear pain and sore throat.    Eyes: Negative for pain and visual disturbance.   Respiratory: Negative for cough and shortness of breath.    Cardiovascular: Positive for chest pain. Negative for palpitations.   Gastrointestinal: Negative for abdominal pain and nausea.   Endocrine: Negative for cold intolerance and heat intolerance.   Genitourinary: Negative for difficulty urinating and dysuria.   Musculoskeletal: Negative for arthralgias and gait problem.   Skin: Negative for color change and rash.   Neurological: Negative for dizziness, weakness and headaches.   Hematological: Negative for adenopathy. Does not bruise/bleed easily.   Psychiatric/Behavioral: Negative for agitation, confusion and sleep disturbance.       Objective     Vital Signs  Temp:  [96.5 °F (35.8 °C)-98.5 °F (36.9 °C)] 98.5 °F (36.9 °C)  Heart Rate:  [64-84] 84  Resp:  [16-20] 16  BP: (111-187)/(52-85) 111/62    Physical Exam  Physical Exam   Constitutional: She is oriented to person, place, and time. She appears well-developed and well-nourished.   HENT:   Head: Normocephalic and atraumatic.   Right Ear: External ear normal.   Left Ear: External ear normal.   Nose: Nose normal.   Mouth/Throat: Oropharynx is clear and moist.   Eyes: Conjunctivae and EOM are normal.   Neck: Normal range of motion. Neck supple.   Cardiovascular: Normal rate, regular rhythm and normal heart sounds.    Pulmonary/Chest: Effort normal and breath sounds normal.   Abdominal: Soft. Bowel sounds are  normal. She exhibits no distension. There is no tenderness.   Musculoskeletal: Normal range of motion.   Neurological: She is alert and oriented to person, place, and time.   Skin: Skin is warm and dry.   Psychiatric: She has a normal mood and affect. Her speech is normal and behavior is normal. Cognition and memory are normal.       Medication Review    Current Facility-Administered Medications:   •  albuterol (PROVENTIL) nebulizer solution 0.083% 2.5 mg/3mL, 2.5 mg, Nebulization, Q6H PRN, Evan Garcia MD, 2.5 mg at 03/07/17 1858  •  [DISCONTINUED] aspirin chewable tablet 324 mg, 324 mg, Oral, Once **AND** aspirin EC tablet 81 mg, 81 mg, Oral, Daily, Maci Sullivan MD, 81 mg at 03/09/17 0805  •  atorvastatin (LIPITOR) tablet 80 mg, 80 mg, Oral, Nightly, Maci Sullivan MD, 80 mg at 03/08/17 2015  •  budesonide-formoterol (SYMBICORT) 160-4.5 MCG/ACT inhaler 2 puff, 2 puff, Inhalation, BID - RT, Evan Garcia MD, 2 puff at 03/09/17 0738  •  busPIRone (BUSPAR) tablet 15 mg, 15 mg, Oral, Q12H, Maci Sullivan MD, 15 mg at 03/09/17 0805  •  docusate sodium (COLACE) capsule 100 mg, 100 mg, Oral, BID, Maci Sullivan MD, 100 mg at 03/09/17 0805  •  doxycycline (VIBRAMYICN) tablet 100 mg, 100 mg, Oral, Q12H, Maci Sullivan MD, 100 mg at 03/09/17 0805  •  furosemide (LASIX) tablet 20 mg, 20 mg, Oral, Daily PRN, Maci Sullivan MD  •  hydrALAZINE (APRESOLINE) injection 10 mg, 10 mg, Intravenous, Q6H PRN, Brittanie Bell MD, 10 mg at 03/09/17 0820  •  HYDROcodone-acetaminophen (NORCO) 5-325 MG per tablet 1 tablet, 1 tablet, Oral, Q8H PRN, Maci Sullivan MD, 1 tablet at 03/09/17 1153  •  ipratropium-albuterol (DUO-NEB) nebulizer solution 3 mL, 3 mL, Nebulization, 4x Daily - RT, Evan Garcia MD, 3 mL at 03/09/17 0737  •  labetalol (NORMODYNE) tablet 200 mg, 200 mg, Oral, BID, Maci Sullivan MD, 200 mg at 03/09/17 0805  •  lisinopril (PRINIVIL,ZESTRIL) tablet 20 mg, 20 mg, Oral, Q24H,  Brittanie Bell MD, 20 mg at 03/09/17 1039  •  Menthol-Zinc Oxide 1 application, 1 application, Topical, Daily, Evan Garcia MD, 1 application at 03/09/17 0814  •  methocarbamol (ROBAXIN) tablet 500 mg, 500 mg, Oral, Q12H PRN, Mary Spangler MD  •  methylPREDNISolone sodium succinate (SOLU-Medrol) injection 60 mg, 60 mg, Intravenous, Daily, Evan Garcia MD, 60 mg at 03/09/17 0806  •  mirtazapine (REMERON) tablet 15 mg, 15 mg, Oral, Daily, Maci Sullivan MD, 15 mg at 03/08/17 2015  •  Morphine sulfate (PF) injection 1 mg, 1 mg, Intravenous, Q4H PRN, Maci Sullivan MD, 1 mg at 03/09/17 0548  •  nicotine (NICODERM CQ) 21 MG/24HR patch 1 patch, 1 patch, Transdermal, Q24H, Maci Sullivan MD, 1 patch at 03/08/17 1440  •  ondansetron (ZOFRAN) injection 4 mg, 4 mg, Intravenous, Q6H PRN, Maci Sullivan MD  •  pantoprazole (PROTONIX) injection 40 mg, 40 mg, Intravenous, Q AM, Maci Sullivan MD, 40 mg at 03/09/17 0548  •  QUEtiapine (SEROquel) tablet 200 mg, 200 mg, Oral, Nightly, Maci Sullivan MD, 200 mg at 03/08/17 2015  •  sodium chloride 0.9 % flush 1-10 mL, 1-10 mL, Intravenous, PRN, Maci Sullivan MD, 10 mL at 03/04/17 1750  •  traZODone (DESYREL) tablet 150 mg, 150 mg, Oral, Nightly, Maci Sullivan MD, 150 mg at 03/08/17 2015     Diagnostic Data      I reviewed the patient's new clinical results and imaging.      Assessment/Plan     Active Hospital Problems (** Indicates Principal Problem)    Diagnosis Date Noted   • **Precordial chest pain [R07.2] 03/04/2017     - Resolved today with medications  -Consulted cardiology, Dr. Ambrocio on board. Recommended medical and symptomatic management for now.         • Disorders of both mitral and tricuspid valves [I08.1] 03/08/2017     Echo identified EF of 50% with Mitral and tricuspid insufficiency contributing to SOB and chest pain. Currently recommended medical management per cardiology.      • CKD (chronic kidney disease) stage 3,  GFR 30-59 ml/min [N18.3] 03/05/2017     -Creatinine elevation from 1.47 at admission to 1.35  -Nephrology consulted, will follow Dr. Bell's recommendations  -Follow with daily CMP. Advised to refrain from sodas.     • Anemia due to gastrointestinal blood loss [D50.0] 03/05/2017     -Follow H&H; Hb 8.1  Dr. Cary consulted for GI bleed, will follow his recommendations  -Risks and benefits of transfusion discussed with patient  -Type, cross, & 2 units PRBCs ordered     • (HFpEF) heart failure with preserved ejection fraction [I50.30] 03/04/2017     -BNP at the time of admission: 85433   -Monitor patient with strict I/Os and daily weight.       • Elevated d-dimer [R79.1] 03/04/2017     -Ddimer: 3896  -V/Q scan: low probability of PE  -Venous doppler: no evidence of lower extremity DVT  -Encourage incentive spirometry        Resolved Hospital Problems    Diagnosis Date Noted Date Resolved   • Hypertensive emergency [I16.1] 03/04/2017 03/07/2017     -Normotensive  -Aspirin, clonidine, and atorvastatin ordered in ICU  -We will continue all her home antihypertensive medications: labetalol, lisinopril, spironolactone, and lasix  -PRN hydralazine for SBP > 160.   -We will continue to follow her kidney function with daily CMP.   -Lipid panel WNL; moderate intensity statin therapy based on ASCVD risk            DVT prophylaxis: SCDs/TEDs      Disposition:Home     I have seen the patient.  I have reviewed the notes, assessments, and/or procedures performed by Dr. Spangler, I concur with her/his documentation and assessment and plan for Eva Kamaratroy.          This document has been electronically signed by Allie Redding MD on March 9, 2017 1:26 PM

## 2017-03-09 NOTE — MEDICAL STUDENT
"[facility]  [address] [place] [address]. [address]   - [phone]  F - [phone]     Progress Note          PATIENT  DEMOGRAPHICS   PATIENT NAME: Eva Troncoso                      PHYSICIAN: Khari Paz, Medical Student  : 1960  MRN: [id number]   LOS: 5 days    Patient Care Team:  Mary Spangler MD as PCP - General (Family Medicine)  Subjective   SUBJECTIVE   Shortness of breath worse today she describes it as \"just cant catch it.\" chest pain still present. Feels her legs are more swollen. Says she has been able to void urine \"some\".     REVIEW OF SYSTEMS    Review of Systems   Significant for shortness of breath, chest pain, swelling. Pain.     Objective   OBJECTIVE   Vital Signs  Temp:  [96.5 °F (35.8 °C)-97.9 °F (36.6 °C)] 96.5 °F (35.8 °C)  Heart Rate:  [63-73] 71  Resp:  [18-20] 20  BP: (152-187)/(52-85) 152/76    Flowsheet Rows         First Filed Value    Admission Height  65\" (165.1 cm) Documented at 2017 0440    Admission Weight  220 lb (99.8 kg) Documented at 2017 0440           I/O last 3 completed shifts:  In: 3025 [P.O.:1640; I.V.:1385]  Out: 1200 [Urine:1200]    PHYSICAL EXAM    Physical Exam  CV:3-4/6 systolic ejection murmur present with s1/2 appreciable and no gallop or rub with regular rhythm.   Swelling worse today. Pitting edema to the top of the KNEE on the R. And to the bottom on the left. Right has 3+ and L has 2+. Pedal pulses distant and palpable.    Skin: warm well perfused    LUNG: decreased air movement bilaterally but CTAB. Tachypneic     CN: 2-12 grossly intact distal sensation present    NEURO: normal thought content and affect.       RESULTS   Results Review:      Results from last 7 days  Lab Units 17  0538 17  0522 17  1353 17  0514   SODIUM mmol/L 146* 145  --  137   POTASSIUM mmol/L 4.1 4.7 5.2* 5.4*   CHLORIDE mmol/L 114* 114*  --  112*   TOTAL CO2 mmol/L 22.0 23.0  --  20.0*   BUN mg/dL 47* 57*  --  " 52*   CREATININE mg/dL 1.35* 2.24*  --  2.85*   CALCIUM mg/dL 8.3* 8.0*  --  7.9*   BILIRUBIN mg/dL 0.3 0.3  --  0.2   ALK PHOS U/L 92 97  --  96   ALT (SGPT) U/L 31 26  --  23   AST (SGOT) U/L 33 27  --  16   GLUCOSE mg/dL 80 79  --  98       Estimated Creatinine Clearance: 34.1 mL/min (by C-G formula based on Cr of 2.24).      Results from last 7 days  Lab Units 03/05/17  0240   MAGNESIUM mg/dL 1.8               Results from last 7 days  Lab Units 03/08/17  0522 03/07/17  0514 03/06/17  0409 03/05/17  2353 03/05/17  1935  03/05/17  1145 03/05/17  0240   WBC 10*3/mm3 5.66 7.48 8.28  --   --   --  5.35 5.01   HEMOGLOBIN g/dL 8.1* 8.1* 7.7* 7.8* 8.1*  < > 7.8* 7.9*   PLATELETS 10*3/mm3 244 220 153  --   --   --  123* 113*   < > = values in this interval not displayed.      Results from last 7 days  Lab Units 03/04/17  1433 03/04/17  1128   INR  1.27* 1.22*         Imaging Results (last 24 hours)     ** No results found for the last 24 hours. **           MEDICATIONS      Current Facility-Administered Medications:   •  albuterol (PROVENTIL) nebulizer solution 0.083% 2.5 mg/3mL, 2.5 mg, Nebulization, Q6H PRN, Evan Garcia MD, 2.5 mg at 03/07/17 3518  •  [DISCONTINUED] aspirin chewable tablet 324 mg, 324 mg, Oral, Once **AND** aspirin EC tablet 81 mg, 81 mg, Oral, Daily, Maci Sullivan MD, 81 mg at 03/08/17 8631  •  atorvastatin (LIPITOR) tablet 80 mg, 80 mg, Oral, Nightly, Maci Sullivan MD, 80 mg at 03/08/17 2015  •  budesonide-formoterol (SYMBICORT) 160-4.5 MCG/ACT inhaler 2 puff, 2 puff, Inhalation, BID - RT, Evan Garcia MD, 2 puff at 03/08/17 1950  •  busPIRone (BUSPAR) tablet 15 mg, 15 mg, Oral, Q12H, Maci Sullivan MD, 15 mg at 03/08/17 2153  •  docusate sodium (COLACE) capsule 100 mg, 100 mg, Oral, BID, Maci Sullivan MD, 100 mg at 03/08/17 1708  •  doxycycline (VIBRAMYICN) tablet 100 mg, 100 mg, Oral, Q12H, Maci Sullivan MD, 100 mg at 03/08/17 2015  •  furosemide (LASIX) tablet  20 mg, 20 mg, Oral, Daily PRN, Maci Sullivan MD  •  hydrALAZINE (APRESOLINE) injection 10 mg, 10 mg, Intravenous, Q6H PRN, Brittanie Bell MD, 10 mg at 03/09/17 0023  •  HYDROcodone-acetaminophen (NORCO) 5-325 MG per tablet 1 tablet, 1 tablet, Oral, Q8H PRN, Maci Sullivan MD, 1 tablet at 03/09/17 0346  •  ipratropium-albuterol (DUO-NEB) nebulizer solution 3 mL, 3 mL, Nebulization, 4x Daily - RT, Evan Garcia MD, 3 mL at 03/08/17 1947  •  labetalol (NORMODYNE) tablet 200 mg, 200 mg, Oral, BID, Maci Sullivan MD, 200 mg at 03/08/17 1708  •  Menthol-Zinc Oxide 1 application, 1 application, Topical, Daily, Evan Garcia MD  •  methocarbamol (ROBAXIN) tablet 500 mg, 500 mg, Oral, Q12H PRN, Mary Spangler MD  •  methylPREDNISolone sodium succinate (SOLU-Medrol) injection 60 mg, 60 mg, Intravenous, Daily, Evan Garcia MD, 60 mg at 03/08/17 0751  •  mirtazapine (REMERON) tablet 15 mg, 15 mg, Oral, Daily, Maci Sullivan MD, 15 mg at 03/08/17 2015  •  Morphine sulfate (PF) injection 1 mg, 1 mg, Intravenous, Q4H PRN, Maci Sullivan MD, 1 mg at 03/09/17 0548  •  nicotine (NICODERM CQ) 21 MG/24HR patch 1 patch, 1 patch, Transdermal, Q24H, Maci Sullivan MD, 1 patch at 03/08/17 1440  •  ondansetron (ZOFRAN) injection 4 mg, 4 mg, Intravenous, Q6H PRN, Maci Sullivan MD  •  pantoprazole (PROTONIX) injection 40 mg, 40 mg, Intravenous, Q AM, Maci Sullivan MD, 40 mg at 03/09/17 0548  •  QUEtiapine (SEROquel) tablet 200 mg, 200 mg, Oral, Nightly, Maci Sullivan MD, 200 mg at 03/08/17 2015  •  sodium chloride 0.9 % flush 1-10 mL, 1-10 mL, Intravenous, PRN, Maci Sullivan MD, 10 mL at 03/04/17 1750  •  sodium chloride 0.9 % infusion, 75 mL/hr, Intravenous, Continuous, Brittanie Bell MD, Last Rate: 75 mL/hr at 03/09/17 0008, 75 mL/hr at 03/09/17 0008  •  traZODone (DESYREL) tablet 150 mg, 150 mg, Oral, Nightly, Maci Sullivan MD, 150 mg at 03/08/17 2015    Assessment/Plan    ASSESSMENT / PLAN    Active Problems:    Precordial chest pain    CHF with left ventricular diastolic dysfunction, NYHA class 1    Elevated d-dimer    CKD (chronic kidney disease) stage 3, GFR 30-59 ml/min    Anemia due to gastrointestinal blood loss    Disorders of both mitral and tricuspid valves    1.acute kidney injury. Patient has a history of recurrent acute kidney injury in the past. Though she recovered well and her creatinine now is less than 1 as a baseline.Her Cr has dropped to 1.35 from 2.24 and her BUN has dropped to 47 down from 57 yesterday and 52 the previous day. It could be related to drug-induced allergic interstitial nephritis plus minus recent Bactrim use and hypotension post nitro drip. She has 3+  pitting edema of the lower extremity on the right and edema on the leftl leg with 1-2+ pitting. Her potassium is now 4.1 and WNL and her metabolic acidosis has resolved since administration of  bicarb amp x 1 and switch to bicarbonate drip which was D/c yesterday.     Will give lasix 40mg iv x 1 today. If needed she can have a left heart cath.      2.hypertension poorly controlled and also labile. It is much improved now but did drop significantly. Her blood pressure is labile with a recent systolic in the 180's. And most recent 152/76. Add lisinopril today      3.chest pain with associated shortness of air. She has elevated d-dimer and VQ scan showed low probability upon admisson. Patient is off of heparin drip due to drop in hemoglobin. She continues to have chest pain and SOA.       4.anemia of chronic disease with superimposed GI loss. 2 units typed and crossed and ready to be infused however Hgb at 8.1 no dizziness, lightheadedness or tachycardia.       5.chronic pain           Khari Paz, Medical Student  03/09/17  6:20 AM

## 2017-03-09 NOTE — THERAPY DISCHARGE NOTE
Acute Care - Physical Therapy Discharge Summary  [facility]       Patient Name: Eva Troncoso  : 1960  MRN: [id number]    Today's Date: 3/9/2017  Onset of Illness/Injury or Date of Surgery Date: 17    Date of Referral to PT: 17  Referring Physician: Dr. Spangler      Admit Date: 3/4/2017      PT Recommendation and Plan    Visit Dx:    ICD-10-CM ICD-9-CM   1. Chest pain, unspecified type R07.9 786.50   2. Acute systolic congestive heart failure I50.21 428.21     428.0   3. Hypertensive emergency I16.1 401.9   4. Impaired physical mobility Z74.09 781.99   5. Impaired gait and mobility R26.89 781.2   6. Essential hypertension I10 401.9             Outcome Measures       17 1125 17 1340 17 1040    How much help from another person do you currently need...    Turning from your back to your side while in flat bed without using bedrails? 4  -LN 4  -LN 4  -LN    Moving from lying on back to sitting on the side of a flat bed without bedrails? 4  -LN 4  -LN 4  -LN    Moving to and from a bed to a chair (including a wheelchair)? 3  -LN 3  -LN 3  -LN    Standing up from a chair using your arms (e.g., wheelchair, bedside chair)? 4  -LN 3  -LN 3  -LN    Climbing 3-5 steps with a railing? 3  -LN 3  -LN 3  -LN    To walk in hospital room?  3  -LN 3  -LN    AM-PAC 6 Clicks Score  20  -LN 20  -LN    Functional Assessment    Outcome Measure Options AM-PAC 6 Clicks Basic Mobility (PT)  -LN AM-PAC 6 Clicks Basic Mobility (PT)  -LN AM-PAC 6 Clicks Basic Mobility (PT)  -LN      17 1400          How much help from another person do you currently need...    Turning from your back to your side while in flat bed without using bedrails? 4  -MM      Moving from lying on back to sitting on the side of a flat bed without bedrails? 4  -MM      Moving to and from a bed to a chair (including a wheelchair)? 3  -MM      Standing up from a chair using your arms (e.g., wheelchair, bedside chair)? 3  -MM       Climbing 3-5 steps with a railing? 3  -MM      To walk in hospital room? 3  -MM      AM-PAC 6 Clicks Score 20  -MM      Functional Assessment    Outcome Measure Options AM-PAC 6 Clicks Basic Mobility (PT)  -MM        User Key  (r) = Recorded By, (t) = Taken By, (c) = Cosigned By    Initials Name Provider Type    LN Abigail Spears, PTA Physical Therapy Assistant    MM Sujey Burton, PT Physical Therapist                PT Charges       03/09/17 1125          Time Calculation    Start Time 1125  -LN      Stop Time 1150  -LN      Time Calculation (min) 25 min  -LN      PT Received On 03/09/17  -LN      Time Calculation- PT    Total Timed Code Minutes- PT 25 minute(s)  -LN        User Key  (r) = Recorded By, (t) = Taken By, (c) = Cosigned By    Initials Name Provider Type    LN Abigail ROLAND Tory, PTA Physical Therapy Assistant                  IP PT Goals       03/09/17 1620 03/09/17 1125 03/08/17 1340    Transfer Training PT STG    Transfer Training PT STG, Date Goal Reviewed 03/09/17  -MN 03/09/17  -LN 03/08/17  -LN    Transfer Training PT STG, Outcome goal not met  -MN goal not met  -LN goal not met  -LN    Transfer Training PT STG, Reason Goal Not Met discharged from facility  -MN progress slower than expected  -LN progress slower than expected  -LN    Gait Training PT STG    Gait Training Goal PT STG, Date Goal Reviewed 03/09/17  -MN 03/09/17  -LN 03/08/17  -LN    Gait Training Goal PT STG, Outcome goal not met  -MN goal not met  -LN goal not met  -LN    Gait Training Goal PT STG, Reason Goal Not Met discharged from facility  -MN progress slower than expected  -LN progress slower than expected  -LN    Stair Training PT LTG    Stair Training Goal PT LTG, Date Established   03/08/17  -LN    Stair Training Goal PT LTG, Date Goal Reviewed 03/09/17  -MN 03/09/17  -LN 03/08/17  -LN    Stair Training Goal PT LTG, Outcome goal not met  -MN goal not met  -LN goal not met  -LN    Stair Training Goal PT LTG, Reason Goal Not Met  discharged from facility  -MN progress slower than expected  -LN progress slower than expected  -LN      03/08/17 1040 03/07/17 1440       Transfer Training PT STG    Transfer Training PT STG, Date Established  03/07/17  -MM     Transfer Training PT STG, Time to Achieve  3 days  -MM     Transfer Training PT STG, Activity Type  all transfers  -MM     Transfer Training PT STG, [place] Level  conditional independence  -MM     Transfer Training PT STG, Assist Device  --   AAD  -MM     Transfer Training PT STG, Date Goal Reviewed 03/08/17  -LN 03/07/17  -MM     Transfer Training PT STG, Outcome goal not met  -LN goal ongoing  -MM     Transfer Training PT STG, Reason Goal Not Met progress slower than expected  -LN      Gait Training PT STG    Gait Training Goal PT STG, Date Established  03/07/17  -MM     Gait Training Goal PT STG, [place] Level  conditional independence  -MM     Gait Training Goal PT STG, Assist Device  --   AAD  -MM     Gait Training Goal PT STG, Distance to Achieve  150'; O2 sats >92%  -MM     Gait Training Goal PT STG, Date Goal Reviewed 03/08/17  -LN 03/07/17  -MM     Gait Training Goal PT STG, Outcome goal not met  -LN goal ongoing  -MM     Gait Training Goal PT STG, Reason Goal Not Met progress slower than expected  -LN      Stair Training PT LTG    Stair Training Goal PT LTG, Date Established  03/07/17  -MM     Stair Training Goal PT LTG, Time to Achieve  5 days  -MM     Stair Training Goal PT LTG, [place] Level  conditional independence  -MM     Stair Training Goal PT LTG, Assist Device  1 handrail  -MM     Stair Training Goal PT LTG, Distance to Achieve  3 steps  -MM     Stair Training Goal PT LTG, Date Goal Reviewed 03/08/17  -LN 03/07/17  -MM     Stair Training Goal PT LTG, Outcome goal not met  -LN goal ongoing  -MM     Stair Training Goal PT LTG, Reason Goal Not Met progress slower than expected  -LN        User Key  (r) = Recorded By, (t) = Taken By, (c) = Cosigned By     Initials Name Provider Type    MN Lorena Diana, PT Physical Therapist    LN Abigail Spears, PTA Physical Therapy Assistant    MM Sujey Burton, PT Physical Therapist              PT Discharge Summary  Anticipated Discharge Disposition: home with assist, home with home health  Reason for Discharge: Discharge from facility, Per MD order  Outcomes Achieved: Unable to make functional progress toward goals at this time, Refer to plan of care for updates on goals achieved  Discharge Destination: Home with assist      Lorena Diana, PT   3/9/2017

## 2017-03-09 NOTE — PLAN OF CARE
Problem: Patient Care Overview (Adult)  Goal: Plan of Care Review  Outcome: Ongoing (interventions implemented as appropriate)    03/08/17 1340 03/08/17 1826   Coping/Psychosocial Response Interventions   Plan Of Care Reviewed With --  patient   Patient Care Overview   Progress improving --    Outcome Evaluation   Outcome Summary/Follow up Plan previous note in am was put in as pm rx-amb 144' with rw and cga of 1 holding to iv pole with sats94%-would benefit from cont therapy hh/op pt when d/c home --        Goal: Adult Individualization and Mutuality  Outcome: Ongoing (interventions implemented as appropriate)  Goal: Discharge Needs Assessment  Outcome: Ongoing (interventions implemented as appropriate)    Problem: Pressure Ulcer Risk (Chaim Scale) (Adult,Obstetrics,Pediatric)  Goal: Skin Integrity  Outcome: Ongoing (interventions implemented as appropriate)    Problem: Respiratory Insufficiency (Adult)  Goal: Acid/Base Balance  Outcome: Ongoing (interventions implemented as appropriate)  Goal: Effective Ventilation  Outcome: Ongoing (interventions implemented as appropriate)    Problem: Gastrointestinal Bleeding (Adult)  Goal: Signs and Symptoms of Listed Potential Problems Will be Absent or Manageable (Gastrointestinal Bleeding)  Outcome: Ongoing (interventions implemented as appropriate)    Problem: Renal Failure/Kidney Injury, Acute (Adult)  Goal: Signs and Symptoms of Listed Potential Problems Will be Absent or Manageable (Renal Failure/Kidney Injury, Acute)  Outcome: Ongoing (interventions implemented as appropriate)

## 2017-03-09 NOTE — PLAN OF CARE
Problem: Patient Care Overview (Adult)  Goal: Plan of Care Review  Outcome: Ongoing (interventions implemented as appropriate)    03/09/17 1125   Coping/Psychosocial Response Interventions   Plan Of Care Reviewed With patient   Patient Care Overview   Progress improving   Outcome Evaluation   Outcome Summary/Follow up Plan amb 160' w/o aad sba with increased chest pain,up/down 3 steps with 1 hr and cga of 1-would benefit from hh PT       Goal: Discharge Needs Assessment  Outcome: Ongoing (interventions implemented as appropriate)    03/06/17 1426 03/07/17 1355 03/07/17 1606   Discharge Needs Assessment   Concerns To Be Addressed no discharge needs identified --  --    Readmission Within The Last 30 Days no previous admission in last 30 days --  --    Equipment Needed After Discharge colostomy/ostomy supplies --  --    Discharge Facility/Level Of Care Needs --  --  --    Discharge Planning Comments --  --  NA bicarb gtt and Albumin infusion today.   Self-Care   Equipment Currently Used at Home --  none --    Living Environment   Transportation Available car;family or friend will provide --  --      03/09/17 1125   Discharge Needs Assessment   Concerns To Be Addressed --    Readmission Within The Last 30 Days --    Equipment Needed After Discharge --    Discharge Facility/Level Of Care Needs home with home health   Discharge Planning Comments --    Self-Care   Equipment Currently Used at Home --    Living Environment   Transportation Available --          Problem: Inpatient Physical Therapy  Goal: Transfer Training Goal 1 STG- PT  Outcome: Ongoing (interventions implemented as appropriate)    03/07/17 1440 03/09/17 1125   Transfer Training PT STG   Transfer Training PT STG, Date Established 03/07/17 --    Transfer Training PT STG, Time to Achieve 3 days --    Transfer Training PT STG, Activity Type all transfers --    Transfer Training PT STG, [place] Level conditional independence --    Transfer Training PT STG,  Assist Device (AAD) --    Transfer Training PT STG, Date Goal Reviewed --  03/09/17   Transfer Training PT STG, Outcome --  goal not met   Transfer Training PT STG, Reason Goal Not Met --  progress slower than expected       Goal: Gait Training Goal STG- PT  Outcome: Ongoing (interventions implemented as appropriate)    03/07/17 1440 03/09/17 1125   Gait Training PT STG   Gait Training Goal PT STG, Date Established 03/07/17 --    Gait Training Goal PT STG, [place] Level conditional independence --    Gait Training Goal PT STG, Assist Device (AAD) --    Gait Training Goal PT STG, Distance to Achieve 150'; O2 sats >92% --    Gait Training Goal PT STG, Date Goal Reviewed --  03/09/17   Gait Training Goal PT STG, Outcome --  goal not met   Gait Training Goal PT STG, Reason Goal Not Met --  progress slower than expected       Goal: Stair Training Goal LTG- PT  Outcome: Ongoing (interventions implemented as appropriate)    03/07/17 1440 03/08/17 1340 03/09/17 1125   Stair Training PT LTG   Stair Training Goal PT LTG, Date Established --  03/08/17 --    Stair Training Goal PT LTG, Time to Achieve 5 days --  --    Stair Training Goal PT LTG, [place] Level conditional independence --  --    Stair Training Goal PT LTG, Assist Device 1 handrail --  --    Stair Training Goal PT LTG, Distance to Achieve 3 steps --  --    Stair Training Goal PT LTG, Date Goal Reviewed --  --  03/09/17   Stair Training Goal PT LTG, Outcome --  --  goal not met   Stair Training Goal PT LTG, Reason Goal Not Met --  --  progress slower than expected

## 2017-03-09 NOTE — PLAN OF CARE
Problem: Patient Care Overview (Adult)  Goal: Plan of Care Review  Outcome: Ongoing (interventions implemented as appropriate)    03/08/17 1340 03/08/17 1826   Coping/Psychosocial Response Interventions   Plan Of Care Reviewed With --  patient   Patient Care Overview   Progress improving --        Goal: Adult Individualization and Mutuality  Outcome: Ongoing (interventions implemented as appropriate)  Goal: Discharge Needs Assessment  Outcome: Ongoing (interventions implemented as appropriate)    Problem: Pressure Ulcer Risk (Chaim Scale) (Adult,Obstetrics,Pediatric)  Goal: Skin Integrity  Outcome: Ongoing (interventions implemented as appropriate)    Problem: Respiratory Insufficiency (Adult)  Goal: Acid/Base Balance  Outcome: Ongoing (interventions implemented as appropriate)  Goal: Effective Ventilation  Outcome: Ongoing (interventions implemented as appropriate)    Problem: Gastrointestinal Bleeding (Adult)  Goal: Signs and Symptoms of Listed Potential Problems Will be Absent or Manageable (Gastrointestinal Bleeding)  Outcome: Ongoing (interventions implemented as appropriate)    Problem: Renal Failure/Kidney Injury, Acute (Adult)  Goal: Signs and Symptoms of Listed Potential Problems Will be Absent or Manageable (Renal Failure/Kidney Injury, Acute)  Outcome: Ongoing (interventions implemented as appropriate)

## 2017-03-10 PROBLEM — L03.311 CELLULITIS OF ABDOMINAL WALL: Status: ACTIVE | Noted: 2017-01-01

## 2017-03-10 PROBLEM — K92.2 GASTROINTESTINAL HEMORRHAGE: Status: ACTIVE | Noted: 2017-01-01

## 2017-03-10 PROBLEM — G47.9 DISTURBANCE IN SLEEP BEHAVIOR: Status: ACTIVE | Noted: 2017-01-01

## 2017-03-10 PROBLEM — R23.8 SKIN IRRITATION: Status: ACTIVE | Noted: 2017-01-01

## 2017-03-11 PROBLEM — K21.9 GASTROESOPHAGEAL REFLUX DISEASE WITHOUT ESOPHAGITIS: Status: ACTIVE | Noted: 2017-01-01

## 2017-03-11 PROBLEM — J42 CHRONIC BRONCHITIS (HCC): Status: ACTIVE | Noted: 2017-01-01

## 2017-03-11 PROBLEM — F17.200 TOBACCO DEPENDENCE SYNDROME: Status: ACTIVE | Noted: 2017-01-01

## 2017-03-11 PROBLEM — I16.0 HYPERTENSIVE URGENCY: Status: ACTIVE | Noted: 2017-01-01

## 2017-03-13 PROBLEM — E87.0 HYPERNATREMIA: Status: ACTIVE | Noted: 2017-01-01

## 2017-03-13 PROBLEM — G89.4 CHRONIC PAIN SYNDROME: Status: ACTIVE | Noted: 2017-01-01

## 2017-03-14 PROBLEM — J81.0 ACUTE PULMONARY EDEMA (HCC): Status: ACTIVE | Noted: 2017-01-01

## 2017-03-14 PROBLEM — J96.21 ACUTE ON CHRONIC RESPIRATORY FAILURE WITH HYPOXIA (HCC): Status: ACTIVE | Noted: 2017-01-01

## 2017-03-14 PROBLEM — R06.03 ACUTE RESPIRATORY DISTRESS: Status: ACTIVE | Noted: 2017-01-01

## 2017-03-15 NOTE — ANESTHESIA PREPROCEDURE EVALUATION
Anesthesia Evaluation     Patient summary reviewed and Nursing notes reviewed   NPO Status: waived due to emergency   Airway   Comment: Intubated and sedated  Dental      Pulmonary - negative pulmonary ROS    breath sounds clear to auscultation  Cardiovascular     Rhythm: regular  Rate: normal    (+) hypertension,       Neuro/Psych  (+) psychiatric history Anxiety and Depression,    GI/Hepatic/Renal/Endo    (+) morbid obesity, GERD, hepatitis, chronic renal disease,     Musculoskeletal     (+) back pain,   Abdominal    Substance History - negative use     OB/GYN negative ob/gyn ROS         Other - negative ROS                                   Anesthesia Plan    ASA 4 - emergent       total IV anesthesia(Discussed central line and guardian understands possible complications,risks and agrees.)  Anesthetic plan and risks discussed with legal guardian.

## 2017-03-15 NOTE — ANESTHESIA POSTPROCEDURE EVALUATION
Patient: Eva Troncoso    Procedure Summary     Date Anesthesia Start Anesthesia Stop Room / Location    03/14/17 [id number]        Procedure Diagnosis Scheduled Providers Provider    ANESTHESIA CENTRAL LINE No diagnosis on file.  Augustine Toscano MD          Anesthesia Type: No value filed.  Last vitals  /74 (03/14/17 2301)    Temp      Pulse 84 (03/14/17 2301)   Resp      SpO2 100 % (03/14/17 2301)      Post Anesthesia Care and Evaluation    Patient location during evaluation: ED  Patient participation: complete - patient cannot participate  Level of consciousness: obtunded/minimal responses  Pain score: 0  Pain management: adequate  Airway patency: patent  Anesthetic complications: No anesthetic complications  PONV Status: none  Cardiovascular status: acceptable  Respiratory status: acceptable, intubated and ventilator  Hydration status: acceptable

## 2017-03-15 NOTE — ANESTHESIA PROCEDURE NOTES
Central Line    Patient location during procedure: ED  Start time: 3/14/2017 10:40 PM  Stop Time:3/14/2017 11:10 PM  Indications: vascular access and MD/Surgeon request  Staff  Anesthesiologist: MATEO HERNÁNDEZ  Preanesthetic Checklist  Completed: patient identified, site marked, surgical consent, pre-op evaluation, timeout performed, IV checked, risks and benefits discussed and monitors and equipment checked  Central Line Prep  Sterile Tech:cap, gloves, gown, mask and sterile barriers  Prep: chloraprep  Patient monitoring: blood pressure monitoring, continuous pulse oximetry and EKG  Central Line Procedure  Laterality:right  Location:internal jugular  Catheter Type:triple lumen  Catheter Size:7 Fr  Guidance:ultrasound guided, landmark technique and palpation technique  PROCEDURE NOTE/ULTRASOUND INTERPRETATION.  Using ultrasound guidance the potential vascular sites for insertion of the catheter were visualized to determine the patency of the vessel to be used for vascular access.  After selecting the appropriate site for insertion, the needle was visualized under ultrasound being inserted into the internal jugular vein, followed by ultrasound confirmation of wire and catheter placement. There were no abnormalities seen on ultrasound; an image was taken; and the patient tolerated the procedure with no complications.   Assessment  Post procedure:biopatch applied, line sutured and occlusive dressing applied  Assessement:blood return through all ports, free fluid flow and chest x-ray ordered  Complications:no  Patient Tolerance:patient tolerated the procedure well with no apparent complications  Additional Notes  Discussed central line and consenting guardian understands complications,risks and agrees.

## 2017-03-16 PROBLEM — I21.4 NON Q WAVE MYOCARDIAL INFARCTION (HCC): Status: ACTIVE | Noted: 2017-01-01

## 2017-03-17 PROBLEM — J15.6 PNEUMONIA DUE TO SERRATIA MARCESCENS (HCC): Status: ACTIVE | Noted: 2017-01-01

## 2017-03-18 PROBLEM — A48.8 SERRATIA MARCESCENS INFECTION: Status: ACTIVE | Noted: 2017-01-01

## 2017-03-18 PROBLEM — N18.2 CHRONIC KIDNEY DISEASE, STAGE II (MILD): Status: ACTIVE | Noted: 2017-01-01

## 2017-03-20 PROBLEM — R50.9 FEVER: Status: ACTIVE | Noted: 2017-01-01

## 2017-05-06 PROBLEM — M79.89 LEG SWELLING: Status: ACTIVE | Noted: 2017-01-01

## 2017-05-06 PROBLEM — I25.10 CORONARY ARTERY DISEASE INVOLVING NATIVE HEART: Status: ACTIVE | Noted: 2017-01-01

## 2017-05-06 PROBLEM — I10 ESSENTIAL HYPERTENSION: Status: ACTIVE | Noted: 2017-01-01

## 2017-05-07 PROBLEM — R23.3 PETECHIAE: Status: ACTIVE | Noted: 2017-01-01

## 2017-05-07 PROBLEM — N39.0 URINARY TRACT INFECTION WITHOUT HEMATURIA: Status: ACTIVE | Noted: 2017-01-01

## 2017-05-08 PROBLEM — J15.9 HOSPITAL-ACQUIRED BACTERIAL PNEUMONIA: Status: ACTIVE | Noted: 2017-01-01

## 2017-05-09 PROBLEM — N30.00 ACUTE CYSTITIS WITHOUT HEMATURIA: Status: ACTIVE | Noted: 2017-01-01

## 2017-05-11 PROBLEM — H05.20 EXOPHTHALMOS: Status: ACTIVE | Noted: 2017-01-01

## 2017-05-12 PROBLEM — D50.0 IRON DEFICIENCY ANEMIA DUE TO CHRONIC BLOOD LOSS: Status: ACTIVE | Noted: 2017-01-01

## 2017-05-15 PROBLEM — R29.898 SEVERE MUSCLE DECONDITIONING: Status: ACTIVE | Noted: 2017-01-01

## 2017-05-21 PROBLEM — R74.01 TRANSAMINITIS: Status: ACTIVE | Noted: 2017-01-01

## 2017-05-23 PROBLEM — D69.6 THROMBOCYTOPENIA (HCC): Status: ACTIVE | Noted: 2017-01-01

## 2017-05-25 PROBLEM — J96.01 ACUTE RESPIRATORY FAILURE WITH HYPOXIA (HCC): Status: RESOLVED | Noted: 2017-01-01 | Resolved: 2017-01-01

## 2017-05-25 PROBLEM — N30.00 ACUTE CYSTITIS WITHOUT HEMATURIA: Status: RESOLVED | Noted: 2017-01-01 | Resolved: 2017-01-01

## 2017-05-26 PROBLEM — J15.9 HOSPITAL-ACQUIRED BACTERIAL PNEUMONIA: Status: RESOLVED | Noted: 2017-01-01 | Resolved: 2017-01-01

## 2017-05-26 PROBLEM — Z99.11 VENTILATOR DEPENDENCE (HCC): Status: ACTIVE | Noted: 2017-01-01

## 2017-06-01 NOTE — PLAN OF CARE
Problem: Patient Care Overview (Adult)  Goal: Plan of Care Review  Outcome: Ongoing (interventions implemented as appropriate)    06/01/17 0535   Coping/Psychosocial Response Interventions   Plan Of Care Reviewed With patient   Patient Care Overview   Progress no change   Outcome Evaluation   Outcome Summary/Follow up Plan Pt was terminally extubated on 5/31/17. Patients vitals remained stable throughout the night, still draining large amounts of serosanguinous fluid from abdominal incision.       Goal: Adult Individualization and Mutuality  Outcome: Ongoing (interventions implemented as appropriate)  Goal: Discharge Needs Assessment  Outcome: Ongoing (interventions implemented as appropriate)    Problem: Cardiac: Heart Failure (Adult)  Goal: Signs and Symptoms of Listed Potential Problems Will be Absent or Manageable (Cardiac: Heart Failure)  Outcome: Ongoing (interventions implemented as appropriate)    Problem: Pneumonia (Adult)  Goal: Signs and Symptoms of Listed Potential Problems Will be Absent or Manageable (Pneumonia)  Outcome: Ongoing (interventions implemented as appropriate)    Problem: Gastrointestinal Bleeding (Adult)  Goal: Signs and Symptoms of Listed Potential Problems Will be Absent or Manageable (Gastrointestinal Bleeding)  Outcome: Ongoing (interventions implemented as appropriate)    Problem: Pressure Ulcer Risk (Chaim Scale) (Adult,Obstetrics,Pediatric)  Goal: Skin Integrity  Outcome: Ongoing (interventions implemented as appropriate)

## 2017-06-01 NOTE — CONSULTS
Nutrition Services    Patient Name:  Eva Troncoso  YOB: 1960  MRN: [id number]  Admit Date:  5/6/2017      Pt extubated and is now comfort Care.  Unresponsive.  Rd will monitor for any changes in tx plans.       Electronically signed by:  Ev Parra RD  06/01/17 10:38 AM

## 2017-06-01 NOTE — PROGRESS NOTES
LOS: 26 days   Patient Care Team:  Mary Spangler MD as PCP - General (Family Medicine)  Nader Paul MD as PCP - Claims Attributed    Chief Complaint: Acute respiratory failure with hypoxia    Subjective  Comfort care for respiratory failure    Interval History: NA    Patient Complaints: none  Patient Denies:  NA  History taken from: patient family    Review of Systems:    Review of Systems   Unable to perform ROS: Patient unresponsive       Objective     Vital Signs  Temp:  [97.4 °F (36.3 °C)-98.6 °F (37 °C)] 98.6 °F (37 °C)  Heart Rate:  [] 104  Resp:  [12-22] 22  BP: (110-138)/(53-85) 138/85  Arterial Line BP: (110-175)/(53-87) 145/60  FiO2 (%):  [22 %] 22 %    Physical Exam:   Physical Exam   Constitutional: She is oriented to person, place, and time. She appears well-developed and well-nourished. She appears toxic. She has a sickly appearance. No distress.   HENT:   Head: Normocephalic and atraumatic.   Right Ear: External ear normal.   Left Ear: External ear normal.   Nose: Nose normal.   Mouth/Throat: Oropharynx is clear and moist.   Eyes: Conjunctivae and EOM are normal. Pupils are equal, round, and reactive to light. Right eye exhibits no discharge. Left eye exhibits no discharge. No scleral icterus.   Neck: Normal range of motion. Neck supple. No JVD present. No tracheal deviation present. No thyromegaly present.   Cardiovascular: Normal rate, regular rhythm, normal heart sounds and intact distal pulses.  Exam reveals no gallop and no friction rub.    No murmur heard.  Pulmonary/Chest: Accessory muscle usage present. No stridor. Apnea noted. She is in respiratory distress. She has decreased breath sounds. She has no wheezes. She has rhonchi. She has no rales. She exhibits no tenderness.   Abdominal: Soft. Bowel sounds are normal. She exhibits no distension and no mass. There is no tenderness. There is no rebound and no guarding. No hernia.   Musculoskeletal: Normal range of motion. She exhibits  no edema or deformity.   Lymphadenopathy:     She has no cervical adenopathy.   Neurological: She is oriented to person, place, and time. She is unresponsive. She exhibits normal muscle tone. Coordination normal.   Skin: Skin is warm and dry. No erythema.   Psychiatric: She has a normal mood and affect. Her behavior is normal. Judgment and thought content normal.   Nursing note and vitals reviewed.       Results Review:       Lab Results (last 24 hours)     Procedure Component Value Units Date/Time    Blood Culture [[id number]]  (Normal) Collected:  05/26/17 1215    Specimen:  Blood from Blood, Arterial Line Updated:  05/31/17 1301     Blood Culture No growth at 5 days          Medication Review:   Current Facility-Administered Medications   Medication Dose Route Frequency Provider Last Rate Last Dose   • artificial tears ophthalmic ointment   Both Eyes Q12H Mray Spangler MD       • atropine 1 % ophthalmic solution 1 drop  1 drop Sublingual TID Mary Spangler MD   1 drop at 06/01/17 0909   • LORazepam (ATIVAN) injection 1 mg  1 mg Intravenous Q1H PRN Eri Antonio MD       • Morphine sulfate (PF) injection 2 mg  2 mg Intravenous Q2H PRN Mary Spangler MD       • Scopolamine (TRANSDERM-SCOP) 1.5 MG/3DAYS patch 1 patch  1 patch Transdermal Q72H Mary Spangler MD   1 patch at 05/31/17 1355       Assessment/Plan     Active Problems:    Hypertensive urgency    Colostomy present    Chronic pain    Chronic hepatitis C without hepatic coma    (HFpEF) heart failure with preserved ejection fraction    Leg swelling    CKD (chronic kidney disease) stage 3, GFR 30-59 ml/min    Gastrointestinal hemorrhage    Coronary artery disease involving native heart    Essential hypertension    Petechiae    Exophthalmos    Iron deficiency anemia due to chronic blood loss    Severe muscle deconditioning    Transaminitis    Thrombocytopenia    Ventilator dependence    Plan    Comfort care measures    I have examined the patient and reviewed the  pertinent diagnostic data. I have discussed the case with the Family Medicine Resident and agree with the assessment and plan of care.    Loc June DO    I have examined the patient and reviewed the pertinent diagnostic data. I have discussed the case with the Family Medicine Resident and agree with the assessment and plan of care.    Loc June DO           This document has been electronically signed by Loc June DO on June 1, 2017 11:26 AM

## 2017-06-01 NOTE — PLAN OF CARE
Problem: Patient Care Overview (Adult)  Goal: Plan of Care Review  Outcome: Ongoing (interventions implemented as appropriate)    06/01/17 1203   Coping/Psychosocial Response Interventions   Plan Of Care Reviewed With patient   Patient Care Overview   Progress no change   Outcome Evaluation   Outcome Summary/Follow up Plan Pt transferred up from ICU today. Pt is unresponsive. No signs of pain. Will continue to monitor.         Problem: Pneumonia (Adult)  Goal: Signs and Symptoms of Listed Potential Problems Will be Absent or Manageable (Pneumonia)  Outcome: Ongoing (interventions implemented as appropriate)    Problem: Gastrointestinal Bleeding (Adult)  Goal: Signs and Symptoms of Listed Potential Problems Will be Absent or Manageable (Gastrointestinal Bleeding)  Outcome: Ongoing (interventions implemented as appropriate)    Problem: Pressure Ulcer Risk (Chaim Scale) (Adult,Obstetrics,Pediatric)  Goal: Skin Integrity  Outcome: Ongoing (interventions implemented as appropriate)

## 2017-06-01 NOTE — PROGRESS NOTES
FAMILY MEDICINE DAILY PROGRESS NOTE  NAME: Eva Troncoso  : 1960  MRN: [id number]     LOS: 26 days     PROVIDER OF SERVICE: Mary Spangler MD    Chief Complaint: Acute respiratory failure with hypoxia    Subjective:     Interval History:  History taken from: chart RN  Patient Complaints: currently on comfort measures and is unresponsive.     Review of Systems:   Review of Systems   Unable to perform ROS: Patient unresponsive     Objective:     Vital Signs  Temp:  [97.4 °F (36.3 °C)-97.6 °F (36.4 °C)] 97.6 °F (36.4 °C)  Heart Rate:  [] 96  Resp:  [12-22] 12  BP: (110-128)/(53-57) 110/53  Arterial Line BP: (108-175)/(45-87) 137/59  FiO2 (%):  [22 %] 22 %    Physical Exam  Physical Exam   Constitutional: Vital signs are normal. She has a sickly appearance. She is sedated and intubated.   Morbidly obese   HENT:   Head: Normocephalic and atraumatic.   Tearing of eyes noted bilaterally with some periorbital edema   Eyes: Lids are normal. Right eye exhibits discharge. Left eye exhibits discharge.   Cardiovascular: Normal rate and regular rhythm.    Pulmonary/Chest: Effort normal and breath sounds normal. She is intubated.   Abdominal: Soft. She exhibits distension. Bowel sounds are decreased. There is no tenderness.       Neurological: GCS eye subscore is 4. GCS verbal subscore is 1. GCS motor subscore is 2.   Skin: Skin is warm.     Medication Review    Current Facility-Administered Medications:   •  artificial tears ophthalmic ointment, , Both Eyes, Q12H, Mary Spangler MD  •  atropine 1 % ophthalmic solution 1 drop, 1 drop, Sublingual, TID, Mary Spangler MD, 1 drop at 175  •  LORazepam (ATIVAN) injection 1 mg, 1 mg, Intravenous, Q2H PRN, Mary Spangler MD, 1 mg at 17 2317  •  Morphine sulfate (PF) injection 2 mg, 2 mg, Intravenous, Q4H PRN, Mary Spangler MD, 2 mg at 17 0303  •  Scopolamine (TRANSDERM-SCOP) 1.5 MG/3DAYS patch 1 patch, 1 patch, Transdermal, Q72H, Mary Spangler MD, 1 patch at  05/31/17 1355     Diagnostic Data    Lab Results (last 24 hours)     Procedure Component Value Units Date/Time    Blood Culture [[id number]]  (Normal) Collected:  05/26/17 0955    Specimen:  Blood from Blood, Arterial Line Updated:  05/31/17 1101     Blood Culture No growth at 5 days    Blood Culture [[id number]]  (Normal) Collected:  05/26/17 1215    Specimen:  Blood from Blood, Arterial Line Updated:  05/31/17 1301     Blood Culture No growth at 5 days            I reviewed the patient's new clinical results.    Assessment/Plan:     Active Hospital Problems (** Indicates Principal Problem)    Diagnosis Date Noted   • Ventilator dependence [Z99.11] 05/26/2017     -patient is POD 7 for colostomy revision  Currently on comfort measures      • Thrombocytopenia [D69.6] 05/23/2017   • Transaminitis [R74.0] 05/21/2017   • Severe muscle deconditioning [R29.898] 05/15/2017   • Iron deficiency anemia due to chronic blood loss [D50.0] 05/12/2017   • Exophthalmos [H05.20] 05/11/2017   • Petechiae [R23.3] 05/07/2017            • Essential hypertension [I10] 05/06/2017   • Coronary artery disease involving native heart [I25.10] 03/16/2017   • Gastrointestinal hemorrhage [K92.2] 03/10/2017     Pt bleeding from ostomy  Patient is so far s/p 9 PRBC, 3 units of platelets and 2 FFP       • CKD (chronic kidney disease) stage 3, GFR 30-59 ml/min [N18.3] 03/05/2017   • (HFpEF) heart failure with preserved ejection fraction [I50.30] 03/04/2017   • Leg swelling [M79.89] 03/04/2017   • Chronic hepatitis C without hepatic coma [B18.2] 11/14/2016   • Hypertensive urgency [I16.0]    • Colostomy present [Z93.3]      Colostomy care  Hem occult positive; Dr. Osei on board s/p procedure to stop bleeding. No intervention from 's standpoint.   -patient had surgery performed 5/25/2017 for colostomy revision. POD #7, continue wound care     • Chronic pain [G89.29]      On morphine          Resolved Hospital Problems    Diagnosis Date Noted  Date Resolved   • **Acute respiratory failure with hypoxia [J96.01] 02/10/2017 05/25/2017     Secondary to MRSA PNA  -Pt satting well onRA  -finished 10 day course of abx  -Continue steroids         • Hospital-acquired bacterial pneumonia - MRSA [J15.9] 05/08/2017 05/26/2017     S/p Zyvox/Rocephin; currently on Vancomcyin dose per pharmacy because sputum is MRSA positive   -vancomycin currently being held due to trough levels being elevated      • Acute complicated cystitis without hematuria [N30.00] 05/07/2017 05/25/2017     -Ucx positive for E.coli  s/p Levaquin and fortaz, has been treated           DVT prophylaxis: on hold due to recent history of bleeding per colostomy site  Code status is Comfort Measures and Allow Natural Death (A-N-D).  Had family meeting, family is aware of poor prognosis and changed patient code status to comfort measures and AND.     Will transfer her to regular floor today.         This document has been electronically signed by Mary Spangler MD on June 1, 2017 8:10 AM    Mary Spangler MD, GEENA  [place] [phone]  .

## 2017-06-02 PROBLEM — K11.7 INCREASED OROPHARYNGEAL SECRETIONS: Status: ACTIVE | Noted: 2017-01-01

## 2017-06-02 PROBLEM — Z51.5 COMFORT MEASURES ONLY STATUS: Status: ACTIVE | Noted: 2017-01-01

## 2017-06-02 NOTE — PROGRESS NOTES
LOS: 27 days   Patient Care Team:  Mary Spangler MD as PCP - General (Family Medicine)  Nader Paul MD as PCP - Claims Attributed    Chief Complaint: Acute respiratory failure with hypoxia    Subjective      Comfort measures    Interval History: No change    Patient Complaints: none  Patient Denies:  NA  History taken from: chart    Review of Systems:    Review of Systems   Unable to perform ROS: Patient unresponsive       Objective     Vital Signs       Physical Exam:   Physical Exam   Constitutional: She is oriented to person, place, and time. She appears well-developed and well-nourished. She is sleeping. She has a sickly appearance. She appears ill. She appears distressed.   HENT:   Head: Normocephalic and atraumatic.   Right Ear: External ear normal.   Left Ear: External ear normal.   Nose: Nose normal.   Mouth/Throat: Oropharynx is clear and moist.   Eyes: Conjunctivae and EOM are normal.   Neck: Normal range of motion. Neck supple.   Cardiovascular: Normal rate, regular rhythm and normal heart sounds.    Pulmonary/Chest: Accessory muscle usage present. Bradypnea noted. She is in respiratory distress. She has rhonchi in the right upper field, the right middle field, the right lower field, the left upper field, the left middle field and the left lower field.   Abdominal: Soft. Bowel sounds are normal. She exhibits no distension. There is no tenderness.   Musculoskeletal: Normal range of motion.   Neurological: She is alert and oriented to person, place, and time.   Skin: Skin is warm and dry.   Psychiatric: She has a normal mood and affect. Her speech is normal and behavior is normal. Cognition and memory are normal.        Results Review:       Lab Results (last 24 hours)     ** No results found for the last 24 hours. **          Medication Review:   Current Facility-Administered Medications   Medication Dose Route Frequency Provider Last Rate Last Dose   • artificial tears ophthalmic ointment   Both  Eyes Q12H Mary Spangler MD       • atropine 1 % ophthalmic solution 2 drop  2 drop Sublingual TID Mary Spangler MD   2 drop at 06/02/17 0847   • LORazepam (ATIVAN) injection 1 mg  1 mg Intravenous Q1H PRN Eri Antonio MD   1 mg at 06/02/17 0846   • Morphine sulfate (PF) injection 2 mg  2 mg Intravenous Q2H PRN Mary Spangler MD   2 mg at 06/02/17 0846   • Scopolamine (TRANSDERM-SCOP) 1.5 MG/3DAYS patch 1 patch  1 patch Transdermal Q72H Mary Spangler MD   1 patch at 05/31/17 1355       Assessment/Plan     Active Problems:    Hypertensive urgency    Colostomy present    Chronic pain    Chronic hepatitis C without hepatic coma    (HFpEF) heart failure with preserved ejection fraction    Leg swelling    CKD (chronic kidney disease) stage 3, GFR 30-59 ml/min    Gastrointestinal hemorrhage    Coronary artery disease involving native heart    Essential hypertension    Petechiae    Exophthalmos    Iron deficiency anemia due to chronic blood loss    Severe muscle deconditioning    Transaminitis    Thrombocytopenia    Ventilator dependence    Increased oropharyngeal secretions    Comfort measures only status    Plan    Cmfort measures    I have examined the patient and reviewed the pertinent diagnostic data. I have discussed the case with the Family Medicine Resident and agree with the assessment and plan of care.    Loc June DO             This document has been electronically signed by Loc June DO on June 2, 2017 10:43 AM

## 2017-06-02 NOTE — PROGRESS NOTES
FAMILY MEDICINE DAILY PROGRESS NOTE  NAME: Eva Troncoso  : 1960  MRN: [id number]     LOS: 27 days     PROVIDER OF SERVICE: Mary Spangler MD    Chief Complaint: Acute respiratory failure with hypoxia    Subjective:     Interval History:  History taken from: chart RN  Patient Complaints: currently on comfort measures and is unresponsive.     Review of Systems:   Review of Systems   Unable to perform ROS: Patient unresponsive     Objective:     Vital Signs  Temp:  [98.6 °F (37 °C)] 98.6 °F (37 °C)  Heart Rate:  [103-104] 104  Resp:  [22] 22  BP: (138)/(85) 138/85  Arterial Line BP: (145)/(60) 145/60    Physical Exam  Physical Exam   Constitutional: Vital signs are normal. She has a sickly appearance.   Morbidly obese  Positive for increased secretions   HENT:   Head: Normocephalic and atraumatic.   Tearing of eyes noted bilaterally with some periorbital edema   Eyes: Lids are normal. Right eye exhibits discharge. Left eye exhibits discharge.   Cardiovascular: Normal rate and regular rhythm.    Pulmonary/Chest: Effort normal and breath sounds normal.   Abdominal: Soft. She exhibits distension. Bowel sounds are decreased. There is no tenderness.       Neurological: GCS eye subscore is 4. GCS verbal subscore is 1. GCS motor subscore is 2.   Skin: Skin is warm.     Medication Review    Current Facility-Administered Medications:   •  artificial tears ophthalmic ointment, , Both Eyes, Q12H, Mary Spangler MD  •  atropine 1 % ophthalmic solution 1 drop, 1 drop, Sublingual, TID, Mary Spangler MD, 1 drop at 17  •  LORazepam (ATIVAN) injection 1 mg, 1 mg, Intravenous, Q1H PRN, Eri Antonio MD  •  Morphine sulfate (PF) injection 2 mg, 2 mg, Intravenous, Q2H PRN, Mary Spangler MD  •  Scopolamine (TRANSDERM-SCOP) 1.5 MG/3DAYS patch 1 patch, 1 patch, Transdermal, Q72H, Mary Spangler MD, 1 patch at 17 1355     Diagnostic Data    Lab Results (last 24 hours)     ** No results found for the last 24 hours. **             I reviewed the patient's new clinical results.    Assessment/Plan:     Active Hospital Problems (** Indicates Principal Problem)    Diagnosis Date Noted   • Increased oropharyngeal secretions [K11.7] 06/02/2017     On scopolamine patches  Will add atropine today     • Comfort measures only status [Z51.5] 06/02/2017     On comfort measures protocol     • Ventilator dependence [Z99.11] 05/26/2017     -patient is POD 8 for colostomy revision  Currently on comfort measures      • Thrombocytopenia [D69.6] 05/23/2017   • Transaminitis [R74.0] 05/21/2017   • Severe muscle deconditioning [R29.898] 05/15/2017   • Iron deficiency anemia due to chronic blood loss [D50.0] 05/12/2017   • Exophthalmos [H05.20] 05/11/2017   • Petechiae [R23.3] 05/07/2017            • Essential hypertension [I10] 05/06/2017   • Coronary artery disease involving native heart [I25.10] 03/16/2017   • Gastrointestinal hemorrhage [K92.2] 03/10/2017     Pt bleeding from ostomy  Patient is so far s/p 9 PRBC, 3 units of platelets and 2 FFP       • CKD (chronic kidney disease) stage 3, GFR 30-59 ml/min [N18.3] 03/05/2017   • (HFpEF) heart failure with preserved ejection fraction [I50.30] 03/04/2017   • Leg swelling [M79.89] 03/04/2017   • Chronic hepatitis C without hepatic coma [B18.2] 11/14/2016   • Hypertensive urgency [I16.0]    • Colostomy present [Z93.3]      Colostomy care  Hem occult positive; Dr. Osei on board s/p procedure to stop bleeding. No intervention from 's standpoint.   -patient had surgery performed 5/25/2017 for colostomy revision. POD #8, continue wound care     • Chronic pain [G89.29]      On morphine          Resolved Hospital Problems    Diagnosis Date Noted Date Resolved   • **Acute respiratory failure with hypoxia [J96.01] 02/10/2017 05/25/2017     Secondary to MRSA PNA  -Pt satting well onRA  -finished 10 day course of abx  -Continue steroids         • Hospital-acquired bacterial pneumonia - MRSA [J15.9]  05/08/2017 05/26/2017     S/p Zyvox/Rocephin; currently on Vancomcyin dose per pharmacy because sputum is MRSA positive   -vancomycin currently being held due to trough levels being elevated      • Acute complicated cystitis without hematuria [N30.00] 05/07/2017 05/25/2017     -Ucx positive for E.coli  s/p Levaquin and fortaz, has been treated           DVT prophylaxis: on hold due to recent history of bleeding per colostomy site  Code status is Comfort Measures and Allow Natural Death (A-N-D).  Had family meeting, family is aware of poor prognosis and changed patient code status to comfort measures and AND.     Will transfer her to regular floor today.         This document has been electronically signed by Mary Spangler MD on June 2, 2017 8:32 AM    Mary Spangler MD, GEENA  [place] [phone]  .

## 2017-06-02 NOTE — PLAN OF CARE
Problem: Patient Care Overview (Adult)  Goal: Plan of Care Review  Outcome: Ongoing (interventions implemented as appropriate)    06/02/17 0340   Patient Care Overview   Progress declining   Outcome Evaluation   Outcome Summary/Follow up Plan Pt is unresponsive; no signs of pain or discomfort; lung sounds are very coarse and pt has copious secretions; no vital signs performed; will continue to monitor       Goal: Adult Individualization and Mutuality  Outcome: Ongoing (interventions implemented as appropriate)  Goal: Discharge Needs Assessment  Outcome: Ongoing (interventions implemented as appropriate)    Problem: Cardiac: Heart Failure (Adult)  Goal: Signs and Symptoms of Listed Potential Problems Will be Absent or Manageable (Cardiac: Heart Failure)  Outcome: Ongoing (interventions implemented as appropriate)    Problem: Pneumonia (Adult)  Goal: Signs and Symptoms of Listed Potential Problems Will be Absent or Manageable (Pneumonia)  Outcome: Ongoing (interventions implemented as appropriate)    Problem: Gastrointestinal Bleeding (Adult)  Goal: Signs and Symptoms of Listed Potential Problems Will be Absent or Manageable (Gastrointestinal Bleeding)  Outcome: Ongoing (interventions implemented as appropriate)    Problem: Pressure Ulcer Risk (Chaim Scale) (Adult,Obstetrics,Pediatric)  Goal: Skin Integrity  Outcome: Ongoing (interventions implemented as appropriate)

## 2017-06-03 NOTE — NURSING NOTE
RN attempted to notify family, Talha Troncoso, regarding patients status change. No answer at this time and voicemail has not been set up. No other numbers on file. Will continue to attempt to reach.

## 2017-06-03 NOTE — THERAPY DISCHARGE NOTE
Acute Care - Physical Therapy Discharge Summary  [facility]       Patient Name: Eva Troncoso  : 1960  MRN: [id number]    Today's Date: 6/3/2017  Onset of Illness/Injury or Date of Surgery Date: 17    Date of Referral to PT: 17  Referring Physician: Dr. Camara      Admit Date: 2017      PT Recommendation and Plan    Visit Dx:    ICD-10-CM ICD-9-CM   1. Colostomy present Z93.3 V44.3   2. Essential hypertension I10 401.9   3. Bronchitis J40 490   4. Dehydration E86.0 276.51   5. Impaired mobility and ADLs Z74.09 799.89   6. Impaired physical mobility Z74.09 781.99   7. Impaired gait and mobility R26.89 781.2   8. Gastrointestinal hemorrhage, unspecified gastrointestinal hemorrhage type K92.2 578.9   9. Iron deficiency anemia due to chronic blood loss D50.0 280.0   10. Acute GI hemorrhage K92.2 578.9   11. Anemia, unspecified type D64.9 285.9                       IP PT Goals       17 0920 17 1001       Bed Mobility PT LTG    Bed Mobility PT LTG, Date Established  05/15/17  -MN     Bed Mobility PT LTG, Time to Achieve  2 wks  -MN     Bed Mobility PT LTG, Activity Type  roll left/roll right;supine to sit/sit to supine  -MN     Bed Mobility PT LTG, [place] Level  supervision required  -MN     Bed Mobility PT Goal  LTG, Assist Device  bed rails  -MN     Bed Mobility PT LTG, Date Goal Reviewed 17  -TW 17  -MN     Bed Mobility PT LTG, Outcome goal ongoing  -TW goal revised  -MN     Transfer Training PT LTG    Transfer Training PT LTG, Date Established  05/15/17  -MN     Transfer Training PT LTG, Time to Achieve  2 wks  -MN     Transfer Training PT LTG, Activity Type  sit to stand/stand to sit;bed to chair /chair to bed;toilet  -MN     Transfer Training PT LTG, [place] Level  contact guard assist  -MN     Transfer Training PT  LTG, Date Goal Reviewed 17  -TW 17  -MN     Transfer Training PT LTG, Outcome goal ongoing  -TW goal revised  -MN     Gait  Training PT LTG    Gait Training Goal PT LTG, Date Established  05/15/17  -MN     Gait Training Goal PT LTG, Time to Achieve  2 wks  -MN     Gait Training Goal PT LTG, [place] Level  contact guard assist  -MN     Gait Training Goal PT LTG, Assist Device  walker, rolling  -MN     Gait Training Goal PT LTG, Distance to Achieve  50 ft  -MN     Gait Training Goal PT LTG, Date Goal Reviewed 17  -TW 17  -MN     Gait Training Goal PT LTG, Outcome goal ongoing  -TW goal revised  -MN       User Key  (r) = Recorded By, (t) = Taken By, (c) = Cosigned By    Initials Name Provider Type    WILLIAMS Diana, PT Physical Therapist    TW Niall Agrawal, PTA Physical Therapy Assistant              PT Discharge Summary  Anticipated Discharge Disposition: other (see comments) (hospice)  Reason for Discharge: Per MD order, Change in medical status, Patient   Outcomes Achieved: Refer to plan of care for updates on goals achieved  Discharge Destination: other (comment) ()      Chris Yepez, PTA   6/3/2017

## 2017-06-03 NOTE — NURSING NOTE
Successfully contacted family at 05:33am. RN spoke with  Talha Troncoso and explained there was a change in his wife's status.

## 2017-06-03 NOTE — PLAN OF CARE
Problem: Patient Care Overview (Adult)  Goal: Plan of Care Review  Outcome: Ongoing (interventions implemented as appropriate)    06/01/17 1203 06/02/17 0340 06/02/17 7354   Coping/Psychosocial Response Interventions   Plan Of Care Reviewed With patient --  --    Patient Care Overview   Progress --  declining --    Outcome Evaluation   Outcome Summary/Follow up Plan --  --  Pt is unresponsive. Showing increased signs of discomfort. Respirations are shallow and accelerated. Pt has required medication for pain and anxiety at each round this shift.        Goal: Adult Individualization and Mutuality  Outcome: Ongoing (interventions implemented as appropriate)  Goal: Discharge Needs Assessment  Outcome: Ongoing (interventions implemented as appropriate)    Problem: Cardiac: Heart Failure (Adult)  Goal: Signs and Symptoms of Listed Potential Problems Will be Absent or Manageable (Cardiac: Heart Failure)  Outcome: Ongoing (interventions implemented as appropriate)    Problem: Mechanical Ventilation, Invasive (Adult)  Goal: Signs and Symptoms of Listed Potential Problems Will be Absent or Manageable (Mechanical Ventilation, Invasive)  Outcome: Ongoing (interventions implemented as appropriate)    Problem: Pneumonia (Adult)  Goal: Signs and Symptoms of Listed Potential Problems Will be Absent or Manageable (Pneumonia)  Outcome: Ongoing (interventions implemented as appropriate)    Problem: Gastrointestinal Bleeding (Adult)  Goal: Signs and Symptoms of Listed Potential Problems Will be Absent or Manageable (Gastrointestinal Bleeding)  Outcome: Ongoing (interventions implemented as appropriate)    Problem: Pressure Ulcer Risk (Chaim Scale) (Adult,Obstetrics,Pediatric)  Goal: Skin Integrity  Outcome: Ongoing (interventions implemented as appropriate)

## 2017-06-04 NOTE — THERAPY DISCHARGE NOTE
Acute Care - Occupational Therapy Initial Eval/Discharge  [facility]     Patient Name: Eva Troncoso  : 1960  MRN: [id number]  Today's Date: 2017  Onset of Illness/Injury or Date of Surgery Date: 17  Date of Referral to OT: 17  Referring Physician: Dr. Camara      Admit Date: 2017       ICD-10-CM ICD-9-CM   1. Colostomy present Z93.3 V44.3   2. Essential hypertension I10 401.9   3. Bronchitis J40 490   4. Dehydration E86.0 276.51   5. Impaired mobility and ADLs Z74.09 799.89   6. Impaired physical mobility Z74.09 781.99   7. Impaired gait and mobility R26.89 781.2   8. Gastrointestinal hemorrhage, unspecified gastrointestinal hemorrhage type K92.2 578.9   9. Iron deficiency anemia due to chronic blood loss D50.0 280.0   10. Acute GI hemorrhage K92.2 578.9   11. 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   • Gastrointestinal hemorrhage   • Cellulitis of abdominal wall   • Skin irritation   • Disturbance in sleep behavior   • Hypertensive urgency   • Chronic bronchitis   • Tobacco dependence syndrome   • Gastroesophageal reflux disease without esophagitis   • Hypernatremia   • Chronic pain syndrome   • Acute pulmonary edema   • Acute respiratory distress   • Coronary artery disease involving native heart   • Serratia marcescens infection   • Fever   • Essential hypertension   • Petechiae   • Exophthalmos   • Iron deficiency anemia due to chronic  blood loss   • Severe muscle deconditioning   • Transaminitis   • Thrombocytopenia   • Ventilator dependence   • Increased oropharyngeal secretions   • Comfort measures only status     Past Medical History:   Diagnosis Date   • Anxiety state    • Chronic depression    • Chronic pain    • Compression fracture of lumbar spine, non-traumatic    • Depressive disorder    • Essential hypertension    • Obesity    • Pericardial effusion      Past Surgical History:   Procedure Laterality Date   • APPENDECTOMY  12/10/2012    Laparoscopic (1) - Acute suppurative appendicitis   • CARDIAC CATHETERIZATION  2014    75512 (1)   -  Successful PCI to the proximal LAD with a V stent reducing 99% stenosis to 0%   • COLONOSCOPY N/A 2017    Procedure: COLONOSCOPY;  Surgeon: Nader Osei DO;  Location: [facility] ENDOSCOPY;  Service:    • COLOSTOMY     • COLOSTOMY CLOSURE N/A 2017    Procedure: COLOSTOMY REVISION ;  Surgeon: Alfred Cary MD;  Location: [facility] OR;  Service:    • SHOULDER SURGERY  2015    (1) - Communited 3 part fractured humerus, proximal left shoulder. Open reduction and internal fixation   • TRACHEOSTOMY  2014    (1) - Percutaneous endoscopic gastrostomy tube placement.;            OT ASSESSMENT FLOWSHEET (last 72 hours)      OT Evaluation       17 0837 17 1956 17 0900 17 2024 17 0955    Clinical Impression    Anticipated Discharge Disposition other (see comments)   Pt .  -RB        Muscle Tone Assessment    Muscle Tone Assessment  Bilateral Upper Extremities;Bilateral Lower Extremities  -TC Bilateral Upper Extremities;Bilateral Lower Extremities  -SM Bilateral Upper Extremities;Bilateral Lower Extremities  -PO Bilateral Upper Extremities;Bilateral Lower Extremities  -JS    Bilateral Upper Extremities Muscle Tone Assessment  moderately decreased tone  -TC moderately decreased tone  -SM moderately decreased tone  -PO moderately decreased tone  -JS     Bilateral Lower Extremities Muscle Tone Assessment  moderately decreased tone  -TC moderately decreased tone  -SM moderately decreased tone  -PO moderately decreased tone  -JS      User Key  (r) = Recorded By, (t) = Taken By, (c) = Cosigned By    Initials Name Effective Dates    RB Julius Morgan, OT 06/15/16 -     TC Roberto Landeros Jr., LEANDRO 10/17/16 -     SM Faviola Kirkpatrick, LEANDRO 10/17/16 -     PO Shari Solorzano, RN 10/17/16 -     JS Carmen Osullivan, LEANDRO 10/17/16 -           Occupational Therapy Education     Title: PT OT SLP Therapies (Active)     Topic: Occupational Therapy (Active)     Point: ADL training (Active)    Description: Instruct learner(s) on proper safety adaptation and remediation techniques during self care or transfers.   Instruct in proper use of assistive devices.    Learning Progress Summary    Learner Readiness Method Response Comment Documented by Status   Patient Acceptance E NR   05/20/17 1204 Active    Nonacceptance E NR   05/18/17 1355 Active    Acceptance E NR   05/15/17 1022 Active               Point: Home exercise program (Active)    Description: Instruct learner(s) on appropriate technique for monitoring, assisting and/or progressing therapeutic exercises/activities.    Learning Progress Summary    Learner Readiness Method Response Comment Documented by Status   Patient Nonacceptance E NR   05/18/17 1355 Active               Point: Precautions (Active)    Description: Instruct learner(s) on prescribed precautions during self-care and functional transfers.    Learning Progress Summary    Learner Readiness Method Response Comment Documented by Status   Patient Nonacceptance E NR   05/18/17 1355 Active               Point: Body mechanics (Active)    Description: Instruct learner(s) on proper positioning and spine alignment during self-care, functional mobility activities and/or exercises.    Learning Progress Summary    Learner Readiness Method Response Comment  Documented by Status   Patient Nonacceptance E NR   17 1355 Active                      User Key     Initials Effective Dates Name Provider Type Discipline     10/17/16 -  NALINI White/L Occupational Therapy Assistant OT     10/17/16 -  NALINI Mejía/L Occupational Therapy Assistant OT     17 -  Kristel Price, OT Occupational Therapist OT                OT Recommendation and Plan  Anticipated Equipment Needs At Discharge: bedside commode  Anticipated Discharge Disposition: other (see comments) (Pt .)  Planned Therapy Interventions: activity intolerance, ADL retraining, balance training, bed mobility training, home exercise program, ROM (Range of Motion), strengthening, transfer training  Therapy Frequency:  (3-14x/wk)  Plan of Care Review  Outcome Summary/Follow up Plan: No goals met . Pt .           OT Goals       17 0828          Bed Mobility OT LTG    Bed Mobility OT LTG, Date Goal Reviewed 17  -RB      Bed Mobility OT LTG, Outcome goal no longer appropriate  -RB      Bed Mobility OT LTG, Reason Goal Not Met other (see comments)   Pt .  -RB      Transfer Training OT LTG    Transfer Training OT LTG, Date Goal Reviewed 17  -RB      Transfer Training OT LTG, Outcome goal no longer appropriate  -RB      Transfer Training OT LTG, Reason Goal Not Met other (see comments)   Pt deceaed.  -RB      ADL OT LTG    ADL OT LTG, Date Goal Reviewed 17  -RB      ADL OT LTG, Outcome goal no longer appropriate  -RB      ADL OT LTG, Reason Goal Not Met other (see comments)   Pt .  -RB        User Key  (r) = Recorded By, (t) = Taken By, (c) = Cosigned By    Initials Name Provider Type    RB Julius Mora, OT Occupational Therapist              Time Calculation:           OT G-codes  OT Professional Judgement Used?: Yes  OT Functional Scales Options: AM-PAC 6 Clicks Daily Activity (OT)  Score: 9  Functional Limitation: Self care  Self Care  Current Status (): At least 60 percent but less than 80 percent impaired, limited or restricted  Self Care Goal Status (): At least 1 percent but less than 20 percent impaired, limited or restricted    OT Discharge Summary  Anticipated Discharge Disposition: other (see comments) (Pt .)  Reason for Discharge: Patient   Outcomes Achieved: Other (No goal met due to pt .)    Julius Mora, OT  2017

## 2017-06-04 NOTE — SIGNIFICANT NOTE
17 0837   OT Discharge Summary   Anticipated Discharge Disposition other (see comments)  (Pt .)   Reason for Discharge Patient    Outcomes Achieved Other  (No goal met due to pt .)

## 2017-06-04 NOTE — PLAN OF CARE
Problem: Patient Care Overview (Adult)  Goal: Plan of Care Review  Outcome: Unable to achieve outcome(s) by discharge Date Met:  17   Outcome Evaluation   Outcome Summary/Follow up Plan No goals met . Pt .         Problem: Inpatient Occupational Therapy  Goal: Bed Mobility Goal LTG- OT  Outcome: Unable to achieve outcome(s) by discharge Date Met:  06/04/17    05/15/17 0850 17   Bed Mobility OT LTG   Bed Mobility OT LTG, Date Established 05/15/17 --    Bed Mobility OT LTG, Time to Achieve by discharge --    Bed Mobility OT LTG, Activity Type sidelying to sit/sit to sidelying --    Bed Mobility OT LTG, [place] Level moderate assist (50% patient effort) --    Bed Mobility OT LTG, Assist Device bed rails --    Bed Mobility OT LTG, Date Goal Reviewed --  17   Bed Mobility OT LTG, Outcome --  goal no longer appropriate   Bed Mobility OT LTG, Reason Goal Not Met --  other (see comments)  (Pt .)       Goal: Transfer Training Goal 1 LTG- OT  Outcome: Unable to achieve outcome(s) by discharge Date Met:  06/04/17    05/15/17 0850 17   Transfer Training OT LTG   Transfer Training OT LTG, Date Established 05/15/17 --    Transfer Training OT LTG, Time to Achieve by discharge --    Transfer Training OT LTG, Activity Type sit to stand/stand to sit --    Transfer Training OT LTG, [place] Level moderate assist (50% patient effort) --    Transfer Training OT LTG, Assist Device walker, rolling --    Transfer Training OT LTG, Date Goal Reviewed --  17   Transfer Training OT LTG, Outcome --  goal no longer appropriate   Transfer Training OT LTG, Reason Goal Not Met --  other (see comments)  (Pt deceaed.)       Goal: ADL Goal LTG- OT  Outcome: Unable to achieve outcome(s) by discharge Date Met:  06/04/17    05/15/17 0850 17   ADL OT LTG   ADL OT LTG, Date Established 05/15/17 --    ADL OT LTG, Time to Achieve by discharge --    ADL OT LTG,  Activity Type ADL skills --    ADL OT LTG, [place] Level mod assist;assistive device --    ADL OT LTG, Date Goal Reviewed --  17   ADL OT LTG, Outcome --  goal no longer appropriate   ADL OT LTG, Reason Goal Not Met --  other (see comments)  (Pt .)

## 2017-06-05 NOTE — DISCHARGE SUMMARY
FAMILY MEDICINE DISCHARGE SUMMARY       NAME: Eva Troncoso   PHYSICIAN: Mary Spangler MD  : 1960  MRN: [id number]    ADMITTED: 2017   DISCHARGED: 17      ADMISSION DIAGNOSES     Present on Admission:  Acute respiratory failure with hypoxia  • Hypertensive urgency  • Chronic hepatitis C without hepatic coma  • CKD (chronic kidney disease) stage 3, GFR 30-59 ml/min  • (HFpEF) heart failure with preserved ejection fraction  • Chronic pain  • Leg swelling  • Coronary artery disease involving native heart  • Essential hypertension  • Gastrointestinal hemorrhage    DISCHARGE DIAGNOSES     Active Problems:    Comfort measures only status    Multiorgan failure secondary to cirrhosis and ventilator dependence secondary to acute hypoxic respiratory failure    Ventilator dependence    Acute hypoxic respiratory failure    Hypertensive urgency    Increased oropharyngeal secretions    Colostomy present    Chronic pain    Chronic hepatitis C without hepatic coma    (HFpEF) heart failure with preserved ejection fraction    Leg swelling    CKD (chronic kidney disease) stage 3, GFR 30-59 ml/min    Gastrointestinal hemorrhage    Coronary artery disease involving native heart    Essential hypertension    Petechiae    Exophthalmos    Iron deficiency anemia due to chronic blood loss    Severe muscle deconditioning    Transaminitis    Thrombocytopenia    SERVICE     Family Medicine.   Attending Dr. June  Resident Mary Spangler MD    CONSULTS     Consult Orders (all)     Start     Ordered    17 0646  Inpatient Consult to Wound Care MD  Once,   Status:  Canceled     Specialty:  Wound Care  Provider:  (Not yet assigned)    17 0645    175  Inpatient Consult to Gastroenterology  Once,   Status:  Canceled     Specialty:  Gastroenterology  Provider:  Nader Osei DO    17 2235    17 0749  Inpatient Consult to Case Management Social  Services  Once     Provider:  (Not yet assigned)    05/17/17 0749    05/11/17 2206  Inpatient Consult to General Surgery  Once     Specialty:  General Surgery  Provider:  Alfred Cary MD    05/11/17 2205    05/08/17 0928  Inpatient Consult to Nutrition  Once     Comments:  Needs tube feeding goal ?can start pulmocare   Provider:  (Not yet assigned)    05/08/17 0929    05/07/17 1134  Inpatient Consult to Pulmonology  Once,   Status:  Canceled     Specialty:  Pulmonary Disease  Provider:  Sohail Adams MD    05/07/17 1134    05/07/17 0618  Inpatient Consult to Gastroenterology  Once     Specialty:  Gastroenterology  Provider:  Nader Osei DO    05/07/17 0617    05/07/17 0617  Inpatient Consult to Hematology & Oncology  Once,   Status:  Canceled     Specialty:  Hematology and Oncology  Provider:  Ishaan Chandra MD    05/07/17 0617    05/07/17 0617  Inpatient Consult to Cardiology  Once     Specialty:  Cardiology  Provider:  Rafi Hoskins MD    05/07/17 0617    05/06/17 2109  Inpatient Consult to Nephrology  Once,   Status:  Canceled     Specialty:  Nephrology  Provider:  (Not yet assigned)    05/06/17 2108    05/06/17 2109  Inpatient Consult to Nephrology  Once     Specialty:  Nephrology  Provider:  Brittanie Bell MD    05/06/17 2109 05/06/17 0853  Cardiac Rehab Evaluation and Enrollment  Once     Provider:  (Not yet assigned)    05/06/17 0852    05/06/17 0853  Cardiac Rehab Evaluation and Enrollment  Once     Provider:  (Not yet assigned)    05/06/17 0852    05/06/17 0727  Family Practice - Resident (on-call MD unless specified)  Once,   Status:  Canceled     Specialty:  Family Medicine  Provider:  Kory Vasquez MD    05/06/17 0726          PROCEDURES     Intubation and extubation    HISTORY OF PRESENT ILLNESS:     Shortness of Breath   This is a new problem. The current episode started yesterday. The problem occurs constantly. Associated symptoms include leg swelling, orthopnea, PND, a rash,  sputum production and wheezing. Pertinent negatives include no abdominal pain, chest pain, claudication, coryza, ear pain, fever, headaches, hemoptysis, leg pain, neck pain, rhinorrhea, sore throat, swollen glands, syncope or vomiting. Exacerbated by: Couldnt get her medicines Risk factors include prolonged immobilization. She has tried beta agonist inhalers, body position changes, ipratropium inhalers, oral steroids and steroid inhalers for the symptoms. The treatment provided mild relief. Her past medical history is significant for CAD, chronic lung disease, COPD, a heart failure and pneumonia. There is no history of allergies, aspirin allergies, asthma or bronchiolitis.      Patient has a hx of intubation in the past. Pt was given Duonebs in ER. Patient is not talking very much; she is drowsy.    DIAGNOSTIC DATA     Results for TAINA HENSON (MRN [id number]) as of 6/5/2017 14:03   5/31/2017 03:22   pH, Arterial 7.316 (L)   pCO2, Arterial 30.9 (L)   pO2, Arterial 131.9 (H)   HCO3, Arterial 15.4 (L)   Base Excess, Arterial -9.7 (L)   O2 Saturation, Arterial 98.3   Hemoglobin, Blood Gas 8.9 (L)   Hematocrit, Blood Gas 26.0   Sodium, Arterial 131.2 (L)   Potassium, Arterial 3.90   Glucose, Arterial 84     Results for TAINA HENSNO (MRN [id number]) as of 6/5/2017 14:03   5/30/2017 05:00   Glucose 83   Sodium 135 (L)   Potassium 4.3   CO2 15.0 (L)   Chloride 109   Anion Gap 11.0   Creatinine 1.33 (H)   BUN 71 (H)   BUN/Creatinine Ratio 53.4 (H)   Calcium 7.7 (L)   eGFR Non African Amer 41 (L)   Alkaline Phosphatase 67   Total Protein 4.6 (L)   ALT (SGPT) 102 (H)   AST (SGOT) 66 (H)   Total Bilirubin 1.1   Albumin 2.10 (L)   Globulin 2.5   A/G Ratio 0.8 (L)     Results for TAINA HENSON (MRN [id number]) as of 6/5/2017 14:03   5/30/2017 05:00   WBC 5.90   RBC 2.80 (L)   Hemoglobin 8.3 (L)   Hematocrit 23.9 (L)   RDW 15.6 (H)   MCV 85.4   MCH 29.6   MCHC 34.7   MPV 10.3   Platelets 91 (L)   RDW-SD 48.8 (H)      CXR:  IMPRESSION:  Hypoinflated lungs accentuating the bronchovascular  markings. No significant interval change.    Death Summary     Patient was admitted to our facility for acute hypoxic respiratory failure. She was intubated in the ED and after being on ventilator for 4-5 days, she was successfully extubated. It is important to note that patient was recently released from [facility] after 30 days. However, during her stay this time, she bled from her colostomy site and Dr. Osei was consulted regarding this, who did a bedside stitch on her to stop the bleeding. Dr. Cary was then consulted, who put in more stitches to stop the bleeding. Patient was transferred to the regular floor, however, her Hg continued to drop. Dr. Osei mentioned that there is no intervention from his standpoint. So Dr. Cary was consulted for revision of the colostomy. Patient underwent surgery under general anesthesia by dr. Cary and Dr. Schneider. Please see the op note for full details. It is important to note that the patient bled 200ccs from 10 cm incision site. Moreover, the new finding of cirrhotic liver was discovered in this laparotomy. This points to the underlying reason for anemia and thrombocytopenia. The liver cirrhosis was not found in the previous CT and/or US. Patient got a total of 6-8 RBCs and 2-4 units of platelets during this admission. She continued to drop her hemoglobin and platelets during the stay. Dr. Chandra was consulted who recommended to continue platelet and blood transfusion if fibrinogen level < 200 and Hg <7.0 respectively.     Over the course of stay, patient started to detoriate poorly. Dr. Adams was consulted for ventilator management, who noted that patient is ventilator dependence and is not having any improvement as far as the weaning process. Patient started to have multiorgan disease at this point which includes acute hypoxic respiratory failure, end stage liver disease, anemia secondary to liver  disease and stage 3 CKD. Multiple family meetings were held to explain the poor prognosis to the family. After multiple interventional talk,  consulted with family members and decided to withdraw care and change her to comfort measures only. Patient stayed in comfort measures, which included routine morphine, ativan, atropine and scopolamine patches. Patient passed away on 6/3/2017 at 1:55 am. Dr. Brambila was on call resident and discharged the patient to family from the hospital under Dr. June's supervision.        DISCHARGE CONDITION          DISPOSITION      at 1:55 am on 6/3/2017    Time: 40 mins    Dr. June is the attending at time of discharge, he is aware of the patient's status and agrees with the above discharge summary.    SIGNATURE     This document has been electronically signed by Mary Spangler MD on 2017 1:46 PM    Mary Spangler MD, GEENA  [address] [address] [phone]

## 2024-11-29 NOTE — PROGRESS NOTES
Acute Care - Physical Therapy Treatment Note  [facility]     Patient Name: Eva Troncoso  : 1960  MRN: [id number]  Today's Date: 3/9/2017  Onset of Illness/Injury or Date of Surgery Date: 17  Date of Referral to PT: 17  Referring Physician: Dr. Spangler    Admit Date: 3/4/2017    Visit Dx:    ICD-10-CM ICD-9-CM   1. Chest pain, unspecified type R07.9 786.50   2. Acute systolic congestive heart failure I50.21 428.21     428.0   3. Hypertensive emergency I16.1 401.9   4. Impaired physical mobility Z74.09 781.99   5. Impaired gait and mobility R26.89 781.2   6. 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               Adult Rehabilitation Note       17 1125 17 1340 17 1040    Rehab Assessment/Intervention    Discipline physical therapy assistant  -LN physical therapy assistant  -LN physical therapy assistant  -LN    Document Type therapy note (daily note)  -LN therapy note (daily note)  -LN therapy note (daily note)  -LN    Subjective Information agree to therapy;complains of;dyspnea  -LN agree to therapy;no complaints  -LN agree to therapy;complains of;pain  -LN    Precautions/Limitations fall precautions   gt belt placement high due to colostomy bag  -LN fall precautions   gt belt placement high due to colostomy bag  -LN fall precautions   gait belt plaecement high due to colostomy bag  -LN    Patient Response to Treatment as pta  Received request for prescription refill for patient.  Patient follows with Dr. Jadyn Ramirez MD, FACC, FACP, RS     Request is for metoprolol  Is patient currently on medication- yes    Last OV- 8/26/24  Next OV- 3/4/25    Pended for signing and sent to provider.    leaving room pt incontinent of urine-pta gathered clothes and pt washed off and dressed ind  -LN      Recorded by [LN] Abigail Spears, PTA [LN] Abigail Spears, PTA [LN] Abigail Spears, PTA    Vital Signs    Pre Systolic BP Rehab 167  -  -  -LN    Pre Treatment Diastolic BP 72  -LN 79  -LN 77  -LN    Post Systolic BP Rehab  160  -LN     Post Treatment Diastolic BP  80  -LN     Pretreatment Heart Rate (beats/min) 68  -LN 69  -LN 63  -LN    Intratreatment Heart Rate (beats/min) 78  -LN 82  -LN     Posttreatment Heart Rate (beats/min) 72  -LN 69  -LN     Pre SpO2 (%) 95  -LN 93  -LN 94  -LN    O2 Delivery Pre Treatment room air  -LN room air  -LN room air  -LN    Intra SpO2 (%) 96  -LN 94  -LN     Post SpO2 (%) 98  -LN 94  -LN     Pre Patient Position Supine  -LN Supine  -LN Supine  -LN    Intra Patient Position Standing  -LN Standing  -LN Sitting  -LN    Post Patient Position Sitting  -LN Sitting  -LN Supine  -LN    Rest Breaks  1  -LN 1  -LN     Recorded by [LN] Abigail Spears, PTA [LN] Abigail Spears, PTA [LN] Abigail Spears, PTA    Pain Assessment    Pain Assessment 0-10  -LN 0-10  -LN 0-10  -LN    Pain Score 7  -LN 0  -LN 7  -LN    Post Pain Score 6  -LN 0  -LN 7  -LN    Pain Type Acute pain  -LN  Acute pain  -LN    Pain Location Chest  -LN  Chest  -LN    Pain Orientation Right;Left  -LN  Right;Left  -LN    Pain Intervention(s)   --   nsg aware  -LN    Recorded by [LN] Abigail Spears, PTA [LN] Abigail Spears, PTA [LN] Abigail Spears, PTA    Cognitive Assessment/Intervention    Orientation Status oriented to;person     -LN oriented to;person     -LN oriented to;person;place     -LN    Recorded by [LN] Abigail Spears, PTA [LN] Abigail Spears, PTA [LN] Abigail S Tory, PTA    Bed Mobility, Assessment/Treatment    Bed Mobility, Assistive Device bed rails;head of bed elevated  -LN bed rails;head of bed elevated  -LN bed rails;head of bed elevated  -LN    Bed Mobility, Roll Right, [place] conditional independence  -LN  conditional independence  -LN conditional independence  -LN    Bed Mobility, Scoot/Bridge, [place]   conditional independence  -LN    Bed Mob, Supine to Sit, [place]  conditional independence  -LN supervision required  -LN    Bed Mob, Sit to Supine, Spink   supervision required  -LN    Bed Mobility, Comment   pt colostomy bag busted open once eob of bed-pt laid back down and nsg called-rx ended-will check back in pm  -LN    Recorded by [LN] Abigail Spears, PTA [LN] Abigail Spears, PTA [LN] Abigail Spears, PTA    Transfer Assessment/Treatment    Transfers, Bed-Chair [place] stand by assist;contact guard assist  -LN contact guard assist  -LN     Transfers, Bed-Chair-Bed, Assist Device --   none  -LN --   iv pole  -LN     Transfers, Sit-Stand [place] independent  -LN supervision required  -LN     Transfers, Stand-Sit [place] independent  -LN supervision required  -LN     Transfers, Sit-Stand-Sit, Assist Device --   none  -LN --   none  -LN     Recorded by [LN] Abigail Spears, PTA [LN] Abigail Spears, PTA     Gait Assessment/Treatment    Gait, [place] Level stand by assist  -LN contact guard assist  -LN     Gait, Assistive Device --   none  -LN --   iv pole  -LN     Gait, Distance (Feet) 160  -  -LN     Gait, Gait Deviations step length decreased  -LN step length decreased  -LN     Recorded by [LN] Abigail Spears, PTA [LN] Abigail Spears, PTA     Stairs Assessment/Treatment    Number of Stairs 3  -LN      Stairs, Handrail Location right side (ascending)  -LN      Stairs, [place] Level contact guard assist  -LN      Stairs, Assistive Device --   none  -LN      Stairs, Technique Used step over step (ascending);step over step (descending)  -LN      Recorded by [LN] Abigail Spears, PTA      Upper Body Dressing Assessment/Training    UB Dressing Assess/Train, Clothing Type donning:;t-shirt  -LN      UB Dressing Assess/Train, Assist Device --   none,ind  -LN      Recorded by [LN] Abigail Spears,  PTA      Lower Body Dressing Assessment/Training    LB Dressing Assess/Train, Clothing Type donning:;pants;shoes;socks  -LN      LB Dressing Assess/Train, Assist Device --   ind  -LN      Recorded by [LN] Abigail Spears PTA      Therapy Exercises    Bilateral Lower Extremities  AROM:;15 reps;hip flexion;LAQ   x 2 sets;25 ankle pumps  -LN     Recorded by  [LN] Abigail Spears PTA     Positioning and Restraints    Post Treatment Position chair  -LN chair  -LN bed  -LN    In Bed   notified nsg;supine;call light within reach;encouraged to call for assist;exit alarm on  -LN    In Chair notified nsg;sitting;call light within reach;encouraged to call for assist  -LN sitting;call light within reach;encouraged to call for assist  -LN     Recorded by [LN] Abigail Spears, PTA [LN] Abigail Spears, PTA [LN] Abigail Spears, PTA      User Key  (r) = Recorded By, (t) = Taken By, (c) = Cosigned By    Initials Name Effective Dates    LN Abigail Spears, BANG 10/17/16 -                 IP PT Goals       03/09/17 1125 03/08/17 1340 03/08/17 1040    Transfer Training PT STG    Transfer Training PT STG, Date Goal Reviewed 03/09/17  -LN 03/08/17  -LN 03/08/17  -LN    Transfer Training PT STG, Outcome goal not met  -LN goal not met  -LN goal not met  -LN    Transfer Training PT STG, Reason Goal Not Met progress slower than expected  -LN progress slower than expected  -LN progress slower than expected  -LN    Gait Training PT STG    Gait Training Goal PT STG, Date Goal Reviewed 03/09/17  -LN 03/08/17  -LN 03/08/17  -LN    Gait Training Goal PT STG, Outcome goal not met  -LN goal not met  -LN goal not met  -LN    Gait Training Goal PT STG, Reason Goal Not Met progress slower than expected  -LN progress slower than expected  -LN progress slower than expected  -LN    Stair Training PT LTG    Stair Training Goal PT LTG, Date Established  03/08/17  -LN     Stair Training Goal PT LTG, Date Goal Reviewed 03/09/17  -LN 03/08/17  -LN 03/08/17  -LN    Stair  Training Goal PT LTG, Outcome goal not met  -LN goal not met  -LN goal not met  -LN    Stair Training Goal PT LTG, Reason Goal Not Met progress slower than expected  -LN progress slower than expected  -LN progress slower than expected  -LN      03/07/17 1440          Transfer Training PT STG    Transfer Training PT STG, Date Established 03/07/17  -MM      Transfer Training PT STG, Time to Achieve 3 days  -MM      Transfer Training PT STG, Activity Type all transfers  -MM      Transfer Training PT STG, [place] Level conditional independence  -MM      Transfer Training PT STG, Assist Device --   AAD  -MM      Transfer Training PT STG, Date Goal Reviewed 03/07/17  -MM      Transfer Training PT STG, Outcome goal ongoing  -MM      Gait Training PT STG    Gait Training Goal PT STG, Date Established 03/07/17  -MM      Gait Training Goal PT STG, [place] Level conditional independence  -MM      Gait Training Goal PT STG, Assist Device --   AAD  -MM      Gait Training Goal PT STG, Distance to Achieve 150'; O2 sats >92%  -MM      Gait Training Goal PT STG, Date Goal Reviewed 03/07/17  -MM      Gait Training Goal PT STG, Outcome goal ongoing  -MM      Stair Training PT LTG    Stair Training Goal PT LTG, Date Established 03/07/17  -MM      Stair Training Goal PT LTG, Time to Achieve 5 days  -MM      Stair Training Goal PT LTG, [place] Level conditional independence  -MM      Stair Training Goal PT LTG, Assist Device 1 handrail  -MM      Stair Training Goal PT LTG, Distance to Achieve 3 steps  -MM      Stair Training Goal PT LTG, Date Goal Reviewed 03/07/17  -MM      Stair Training Goal PT LTG, Outcome goal ongoing  -MM        User Key  (r) = Recorded By, (t) = Taken By, (c) = Cosigned By    Initials Name Provider Type    LN Abigail Spears PTA Physical Therapy Assistant    MM Sujey Burton, PT Physical Therapist          Physical Therapy Education     Title: PT OT SLP Therapies (Active)     Topic: Physical Therapy  (Active)     Point: Mobility training (Active)    Learning Progress Summary    Learner Readiness Method Response Comment Documented by Status   Patient Acceptance E NR  LN 03/09/17 1254 Active    Acceptance E NR  LN 03/08/17 1442 Active    Acceptance E NR  LN 03/08/17 1429 Active    Acceptance E VU  MM 03/07/17 1439 Done               Point: Home exercise program (Active)    Learning Progress Summary    Learner Readiness Method Response Comment Documented by Status   Patient Acceptance E NR  LN 03/08/17 1442 Active    Acceptance E VU  MM 03/07/17 1439 Done               Point: Body mechanics (Active)    Learning Progress Summary    Learner Readiness Method Response Comment Documented by Status   Patient Acceptance E NR  LN 03/09/17 1254 Active    Acceptance E NR  LN 03/08/17 1442 Active    Acceptance E NR  LN 03/08/17 1429 Active    Acceptance E VU  MM 03/07/17 1439 Done               Point: Precautions (Active)    Learning Progress Summary    Learner Readiness Method Response Comment Documented by Status   Patient Acceptance E NR  LN 03/09/17 1254 Active    Acceptance E NR  LN 03/08/17 1442 Active    Acceptance E NR  LN 03/08/17 1429 Active    Acceptance E VU  MM 03/07/17 1439 Done                      User Key     Initials Effective Dates Name Provider Type Discipline    LN 10/17/16 -  Abigail Spears, PTA Physical Therapy Assistant PT    MM 12/28/16 -  Sujey Burton PT Physical Therapist PT                    PT Recommendation and Plan  Anticipated Discharge Disposition: home with assist, home with home health  Planned Therapy Interventions: gait training, home exercise program, balance training, patient/family education, stair training, strengthening, transfer training  PT Frequency: 3-5 times/wk  Plan of Care Review  Plan Of Care Reviewed With: patient  Progress: improving  Outcome Summary/Follow up Plan: amb 160' w/o aad sba with increased chest pain,up/down 3 steps with 1 hr and cga of 1-would benefit from hh  PT          Outcome Measures       03/09/17 1125 03/08/17 1340 03/08/17 1040    How much help from another person do you currently need...    Turning from your back to your side while in flat bed without using bedrails? 4  -LN 4  -LN 4  -LN    Moving from lying on back to sitting on the side of a flat bed without bedrails? 4  -LN 4  -LN 4  -LN    Moving to and from a bed to a chair (including a wheelchair)? 3  -LN 3  -LN 3  -LN    Standing up from a chair using your arms (e.g., wheelchair, bedside chair)? 4  -LN 3  -LN 3  -LN    Climbing 3-5 steps with a railing? 3  -LN 3  -LN 3  -LN    To walk in hospital room?  3  -LN 3  -LN    AM-PAC 6 Clicks Score  20  -LN 20  -LN    Functional Assessment    Outcome Measure Options AM-PAC 6 Clicks Basic Mobility (PT)  -LN AM-PAC 6 Clicks Basic Mobility (PT)  -LN AM-PAC 6 Clicks Basic Mobility (PT)  -LN      03/07/17 1400          How much help from another person do you currently need...    Turning from your back to your side while in flat bed without using bedrails? 4  -MM      Moving from lying on back to sitting on the side of a flat bed without bedrails? 4  -MM      Moving to and from a bed to a chair (including a wheelchair)? 3  -MM      Standing up from a chair using your arms (e.g., wheelchair, bedside chair)? 3  -MM      Climbing 3-5 steps with a railing? 3  -MM      To walk in hospital room? 3  -MM      AM-PAC 6 Clicks Score 20  -MM      Functional Assessment    Outcome Measure Options AM-PAC 6 Clicks Basic Mobility (PT)  -MM        User Key  (r) = Recorded By, (t) = Taken By, (c) = Cosigned By    Initials Name Provider Type    KRISTA Spears PTA Physical Therapy Assistant    TYLER Burton PT Physical Therapist           Time Calculation:         PT Charges       03/09/17 1125          Time Calculation    Start Time 1125  -LN      Stop Time 1150  -LN      Time Calculation (min) 25 min  -LN      PT Received On 03/09/17  -LN      Time Calculation- PT    Total Timed  Code Minutes- PT 25 minute(s)  -LN        User Key  (r) = Recorded By, (t) = Taken By, (c) = Cosigned By    Initials Name Provider Type    LN Abigail Spears PTA Physical Therapy Assistant          Therapy Charges for Today     Code Description Service Date Service Provider Modifiers Qty    79472395839 HC PT THERAPEUTIC ACT EA 15 MIN 3/8/2017 Abigail ROLAND Tory, PTA GP, KX 1    13924282436 HC GAIT TRAINING EA 15 MIN 3/8/2017 Abigail S Tory, PTA GP, KX 1    98081300950 HC PT THER PROC EA 15 MIN 3/8/2017 Abigail S Tory, PTA GP, KX 1    64987466487 HC GAIT TRAINING EA 15 MIN 3/9/2017 Abigail S Tory, PTA GP, KX 1    27567347589 HC PT THERAPEUTIC ACT EA 15 MIN 3/9/2017 Abigail S Tory, PTA GP, KX 1          PT G-Codes  PT Professional Judgement Used?: Yes  Outcome Measure Options: AM-PAC 6 Clicks Basic Mobility (PT)  Score:  (20)  Functional Limitation: Mobility: Walking and moving around  Mobility: Walking and Moving Around Current Status (): At least 20 percent but less than 40 percent impaired, limited or restricted  Mobility: Walking and Moving Around Goal Status (): At least 1 percent but less than 20 percent impaired, limited or restricted    Abigail Spears PTA  3/9/2017

## (undated) DEVICE — Device

## (undated) DEVICE — GOWN SURG PREVENTION PLUS IMPERV XLNG 2XL

## (undated) DEVICE — SUT ETHLN 2/0 FS 18IN 664H

## (undated) DEVICE — SPNG GZ WOVN 4X4IN 12PLY 10/BX STRL

## (undated) DEVICE — GLV SURG TRIUMPH LT PF LTX 7 STRL

## (undated) DEVICE — GLV SURG SENSICARE GREEN W/ALOE PF LF 8.5 STRL

## (undated) DEVICE — GLV SURG TRIUMPH LT PF LTX 6 STRL

## (undated) DEVICE — GOWN,AURORA,NOREINF,RAGLAN,XL,STERILE: Brand: MEDLINE

## (undated) DEVICE — GLV SURG TRIUMPH LT PF LTX 7.5 STRL

## (undated) DEVICE — SINGLE-USE BIOPSY FORCEPS: Brand: RADIAL JAW 4

## (undated) DEVICE — SUT VICRYL 3-0 SH-1 PO 18IN J772D

## (undated) DEVICE — SUT PDS 0 CT-1 Z340H

## (undated) DEVICE — PK MAJ PROC LF 60

## (undated) DEVICE — SUT VIC 3/0 TIES 18IN J110T

## (undated) DEVICE — TOWEL,OR,DSP,ST,BLUE,DLX,4/PK,20PK/CS: Brand: MEDLINE

## (undated) DEVICE — SUT VIC 2/0 TIES 18IN J111T

## (undated) DEVICE — ELECTRD BLD STD SS 3/32X6.5IN

## (undated) DEVICE — SUT VIC 2/0 SH 27IN

## (undated) DEVICE — SUT MONOCRYL 4/0 PS2 27IN Y426H ETY426H

## (undated) DEVICE — TOWEL,OR,DSP,ST,BLUE,DLX,8/PK,10PK/CS: Brand: MEDLINE

## (undated) DEVICE — STERILE POLYISOPRENE POWDER-FREE SURGICAL GLOVES: Brand: PROTEXIS

## (undated) DEVICE — STERILE POLYISOPRENE POWDER-FREE SURGICAL GLOVES WITH EMOLLIENT COATING: Brand: PROTEXIS

## (undated) DEVICE — GLV SURG SENSICARE GREEN W/ALOE PF LF 6 STRL

## (undated) DEVICE — GLV SURG TRIUMPH LT PF LTX 8 STRL

## (undated) DEVICE — SUT VIC 3/0 SH 27IN J416H

## (undated) DEVICE — GLV SURG TRIUMPH LT PF LTX 6.5 STRL

## (undated) DEVICE — GLV SURG SENSICARE GREEN W/ALOE PF LF 6.5 STRL

## (undated) DEVICE — SUT SILK 2/0 FS BLK 18IN 685G

## (undated) DEVICE — SPNG LAP 18X18IN LF STRL PK/5

## (undated) DEVICE — SUT PDS 1 XLH LP 99IN Z881G

## (undated) DEVICE — CANN SMPL SOFTECH BIFLO ETCO2 A/M 7FT

## (undated) DEVICE — GLV SURG SENSICARE GREEN W/ALOE PF LF 7 STRL

## (undated) DEVICE — SUT VIC 0 CT 36IN J958H

## (undated) DEVICE — DRP WARMR MACH